# Patient Record
Sex: MALE | Race: WHITE | NOT HISPANIC OR LATINO | Employment: OTHER | URBAN - NONMETROPOLITAN AREA
[De-identification: names, ages, dates, MRNs, and addresses within clinical notes are randomized per-mention and may not be internally consistent; named-entity substitution may affect disease eponyms.]

---

## 2017-09-04 ENCOUNTER — HOSPITAL ENCOUNTER (EMERGENCY)
Facility: HOSPITAL | Age: 57
Discharge: HOME | End: 2017-09-04
Attending: INTERNAL MEDICINE | Admitting: INTERNAL MEDICINE
Payer: MEDICARE

## 2017-09-04 VITALS
TEMPERATURE: 98.1 F | DIASTOLIC BLOOD PRESSURE: 94 MMHG | RESPIRATION RATE: 18 BRPM | SYSTOLIC BLOOD PRESSURE: 151 MMHG | OXYGEN SATURATION: 100 %

## 2017-09-04 DIAGNOSIS — K59.01 SLOW TRANSIT CONSTIPATION: ICD-10-CM

## 2017-09-04 DIAGNOSIS — K21.9 GASTROESOPHAGEAL REFLUX DISEASE, ESOPHAGITIS PRESENCE NOT SPECIFIED: ICD-10-CM

## 2017-09-04 LAB
ALBUMIN SERPL-MCNC: 3.4 G/DL (ref 3.4–5)
ALP SERPL-CCNC: 136 U/L (ref 40–150)
ALT SERPL W P-5'-P-CCNC: 15 U/L (ref 0–70)
AMYLASE SERPL-CCNC: 28 U/L (ref 30–110)
ANION GAP SERPL CALCULATED.3IONS-SCNC: 10 MMOL/L (ref 3–14)
AST SERPL W P-5'-P-CCNC: 15 U/L (ref 0–45)
BASOPHILS # BLD AUTO: 0 10E9/L (ref 0–0.2)
BASOPHILS NFR BLD AUTO: 0.4 %
BILIRUB SERPL-MCNC: 0.4 MG/DL (ref 0.2–1.3)
BUN SERPL-MCNC: 12 MG/DL (ref 7–30)
CALCIUM SERPL-MCNC: 8.6 MG/DL (ref 8.5–10.1)
CHLORIDE SERPL-SCNC: 102 MMOL/L (ref 94–109)
CO2 SERPL-SCNC: 24 MMOL/L (ref 20–32)
CREAT SERPL-MCNC: 0.88 MG/DL (ref 0.66–1.25)
DIFFERENTIAL METHOD BLD: ABNORMAL
EOSINOPHIL # BLD AUTO: 0.1 10E9/L (ref 0–0.7)
EOSINOPHIL NFR BLD AUTO: 1.3 %
ERYTHROCYTE [DISTWIDTH] IN BLOOD BY AUTOMATED COUNT: 12.1 % (ref 10–15)
GFR SERPL CREATININE-BSD FRML MDRD: 90 ML/MIN/1.7M2
GLUCOSE SERPL-MCNC: 104 MG/DL (ref 70–99)
HCT VFR BLD AUTO: 39.1 % (ref 40–53)
HGB BLD-MCNC: 13.8 G/DL (ref 13.3–17.7)
IMM GRANULOCYTES # BLD: 0 10E9/L (ref 0–0.4)
IMM GRANULOCYTES NFR BLD: 0.3 %
LIPASE SERPL-CCNC: 96 U/L (ref 73–393)
LYMPHOCYTES # BLD AUTO: 1.9 10E9/L (ref 0.8–5.3)
LYMPHOCYTES NFR BLD AUTO: 27.3 %
MCH RBC QN AUTO: 32.5 PG (ref 26.5–33)
MCHC RBC AUTO-ENTMCNC: 35.3 G/DL (ref 31.5–36.5)
MCV RBC AUTO: 92 FL (ref 78–100)
MONOCYTES # BLD AUTO: 0.7 10E9/L (ref 0–1.3)
MONOCYTES NFR BLD AUTO: 9.4 %
NEUTROPHILS # BLD AUTO: 4.2 10E9/L (ref 1.6–8.3)
NEUTROPHILS NFR BLD AUTO: 61.3 %
NRBC # BLD AUTO: 0 10*3/UL
NRBC BLD AUTO-RTO: 0 /100
PLATELET # BLD AUTO: 122 10E9/L (ref 150–450)
POTASSIUM SERPL-SCNC: 3.4 MMOL/L (ref 3.4–5.3)
PROT SERPL-MCNC: 7.3 G/DL (ref 6.8–8.8)
RBC # BLD AUTO: 4.24 10E12/L (ref 4.4–5.9)
SODIUM SERPL-SCNC: 136 MMOL/L (ref 133–144)
TROPONIN I SERPL-MCNC: <0.015 UG/L (ref 0–0.04)
WBC # BLD AUTO: 6.9 10E9/L (ref 4–11)

## 2017-09-04 PROCEDURE — 25000125 ZZHC RX 250: Performed by: INTERNAL MEDICINE

## 2017-09-04 PROCEDURE — 96374 THER/PROPH/DIAG INJ IV PUSH: CPT

## 2017-09-04 PROCEDURE — 80053 COMPREHEN METABOLIC PANEL: CPT | Performed by: INTERNAL MEDICINE

## 2017-09-04 PROCEDURE — 96375 TX/PRO/DX INJ NEW DRUG ADDON: CPT

## 2017-09-04 PROCEDURE — 93010 ELECTROCARDIOGRAM REPORT: CPT | Performed by: INTERNAL MEDICINE

## 2017-09-04 PROCEDURE — 84484 ASSAY OF TROPONIN QUANT: CPT | Performed by: INTERNAL MEDICINE

## 2017-09-04 PROCEDURE — 85025 COMPLETE CBC W/AUTO DIFF WBC: CPT | Performed by: INTERNAL MEDICINE

## 2017-09-04 PROCEDURE — 99285 EMERGENCY DEPT VISIT HI MDM: CPT | Performed by: INTERNAL MEDICINE

## 2017-09-04 PROCEDURE — A9270 NON-COVERED ITEM OR SERVICE: HCPCS | Mod: GY | Performed by: INTERNAL MEDICINE

## 2017-09-04 PROCEDURE — 25000128 H RX IP 250 OP 636: Performed by: INTERNAL MEDICINE

## 2017-09-04 PROCEDURE — 25000132 ZZH RX MED GY IP 250 OP 250 PS 637: Mod: GY | Performed by: INTERNAL MEDICINE

## 2017-09-04 PROCEDURE — 74020 ZZHC X-RAY ABDOMEN COMPLETE: CPT | Mod: TC

## 2017-09-04 PROCEDURE — 83690 ASSAY OF LIPASE: CPT | Performed by: INTERNAL MEDICINE

## 2017-09-04 PROCEDURE — 96361 HYDRATE IV INFUSION ADD-ON: CPT

## 2017-09-04 PROCEDURE — S0028 INJECTION, FAMOTIDINE, 20 MG: HCPCS | Performed by: INTERNAL MEDICINE

## 2017-09-04 PROCEDURE — 93005 ELECTROCARDIOGRAM TRACING: CPT

## 2017-09-04 PROCEDURE — 82150 ASSAY OF AMYLASE: CPT | Performed by: INTERNAL MEDICINE

## 2017-09-04 PROCEDURE — 71020 ZZHC CHEST TWO VIEWS, FRONT/LAT: CPT | Mod: TC

## 2017-09-04 PROCEDURE — 36415 COLL VENOUS BLD VENIPUNCTURE: CPT | Performed by: INTERNAL MEDICINE

## 2017-09-04 PROCEDURE — 99285 EMERGENCY DEPT VISIT HI MDM: CPT | Mod: 25

## 2017-09-04 RX ORDER — FAMOTIDINE 20 MG/1
20 TABLET, FILM COATED ORAL 2 TIMES DAILY
Qty: 20 TABLET | Refills: 0 | Status: SHIPPED | OUTPATIENT
Start: 2017-09-04 | End: 2017-09-14

## 2017-09-04 RX ORDER — BISACODYL 10 MG
10 SUPPOSITORY, RECTAL RECTAL ONCE
Status: COMPLETED | OUTPATIENT
Start: 2017-09-04 | End: 2017-09-04

## 2017-09-04 RX ORDER — LORAZEPAM 2 MG/ML
1 INJECTION INTRAMUSCULAR ONCE
Status: COMPLETED | OUTPATIENT
Start: 2017-09-04 | End: 2017-09-04

## 2017-09-04 RX ORDER — LABETALOL HYDROCHLORIDE 5 MG/ML
20 INJECTION, SOLUTION INTRAVENOUS ONCE
Status: COMPLETED | OUTPATIENT
Start: 2017-09-04 | End: 2017-09-04

## 2017-09-04 RX ADMIN — SODIUM CHLORIDE 1000 ML: 9 INJECTION, SOLUTION INTRAVENOUS at 04:42

## 2017-09-04 RX ADMIN — LIDOCAINE HYDROCHLORIDE 30 ML: 20 SOLUTION ORAL; TOPICAL at 07:41

## 2017-09-04 RX ADMIN — LABETALOL HYDROCHLORIDE 20 MG: 5 INJECTION, SOLUTION INTRAVENOUS at 04:39

## 2017-09-04 RX ADMIN — LORAZEPAM 1 MG: 2 INJECTION INTRAMUSCULAR; INTRAVENOUS at 06:37

## 2017-09-04 RX ADMIN — BISACODYL 10 MG: 10 SUPPOSITORY RECTAL at 06:38

## 2017-09-04 RX ADMIN — MAGNESIUM HYDROXIDE 30 ML: 400 SUSPENSION ORAL at 04:28

## 2017-09-04 RX ADMIN — FAMOTIDINE 20 MG: 10 INJECTION, SOLUTION INTRAVENOUS at 04:41

## 2017-09-04 ASSESSMENT — ENCOUNTER SYMPTOMS
COUGH: 0
BACK PAIN: 0
DIAPHORESIS: 0
HEADACHES: 0
NUMBNESS: 0
FEVER: 0
WEAKNESS: 0
CONFUSION: 0
NAUSEA: 1
VOMITING: 0
SLEEP DISTURBANCE: 0
FREQUENCY: 0
BLOOD IN STOOL: 0
ABDOMINAL DISTENTION: 0
DIZZINESS: 0
CHEST TIGHTNESS: 0
FLANK PAIN: 0
CHILLS: 0
PALPITATIONS: 0
CONSTIPATION: 1
VOICE CHANGE: 0
COLOR CHANGE: 0
WHEEZING: 0
LIGHT-HEADEDNESS: 0
DYSURIA: 0
ABDOMINAL PAIN: 1
SHORTNESS OF BREATH: 0
MYALGIAS: 0
ANAL BLEEDING: 0
NECK PAIN: 0

## 2017-09-04 NOTE — ED PROVIDER NOTES
History     Chief Complaint   Patient presents with     Gastrophageal Reflux     For two days, states acid comes up into neck and makes him nauseated     The history is provided by the patient.     Rodney Goodwin is a 57 year old male who came for feeling acid reflex for 2 days, symptoms started after abusing meth 2 days ago, he is on methadone program. Also complaining of lower abd cramps and constipation.    I have reviewed the Medications, Allergies, Past Medical and Surgical History, and Social History in the Epic system.      Review of Systems   Constitutional: Negative for chills, diaphoresis and fever.   HENT: Negative for voice change.    Eyes: Negative for visual disturbance.   Respiratory: Negative for cough, chest tightness, shortness of breath and wheezing.    Cardiovascular: Negative for chest pain, palpitations and leg swelling.   Gastrointestinal: Positive for abdominal pain, constipation and nausea. Negative for abdominal distention, anal bleeding, blood in stool and vomiting.   Genitourinary: Negative for decreased urine volume, dysuria, flank pain and frequency.   Musculoskeletal: Negative for back pain, gait problem, myalgias and neck pain.   Skin: Negative for color change, pallor and rash.   Neurological: Negative for dizziness, syncope, weakness, light-headedness, numbness and headaches.   Psychiatric/Behavioral: Negative for confusion, sleep disturbance and suicidal ideas.       Physical Exam   BP: (!) 186/104  Heart Rate: 79  Temp: 97.6  F (36.4  C)  Resp: 19  SpO2: 100 %  Physical Exam   Constitutional: He is oriented to person, place, and time. He appears well-developed and well-nourished.   HENT:   Head: Normocephalic and atraumatic.   Mouth/Throat: No oropharyngeal exudate.   Eyes: Conjunctivae are normal. Pupils are equal, round, and reactive to light.   Neck: Normal range of motion. Neck supple. No JVD present. No tracheal deviation present. No thyromegaly present.   Cardiovascular:  Normal rate, regular rhythm, normal heart sounds and intact distal pulses.  Exam reveals no gallop and no friction rub.    No murmur heard.  Pulmonary/Chest: Effort normal and breath sounds normal. No stridor. No respiratory distress. He has no wheezes. He has no rales. He exhibits no tenderness.   Abdominal: Soft. Bowel sounds are normal. He exhibits no distension and no mass. There is no tenderness. There is no rebound and no guarding.   Musculoskeletal: Normal range of motion. He exhibits no edema or tenderness.   Lymphadenopathy:     He has no cervical adenopathy.   Neurological: He is alert and oriented to person, place, and time.   Skin: Skin is warm and dry. No rash noted. No erythema. No pallor.   Psychiatric: His behavior is normal.   Nursing note and vitals reviewed.      ED Course     ED Course     Procedures               Labs Ordered and Resulted from Time of ED Arrival Up to the Time of Departure from the ED   AMYLASE - Abnormal; Notable for the following:        Result Value    Amylase 28 (*)     All other components within normal limits   CBC WITH PLATELETS DIFFERENTIAL - Abnormal; Notable for the following:     RBC Count 4.24 (*)     Hematocrit 39.1 (*)     Platelet Count 122 (*)     All other components within normal limits   COMPREHENSIVE METABOLIC PANEL - Abnormal; Notable for the following:     Glucose 104 (*)     All other components within normal limits   LIPASE   TROPONIN I       Assessments & Plan (with Medical Decision Making)   Feeling acid reflax after abusing meth 2 days ago, constipation + lower abd carmps  EKG: NSR,   CXR, no acute finding   abd xray: constipation  No significant abnormality in labs  Pt had large bowel movement , felt much better  I advised fiber food at home,  Antiacid therapy  Fu with PCP  I have reviewed the nursing notes.    I have reviewed the findings, diagnosis, plan and need for follow up with the patient.      New Prescriptions    FAMOTIDINE (PEPCID) 20 MG  TABLET    Take 1 tablet (20 mg) by mouth 2 times daily for 10 days    MAGNESIUM HYDROXIDE (MILK OF MAGNESIA) 400 MG/5ML SUSPENSION    Take 30 mLs by mouth daily as needed for constipation or heartburn       Final diagnoses:   Slow transit constipation   Gastroesophageal reflux disease, esophagitis presence not specified       9/4/2017   [facility]     Joel Sharp MD  09/04/17 9667

## 2017-09-04 NOTE — ED AVS SNAPSHOT
[facility]    [address] [address]    Phone:  [phone]                                       Rodney Goodwin   MRN: [id number]    Department:  [facility]   Date of Visit:  9/4/2017           Patient Information     Date Of Birth          1960        Your diagnoses for this visit were:     Slow transit constipation     Gastroesophageal reflux disease, esophagitis presence not specified        You were seen by Joel Sharp MD.      Follow-up Information     Call Tristan Estevez MD.    Specialty:  Family Practice    Contact information:    [facility]  [address] [address]  [phone]          Discharge Instructions         Treating Constipation    Constipation is a common and often uncomfortable problem. Constipation means you have bowel movements fewer than 3 times per week, or strain to pass hard, dry stool. It can last a short time. Or it can be a problem that never seems to go away. The good news is that it can often be treated and controlled.  Eat more fiber  One of the best ways to help treat constipation is to increase your fiber intake. You can do this either through diet or by using fiber supplements. Fiber (in whole grains, fruits, and vegetables) adds bulk and absorbs water to soften the stool. This helps the stool pass through the colon more easily. When you increase your fiber intake, do it slowly to avoid side effects such as bloating. Also increase the amount of water that you drink. Eating more of the following foods can add fiber to your diet.    High-fiber cereals    Whole grains, bran, and brown rice    Vegetables such as carrots, broccoli, and greens    Fresh fruits (especially apples, pears, and dried fruits like raisins and apricots)    Nuts and legumes (especially beans such as lentils, kidney beans, and lima beans)  Get physically active  Exercise helps improve the working of your colon which helps ease  constipation. Try to get some physical activity every day. If you haven t been active for a while, talk to your healthcare provider before starting again.  Laxatives  Your healthcare provider may suggest an over-the-counter product to help ease your constipation. He or she may suggest the use of bulk-forming agents or laxatives. The use of laxatives, if used as directed, is common and safe. Follow directions carefully when using them. See your healthcare provider for new-onset constipation, or long-term constipation, to rule out other causes such as medicines or thyroid disease.  Date Last Reviewed: 7/1/2016 2000-2017 [employer]. [address] [place] [address], [place] [address]. All rights reserved. This information is not intended as a substitute for professional medical care. Always follow your healthcare professional's instructions.          Medicines for Acid Reflux  Your healthcare provider has told you that you have acid reflux. This condition causes stomach acid to wash up into your throat. For most people, acid reflux is troubling but not dangerous. But left untreated, acid reflux sometimes damages the esophagus. Medicines can help control acid reflux and limit your risk of future problems.  Medicines for acid reflux  Your healthcare provider may prescribe medicine to help treat your acid reflux. Medicine will be based on your symptoms and any test results. Your provider will explain how to take your medicine. You will also be told about possible side effects.  Reducing stomach acid  Your provider may suggest antacids that you can buy over the counter. Antacids can give fast relief. Or you may be told to take a type of medicine called H2 blockers. These are available over the counter and by prescription (for higher doses).  Blocking stomach acid  In more severe cases, your healthcare provider may suggest stronger medicines such as proton pump inhibitors (PPIs). These keep the stomach from making  acid. They are often prescribed for long-term use.  Other medicines  In some cases medicines to reduce or block stomach acid may not work. Then you may be switched to another type of medicine that helps your stomach empty better.     Date Last Reviewed: 10/1/2016    8461-9339 [employer]. [address] [place] [address]. All rights reserved. This information is not intended as a substitute for professional medical care. Always follow your healthcare professional's instructions.             Review of your medicines      START taking        Dose / Directions Last dose taken    famotidine 20 MG tablet   Commonly known as:  PEPCID   Dose:  20 mg   Quantity:  20 tablet        Take 1 tablet (20 mg) by mouth 2 times daily for 10 days   Refills:  0        magnesium hydroxide 400 MG/5ML suspension   Commonly known as:  MILK OF MAGNESIA   Dose:  30 mL   Quantity:  360 mL        Take 30 mLs by mouth daily as needed for constipation or heartburn   Refills:  1          Our records show that you are taking the medicines listed below. If these are incorrect, please call your family doctor or clinic.        Dose / Directions Last dose taken    ATENOLOL PO   Dose:  100 mg        Take 100 mg by mouth daily   Refills:  0        ipratropium - albuterol 0.5 mg/2.5 mg/3 mL 0.5-2.5 (3) MG/3ML neb solution   Commonly known as:  DUONEB   Dose:  1 vial   Quantity:  360 mL        Take 1 vial (3 mLs) by nebulization every 6 hours as needed for shortness of breath / dyspnea or wheezing   Refills:  0        METHADONE HCL PO   Dose:  175 mg        Take 175 mg by mouth daily   Refills:  0        nystatin 421484 UNIT/ML suspension   Commonly known as:  MYCOSTATIN   Dose:  130743 Units        Take 500,000 Units by mouth 4 times daily Swish and swallow 5 mL 4 times daily.   Refills:  0        order for DME   Quantity:  1 Device        Equipment being ordered: Oxygen 4 liter/min   Refills:  0        SIMVASTATIN PO   Dose:  20  "mg        Take 20 mg by mouth daily   Refills:  0        tiotropium 18 MCG capsule   Commonly known as:  SPIRIVA   Dose:  18 mcg   Quantity:  30 capsule        Inhale 1 capsule (18 mcg) into the lungs daily   Refills:  3                Prescriptions were sent or printed at these locations (2 Prescriptions)                   [facility] - [place], [place] - [address] AT [facility] [address]   [address], [place] [address]    Telephone:  [phone]   Fax:  [phone]   Hours:                  Printed at Department/Unit printer (2 of 2)         magnesium hydroxide (MILK OF MAGNESIA) 400 MG/5ML suspension               famotidine (PEPCID) 20 MG tablet                Procedures and tests performed during your visit     Amylase    CBC with platelets differential    Comprehensive metabolic panel    EKG 12-lead, tracing only    Lipase    Troponin I    XR Abdomen 2 Views    XR Chest 2 Views      Orders Needing Specimen Collection     None      Pending Results     Date and Time Order Name Status Description    9/4/2017 0425 XR Chest 2 Views In process     9/4/2017 0420 XR Abdomen 2 Views In process             Pending Culture Results     No orders found from 9/2/2017 to 9/5/2017.            Thank you for choosing [facility]       Thank you for choosing [facility] for your care. Our goal is always to provide you with excellent care. Hearing back from our patients is one way we can continue to improve our services. Please take a few minutes to complete the written survey that you may receive in the mail after you visit with us. Thank you!        [employer] Information     [employer] lets you send messages to your doctor, view your test results, renew your prescriptions, schedule appointments and more. To sign up, go to [email or web address] . Click on \"Log in\" on the left side of the screen, which will take you to the Welcome page. Then click on \"Sign up Now\" on the right side of the page.     You will be " asked to enter the access code listed below, as well as some personal information. Please follow the directions to create your username and password.     Your access code is: [email or web address]  Expires: 12/3/2017  7:45 AM     Your access code will  in 90 days. If you need help or a new code, please call your [facility] clinic or [phone].        Care EveryWhere ID     This is your Care EveryWhere ID. This could be used by other organizations to access your [facility] medical records  [id number]        Equal Access to Services     [facility]: Dean Moreira, kleber izquierdo, maci auallucas zhao, shelley short . So [facility] [phone].    ATENCIÓN: Si habla español, tiene a humphreys disposición servicios gratuitos de asistencia lingüística. Sheldon al [phone].    We comply with applicable federal civil rights laws and [place] laws. We do not discriminate on the basis of race, color, national origin, age, disability sex, sexual orientation or gender identity.            After Visit Summary       This is your record. Keep this with you and show to your community pharmacist(s) and doctor(s) at your next visit.

## 2017-09-04 NOTE — ED NOTES
Face to face report given with opportunity to observe patient.    Report given to ALFREDO Solomon   9/4/2017  7:16 AM

## 2017-09-04 NOTE — ED NOTES
"Reviewed discharge medications and instructions with pt. Questioning \"that he isnt going to make it [place] today for his daily Methadone dose,\" instructed him that he will need to contact clinic and inform them. Educated that we do not adm methadone for purposes as indicated. Copy of discharge instructions provided and prescriptions for Maalox and Pepcid  "

## 2017-09-04 NOTE — ED NOTES
Resting in bed, states that he feels no better and he is concerned about the taste in his mouth. The patient did get up to void, no bowel movement, however. Continues to moan in the bed. BP remains 160's/ 70's. Dr. Marks notified that results are back.

## 2017-09-04 NOTE — ED NOTES
Patient wanting someone to call his methadone clinic. He would like to have his methadone given here since he will not be making it to his appointment in [place] this AM. Provider updated. Provider in for re evaluation at this time

## 2017-09-04 NOTE — ED NOTES
"Presents with epigastric pain. The patient states that he has a history of hypertension and that he also uses methadone for heroin dependence. The patient reports that he's unable to vomit though he has \"acid come up\" and it \"burns\". The patient also states he has not had a bowel movement since Saturday morning and he is unable to urinate much. The patient reports that he \"smoked a lot of meth\" on Saturday. The patient reports that he has not felt right since that time and he has not felt well since. He initially complained of epigastric pain however he then changed his complaint to mid to lower abdominal pain. Assessment as per chart. Dr. Marks notified  "

## 2017-09-04 NOTE — ED AVS SNAPSHOT
[facility]    [address] [address]    Phone:  [phone]                                       Rodney Goodwin   MRN: [id number]    Department:  [facility]   Date of Visit:  9/4/2017           After Visit Summary Signature Page     I have received my discharge instructions, and my questions have been answered. I have discussed any challenges I see with this plan with the nurse or doctor.    ..........................................................................................................................................  Patient/Patient Representative Signature      ..........................................................................................................................................  Patient Representative Print Name and Relationship to Patient    ..................................................               ................................................  Date                                            Time    ..........................................................................................................................................  Reviewed by Signature/Title    ...................................................              ..............................................  Date                                                            Time

## 2017-09-04 NOTE — DISCHARGE INSTRUCTIONS
Treating Constipation    Constipation is a common and often uncomfortable problem. Constipation means you have bowel movements fewer than 3 times per week, or strain to pass hard, dry stool. It can last a short time. Or it can be a problem that never seems to go away. The good news is that it can often be treated and controlled.  Eat more fiber  One of the best ways to help treat constipation is to increase your fiber intake. You can do this either through diet or by using fiber supplements. Fiber (in whole grains, fruits, and vegetables) adds bulk and absorbs water to soften the stool. This helps the stool pass through the colon more easily. When you increase your fiber intake, do it slowly to avoid side effects such as bloating. Also increase the amount of water that you drink. Eating more of the following foods can add fiber to your diet.    High-fiber cereals    Whole grains, bran, and brown rice    Vegetables such as carrots, broccoli, and greens    Fresh fruits (especially apples, pears, and dried fruits like raisins and apricots)    Nuts and legumes (especially beans such as lentils, kidney beans, and lima beans)  Get physically active  Exercise helps improve the working of your colon which helps ease constipation. Try to get some physical activity every day. If you haven t been active for a while, talk to your healthcare provider before starting again.  Laxatives  Your healthcare provider may suggest an over-the-counter product to help ease your constipation. He or she may suggest the use of bulk-forming agents or laxatives. The use of laxatives, if used as directed, is common and safe. Follow directions carefully when using them. See your healthcare provider for new-onset constipation, or long-term constipation, to rule out other causes such as medicines or thyroid disease.  Date Last Reviewed: 7/1/2016 2000-2017 [employer]. [address] [place] [address], [place] [address]. All rights reserved.  This information is not intended as a substitute for professional medical care. Always follow your healthcare professional's instructions.          Medicines for Acid Reflux  Your healthcare provider has told you that you have acid reflux. This condition causes stomach acid to wash up into your throat. For most people, acid reflux is troubling but not dangerous. But left untreated, acid reflux sometimes damages the esophagus. Medicines can help control acid reflux and limit your risk of future problems.  Medicines for acid reflux  Your healthcare provider may prescribe medicine to help treat your acid reflux. Medicine will be based on your symptoms and any test results. Your provider will explain how to take your medicine. You will also be told about possible side effects.  Reducing stomach acid  Your provider may suggest antacids that you can buy over the counter. Antacids can give fast relief. Or you may be told to take a type of medicine called H2 blockers. These are available over the counter and by prescription (for higher doses).  Blocking stomach acid  In more severe cases, your healthcare provider may suggest stronger medicines such as proton pump inhibitors (PPIs). These keep the stomach from making acid. They are often prescribed for long-term use.  Other medicines  In some cases medicines to reduce or block stomach acid may not work. Then you may be switched to another type of medicine that helps your stomach empty better.     Date Last Reviewed: 10/1/2016    7382-6485 [employer] [address] [place] [address], [place] [address]. All rights reserved. This information is not intended as a substitute for professional medical care. Always follow your healthcare professional's instructions.

## 2017-09-13 ENCOUNTER — TRANSFERRED RECORDS (OUTPATIENT)
Dept: HEALTH INFORMATION MANAGEMENT | Facility: HOSPITAL | Age: 57
End: 2017-09-13

## 2017-10-17 ENCOUNTER — TRANSFERRED RECORDS (OUTPATIENT)
Dept: HEALTH INFORMATION MANAGEMENT | Facility: HOSPITAL | Age: 57
End: 2017-10-17

## 2017-10-25 ENCOUNTER — TRANSFERRED RECORDS (OUTPATIENT)
Dept: HEALTH INFORMATION MANAGEMENT | Facility: HOSPITAL | Age: 57
End: 2017-10-25

## 2017-11-03 ENCOUNTER — HOSPITAL ENCOUNTER (EMERGENCY)
Facility: HOSPITAL | Age: 57
Discharge: HOME | End: 2017-11-04
Attending: INTERNAL MEDICINE | Admitting: INTERNAL MEDICINE
Payer: MEDICARE

## 2017-11-03 ENCOUNTER — APPOINTMENT (OUTPATIENT)
Dept: CT IMAGING | Facility: HOSPITAL | Age: 57
End: 2017-11-03
Attending: INTERNAL MEDICINE
Payer: MEDICARE

## 2017-11-03 DIAGNOSIS — K63.89 EPIPLOIC APPENDAGITIS: ICD-10-CM

## 2017-11-03 LAB
ALBUMIN SERPL-MCNC: 3.5 G/DL (ref 3.4–5)
ALP SERPL-CCNC: 134 U/L (ref 40–150)
ALT SERPL W P-5'-P-CCNC: 21 U/L (ref 0–70)
AMYLASE SERPL-CCNC: 26 U/L (ref 30–110)
ANION GAP SERPL CALCULATED.3IONS-SCNC: 7 MMOL/L (ref 3–14)
AST SERPL W P-5'-P-CCNC: 17 U/L (ref 0–45)
BASOPHILS # BLD AUTO: 0 10E9/L (ref 0–0.2)
BASOPHILS NFR BLD AUTO: 0.5 %
BILIRUB SERPL-MCNC: 0.3 MG/DL (ref 0.2–1.3)
BUN SERPL-MCNC: 22 MG/DL (ref 7–30)
CALCIUM SERPL-MCNC: 8.5 MG/DL (ref 8.5–10.1)
CHLORIDE SERPL-SCNC: 104 MMOL/L (ref 94–109)
CO2 SERPL-SCNC: 26 MMOL/L (ref 20–32)
CREAT SERPL-MCNC: 1.14 MG/DL (ref 0.66–1.25)
CRP SERPL-MCNC: 6.5 MG/L (ref 0–8)
DIFFERENTIAL METHOD BLD: ABNORMAL
EOSINOPHIL # BLD AUTO: 0.1 10E9/L (ref 0–0.7)
EOSINOPHIL NFR BLD AUTO: 1.5 %
ERYTHROCYTE [DISTWIDTH] IN BLOOD BY AUTOMATED COUNT: 12.4 % (ref 10–15)
GFR SERPL CREATININE-BSD FRML MDRD: 67 ML/MIN/1.7M2
GLUCOSE SERPL-MCNC: 111 MG/DL (ref 70–99)
HCT VFR BLD AUTO: 35.8 % (ref 40–53)
HGB BLD-MCNC: 12.5 G/DL (ref 13.3–17.7)
IMM GRANULOCYTES # BLD: 0 10E9/L (ref 0–0.4)
IMM GRANULOCYTES NFR BLD: 0.2 %
LIPASE SERPL-CCNC: 81 U/L (ref 73–393)
LYMPHOCYTES # BLD AUTO: 2.5 10E9/L (ref 0.8–5.3)
LYMPHOCYTES NFR BLD AUTO: 30.1 %
MCH RBC QN AUTO: 32.1 PG (ref 26.5–33)
MCHC RBC AUTO-ENTMCNC: 34.9 G/DL (ref 31.5–36.5)
MCV RBC AUTO: 92 FL (ref 78–100)
MONOCYTES # BLD AUTO: 0.7 10E9/L (ref 0–1.3)
MONOCYTES NFR BLD AUTO: 8.2 %
NEUTROPHILS # BLD AUTO: 4.8 10E9/L (ref 1.6–8.3)
NEUTROPHILS NFR BLD AUTO: 59.5 %
NRBC # BLD AUTO: 0 10*3/UL
NRBC BLD AUTO-RTO: 0 /100
PLATELET # BLD AUTO: 145 10E9/L (ref 150–450)
POTASSIUM SERPL-SCNC: 3.7 MMOL/L (ref 3.4–5.3)
PROT SERPL-MCNC: 7 G/DL (ref 6.8–8.8)
RBC # BLD AUTO: 3.89 10E12/L (ref 4.4–5.9)
SODIUM SERPL-SCNC: 137 MMOL/L (ref 133–144)
WBC # BLD AUTO: 8.1 10E9/L (ref 4–11)

## 2017-11-03 PROCEDURE — 99285 EMERGENCY DEPT VISIT HI MDM: CPT | Mod: 25

## 2017-11-03 PROCEDURE — 83690 ASSAY OF LIPASE: CPT | Performed by: PHYSICIAN ASSISTANT

## 2017-11-03 PROCEDURE — 25000128 H RX IP 250 OP 636: Performed by: INTERNAL MEDICINE

## 2017-11-03 PROCEDURE — 86140 C-REACTIVE PROTEIN: CPT | Performed by: PHYSICIAN ASSISTANT

## 2017-11-03 PROCEDURE — 96374 THER/PROPH/DIAG INJ IV PUSH: CPT | Mod: 59

## 2017-11-03 PROCEDURE — 80053 COMPREHEN METABOLIC PANEL: CPT | Performed by: PHYSICIAN ASSISTANT

## 2017-11-03 PROCEDURE — 74177 CT ABD & PELVIS W/CONTRAST: CPT | Mod: TC

## 2017-11-03 PROCEDURE — 82150 ASSAY OF AMYLASE: CPT | Performed by: PHYSICIAN ASSISTANT

## 2017-11-03 PROCEDURE — 99285 EMERGENCY DEPT VISIT HI MDM: CPT | Performed by: INTERNAL MEDICINE

## 2017-11-03 PROCEDURE — 85025 COMPLETE CBC W/AUTO DIFF WBC: CPT | Performed by: PHYSICIAN ASSISTANT

## 2017-11-03 PROCEDURE — 36415 COLL VENOUS BLD VENIPUNCTURE: CPT | Performed by: PHYSICIAN ASSISTANT

## 2017-11-03 RX ORDER — IOPAMIDOL 612 MG/ML
100 INJECTION, SOLUTION INTRAVASCULAR ONCE
Status: COMPLETED | OUTPATIENT
Start: 2017-11-03 | End: 2017-11-03

## 2017-11-03 RX ORDER — KETOROLAC TROMETHAMINE 30 MG/ML
30 INJECTION, SOLUTION INTRAMUSCULAR; INTRAVENOUS ONCE
Status: COMPLETED | OUTPATIENT
Start: 2017-11-03 | End: 2017-11-03

## 2017-11-03 RX ORDER — NAPROXEN 500 MG/1
500 TABLET ORAL 2 TIMES DAILY WITH MEALS
Qty: 6 TABLET | Refills: 0 | Status: SHIPPED | OUTPATIENT
Start: 2017-11-03 | End: 2017-11-06

## 2017-11-03 RX ADMIN — IOPAMIDOL 100 ML: 612 INJECTION, SOLUTION INTRAVENOUS at 22:43

## 2017-11-03 RX ADMIN — KETOROLAC TROMETHAMINE 30 MG: 30 INJECTION, SOLUTION INTRAMUSCULAR at 21:54

## 2017-11-03 NOTE — ED AVS SNAPSHOT
[facility]    [address] [address]    Phone:  [phone]                                       Rodney Goodwin   MRN: [id number]    Department[facility]  [facility]   Date of Visit:  11/3/2017           After Visit Summary Signature Page     I have received my discharge instructions, and my questions have been answered. I have discussed any challenges I see with this plan with the nurse or doctor.    ..........................................................................................................................................  Patient/Patient Representative Signature      ..........................................................................................................................................  Patient Representative Print Name and Relationship to Patient    ..................................................               ................................................  Date                                            Time    ..........................................................................................................................................  Reviewed by Signature/Title    ...................................................              ..............................................  Date                                                            Time

## 2017-11-03 NOTE — ED AVS SNAPSHOT
[facility]    [address] [address]    Phone:  [phone]                                       Rodney Goodwin   MRN: [id number]    Department:  [facility]   Date of Visit:  11/3/2017           Patient Information     Date Of Birth          1960        Your diagnoses for this visit were:     Epiploic appendagitis        You were seen by Joel Sharp MD.      Follow-up Information     Follow up with Tristan Estevez MD.    Specialty:  Family Practice    Contact information:    [facility]  [address] [address]  [phone]          Discharge Instructions         How the Colon Works  The colon (large intestine) is a muscular tube that forms the last part of the digestive tract. It absorbs water and stores food waste. The colon is about 4 to 6 feet long. The rectum is the last 6 inches of the colon.    How food moves through the colon  Semiliquid food waste from the small intestine enters the colon at the beginning of the colon (cecum). As this waste, called stool, travels through the colon, it loses water and solidifies. Strong muscles keep the stool moving through the colon. The stool is moved toward the last section of the colon (sigmoid colon). From there, it passes into the rectum, where it is stored until it leaves the body through the anus during a bowel movement.    Date Last Reviewed: 8/1/2016 2000-2017 The [employer]. [address] [place] [address]. All rights reserved. This information is not intended as a substitute for professional medical care. Always follow your healthcare professional's instructions.      What to expect when you have contrast    During your exam, we will inject  contrast  into your vein or artery. (Contrast is a clear liquid with iodine in it. It shows up on X-rays.)    You may feel warm or hot. You may have a metal taste in your mouth and a slight upset stomach. You may also  feel pressure near the kidneys and bladder. These effects will last about 1 to 3 minutes.    Please tell us if you have:    Sneezing     Itching    Hives     Swelling in the face    A hoarse voice    Breathing problems    Other new symptoms    Serious problems are rare.  They may include:    Irregular heartbeat     Seizures    Kidney failure              Tissue damage    Shock      Death    If you have any problems during the exam, we  will treat them right away.    When you get home    Call your hospital if you have any new symptoms in the next 2 days, like hives or swelling. (Phone numbers are at the bottom of this page.) Or call your family doctor.     If you have wheezing or trouble breathing, call 911.    Self-care  -Drink at least 4 extra glasses of water today.   This reduces the stress on your kidneys.  -Keep taking your regular medicines.    The contrast will pass out of your body in your  Urine(pee). This will happen in the next 24 hours. You  will not feel this. Your urine will not  change color.    If you have kidney problems or take metformin    Drink 4 to 8 large glasses of water for the next  2 days, if you are not on a fluid restriction.    ?If you take metformin (Glucophage or Glucovance) for diabetes, keep taking it.      ?Your kidney function tests are abnormal.  If you take Metformin, do not take it for 48 hours. Please go to your clinic for a blood test within 3 days after your exam before the restarting this medicine.     (Note to provider:please give patient prescription for lab tests.)    ?Special instructions:     I have read and understand the above information.    Patient Sign Here:______________________________________Date:________Time:______    Staff Sign Here:________________________________________Date:_______Time:______      Radiology Departments:     ?[facility]: [phone] ?[facility]: [phone]     ?[place]: [phone] ?[facility]:[phone]      ?[place]:  [phone]  ?[place]: [phone]  ?[place]:[phone]    ?[facility]:[phone]  ?[facility]:[phone]       Review of your medicines      START taking        Dose / Directions Last dose taken    naproxen 500 MG tablet   Commonly known as:  NAPROSYN   Dose:  500 mg   Quantity:  6 tablet        Take 1 tablet (500 mg) by mouth 2 times daily (with meals) for 3 days   Refills:  0          Our records show that you are taking the medicines listed below. If these are incorrect, please call your family doctor or clinic.        Dose / Directions Last dose taken    ATENOLOL PO   Dose:  100 mg        Take 100 mg by mouth daily   Refills:  0        ipratropium - albuterol 0.5 mg/2.5 mg/3 mL 0.5-2.5 (3) MG/3ML neb solution   Commonly known as:  DUONEB   Dose:  1 vial   Quantity:  360 mL        Take 1 vial (3 mLs) by nebulization every 6 hours as needed for shortness of breath / dyspnea or wheezing   Refills:  0        METHADONE HCL PO   Dose:  105 mg        Take 105 mg by mouth daily   Refills:  0        nystatin 283483 UNIT/ML suspension   Commonly known as:  MYCOSTATIN   Dose:  112654 Units        Take 500,000 Units by mouth 4 times daily Swish and swallow 5 mL 4 times daily.   Refills:  0        SIMVASTATIN PO   Dose:  20 mg        Take 20 mg by mouth daily   Refills:  0                Prescriptions were sent or printed at these locations (1 Prescription)                   [facility] [address] [place] [address] [place] - [address] AT [facility] [address] [place] [address]    Telephone:  [phone]   Fax:  [phone]   Hours:                  Printed at Department/Unit printer (1 of 1)         naproxen (NAPROSYN) 500 MG tablet                Procedures and tests performed during your visit     Abd/pelvis CT - IV contrast only TRAUMA / AAA    Amylase    CBC with platelets differential    CRP inflammation    Comprehensive metabolic panel    Lipase      Orders Needing Specimen  "Collection     Ordered          17  UA with Microscopic reflex to Culture - STAT, [facility]: STAT, Needs to be Collected     Scheduled Task Status   17 Collect UA with Microscopic reflex to Culture Open   Order Class:  PCU Collect                  Pending Results     Date and Time Order Name Status Description    11/3/2017 2150 Abd/pelvis CT - IV contrast only TRAUMA / AAA In process             Pending Culture Results     No orders found from 2017 to 2017.            Thank you for choosing [facility]       Thank you for choosing [facility] for your care. Our goal is always to provide you with excellent care. Hearing back from our patients is one way we can continue to improve our services. Please take a few minutes to complete the written survey that you may receive in the mail after you visit with us. Thank you!        [facility] Information     [facility] lets you send messages to your doctor, view your test results, renew your prescriptions, schedule appointments and more. To sign up, go to [email or web address] . Click on \"Log in\" on the left side of the screen, which will take you to the Welcome page. Then click on \"Sign up Now\" on the right side of the page.     You will be asked to enter the access code listed below, as well as some personal information. Please follow the directions to create your username and password.     Your access code is: [email or web address]  Expires: 12/3/2017  7:45 AM     Your access code will  in 90 days. If you need help or a new code, please call your [facility] clinic or [phone].        Care EveryWhere ID     This is your Care EveryWhere ID. This could be used by other organizations to access your [facility] medical records  [id number]        Equal Access to Services     NATHAN NIELSON : kleber Sarkar qaybta kaalmada adeegyada, waxay idiin hayaan adeeg kharash la'aan ah. So [facility] [phone].    ATENCIÓN: Si viviennela espcricket vo " a humphreys disposición servicios gratuitos de asistencia lingüística. Lljamey al [phone].    We comply with applicable federal civil rights laws and [place] laws. We do not discriminate on the basis of race, color, national origin, age, disability, sex, sexual orientation, or gender identity.            After Visit Summary       This is your record. Keep this with you and show to your community pharmacist(s) and doctor(s) at your next visit.

## 2017-11-04 VITALS
SYSTOLIC BLOOD PRESSURE: 147 MMHG | TEMPERATURE: 99.2 F | RESPIRATION RATE: 20 BRPM | OXYGEN SATURATION: 98 % | DIASTOLIC BLOOD PRESSURE: 84 MMHG | HEART RATE: 62 BPM

## 2017-11-04 NOTE — ED NOTES
Reports 4 days of sharp, burning RLQ.  Normal bowel movements, normal urination.  Last ate at 1900 today.  Pain worsening since this am.

## 2017-11-04 NOTE — DISCHARGE INSTRUCTIONS
How the Colon Works  The colon (large intestine) is a muscular tube that forms the last part of the digestive tract. It absorbs water and stores food waste. The colon is about 4 to 6 feet long. The rectum is the last 6 inches of the colon.    How food moves through the colon  Semiliquid food waste from the small intestine enters the colon at the beginning of the colon (cecum). As this waste, called stool, travels through the colon, it loses water and solidifies. Strong muscles keep the stool moving through the colon. The stool is moved toward the last section of the colon (sigmoid colon). From there, it passes into the rectum, where it is stored until it leaves the body through the anus during a bowel movement.    Date Last Reviewed: 8/1/2016 2000-2017 The [employer]. [address] [place], [place] [address]. All rights reserved. This information is not intended as a substitute for professional medical care. Always follow your healthcare professional's instructions.      What to expect when you have contrast    During your exam, we will inject  contrast  into your vein or artery. (Contrast is a clear liquid with iodine in it. It shows up on X-rays.)    You may feel warm or hot. You may have a metal taste in your mouth and a slight upset stomach. You may also feel pressure near the kidneys and bladder. These effects will last about 1 to 3 minutes.    Please tell us if you have:    Sneezing     Itching    Hives     Swelling in the face    A hoarse voice    Breathing problems    Other new symptoms    Serious problems are rare.  They may include:    Irregular heartbeat     Seizures    Kidney failure              Tissue damage    Shock      Death    If you have any problems during the exam, we  will treat them right away.    When you get home    Call your hospital if you have any new symptoms in the next 2 days, like hives or swelling. (Phone numbers are at the bottom of this page.) Or call your family  doctor.     If you have wheezing or trouble breathing, call 911.    Self-care  -Drink at least 4 extra glasses of water today.   This reduces the stress on your kidneys.  -Keep taking your regular medicines.    The contrast will pass out of your body in your  Urine(pee). This will happen in the next 24 hours. You  will not feel this. Your urine will not  change color.    If you have kidney problems or take metformin    Drink 4 to 8 large glasses of water for the next  2 days, if you are not on a fluid restriction.    ?If you take metformin (Glucophage or Glucovance) for diabetes, keep taking it.      ?Your kidney function tests are abnormal.  If you take Metformin, do not take it for 48 hours. Please go to your clinic for a blood test within 3 days after your exam before the restarting this medicine.     (Note to provider:please give patient prescription for lab tests.)    ?Special instructions:     I have read and understand the above information.    Patient Sign Here:______________________________________Date:________Time:______    Staff Sign Here:________________________________________Date:_______Time:______      Radiology Departments:     ?[facility]: [phone] ?[place]: [phone]     ?[place]: [phone] ?[facility]:[phone]      ?[place]: [phone]  ?[place]: [phone]  ?[place]:[phone]    ?[facility]:[phone]  ?[facility]:[phone]

## 2017-11-06 NOTE — PROGRESS NOTES
Abdomen/pelvis CT report routed to Dr Estevez. IMPRESSION: Question epiploic appendicitis in the right lower quadrant. The appendix proper is unremarkable. Pt advised to follow up with Dr Estevez.

## 2017-11-08 ASSESSMENT — ENCOUNTER SYMPTOMS
CONFUSION: 0
WHEEZING: 0
MYALGIAS: 0
BLOOD IN STOOL: 0
SLEEP DISTURBANCE: 0
LIGHT-HEADEDNESS: 0
DIAPHORESIS: 0
ABDOMINAL PAIN: 1
CHEST TIGHTNESS: 0
HEADACHES: 0
FREQUENCY: 0
ANAL BLEEDING: 0
NECK PAIN: 0
NUMBNESS: 0
ABDOMINAL DISTENTION: 0
BACK PAIN: 0
VOICE CHANGE: 0
COLOR CHANGE: 0
VOMITING: 0
WEAKNESS: 0
FEVER: 0
DIZZINESS: 0
DYSURIA: 0
PALPITATIONS: 0
CHILLS: 0
SHORTNESS OF BREATH: 0
FLANK PAIN: 0
COUGH: 0
NAUSEA: 0

## 2017-11-08 NOTE — ED PROVIDER NOTES
History     Chief Complaint   Patient presents with     Abdominal Pain     RLQ pain for 4 days. pt goes to methadone clinic daily for 3 years.     Patient is a 57 year old male presenting with abdominal pain. The history is provided by the patient.   Abdominal Pain   Pain location:  RLQ  Pain quality: sharp    Pain radiates to:  Does not radiate  Pain severity:  Moderate  Onset quality:  Gradual  Timing:  Constant  Chronicity:  New  Associated symptoms: no chest pain, no chills, no cough, no dysuria, no fever, no nausea, no shortness of breath and no vomiting          Problem List:    Patient Active Problem List    Diagnosis Date Noted     Elevated blood sugar 10/29/2016     Priority: Medium     Narcotic addiction (H) 10/29/2016     Priority: Medium     Acute exacerbation of chronic obstructive pulmonary disease (H) 10/28/2016     Priority: Medium     Heroin abuse 09/15/2015     Priority: Medium     COPD exacerbation (H) 09/14/2015     Priority: Medium     Costochondritis 02/26/2015     Priority: Medium     Venous insufficiency of both lower extremities 02/26/2015     Priority: Medium     Do you wish to do the replacement in the background? yes         Bipolar 1 disorder (H) 11/11/2014     Priority: Medium     Cellulitis and abscess of forearm 04/04/2014     Priority: Medium        Past Medical History:    Past Medical History:   Diagnosis Date     Anxiety      Bipolar affective disorder (H)      Chronic constipation      Depressive disorder      Drug abuse      Hypertension        Past Surgical History:    Past Surgical History:   Procedure Laterality Date     ARTHROSCOPY KNEE BILATERAL       COLONOSCOPY      Multiple, repeat due 2015     COLONOSCOPY N/A 7/31/2015    Procedure: COLONOSCOPY;  Surgeon: Justin Andujar MD;  Location: [facility]     DENTAL SURGERY       HERNIA REPAIR      Inguinal, left       Family History:    Family History   Problem Relation Age of Onset     CEREBROVASCULAR DISEASE Father      DIABETES  Mother      Pancreatic Cancer Brother        Social History:  Marital Status:   [4]  Social History   Substance Use Topics     Smoking status: Current Every Day Smoker     Packs/day: 1.00     Years: 30.00     Types: Cigarettes     Smokeless tobacco: Never Used      Comment: Tried to Quit (NO)     Alcohol use No        Medications:      METHADONE HCL PO   ATENOLOL PO   SIMVASTATIN PO   ipratropium - albuterol 0.5 mg/2.5 mg/3 mL (DUONEB) 0.5-2.5 (3) MG/3ML nebulization   nystatin (MYCOSTATIN) 544649 UNIT/ML suspension         Review of Systems   Constitutional: Negative for chills, diaphoresis and fever.   HENT: Negative for voice change.    Eyes: Negative for visual disturbance.   Respiratory: Negative for cough, chest tightness, shortness of breath and wheezing.    Cardiovascular: Negative for chest pain, palpitations and leg swelling.   Gastrointestinal: Positive for abdominal pain. Negative for abdominal distention, anal bleeding, blood in stool, nausea and vomiting.   Genitourinary: Negative for decreased urine volume, dysuria, flank pain and frequency.   Musculoskeletal: Negative for back pain, gait problem, myalgias and neck pain.   Skin: Negative for color change, pallor and rash.   Neurological: Negative for dizziness, syncope, weakness, light-headedness, numbness and headaches.   Psychiatric/Behavioral: Negative for confusion, sleep disturbance and suicidal ideas.       Physical Exam   BP: 144/91  Pulse: 79  Heart Rate: 72  Temp: 96.5  F (35.8  C)  Resp: 18  SpO2: 98 %      Physical Exam   Constitutional: He is oriented to person, place, and time. He appears well-developed and well-nourished.   HENT:   Head: Normocephalic and atraumatic.   Mouth/Throat: No oropharyngeal exudate.   Eyes: Conjunctivae are normal. Pupils are equal, round, and reactive to light.   Neck: Normal range of motion. Neck supple. No JVD present. No tracheal deviation present. No thyromegaly present.   Cardiovascular: Normal  rate, regular rhythm, normal heart sounds and intact distal pulses.  Exam reveals no gallop and no friction rub.    No murmur heard.  Pulmonary/Chest: Effort normal and breath sounds normal. No stridor. No respiratory distress. He has no wheezes. He has no rales. He exhibits no tenderness.   Abdominal: Soft. Bowel sounds are normal. He exhibits no distension and no mass. There is tenderness in the right lower quadrant. There is no rebound and no guarding.   Musculoskeletal: Normal range of motion. He exhibits no edema or tenderness.   Lymphadenopathy:     He has no cervical adenopathy.   Neurological: He is alert and oriented to person, place, and time.   Skin: Skin is warm and dry. No rash noted. No erythema. No pallor.   Psychiatric: His behavior is normal.   Nursing note and vitals reviewed.      ED Course     ED Course     Procedures                   Labs Ordered and Resulted from Time of ED Arrival Up to the Time of Departure from the ED   AMYLASE - Abnormal; Notable for the following:        Result Value    Amylase 26 (*)     All other components within normal limits   CBC WITH PLATELETS DIFFERENTIAL - Abnormal; Notable for the following:     RBC Count 3.89 (*)     Hemoglobin 12.5 (*)     Hematocrit 35.8 (*)     Platelet Count 145 (*)     All other components within normal limits   COMPREHENSIVE METABOLIC PANEL - Abnormal; Notable for the following:     Glucose 111 (*)     All other components within normal limits   CRP INFLAMMATION   LIPASE       Assessments & Plan (with Medical Decision Making)   RLQ pain tenderness  CT ab: epiploic appendagitis  Symptoms improved after receiving Toradol  I advised him to return to Er if pain got worse, had fever vomiting or any new symptoms  He understood and agreed  I have reviewed the nursing notes.    I have reviewed the findings, diagnosis, plan and need for follow up with the patient.      Discharge Medication List as of 11/3/2017 11:49 PM      START taking these  medications    Details   naproxen (NAPROSYN) 500 MG tablet Take 1 tablet (500 mg) by mouth 2 times daily (with meals) for 3 days, Disp-6 tablet, R-0, Local Print             Final diagnoses:   Epiploic appendagitis       11/3/2017   [facility]     Joel Sharp MD  11/08/17 5910

## 2017-11-17 ENCOUNTER — TRANSFERRED RECORDS (OUTPATIENT)
Dept: HEALTH INFORMATION MANAGEMENT | Facility: HOSPITAL | Age: 57
End: 2017-11-17

## 2017-11-21 ENCOUNTER — HOSPITAL ENCOUNTER (OUTPATIENT)
Facility: HOSPITAL | Age: 57
Discharge: HOME | End: 2017-11-21
Attending: ORTHOPAEDIC SURGERY | Admitting: ORTHOPAEDIC SURGERY
Payer: MEDICARE

## 2017-11-21 ENCOUNTER — ANESTHESIA EVENT (OUTPATIENT)
Dept: SURGERY | Facility: HOSPITAL | Age: 57
End: 2017-11-21
Payer: MEDICARE

## 2017-11-21 ENCOUNTER — ANESTHESIA (OUTPATIENT)
Dept: SURGERY | Facility: HOSPITAL | Age: 57
End: 2017-11-21
Payer: MEDICARE

## 2017-11-21 VITALS
TEMPERATURE: 97 F | BODY MASS INDEX: 26.51 KG/M2 | HEART RATE: 60 BPM | SYSTOLIC BLOOD PRESSURE: 125 MMHG | HEIGHT: 69 IN | RESPIRATION RATE: 18 BRPM | WEIGHT: 179 LBS | OXYGEN SATURATION: 98 % | DIASTOLIC BLOOD PRESSURE: 78 MMHG

## 2017-11-21 DIAGNOSIS — G56.02 CARPAL TUNNEL SYNDROME OF LEFT WRIST: Primary | ICD-10-CM

## 2017-11-21 PROCEDURE — 37000009 ZZH ANESTHESIA TECHNICAL FEE, EACH ADDTL 15 MIN: Performed by: ORTHOPAEDIC SURGERY

## 2017-11-21 PROCEDURE — 36000050 ZZH SURGERY LEVEL 2 1ST 30 MIN: Performed by: ORTHOPAEDIC SURGERY

## 2017-11-21 PROCEDURE — 25000125 ZZHC RX 250: Performed by: ORTHOPAEDIC SURGERY

## 2017-11-21 PROCEDURE — 64721 CARPAL TUNNEL SURGERY: CPT | Performed by: ANESTHESIOLOGY

## 2017-11-21 PROCEDURE — 40000306 ZZH STATISTIC PRE PROC ASSESS II: Performed by: ORTHOPAEDIC SURGERY

## 2017-11-21 PROCEDURE — 37000008 ZZH ANESTHESIA TECHNICAL FEE, 1ST 30 MIN: Performed by: ORTHOPAEDIC SURGERY

## 2017-11-21 PROCEDURE — 36000052 ZZH SURGERY LEVEL 2 EA 15 ADDTL MIN: Performed by: ORTHOPAEDIC SURGERY

## 2017-11-21 PROCEDURE — S0077 INJECTION, CLINDAMYCIN PHOSP: HCPCS | Performed by: ORTHOPAEDIC SURGERY

## 2017-11-21 PROCEDURE — 25000125 ZZHC RX 250: Performed by: NURSE ANESTHETIST, CERTIFIED REGISTERED

## 2017-11-21 PROCEDURE — 71000027 ZZH RECOVERY PHASE 2 EACH 15 MINS: Performed by: ORTHOPAEDIC SURGERY

## 2017-11-21 PROCEDURE — 25000128 H RX IP 250 OP 636: Performed by: NURSE ANESTHETIST, CERTIFIED REGISTERED

## 2017-11-21 PROCEDURE — 71000014 ZZH RECOVERY PHASE 1 LEVEL 2 FIRST HR: Performed by: ORTHOPAEDIC SURGERY

## 2017-11-21 PROCEDURE — 25000566 ZZH SEVOFLURANE, EA 15 MIN: Performed by: ANESTHESIOLOGY

## 2017-11-21 PROCEDURE — 25000125 ZZHC RX 250: Performed by: ANESTHESIOLOGY

## 2017-11-21 PROCEDURE — 27210794 ZZH OR GENERAL SUPPLY STERILE: Performed by: ORTHOPAEDIC SURGERY

## 2017-11-21 PROCEDURE — 25000128 H RX IP 250 OP 636: Performed by: ANESTHESIOLOGY

## 2017-11-21 PROCEDURE — 64721 CARPAL TUNNEL SURGERY: CPT | Performed by: NURSE ANESTHETIST, CERTIFIED REGISTERED

## 2017-11-21 RX ORDER — FENTANYL CITRATE 50 UG/ML
25-50 INJECTION, SOLUTION INTRAMUSCULAR; INTRAVENOUS
Status: DISCONTINUED | OUTPATIENT
Start: 2017-11-21 | End: 2017-11-21 | Stop reason: HOSPADM

## 2017-11-21 RX ORDER — HYDRALAZINE HYDROCHLORIDE 20 MG/ML
2.5-5 INJECTION INTRAMUSCULAR; INTRAVENOUS EVERY 10 MIN PRN
Status: DISCONTINUED | OUTPATIENT
Start: 2017-11-21 | End: 2017-11-21 | Stop reason: HOSPADM

## 2017-11-21 RX ORDER — NALOXONE HYDROCHLORIDE 0.4 MG/ML
.1-.4 INJECTION, SOLUTION INTRAMUSCULAR; INTRAVENOUS; SUBCUTANEOUS
Status: DISCONTINUED | OUTPATIENT
Start: 2017-11-21 | End: 2017-11-21 | Stop reason: HOSPADM

## 2017-11-21 RX ORDER — KETOROLAC TROMETHAMINE 30 MG/ML
30 INJECTION, SOLUTION INTRAMUSCULAR; INTRAVENOUS EVERY 6 HOURS PRN
Status: DISCONTINUED | OUTPATIENT
Start: 2017-11-21 | End: 2017-11-21 | Stop reason: HOSPADM

## 2017-11-21 RX ORDER — ALBUTEROL SULFATE 0.83 MG/ML
2.5 SOLUTION RESPIRATORY (INHALATION) EVERY 4 HOURS PRN
Status: DISCONTINUED | OUTPATIENT
Start: 2017-11-21 | End: 2017-11-21 | Stop reason: HOSPADM

## 2017-11-21 RX ORDER — PROMETHAZINE HYDROCHLORIDE 25 MG/ML
12.5 INJECTION, SOLUTION INTRAMUSCULAR; INTRAVENOUS
Status: DISCONTINUED | OUTPATIENT
Start: 2017-11-21 | End: 2017-11-21 | Stop reason: HOSPADM

## 2017-11-21 RX ORDER — PROPOFOL 10 MG/ML
INJECTION, EMULSION INTRAVENOUS PRN
Status: DISCONTINUED | OUTPATIENT
Start: 2017-11-21 | End: 2017-11-21

## 2017-11-21 RX ORDER — SODIUM CHLORIDE, SODIUM LACTATE, POTASSIUM CHLORIDE, CALCIUM CHLORIDE 600; 310; 30; 20 MG/100ML; MG/100ML; MG/100ML; MG/100ML
INJECTION, SOLUTION INTRAVENOUS CONTINUOUS
Status: DISCONTINUED | OUTPATIENT
Start: 2017-11-21 | End: 2017-11-21 | Stop reason: HOSPADM

## 2017-11-21 RX ORDER — MEPERIDINE HYDROCHLORIDE 25 MG/ML
12.5 INJECTION INTRAMUSCULAR; INTRAVENOUS; SUBCUTANEOUS
Status: DISCONTINUED | OUTPATIENT
Start: 2017-11-21 | End: 2017-11-21 | Stop reason: HOSPADM

## 2017-11-21 RX ORDER — ONDANSETRON 2 MG/ML
INJECTION INTRAMUSCULAR; INTRAVENOUS PRN
Status: DISCONTINUED | OUTPATIENT
Start: 2017-11-21 | End: 2017-11-21

## 2017-11-21 RX ORDER — KETAMINE HYDROCHLORIDE 10 MG/ML
INJECTION, SOLUTION INTRAMUSCULAR; INTRAVENOUS PRN
Status: DISCONTINUED | OUTPATIENT
Start: 2017-11-21 | End: 2017-11-21

## 2017-11-21 RX ORDER — SCOLOPAMINE TRANSDERMAL SYSTEM 1 MG/1
1 PATCH, EXTENDED RELEASE TRANSDERMAL ONCE
Status: COMPLETED | OUTPATIENT
Start: 2017-11-21 | End: 2017-11-21

## 2017-11-21 RX ORDER — LIDOCAINE HYDROCHLORIDE 20 MG/ML
INJECTION, SOLUTION INFILTRATION; PERINEURAL PRN
Status: DISCONTINUED | OUTPATIENT
Start: 2017-11-21 | End: 2017-11-21

## 2017-11-21 RX ORDER — HYDROCODONE BITARTRATE AND ACETAMINOPHEN 5; 325 MG/1; MG/1
1 TABLET ORAL
Status: DISCONTINUED | OUTPATIENT
Start: 2017-11-21 | End: 2017-11-21 | Stop reason: HOSPADM

## 2017-11-21 RX ORDER — CLINDAMYCIN PHOSPHATE 900 MG/50ML
900 INJECTION, SOLUTION INTRAVENOUS
Status: COMPLETED | OUTPATIENT
Start: 2017-11-21 | End: 2017-11-21

## 2017-11-21 RX ORDER — DEXAMETHASONE SODIUM PHOSPHATE 4 MG/ML
4 INJECTION, SOLUTION INTRA-ARTICULAR; INTRALESIONAL; INTRAMUSCULAR; INTRAVENOUS; SOFT TISSUE EVERY 10 MIN PRN
Status: DISCONTINUED | OUTPATIENT
Start: 2017-11-21 | End: 2017-11-21 | Stop reason: HOSPADM

## 2017-11-21 RX ORDER — ONDANSETRON 2 MG/ML
4 INJECTION INTRAMUSCULAR; INTRAVENOUS EVERY 30 MIN PRN
Status: DISCONTINUED | OUTPATIENT
Start: 2017-11-21 | End: 2017-11-21 | Stop reason: HOSPADM

## 2017-11-21 RX ORDER — ONDANSETRON 4 MG/1
4 TABLET, ORALLY DISINTEGRATING ORAL EVERY 30 MIN PRN
Status: DISCONTINUED | OUTPATIENT
Start: 2017-11-21 | End: 2017-11-21 | Stop reason: HOSPADM

## 2017-11-21 RX ORDER — HYDROCODONE BITARTRATE AND ACETAMINOPHEN 5; 325 MG/1; MG/1
1-2 TABLET ORAL EVERY 4 HOURS PRN
Qty: 15 TABLET | Refills: 0 | Status: SHIPPED | OUTPATIENT
Start: 2017-11-21 | End: 2018-03-12

## 2017-11-21 RX ORDER — HYDROMORPHONE HYDROCHLORIDE 1 MG/ML
.3-.5 INJECTION, SOLUTION INTRAMUSCULAR; INTRAVENOUS; SUBCUTANEOUS EVERY 10 MIN PRN
Status: DISCONTINUED | OUTPATIENT
Start: 2017-11-21 | End: 2017-11-21 | Stop reason: HOSPADM

## 2017-11-21 RX ADMIN — LIDOCAINE HYDROCHLORIDE 40 MG: 20 INJECTION, SOLUTION INFILTRATION; PERINEURAL at 11:13

## 2017-11-21 RX ADMIN — ONDANSETRON 4 MG: 2 INJECTION INTRAMUSCULAR; INTRAVENOUS at 11:44

## 2017-11-21 RX ADMIN — MIDAZOLAM HYDROCHLORIDE 2 MG: 1 INJECTION, SOLUTION INTRAMUSCULAR; INTRAVENOUS at 11:04

## 2017-11-21 RX ADMIN — SCOPALAMINE 1 PATCH: 1 PATCH, EXTENDED RELEASE TRANSDERMAL at 10:27

## 2017-11-21 RX ADMIN — CLINDAMYCIN PHOSPHATE 900 MG: 18 INJECTION, SOLUTION INTRAVENOUS at 11:04

## 2017-11-21 RX ADMIN — KETAMINE HYDROCHLORIDE 20 MG: 10 INJECTION, SOLUTION INTRAMUSCULAR; INTRAVENOUS at 11:13

## 2017-11-21 RX ADMIN — SODIUM CHLORIDE, POTASSIUM CHLORIDE, SODIUM LACTATE AND CALCIUM CHLORIDE: 600; 310; 30; 20 INJECTION, SOLUTION INTRAVENOUS at 10:27

## 2017-11-21 RX ADMIN — PROPOFOL 200 MG: 10 INJECTION, EMULSION INTRAVENOUS at 11:13

## 2017-11-21 ASSESSMENT — LIFESTYLE VARIABLES: TOBACCO_USE: 1

## 2017-11-21 ASSESSMENT — ENCOUNTER SYMPTOMS: DYSRHYTHMIAS: 1

## 2017-11-21 ASSESSMENT — COPD QUESTIONNAIRES: COPD: 1

## 2017-11-21 NOTE — BRIEF OP NOTE
[facility]  Orthopedics Brief Operative Note    Pre-operative diagnosis: LEFT ELBOW CUBITAL TUNNEL AND LEFT CARPAL TUNNEL SYNDROME   Post-operative diagnosis Same as Above   Procedure: Procedure(s):  LEFT ELBOW CUBITAL TUNNEL RELEASE AND LEFT WRIST CARPAL TUNNEL RELEASE - Wound Class: I-Clean   - Wound Class: I-Clean   Surgeon: Jhonny Zhao MD, MD   Assistants(s): Rainer Hinds   Anesthesia: Combined MAC with Local    Estimated blood loss: Less than 10 ml    Total IV fluids: (See anesthesia record)   Blood transfusion: No transfusion was given during surgery   Total urine output: (See anesthesia record)   Drains: None   Specimens: None   Implants: None   Findings: Thickened TCL; Thickened cubital tunnel fascia; satisfactory carpal and cubital tunnel release   Complications: None   Condition: Stable   Comments: See dictated operative report for full details      Dictation Number: [id number]

## 2017-11-21 NOTE — OR NURSING
Report from Eric HILL. PT sleeping oral airway in. P46  Pt has been been in the 40s per Eric. Left hand warm pink and moves fingers.

## 2017-11-21 NOTE — DISCHARGE INSTRUCTIONS
Post-Anesthesia Patient Instructions    IMMEDIATELY FOLLOWING SURGERY:  Do not drive or operate machinery for the first twenty four hours after surgery.  Do not make any important decisions for twenty four hours after surgery or while taking narcotic pain medications or sedatives.  If you develop intractable nausea and vomiting or a severe headache please notify your doctor immediately.    FOLLOW-UP:  Please make an appointment with your surgeon as instructed. You do not need to follow up with anesthesia unless specifically instructed to do so.    WOUND CARE INSTRUCTIONS (if applicable):  Keep a dry clean dressing on the anesthesia/puncture wound site if there is drainage.  Once the wound has quit draining you may leave it open to air.  Generally you should leave the bandage intact for twenty four hours unless there is drainage.  If the epidural site drains for more than 36-48 hours please call the anesthesia department.    QUESTIONS?:  Please feel free to call your physician or the hospital  if you have any questions, and they will be happy to assist you.         Scopolamine Patch

## 2017-11-21 NOTE — ANESTHESIA PREPROCEDURE EVALUATION
Anesthesia Evaluation     . Pt has had prior anesthetic.     No history of anesthetic complications          ROS/MED HX    ENT/Pulmonary:     (+)tobacco use, Current use <0.5 PPD packs/day  COPD, , . .    Neurologic:  - neg neurologic ROS     Cardiovascular:     (+) Dyslipidemia, hypertension-range: not on beta blocker, Peripheral Vascular Disease (Lower extremity Venous insufficiency )---. : . . . :. dysrhythmias Long QT, . Previous cardiac testing date:results:date: results:ECG reviewed date:10/17/2017 results:SB@50, Long QT date: results:          METS/Exercise Tolerance:     Hematologic:     (+) Anemia, Other Hematologic Disorder-Pancytopenia      Musculoskeletal:   (+) , , other musculoskeletal- LEFT ELBOW CUBITAL TUNNEL AND LEFT CARPAL TUNNEL SYNDROME      GI/Hepatic:  - neg GI/hepatic ROS       Renal/Genitourinary:     (+) Nephrolithiasis ,       Endo:  - neg endo ROS       Psychiatric:     (+) psychiatric history depression, bipolar and anxiety      Infectious Disease:  - neg infectious disease ROS       Malignancy:      - no malignancy   Other: Comment: Polysubstance Abuse; on methadone                    Physical Exam      Airway   Mallampati: III  TM distance: >3 FB  Neck ROM: full    Dental   (+) upper dentures, lower dentures, missing and partials    Cardiovascular   Rhythm and rate: regular and normal      Pulmonary    breath sounds clear to auscultation(+) decreased breath sounds                       Anesthesia Plan      History & Physical Review  History and physical reviewed and following examination; no interval change.    ASA Status:  3 .    NPO Status:  > 8 hours    Plan for General and LMA with Intravenous and Propofol induction. Maintenance will be Balanced.    PONV prophylaxis:  Ondansetron (or other 5HT-3), Dexamethasone or Solumedrol and Scopolamine patch       Postoperative Care  Postoperative pain management:  IV analgesics and Oral pain medications.      Consents  Anesthetic plan,  risks, benefits and alternatives discussed with:  Patient..                          .

## 2017-11-21 NOTE — IP AVS SNAPSHOT
[facility] Preop/Phase II    [address] [address]    Phone:  [phone]                                       After Visit Summary   11/21/2017    Rodney Goodwin    MRN: [id number]           After Visit Summary Signature Page     I have received my discharge instructions, and my questions have been answered. I have discussed any challenges I see with this plan with the nurse or doctor.    ..........................................................................................................................................  Patient/Patient Representative Signature      ..........................................................................................................................................  Patient Representative Print Name and Relationship to Patient    ..................................................               ................................................  Date                                            Time    ..........................................................................................................................................  Reviewed by Signature/Title    ...................................................              ..............................................  Date                                                            Time

## 2017-11-21 NOTE — ANESTHESIA CARE TRANSFER NOTE
Patient: Rodney Goodwin    Procedure(s):  LEFT ELBOW CUBITAL and CARPAL TUNNEL RELEASE - Wound Class: I-Clean   - Wound Class: I-Clean    Diagnosis: LEFT ELBOW CUBITAL TUNNEL AND LEFT CARPAL TUNNEL SYNDROME  Diagnosis Additional Information: No value filed.    Anesthesia Type:   General, LMA     Note:  Airway :Oral Airway and Face Mask  Patient transferred to:PACU  Comments: VSS  Report to RNHandoff Report: Identifed the Patient, Identified the Reponsible Provider, Reviewed the pertinent medical history, Discussed the surgical course, Reviewed Intra-OP anesthesia mangement and issues during anesthesia, Set expectations for post-procedure period and Allowed opportunity for questions and acknowledgement of understanding      Vitals: (Last set prior to Anesthesia Care Transfer)    CRNA VITALS  11/21/2017 1134 - 11/21/2017 1212      11/21/2017             NIBP: 108/55    Pulse: (!)  45    NIBP Mean: 79    Ht Rate: (!)  44    SpO2: 100 %    Resp Rate (observed): (!)  2    Resp Rate (set): 8                Electronically Signed By: MARCO ANTONIO Rocha CRNA  November 21, 2017  12:12 PM

## 2017-11-21 NOTE — IP AVS SNAPSHOT
MRN:[id number]                      After Visit Summary   11/21/2017    Rodney Goodwin    MRN: [id number]           Thank you!     Thank you for choosing [facility] for your care. Our goal is always to provide you with excellent care. Hearing back from our patients is one way we can continue to improve our services. Please take a few minutes to complete the written survey that you may receive in the mail after you visit with us. Thank you!        Patient Information     Date Of Birth          1960        About your hospital stay     You were admitted on:  November 21, 2017 You last received care in the:  [facility]    You were discharged on:  November 21, 2017       Who to Call     For medical emergencies, please call 911.  For non-urgent questions about your medical care, please call your primary care provider or clinic, [phone]  For questions related to your surgery, please call your surgery clinic        Attending Provider     Provider Specialty    Jhonny Zhao MD Orthopedics       Primary Care Provider Office Phone # Fax #    Tristan Estevez [phone]      After Care Instructions     Diet Instructions       Resume pre-procedure diet            Discharge Instructions       Follow up appointment as instructed by Surgeon and or RN            Discharge Instructions       Patient to follow up with appointment in 1 weeks            Do not - immerse incision in water until sutures removed       Do not immerse incision in water until sutures removed            Dressing       Keep dressing clean and dry. Remove surgical dressing POD #4            Ice to affected area       Ice to operative site PRN            No lifting        No lifting over 2 lbs and no strenuous physical activity for 2 weeks            Shower       No shower for 24 hours post procedure. May shower Postoperative Day (POD)  4                  Further instructions from your care team      "      Post-Anesthesia Patient Instructions    IMMEDIATELY FOLLOWING SURGERY:  Do not drive or operate machinery for the first twenty four hours after surgery.  Do not make any important decisions for twenty four hours after surgery or while taking narcotic pain medications or sedatives.  If you develop intractable nausea and vomiting or a severe headache please notify your doctor immediately.    FOLLOW-UP:  Please make an appointment with your surgeon as instructed. You do not need to follow up with anesthesia unless specifically instructed to do so.    WOUND CARE INSTRUCTIONS (if applicable):  Keep a dry clean dressing on the anesthesia/puncture wound site if there is drainage.  Once the wound has quit draining you may leave it open to air.  Generally you should leave the bandage intact for twenty four hours unless there is drainage.  If the epidural site drains for more than 36-48 hours please call the anesthesia department.    QUESTIONS?:  Please feel free to call your physician or the hospital  if you have any questions, and they will be happy to assist you.         Scopolamine Patch      Pending Results     No orders found from 11/19/2017 to 11/22/2017.            Admission Information     Date & Time Provider Department Dept. Phone    11/21/2017 Jhonny Zhao MD [facility] Preop/Phase [phone]      Your Vitals Were     Blood Pressure Pulse Temperature Respirations Height Weight    131/71 60 96.8  F (36  C) (Temporal) 14 1.753 m (5' 9\") 81.2 kg (179 lb)    Pulse Oximetry BMI (Body Mass Index)                98% 26.43 kg/m2          [employer] Information     [employer] lets you send messages to your doctor, view your test results, renew your prescriptions, schedule appointments and more. To sign up, go to [email or web address] . Click on \"Log in\" on the left side of the screen, which will take you to the Welcome page. Then click on \"Sign up Now\" on the right side of the page.     You will be asked to enter " the access code listed below, as well as some personal information. Please follow the directions to create your username and password.     Your access code is: [email or web address]  Expires: 12/3/2017  6:45 AM     Your access code will  in 90 days. If you need help or a new code, please call your [facility] clinic or [phone].        Care EveryWhere ID     This is your Care EveryWhere ID. This could be used by other organizations to access your [facility] medical records  [id number]        Equal Access to Services     [facility]: Hadii radha Moreira, walj izquierdo, qayblaureano kaalmacate zhao, shelley short . So [facility] [phone].    ATENCIÓN: Si habla español, tiene a humphreys disposición servicios gratuitos de asistencia lingüística. Llame al [phone].    We comply with applicable federal civil rights laws and [place] laws. We do not discriminate on the basis of race, color, national origin, age, disability, sex, sexual orientation, or gender identity.               Review of your medicines      START taking        Dose / Directions    HYDROcodone-acetaminophen 5-325 MG per tablet   Commonly known as:  NORCO   Used for:  Carpal tunnel syndrome of left wrist        Dose:  1-2 tablet   Take 1-2 tablets by mouth every 4 hours as needed for other (Moderate to Severe Pain)   Quantity:  15 tablet   Refills:  0         CONTINUE these medicines which have NOT CHANGED        Dose / Directions    ipratropium - albuterol 0.5 mg/2.5 mg/3 mL 0.5-2.5 (3) MG/3ML neb solution   Commonly known as:  DUONEB   Used for:  COPD exacerbation (H)        Dose:  1 vial   Take 1 vial (3 mLs) by nebulization every 6 hours as needed for shortness of breath / dyspnea or wheezing   Quantity:  360 mL   Refills:  0       METHADONE HCL PO        Dose:  105 mg   Take 105 mg by mouth daily   Refills:  0       METOPROLOL SUCCINATE ER PO        Dose:  100 mg   Take 100 mg by mouth daily   Refills:  0        nystatin [id number] UNIT/ML suspension   Commonly known as:  MYCOSTATIN        Dose:  435862 Units   Take 500,000 Units by mouth 4 times daily Swish and swallow 5 mL 4 times daily.   Refills:  0       SIMVASTATIN PO        Dose:  20 mg   Take 20 mg by mouth daily   Refills:  0            Where to get your medicines      Some of these will need a paper prescription and others can be bought over the counter. Ask your nurse if you have questions.     Bring a paper prescription for each of these medications     HYDROcodone-acetaminophen 5-325 MG per tablet                Protect others around you: Learn how to safely use, store and throw away your medicines at www.disposemymeds.org.             Medication List: This is a list of all your medications and when to take them. Check marks below indicate your daily home schedule. Keep this list as a reference.      Medications           Morning Afternoon Evening Bedtime As Needed    HYDROcodone-acetaminophen 5-325 MG per tablet   Commonly known as:  NORCO   Take 1-2 tablets by mouth every 4 hours as needed for other (Moderate to Severe Pain)                                ipratropium - albuterol 0.5 mg/2.5 mg/3 mL 0.5-2.5 (3) MG/3ML neb solution   Commonly known as:  DUONEB   Take 1 vial (3 mLs) by nebulization every 6 hours as needed for shortness of breath / dyspnea or wheezing                                METHADONE HCL PO   Take 105 mg by mouth daily                                METOPROLOL SUCCINATE ER PO   Take 100 mg by mouth daily                                nystatin [id number] UNIT/ML suspension   Commonly known as:  MYCOSTATIN   Take 500,000 Units by mouth 4 times daily Swish and swallow 5 mL 4 times daily.                                SIMVASTATIN PO   Take 20 mg by mouth daily

## 2017-11-21 NOTE — OR NURSING
Patient and responsible adult given discharge instructions with no questions regarding instructions. Ger score 20 Pain level 0/10.  Discharged from unit via ambulatory. Patient discharged to home.

## 2017-11-21 NOTE — OP NOTE
PREOPERATIVE DIAGNOSES:     1.  Left carpal tunnel syndrome.   2.  Left decubital tunnel syndrome.      POSTOPERATIVE DIAGNOSES:     1.  Left carpal tunnel syndrome.   2.  Left decubital tunnel syndrome.      PROCEDURES:   1.  Left open carpal tunnel release.   2.  Left cubital tunnel release.      SURGEON:  Jhonny Zhao MD      ASSISTANT:  Rainer Hinds PA-C      ANESTHESIA:  General by LMA.      FINDINGS:   1.  Thickened transverse carpal ligament.   2.  Thickened fascia overlying the cubital tunnel.   3.  Satisfactory open carpal tunnel release and cubital tunnel release.   4.  Incontinuity, median and ulnar nerves.   5.  Adequate hemostasis.      TOURNIQUET TIME:  11 minutes.      SPECIMENS:  None.      DRAINS:  None.      COMPLICATIONS:  None.      IMPLANTS:  None.      INDICATIONS:  Rodney Goodwin is a 57-year-old male who is a chronic pain patient who has increasing numbness and pain in his left hand.  He had an EMG showing left carpal and cubital tunnel syndrome.  Discussed the need for operative release of his carpal and cubital tunnel for him and discussed the surgery, recovery, risks and benefits with him.  Informed consent was obtained.      DESCRIPTION OF PROCEDURE:  The patient was seen in the preoperative area, surgical site was marked.  Questions were answered.  He was taken to the operating room, placed under general anesthesia.  His left upper extremity was prepped and draped in a sterile fashion with ChloraPrep.  A timeout was called, identified correct patient, correct surgical site.  The limb was then exsanguinated and the tourniquet inflated to 250 mmHg. I started with the carpal tunnel.  A 1.5 cm incision was made overlying the carpal tunnel.  Full-thickness skin flaps were developed.  The palmar fascia was incised longitudinally.  This allowed visualization of the transverse carpal ligament that was incised at the mid portion and then carried that complete dissection distally.  I then carried  the dissection proximally so the superficial palmar fascia was also divided.  I then obtained hemostasis at the skin edges then turned my attention to the cubital tunnel release.  A curvilinear incision was made just anterior to the medial epicondyle and then dissected posterior to the medial epicondyle to identify the cubital tunnel fascia.  The fascia was incised longitudinally and then blunt dissection was carried out around the nerve.  The nerve was not successfully mobilized and the epineurium was left intact.  I carried the dissection from mid portion proximally to the triceps and distally to the flexor carpi ulnaris.  A complete release was obtained.  The elbow was then taken through flexion, extension and the ulnar nerve did not sublux.  Tourniquet was deflated.  Hemostasis was achieved.  The subcutaneous tissues were anesthetized with Marcaine and lidocaine at both surgical sites.  Wounds were closed with a 2-0 Vicryl, 4-0 nylon.  Sterile dressing was applied.  He was awakened, transferred to PACU in stable condition.      POSTOPERATIVE PLAN:  The patient will be in a sling for comfort; leave dressings intact for 4 days.  He then can remove them and shower.  Follow up in a week for wound check, suture removal.  I gave him a couple Norco pills.  He can resume his normal methadone pills starting tomorrow.         PARISH BRAVO MD             D: 2017 11:46   T: 2017 11:59   MT: JAUN      Name:     MADISON LERMA   MRN:      [id number]        Account:        [id number]   :      1960           Procedure Date: 2017      Document: [id number]

## 2017-11-21 NOTE — INTERVAL H&P NOTE
History and physical reviewed. Patient examined. No interval change in condition. Consent has been obtained.  Surgical site was marked. Patient's questions were answered.

## 2017-11-22 NOTE — ANESTHESIA POSTPROCEDURE EVALUATION
Patient: Rodney Goodwin    Procedure(s):  LEFT ELBOW CUBITAL and CARPAL TUNNEL RELEASE - Wound Class: I-Clean   - Wound Class: I-Clean    Diagnosis:LEFT ELBOW CUBITAL TUNNEL AND LEFT CARPAL TUNNEL SYNDROME  Diagnosis Additional Information: No value filed.    Anesthesia Type:  General, LMA    Note:  Anesthesia Post Evaluation    Patient location during evaluation: Phase 2, PACU and Bedside  Patient participation: Able to fully participate in evaluation  Level of consciousness: awake and alert  Pain management: adequate  Airway patency: patent  Cardiovascular status: acceptable  Respiratory status: acceptable  Hydration status: stable  PONV: none     Anesthetic complications: None          Last vitals:  Vitals:    11/21/17 1341 11/21/17 1342 11/21/17 1345   BP:   125/78   Pulse:      Resp:   18   Temp:   97  F (36.1  C)   SpO2: 98% 98% 98%         Electronically Signed By: Antonino Agosto MD  November 22, 2017  6:59 AM

## 2018-02-21 ENCOUNTER — HOSPITAL ENCOUNTER (OUTPATIENT)
Dept: NUCLEAR MEDICINE | Facility: HOSPITAL | Age: 58
Setting detail: NUCLEAR MEDICINE
Discharge: HOME | End: 2018-02-21
Attending: FAMILY MEDICINE | Admitting: FAMILY MEDICINE
Payer: MEDICARE

## 2018-02-21 ENCOUNTER — HOSPITAL ENCOUNTER (OUTPATIENT)
Dept: NUCLEAR MEDICINE | Facility: HOSPITAL | Age: 58
Setting detail: NUCLEAR MEDICINE
End: 2018-02-21
Attending: FAMILY MEDICINE
Payer: MEDICARE

## 2018-02-21 ENCOUNTER — HOSPITAL ENCOUNTER (OUTPATIENT)
Dept: CARDIOLOGY | Facility: HOSPITAL | Age: 58
Setting detail: NUCLEAR MEDICINE
End: 2018-02-21
Attending: FAMILY MEDICINE
Payer: MEDICARE

## 2018-02-21 ENCOUNTER — ANESTHESIA EVENT (OUTPATIENT)
Dept: NUCLEAR MEDICINE | Facility: HOSPITAL | Age: 58
End: 2018-02-21

## 2018-02-21 DIAGNOSIS — R94.31 ST SEGMENT DEPRESSION: ICD-10-CM

## 2018-02-21 PROCEDURE — 78452 HT MUSCLE IMAGE SPECT MULT: CPT | Mod: TC

## 2018-02-21 PROCEDURE — A9500 TC99M SESTAMIBI: HCPCS | Performed by: FAMILY MEDICINE

## 2018-02-21 PROCEDURE — 93016 CV STRESS TEST SUPVJ ONLY: CPT | Performed by: INTERNAL MEDICINE

## 2018-02-21 PROCEDURE — 25000128 H RX IP 250 OP 636: Performed by: INTERNAL MEDICINE

## 2018-02-21 PROCEDURE — 93017 CV STRESS TEST TRACING ONLY: CPT

## 2018-02-21 PROCEDURE — 34300033 ZZH RX 343: Performed by: RADIOLOGY

## 2018-02-21 PROCEDURE — 93018 CV STRESS TEST I&R ONLY: CPT | Performed by: INTERNAL MEDICINE

## 2018-02-21 PROCEDURE — 34300033 ZZH RX 343: Performed by: FAMILY MEDICINE

## 2018-02-21 PROCEDURE — A9500 TC99M SESTAMIBI: HCPCS | Performed by: RADIOLOGY

## 2018-02-21 RX ORDER — SODIUM CHLORIDE 9 MG/ML
INJECTION, SOLUTION INTRAVENOUS ONCE
Status: COMPLETED | OUTPATIENT
Start: 2018-02-21 | End: 2018-02-21

## 2018-02-21 RX ADMIN — Medication 30 MILLICURIE: at 10:21

## 2018-02-21 RX ADMIN — SODIUM CHLORIDE: 9 INJECTION, SOLUTION INTRAVENOUS at 10:07

## 2018-02-21 RX ADMIN — Medication 10 MILLICURIE: at 07:45

## 2018-02-21 NOTE — ANESTHESIA PREPROCEDURE EVALUATION
Anesthesia Plan  Procedure only, no anesthetic delivered    History & Physical Review      ASA Status:  3 .             Postoperative Care      Consents                          .

## 2018-03-12 ENCOUNTER — APPOINTMENT (OUTPATIENT)
Dept: GENERAL RADIOLOGY | Facility: HOSPITAL | Age: 58
End: 2018-03-12
Attending: FAMILY MEDICINE
Payer: MEDICARE

## 2018-03-12 ENCOUNTER — ANESTHESIA EVENT (OUTPATIENT)
Dept: EMERGENCY MEDICINE | Facility: HOSPITAL | Age: 58
End: 2018-03-12
Payer: MEDICARE

## 2018-03-12 ENCOUNTER — ANESTHESIA (OUTPATIENT)
Dept: EMERGENCY MEDICINE | Facility: HOSPITAL | Age: 58
End: 2018-03-12
Payer: MEDICARE

## 2018-03-12 ENCOUNTER — APPOINTMENT (OUTPATIENT)
Dept: CT IMAGING | Facility: HOSPITAL | Age: 58
End: 2018-03-12
Attending: FAMILY MEDICINE
Payer: MEDICARE

## 2018-03-12 ENCOUNTER — HOSPITAL ENCOUNTER (EMERGENCY)
Facility: HOSPITAL | Age: 58
Discharge: HOME | End: 2018-03-12
Attending: FAMILY MEDICINE | Admitting: FAMILY MEDICINE
Payer: MEDICARE

## 2018-03-12 VITALS
WEIGHT: 190 LBS | SYSTOLIC BLOOD PRESSURE: 128 MMHG | HEART RATE: 74 BPM | TEMPERATURE: 97.4 F | OXYGEN SATURATION: 94 % | BODY MASS INDEX: 28.06 KG/M2 | DIASTOLIC BLOOD PRESSURE: 83 MMHG | RESPIRATION RATE: 20 BRPM

## 2018-03-12 DIAGNOSIS — R10.84 ABDOMINAL PAIN, GENERALIZED: ICD-10-CM

## 2018-03-12 LAB
ALBUMIN SERPL-MCNC: 4.1 G/DL (ref 3.4–5)
ALBUMIN UR-MCNC: 10 MG/DL
ALP SERPL-CCNC: 140 U/L (ref 40–150)
ALT SERPL W P-5'-P-CCNC: 22 U/L (ref 0–70)
ANION GAP SERPL CALCULATED.3IONS-SCNC: 7 MMOL/L (ref 3–14)
APPEARANCE UR: CLEAR
AST SERPL W P-5'-P-CCNC: 23 U/L (ref 0–45)
BACTERIA #/AREA URNS HPF: ABNORMAL /HPF
BASOPHILS # BLD AUTO: 0 10E9/L (ref 0–0.2)
BASOPHILS NFR BLD AUTO: 0.5 %
BILIRUB SERPL-MCNC: 0.5 MG/DL (ref 0.2–1.3)
BILIRUB UR QL STRIP: NEGATIVE
BUN SERPL-MCNC: 15 MG/DL (ref 7–30)
CALCIUM SERPL-MCNC: 9.1 MG/DL (ref 8.5–10.1)
CHLORIDE SERPL-SCNC: 100 MMOL/L (ref 94–109)
CO2 SERPL-SCNC: 27 MMOL/L (ref 20–32)
COLOR UR AUTO: ABNORMAL
CREAT SERPL-MCNC: 0.98 MG/DL (ref 0.66–1.25)
DIFFERENTIAL METHOD BLD: ABNORMAL
EOSINOPHIL # BLD AUTO: 0 10E9/L (ref 0–0.7)
EOSINOPHIL NFR BLD AUTO: 0.1 %
ERYTHROCYTE [DISTWIDTH] IN BLOOD BY AUTOMATED COUNT: 12.7 % (ref 10–15)
GFR SERPL CREATININE-BSD FRML MDRD: 79 ML/MIN/1.7M2
GLUCOSE SERPL-MCNC: 105 MG/DL (ref 70–99)
GLUCOSE UR STRIP-MCNC: NEGATIVE MG/DL
HCT VFR BLD AUTO: 44 % (ref 40–53)
HGB BLD-MCNC: 15.3 G/DL (ref 13.3–17.7)
HGB UR QL STRIP: ABNORMAL
IMM GRANULOCYTES # BLD: 0.1 10E9/L (ref 0–0.4)
IMM GRANULOCYTES NFR BLD: 0.6 %
KETONES UR STRIP-MCNC: NEGATIVE MG/DL
LACTATE SERPL-SCNC: 1.2 MMOL/L (ref 0.4–2)
LEUKOCYTE ESTERASE UR QL STRIP: NEGATIVE
LYMPHOCYTES # BLD AUTO: 0.7 10E9/L (ref 0.8–5.3)
LYMPHOCYTES NFR BLD AUTO: 9 %
MCH RBC QN AUTO: 32.4 PG (ref 26.5–33)
MCHC RBC AUTO-ENTMCNC: 34.8 G/DL (ref 31.5–36.5)
MCV RBC AUTO: 93 FL (ref 78–100)
MONOCYTES # BLD AUTO: 0.8 10E9/L (ref 0–1.3)
MONOCYTES NFR BLD AUTO: 10.3 %
MUCOUS THREADS #/AREA URNS LPF: PRESENT /LPF
NEUTROPHILS # BLD AUTO: 6.4 10E9/L (ref 1.6–8.3)
NEUTROPHILS NFR BLD AUTO: 79.5 %
NITRATE UR QL: NEGATIVE
NRBC # BLD AUTO: 0 10*3/UL
NRBC BLD AUTO-RTO: 0 /100
PH UR STRIP: 6 PH (ref 4.7–8)
PLATELET # BLD AUTO: 120 10E9/L (ref 150–450)
POTASSIUM SERPL-SCNC: 3.8 MMOL/L (ref 3.4–5.3)
PROT SERPL-MCNC: 8.4 G/DL (ref 6.8–8.8)
RBC # BLD AUTO: 4.72 10E12/L (ref 4.4–5.9)
RBC #/AREA URNS AUTO: 4 /HPF (ref 0–2)
SODIUM SERPL-SCNC: 134 MMOL/L (ref 133–144)
SOURCE: ABNORMAL
SP GR UR STRIP: 1.01 (ref 1–1.03)
TROPONIN I SERPL-MCNC: <0.015 UG/L (ref 0–0.04)
UROBILINOGEN UR STRIP-MCNC: NORMAL MG/DL (ref 0–2)
WBC # BLD AUTO: 8 10E9/L (ref 4–11)
WBC #/AREA URNS AUTO: 1 /HPF (ref 0–5)

## 2018-03-12 PROCEDURE — 99285 EMERGENCY DEPT VISIT HI MDM: CPT | Mod: Z6 | Performed by: FAMILY MEDICINE

## 2018-03-12 PROCEDURE — 71045 X-RAY EXAM CHEST 1 VIEW: CPT | Mod: TC

## 2018-03-12 PROCEDURE — 25000128 H RX IP 250 OP 636: Performed by: FAMILY MEDICINE

## 2018-03-12 PROCEDURE — 37000011 ZZH ANESTHESIA WARD SERVICE: Performed by: NURSE ANESTHETIST, CERTIFIED REGISTERED

## 2018-03-12 PROCEDURE — 99285 EMERGENCY DEPT VISIT HI MDM: CPT | Mod: 25

## 2018-03-12 PROCEDURE — 36415 COLL VENOUS BLD VENIPUNCTURE: CPT | Performed by: FAMILY MEDICINE

## 2018-03-12 PROCEDURE — 93010 ELECTROCARDIOGRAM REPORT: CPT | Performed by: INTERNAL MEDICINE

## 2018-03-12 PROCEDURE — 93005 ELECTROCARDIOGRAM TRACING: CPT

## 2018-03-12 PROCEDURE — 84484 ASSAY OF TROPONIN QUANT: CPT | Performed by: FAMILY MEDICINE

## 2018-03-12 PROCEDURE — 83605 ASSAY OF LACTIC ACID: CPT | Performed by: FAMILY MEDICINE

## 2018-03-12 PROCEDURE — 85025 COMPLETE CBC W/AUTO DIFF WBC: CPT | Performed by: FAMILY MEDICINE

## 2018-03-12 PROCEDURE — 81001 URINALYSIS AUTO W/SCOPE: CPT | Performed by: FAMILY MEDICINE

## 2018-03-12 PROCEDURE — 80053 COMPREHEN METABOLIC PANEL: CPT | Performed by: FAMILY MEDICINE

## 2018-03-12 PROCEDURE — 94640 AIRWAY INHALATION TREATMENT: CPT

## 2018-03-12 PROCEDURE — 96375 TX/PRO/DX INJ NEW DRUG ADDON: CPT

## 2018-03-12 PROCEDURE — 25000125 ZZHC RX 250: Performed by: FAMILY MEDICINE

## 2018-03-12 PROCEDURE — 96374 THER/PROPH/DIAG INJ IV PUSH: CPT | Mod: XU

## 2018-03-12 PROCEDURE — 25000128 H RX IP 250 OP 636: Performed by: RADIOLOGY

## 2018-03-12 PROCEDURE — 74177 CT ABD & PELVIS W/CONTRAST: CPT | Mod: TC

## 2018-03-12 RX ORDER — ASPIRIN 325 MG
324 TABLET ORAL DAILY
Qty: 10 TABLET | Refills: 0 | Status: SHIPPED | OUTPATIENT
Start: 2018-03-12 | End: 2020-02-28

## 2018-03-12 RX ORDER — ONDANSETRON 4 MG/1
4 TABLET, ORALLY DISINTEGRATING ORAL EVERY 8 HOURS PRN
Qty: 10 TABLET | Refills: 0 | Status: SHIPPED | OUTPATIENT
Start: 2018-03-12 | End: 2018-03-15

## 2018-03-12 RX ORDER — ONDANSETRON 2 MG/ML
8 INJECTION INTRAMUSCULAR; INTRAVENOUS ONCE
Status: COMPLETED | OUTPATIENT
Start: 2018-03-12 | End: 2018-03-12

## 2018-03-12 RX ORDER — IOPAMIDOL 612 MG/ML
100 INJECTION, SOLUTION INTRAVASCULAR ONCE
Status: COMPLETED | OUTPATIENT
Start: 2018-03-12 | End: 2018-03-12

## 2018-03-12 RX ORDER — KETOROLAC TROMETHAMINE 15 MG/ML
15 INJECTION, SOLUTION INTRAMUSCULAR; INTRAVENOUS ONCE
Status: COMPLETED | OUTPATIENT
Start: 2018-03-12 | End: 2018-03-12

## 2018-03-12 RX ORDER — IPRATROPIUM BROMIDE AND ALBUTEROL SULFATE 2.5; .5 MG/3ML; MG/3ML
3 SOLUTION RESPIRATORY (INHALATION) ONCE
Status: COMPLETED | OUTPATIENT
Start: 2018-03-12 | End: 2018-03-12

## 2018-03-12 RX ADMIN — DIATRIZOATE MEGLUMINE AND DIATRIZOATE SODIUM 30 ML: 660; 100 SOLUTION ORAL; RECTAL at 15:22

## 2018-03-12 RX ADMIN — ONDANSETRON 8 MG: 2 INJECTION INTRAMUSCULAR; INTRAVENOUS at 14:19

## 2018-03-12 RX ADMIN — IOPAMIDOL 100 ML: 612 INJECTION, SOLUTION INTRAVENOUS at 15:21

## 2018-03-12 RX ADMIN — KETOROLAC TROMETHAMINE 15 MG: 15 INJECTION, SOLUTION INTRAMUSCULAR; INTRAVENOUS at 14:19

## 2018-03-12 RX ADMIN — IPRATROPIUM BROMIDE AND ALBUTEROL SULFATE 3 ML: .5; 3 SOLUTION RESPIRATORY (INHALATION) at 12:17

## 2018-03-12 NOTE — ED PROVIDER NOTES
eMERGENCY dEPARTMENT eNCOUnter        CHIEF COMPLAINT    Chief Complaint   Patient presents with     Shortness of Breath     Abdominal Pain       HPI    Rodney Goodwin is a 57 year old male who presents with shortness of breath and abdominal pain for the last couple of days.  Adeel has a history of congestive heart failure, COPD, history of polysubstance abuse is currently on methadone, bipolar disorder.  He denies any problems with ascites or liver failure.  He says for the last couple of days he has not been able to catch his breath and he has lower abdominal pain and swelling.  He states he has gained 20 pounds in the last week.  He attends the methadone clinic in [place].  He called the ambulance today from his residence 1 block away from the hospital.      REVIEW OF SYSTEMS    Cardiac: No palpitations, No syncope  Respiratory:  No SOB  Neurologic: No syncope or LOC  GI: he states he has been vomiting but it is noted that he spits up after he coughs No Diarrhea  General: No Fever, No Chills  All other systems reviewed and are negative.    PAST MEDICAL & SURGICAL HISTORY    Past Medical History:   Diagnosis Date     Anxiety      Bipolar affective disorder (H)      Chronic constipation      Depressive disorder      Drug abuse      Hypertension      Past Surgical History:   Procedure Laterality Date     ARTHROSCOPY KNEE BILATERAL       COLONOSCOPY      Multiple, repeat due 2015     COLONOSCOPY N/A 7/31/2015    Procedure: COLONOSCOPY;  Surgeon: Justin Andujar MD;  Location: [facility]     DECOMPRESSION CUBITAL TUNNEL Left 11/21/2017    Procedure: DECOMPRESSION CUBITAL TUNNEL;;  Surgeon: Jhonny Zhao MD;  Location: [facility] OR     DENTAL SURGERY       HERNIA REPAIR      Inguinal, left     RELEASE CARPAL TUNNEL Left 11/21/2017    Procedure: RELEASE CARPAL TUNNEL;  LEFT ELBOW CUBITAL and CARPAL TUNNEL RELEASE;  Surgeon: Jhonny Zhao MD;  Location: [facility] OR       CURRENT MEDICATIONS    Current Outpatient Rx   Medication Sig  Dispense Refill     ondansetron (ZOFRAN ODT) 4 MG ODT tab Take 1 tablet (4 mg) by mouth every 8 hours as needed for nausea 10 tablet 0     aspirin 325 MG tablet Take 1 tablet (324 mg) by mouth daily 10 tablet 0     METOPROLOL SUCCINATE ER PO Take 150 mg by mouth daily        METHADONE HCL PO Take 105 mg by mouth daily        SIMVASTATIN PO Take 20 mg by mouth daily       ipratropium - albuterol 0.5 mg/2.5 mg/3 mL (DUONEB) 0.5-2.5 (3) MG/3ML nebulization Take 1 vial (3 mLs) by nebulization every 6 hours as needed for shortness of breath / dyspnea or wheezing 360 mL 0       ALLERGIES    Allergies   Allergen Reactions     Penicillins        SOCIAL & FAMILY HISTORY    Social History     Social History     Marital status:      Spouse name: N/A     Number of children: 3     Years of education: 12     Occupational History      Disability     Social History Main Topics     Smoking status: Current Every Day Smoker     Packs/day: 1.00     Years: 30.00     Types: Cigarettes     Smokeless tobacco: Never Used      Comment: Tried to Quit (NO)     Alcohol use No     Drug use: Yes     Special: IV, Methamphetamines      Comment: last use of heroin 3 years, last use meth 4 months ago     Sexual activity: Not Currently     Other Topics Concern     Not on file     Social History Narrative     Family History   Problem Relation Age of Onset     DIABETES Mother      CEREBROVASCULAR DISEASE Father      Pancreatic Cancer Brother        PHYSICAL EXAM    VITAL SIGNS: /83  Pulse 74  Temp 97.4  F (36.3  C) (Oral)  Resp 20  Wt 86.2 kg (190 lb)  SpO2 94%  BMI 28.06 kg/m2   Constitutional:  Well developed, well nourished, no acute distress, he has few teeth.  Is rolling around on the bed.  He does stop rolling when I ask him questions.  HENT:  Atraumatic, moist mucus membranes  Neck: supple, no JVD   Respiratory:  Lungs clear, no retractions   Cardiovascular: Regular rate, no murmurs  GI:  Soft, nontender, normal bowel  sounds  Musculoskeletal:  No edema, no acute deformities  Integument:  Skin is warm and dry, no petechiae   Neurologic:  Alert & oriented, no slurred speech  Psych: Pleasant affect, no hallucinations    EKG    Inverted T waves, unchanged from previous    RADIOLOGY/PROCEDURES    Results for orders placed or performed during the hospital encounter of 03/12/18   XR Chest Port 1 View    Narrative    PROCEDURE:  XR CHEST PORT 1 VW    HISTORY:  sob; .     COMPARISON:  9/4/2017    FINDINGS:   The cardiac silhouette is normal in size. The pulmonary vasculature is  normal.  The lungs are clear. No pleural effusion or pneumothorax.      Impression    IMPRESSION:  No acute cardiopulmonary disease.      CARINA CURRY MD   CT Abdomen Pelvis w Contrast    Narrative    Exam:CT ABDOMEN PELVIS W CONTRAST    History: 57 years Male with generalized abdominal pain, lower quadrant  and gain of 20 pounds last 4 days.     Comparisons: 11/3/2017    Technique: Axial CT imaging of the abdomen and pelvis was performed  with IV and oral contrast. Coronal and sagittal reconstructions were  obtained      FINDINGS: The appendix is normal. There is interval resolution of fat  stranding in the right lower quadrant as seen on the prior study.  Lung bases:The lung bases are clear    Abdomen:  Liver:Unremarkable  Gallbladder and biliary tree:No calcified gallstones are present.  There is no evidence of biliary dilatation.  Pancreas:Unremarkable  Spleen:Unremarkable  Adrenals:Normal    Kidneys and ureters: There is a 3 mm calculus at the lower pole of the  left kidney. There is no proptosis. The kidneys are otherwise  unremarkable.    Lymph nodes:There is no significant lymphadenopathy    Bowel:No abnormally distended or thickened loops of bowel are present.  There is no evidence of bowel obstruction.    Appendix:Unremarkable    Vessels: There is atherosclerotic disease of the aorta. There is a 2.6  cm infrarenal abdominal aortic aneurysm without  change from the prior  study.    Osseous structures:Unremarkable    Pelvis:There is no evidence of mass or lymphadenopathy. No abnormal  fluid collections are present.            Impression    IMPRESSION: No acute findings. There is no evidence of appendicitis or  other inflammatory process.    Mild abdominal aortic aneurysm measuring 2.6 cm in diameter without  change from prior study.     Interval resolution of fat stranding/epiploic appendicitis of the  right lower quadrant    GREGORIO PATE MD         ED COURSE & MEDICAL DECISION MAKING    Pertinent Labs & Imaging studies reviewed and interpreted. (See chart for details)    See chart for details of medications given during the ED stay.    Vitals:    03/12/18 1139 03/12/18 1217 03/12/18 1450 03/12/18 1651   BP: 157/93   128/83   Pulse: 79   74   Resp: 20   20   Temp: 99.6  F (37.6  C)   97.4  F (36.3  C)   TempSrc: Oral   Oral   SpO2: 95% 94% 96% 94%   Weight: 86.2 kg (190 lb)              FINAL IMPRESSION    1. Abdominal pain, generalized        PLAN  Adeel has a normal exam normal CT and normal labs.  He has not had any vomiting here.  Will discharge him to home.  He asked for some aspirin and a prescription is sent as well as some Zofran.  He should follow-up with his regular methadone appointment tomorrow.      (Please note that this note was completed with a voice recognition program.  Every attempt was made to edit the dictations, but inevitably there remain words that are mis-transcribed.)       Sunshine Elizabeth MD  03/13/18 1934

## 2018-03-12 NOTE — ED AVS SNAPSHOT
[facility]    [address] [address]    Phone:  [phone]                                       Rodney Goodwin   MRN: [id number]    Department:  [facility]   Date of Visit:  3/12/2018           After Visit Summary Signature Page     I have received my discharge instructions, and my questions have been answered. I have discussed any challenges I see with this plan with the nurse or doctor.    ..........................................................................................................................................  Patient/Patient Representative Signature      ..........................................................................................................................................  Patient Representative Print Name and Relationship to Patient    ..................................................               ................................................  Date                                            Time    ..........................................................................................................................................  Reviewed by Signature/Title    ...................................................              ..............................................  Date                                                            Time

## 2018-03-12 NOTE — ED AVS SNAPSHOT
[facility]    [address]    [place] [address]    Phone:  [phone]                                       Rodney Goowdin   MRN: [id number]    Department:  [facility]   Date of Visit:  3/12/2018           Patient Information     Date Of Birth          1960        Your diagnoses for this visit were:     Abdominal pain, generalized        You were seen by Sunshine Elizabeth MD.        Discharge Instructions       Rodney,    Your labs and CT are normal today.  I recommend tylenol tonight, continue your regular medications.  Obtain your methadone tomorrow in [place] as scheduled.       Review of your medicines      START taking        Dose / Directions Last dose taken    aspirin 325 MG tablet   Dose:  324 mg   Quantity:  10 tablet        Take 1 tablet (324 mg) by mouth daily   Refills:  0        ondansetron 4 MG ODT tab   Commonly known as:  ZOFRAN ODT   Dose:  4 mg   Quantity:  10 tablet        Take 1 tablet (4 mg) by mouth every 8 hours as needed for nausea   Refills:  0          Our records show that you are taking the medicines listed below. If these are incorrect, please call your family doctor or clinic.        Dose / Directions Last dose taken    ipratropium - albuterol 0.5 mg/2.5 mg/3 mL 0.5-2.5 (3) MG/3ML neb solution   Commonly known as:  DUONEB   Dose:  1 vial   Quantity:  360 mL        Take 1 vial (3 mLs) by nebulization every 6 hours as needed for shortness of breath / dyspnea or wheezing   Refills:  0        METHADONE HCL PO   Dose:  105 mg        Take 105 mg by mouth daily   Refills:  0        METOPROLOL SUCCINATE ER PO   Dose:  150 mg        Take 150 mg by mouth daily   Refills:  0        SIMVASTATIN PO   Dose:  20 mg        Take 20 mg by mouth daily   Refills:  0                Prescriptions were sent or printed at these locations (2 Prescriptions)                   [facility] [id number]  [place], [place] - [address] AT [facility] [address]  "[place] [address]    Telephone:  [phone]   Fax:  [phone]   Hours:                  E-Prescribed (2 of 2)         ondansetron (ZOFRAN ODT) 4 MG ODT tab               aspirin 325 MG tablet                Procedures and tests performed during your visit     CBC with platelets differential    CT Abdomen Pelvis w Contrast    Comprehensive metabolic panel    EKG 12-lead, tracing only    Lactic acid    Troponin I    UA with Microscopic    XR Chest Port 1 View      Orders Needing Specimen Collection     None      Pending Results     No orders found from 3/10/2018 to 3/13/2018.            Pending Culture Results     No orders found from 3/10/2018 to 3/13/2018.            Thank you for choosing [facility]       Thank you for choosing [facility] for your care. Our goal is always to provide you with excellent care. Hearing back from our patients is one way we can continue to improve our services. Please take a few minutes to complete the written survey that you may receive in the mail after you visit with us. Thank you!        [facility] Information     [facility] lets you send messages to your doctor, view your test results, renew your prescriptions, schedule appointments and more. To sign up, go to [email or web address] . Click on \"Log in\" on the left side of the screen, which will take you to the Welcome page. Then click on \"Sign up Now\" on the right side of the page.     You will be asked to enter the access code listed below, as well as some personal information. Please follow the directions to create your username and password.     Your access code is: [email or web address]  Expires: 6/10/2018  4:41 PM     Your access code will  in 90 days. If you need help or a new code, please call your [facility] clinic or [phone].        Care EveryWhere ID     This is your Care EveryWhere ID. This could be used by other organizations to access your [facility] medical records  [id number]        Equal Access to Services     " NATHAN NIELSON : Hadii radha Moreira, waaxda luqadaha, qaybta kaalmada cece, shelley barreto. So [facility] [phone].    ATENCIÓN: Si habla español, tiene a humphreys disposición servicios gratuitos de asistencia lingüística. Llame al [phone].    We comply with applicable federal civil rights laws and [place] laws. We do not discriminate on the basis of race, color, national origin, age, disability, sex, sexual orientation, or gender identity.            After Visit Summary       This is your record. Keep this with you and show to your community pharmacist(s) and doctor(s) at your next visit.

## 2018-03-12 NOTE — ANESTHESIA POSTPROCEDURE EVALUATION
Patient: Rodney Goodwin    * No procedures listed *    Diagnosis:* No pre-op diagnosis entered *  Diagnosis Additional Information: No value filed.    Anesthesia Type:  No value filed.    Note:  Anesthesia Post Evaluation    Last vitals:  Vitals:    03/12/18 1139 03/12/18 1217   BP: 157/93    Pulse: 79    Resp: 20    Temp: 99.6  F (37.6  C)    SpO2: 95% 94%         Electronically Signed By: MARCO ANTONIO Sweeney CRNA  March 12, 2018  12:53 PM

## 2018-03-12 NOTE — DISCHARGE INSTRUCTIONS
Rodney,    Your labs and CT are normal today.  I recommend tylenol tonight, continue your regular medications.  Obtain your methadone tomorrow in [place] as scheduled.

## 2018-03-12 NOTE — ED NOTES
"Patient arrives via [facility] with complaint of abdominal pain and shortness of breath. Patient reports the shortness of breath started yesterday and the abdominal pain began this morning. Pain is located on RLQ and LLQ. Reports nausea without emesis. Currently rating the pain 9/10. Reports he has had fevers, however has not checked his temp. Patient reporting a 20 pound weight gain over the last several weeks. Denies urinary changes. Patient stating \"I feel constipated, but has a bowel movement last night.\" Patient is under a pain contract with the Methadone clinic. Call light within reach.    "

## 2018-12-24 ENCOUNTER — HOSPITAL ENCOUNTER (EMERGENCY)
Facility: HOSPITAL | Age: 58
Discharge: LEFT BEFORE DISCHARGE | End: 2018-12-24
Attending: EMERGENCY MEDICINE | Admitting: EMERGENCY MEDICINE
Payer: MEDICARE

## 2018-12-24 VITALS
TEMPERATURE: 97.5 F | OXYGEN SATURATION: 96 % | DIASTOLIC BLOOD PRESSURE: 92 MMHG | SYSTOLIC BLOOD PRESSURE: 149 MMHG | RESPIRATION RATE: 20 BRPM | HEART RATE: 74 BPM

## 2018-12-24 DIAGNOSIS — R63.1 EXCESSIVE THIRST: Primary | ICD-10-CM

## 2018-12-24 DIAGNOSIS — F15.10 METHAMPHETAMINE ABUSE (H): ICD-10-CM

## 2018-12-24 DIAGNOSIS — R35.0 URINE FREQUENCY: ICD-10-CM

## 2018-12-24 DIAGNOSIS — R63.1 POLYDIPSIA: ICD-10-CM

## 2018-12-24 LAB
ALBUMIN UR-MCNC: NEGATIVE MG/DL
AMPHETAMINES UR QL SCN: POSITIVE
APPEARANCE UR: CLEAR
BARBITURATES UR QL: NEGATIVE
BENZODIAZ UR QL: NEGATIVE
BILIRUB UR QL STRIP: NEGATIVE
CANNABINOIDS UR QL SCN: NEGATIVE
COCAINE UR QL: NEGATIVE
COLOR UR AUTO: NORMAL
GLUCOSE UR STRIP-MCNC: NEGATIVE MG/DL
HGB UR QL STRIP: NEGATIVE
KETONES UR STRIP-MCNC: NEGATIVE MG/DL
LEUKOCYTE ESTERASE UR QL STRIP: NEGATIVE
METHADONE UR QL SCN: POSITIVE
NITRATE UR QL: NEGATIVE
OPIATES UR QL SCN: NEGATIVE
PCP UR QL SCN: NEGATIVE
PH UR STRIP: 5.5 PH (ref 4.7–8)
SOURCE: NORMAL
SP GR UR STRIP: 1 (ref 1–1.03)
UROBILINOGEN UR STRIP-MCNC: NORMAL MG/DL (ref 0–2)

## 2018-12-24 PROCEDURE — 99282 EMERGENCY DEPT VISIT SF MDM: CPT | Mod: Z6 | Performed by: EMERGENCY MEDICINE

## 2018-12-24 PROCEDURE — 81003 URINALYSIS AUTO W/O SCOPE: CPT | Performed by: EMERGENCY MEDICINE

## 2018-12-24 PROCEDURE — 99283 EMERGENCY DEPT VISIT LOW MDM: CPT

## 2018-12-24 PROCEDURE — 80307 DRUG TEST PRSMV CHEM ANLYZR: CPT | Performed by: EMERGENCY MEDICINE

## 2018-12-24 RX ORDER — KETOROLAC TROMETHAMINE 30 MG/ML
30 INJECTION, SOLUTION INTRAMUSCULAR; INTRAVENOUS ONCE
Status: DISCONTINUED | OUTPATIENT
Start: 2018-12-24 | End: 2018-12-24 | Stop reason: HOSPADM

## 2018-12-24 RX ORDER — SODIUM CHLORIDE 9 MG/ML
1000 INJECTION, SOLUTION INTRAVENOUS CONTINUOUS
Status: DISCONTINUED | OUTPATIENT
Start: 2018-12-24 | End: 2018-12-24 | Stop reason: HOSPADM

## 2018-12-24 ASSESSMENT — ENCOUNTER SYMPTOMS
ABDOMINAL PAIN: 0
FREQUENCY: 1
SHORTNESS OF BREATH: 0
FEVER: 0

## 2018-12-24 NOTE — ED PROVIDER NOTES
History     Chief Complaint   Patient presents with     Abdominal Pain     Nausea & Vomiting     HPI  Rodney Goodwin is a 58 year old male who presented to the emergency department with complaints of excessive thirst, drinking a lot of water and frequent urination.  Patient is not diabetic.  He goes to [place] every week to the methadone clinic for the last 3 years.  He denies any fever, chills, painful urination, shortness of breath or chest pain.  No nausea or vomiting, no diarrhea.    Problem List:    Patient Active Problem List    Diagnosis Date Noted     Elevated blood sugar 10/29/2016     Priority: Medium     Narcotic addiction (H) 10/29/2016     Priority: Medium     Acute exacerbation of chronic obstructive pulmonary disease (H) 10/28/2016     Priority: Medium     Heroin abuse (H) 09/15/2015     Priority: Medium     COPD exacerbation (H) 09/14/2015     Priority: Medium     Costochondritis 02/26/2015     Priority: Medium     Venous insufficiency of both lower extremities 02/26/2015     Priority: Medium     Do you wish to do the replacement in the background? yes         Bipolar 1 disorder (H) 11/11/2014     Priority: Medium     Cellulitis and abscess of forearm 04/04/2014     Priority: Medium        Past Medical History:    Past Medical History:   Diagnosis Date     Anxiety      Bipolar affective disorder (H)      Chronic constipation      Depressive disorder      Drug abuse      Hypertension        Past Surgical History:    Past Surgical History:   Procedure Laterality Date     ARTHROSCOPY KNEE BILATERAL       COLONOSCOPY      Multiple, repeat due 2015     COLONOSCOPY N/A 7/31/2015    Procedure: COLONOSCOPY;  Surgeon: Justin Andujar MD;  [facility]     DECOMPRESSION CUBITAL TUNNEL Left 11/21/2017    Procedure: DECOMPRESSION CUBITAL TUNNEL;;  Surgeon: Jhonny Zhao MD;  Location: [facility]     DENTAL SURGERY       HERNIA REPAIR      Inguinal, left     RELEASE CARPAL TUNNEL Left 11/21/2017    Procedure:  RELEASE CARPAL TUNNEL;  LEFT ELBOW CUBITAL and CARPAL TUNNEL RELEASE;  Surgeon: Jhonny Zhao MD;  Location: [facility]       Family History:    Family History   Problem Relation Age of Onset     Diabetes Mother      Cerebrovascular Disease Father      Pancreatic Cancer Brother        Social History:  Marital Status:   [4]  Social History     Tobacco Use     Smoking status: Current Every Day Smoker     Packs/day: 1.00     Years: 30.00     Pack years: 30.00     Types: Cigarettes     Smokeless tobacco: Never Used     Tobacco comment: Tried to Quit (NO)   Substance Use Topics     Alcohol use: No     Alcohol/week: 0.0 oz     Drug use: Yes     Types: IV, Methamphetamines     Comment: last use of heroin 3 years, last use meth 4 months ago        Medications:      METHADONE HCL PO   METOPROLOL SUCCINATE ER PO   SIMVASTATIN PO   aspirin 325 MG tablet   ipratropium - albuterol 0.5 mg/2.5 mg/3 mL (DUONEB) 0.5-2.5 (3) MG/3ML nebulization         Review of Systems   Constitutional: Negative for fever.   Respiratory: Negative for shortness of breath.    Cardiovascular: Negative for chest pain.   Gastrointestinal: Negative for abdominal pain.   Genitourinary: Positive for frequency.   All other systems reviewed and are negative.      Physical Exam   BP: 149/92  Pulse: 74  Heart Rate: 72  Temp: 97.5  F (36.4  C)  Resp: 20  SpO2: 96 %      Physical Exam   Constitutional: He is oriented to person, place, and time. He appears well-developed and well-nourished. No distress.   HENT:   Head: Normocephalic and atraumatic.   Mouth/Throat: Oropharynx is clear and moist.   Eyes: EOM are normal. Pupils are equal, round, and reactive to light.   Cardiovascular: Normal rate, regular rhythm, normal heart sounds and intact distal pulses.   Pulmonary/Chest: Effort normal and breath sounds normal. No stridor. No respiratory distress.   Abdominal: Bowel sounds are normal. He exhibits no distension. There is no tenderness.   Musculoskeletal: He  exhibits no edema, tenderness or deformity.   Neurological: He is alert and oriented to person, place, and time. No cranial nerve deficit.   Skin: He is not diaphoretic.   Nursing note and vitals reviewed.      ED Course   Patient evaluated and laboratory tests ordered.  Started on IV fluid hydration.    Procedures      Results for orders placed or performed during the hospital encounter of 12/24/18 (from the past 24 hour(s))   UA reflex to Microscopic and Culture   Result Value Ref Range    Color Urine Light Yellow     Appearance Urine Clear     Glucose Urine Negative NEG^Negative mg/dL    Bilirubin Urine Negative NEG^Negative    Ketones Urine Negative NEG^Negative mg/dL    Specific Gravity Urine 1.005 1.003 - 1.035    Blood Urine Negative NEG^Negative    pH Urine 5.5 4.7 - 8.0 pH    Protein Albumin Urine Negative NEG^Negative mg/dL    Urobilinogen mg/dL Normal 0.0 - 2.0 mg/dL    Nitrite Urine Negative NEG^Negative    Leukocyte Esterase Urine Negative NEG^Negative    Source Midstream Urine    Drug Screen Urine (Range)   Result Value Ref Range    Amphetamine Qual Urine Positive (A) NEG^Negative    Barbiturates Qual Urine Negative NEG^Negative    Benzodiazepine Qual Urine Negative NEG^Negative    Cannabinoids Qual Urine Negative NEG^Negative    Cocaine Qual Urine Negative NEG^Negative    Opiates Qualitative Urine Negative NEG^Negative    Methadone Qual Urine Positive (A) NEG^Negative    PCP Qual Urine Negative NEG^Negative       Medications - No data to display    Assessments & Plan (with Medical Decision Making)   Excessive thirst with associated urinary frequency and polydipsia: Patient presented to the ED with several weeks history of excessive thirst, frequent drinking of water and frequent urination.  He has never been diagnosed with diabetes.  He was evaluated and laboratory tests ordered.  An attempt was made to control his blood and after 2 attempts failed patient decided to sign and leave the facility  AGAINST MEDICAL ADVICE.  Urine sample given was positive for amphetamine and methadone.  He is currently on a methadone program for the last 3 years and gets his prescriptions from [facility].  However he did not report any prescriptions for amphetamine or methamphetamine from a new provider.  Patient left facility AMA without completion of investigations.    Methamphetamine abuse:    I have reviewed the nursing notes.    I have reviewed the findings, diagnosis, plan and need for follow up with the patient.       Medication List      There are no discharge medications for this visit.         Final diagnoses:   Excessive thirst   Urine frequency   Polydipsia   Methamphetamine abuse (H)       12/24/2018   [facility]     Thomas Blair MD  12/24/18 0714

## 2018-12-24 NOTE — ED NOTES
After unsuccessful attempts by 2 RNs to obtain IV access pt stated a desire to leave.  Pt was advised of potential negative health outcomes of leaving AMA but insists on leaving.  Dr Blair advised.  Pt signed AMA form and departed.

## 2019-01-10 ENCOUNTER — HOSPITAL ENCOUNTER (EMERGENCY)
Facility: HOSPITAL | Age: 59
Discharge: HOME | End: 2019-01-10
Attending: FAMILY MEDICINE | Admitting: FAMILY MEDICINE
Payer: MEDICARE

## 2019-01-10 VITALS
TEMPERATURE: 98.8 F | WEIGHT: 185 LBS | RESPIRATION RATE: 18 BRPM | HEART RATE: 64 BPM | SYSTOLIC BLOOD PRESSURE: 114 MMHG | OXYGEN SATURATION: 98 % | BODY MASS INDEX: 27.4 KG/M2 | HEIGHT: 69 IN | DIASTOLIC BLOOD PRESSURE: 100 MMHG

## 2019-01-10 DIAGNOSIS — F11.93 OPIATE WITHDRAWAL (H): ICD-10-CM

## 2019-01-10 LAB
ALBUMIN UR-MCNC: NEGATIVE MG/DL
AMPHETAMINES UR QL SCN: POSITIVE
ANION GAP SERPL CALCULATED.3IONS-SCNC: 5 MMOL/L (ref 3–14)
APPEARANCE UR: CLEAR
BARBITURATES UR QL: NEGATIVE
BASOPHILS # BLD AUTO: 0 10E9/L (ref 0–0.2)
BASOPHILS NFR BLD AUTO: 0.4 %
BENZODIAZ UR QL: NEGATIVE
BILIRUB UR QL STRIP: NEGATIVE
BUN SERPL-MCNC: 16 MG/DL (ref 7–30)
CALCIUM SERPL-MCNC: 8.7 MG/DL (ref 8.5–10.1)
CANNABINOIDS UR QL SCN: NEGATIVE
CHLORIDE SERPL-SCNC: 110 MMOL/L (ref 94–109)
CO2 SERPL-SCNC: 25 MMOL/L (ref 20–32)
COCAINE UR QL: NEGATIVE
COLOR UR AUTO: NORMAL
CREAT SERPL-MCNC: 0.86 MG/DL (ref 0.66–1.25)
DIFFERENTIAL METHOD BLD: ABNORMAL
EOSINOPHIL # BLD AUTO: 0.1 10E9/L (ref 0–0.7)
EOSINOPHIL NFR BLD AUTO: 1.7 %
ERYTHROCYTE [DISTWIDTH] IN BLOOD BY AUTOMATED COUNT: 12.2 % (ref 10–15)
GFR SERPL CREATININE-BSD FRML MDRD: >90 ML/MIN/{1.73_M2}
GLUCOSE SERPL-MCNC: 102 MG/DL (ref 70–99)
GLUCOSE UR STRIP-MCNC: NEGATIVE MG/DL
HCT VFR BLD AUTO: 41.8 % (ref 40–53)
HGB BLD-MCNC: 13.7 G/DL (ref 13.3–17.7)
HGB UR QL STRIP: NEGATIVE
IMM GRANULOCYTES # BLD: 0 10E9/L (ref 0–0.4)
IMM GRANULOCYTES NFR BLD: 0.2 %
KETONES UR STRIP-MCNC: NEGATIVE MG/DL
LEUKOCYTE ESTERASE UR QL STRIP: NEGATIVE
LYMPHOCYTES # BLD AUTO: 2.5 10E9/L (ref 0.8–5.3)
LYMPHOCYTES NFR BLD AUTO: 30.6 %
MCH RBC QN AUTO: 32 PG (ref 26.5–33)
MCHC RBC AUTO-ENTMCNC: 32.8 G/DL (ref 31.5–36.5)
MCV RBC AUTO: 98 FL (ref 78–100)
METHADONE UR QL SCN: POSITIVE
MONOCYTES # BLD AUTO: 0.6 10E9/L (ref 0–1.3)
MONOCYTES NFR BLD AUTO: 6.9 %
NEUTROPHILS # BLD AUTO: 5 10E9/L (ref 1.6–8.3)
NEUTROPHILS NFR BLD AUTO: 60.2 %
NITRATE UR QL: NEGATIVE
NRBC # BLD AUTO: 0 10*3/UL
NRBC BLD AUTO-RTO: 0 /100
OPIATES UR QL SCN: NEGATIVE
PCP UR QL SCN: NEGATIVE
PH UR STRIP: 6.5 PH (ref 4.7–8)
PLATELET # BLD AUTO: 143 10E9/L (ref 150–450)
POTASSIUM SERPL-SCNC: 4.9 MMOL/L (ref 3.4–5.3)
RBC # BLD AUTO: 4.28 10E12/L (ref 4.4–5.9)
SODIUM SERPL-SCNC: 140 MMOL/L (ref 133–144)
SOURCE: NORMAL
SP GR UR STRIP: 1.01 (ref 1–1.03)
UROBILINOGEN UR STRIP-MCNC: NORMAL MG/DL (ref 0–2)
WBC # BLD AUTO: 8.3 10E9/L (ref 4–11)

## 2019-01-10 PROCEDURE — 36415 COLL VENOUS BLD VENIPUNCTURE: CPT | Performed by: FAMILY MEDICINE

## 2019-01-10 PROCEDURE — 96361 HYDRATE IV INFUSION ADD-ON: CPT

## 2019-01-10 PROCEDURE — 80307 DRUG TEST PRSMV CHEM ANLYZR: CPT | Performed by: FAMILY MEDICINE

## 2019-01-10 PROCEDURE — 96375 TX/PRO/DX INJ NEW DRUG ADDON: CPT

## 2019-01-10 PROCEDURE — 96374 THER/PROPH/DIAG INJ IV PUSH: CPT

## 2019-01-10 PROCEDURE — 25000128 H RX IP 250 OP 636: Performed by: FAMILY MEDICINE

## 2019-01-10 PROCEDURE — 99284 EMERGENCY DEPT VISIT MOD MDM: CPT | Mod: Z6 | Performed by: FAMILY MEDICINE

## 2019-01-10 PROCEDURE — 99284 EMERGENCY DEPT VISIT MOD MDM: CPT | Mod: 25

## 2019-01-10 PROCEDURE — A9270 NON-COVERED ITEM OR SERVICE: HCPCS | Mod: GY | Performed by: FAMILY MEDICINE

## 2019-01-10 PROCEDURE — 85025 COMPLETE CBC W/AUTO DIFF WBC: CPT | Performed by: FAMILY MEDICINE

## 2019-01-10 PROCEDURE — 81003 URINALYSIS AUTO W/O SCOPE: CPT | Performed by: FAMILY MEDICINE

## 2019-01-10 PROCEDURE — 80048 BASIC METABOLIC PNL TOTAL CA: CPT | Performed by: FAMILY MEDICINE

## 2019-01-10 PROCEDURE — 25000132 ZZH RX MED GY IP 250 OP 250 PS 637: Mod: GY | Performed by: FAMILY MEDICINE

## 2019-01-10 RX ORDER — HYDROXYZINE PAMOATE 25 MG/1
25 CAPSULE ORAL 3 TIMES DAILY PRN
Status: DISCONTINUED | OUTPATIENT
Start: 2019-01-10 | End: 2019-01-10 | Stop reason: HOSPADM

## 2019-01-10 RX ORDER — PROMETHAZINE HYDROCHLORIDE 25 MG/ML
25 INJECTION, SOLUTION INTRAMUSCULAR; INTRAVENOUS ONCE
Status: COMPLETED | OUTPATIENT
Start: 2019-01-10 | End: 2019-01-10

## 2019-01-10 RX ORDER — CLONIDINE HYDROCHLORIDE 0.1 MG/1
0.1 TABLET ORAL ONCE
Status: COMPLETED | OUTPATIENT
Start: 2019-01-10 | End: 2019-01-10

## 2019-01-10 RX ORDER — ONDANSETRON 2 MG/ML
4 INJECTION INTRAMUSCULAR; INTRAVENOUS ONCE
Status: COMPLETED | OUTPATIENT
Start: 2019-01-10 | End: 2019-01-10

## 2019-01-10 RX ADMIN — HYDROXYZINE PAMOATE 25 MG: 25 CAPSULE ORAL at 08:53

## 2019-01-10 RX ADMIN — CLONIDINE HYDROCHLORIDE 0.1 MG: 0.1 TABLET ORAL at 08:53

## 2019-01-10 RX ADMIN — SODIUM CHLORIDE 1000 ML: 9 INJECTION, SOLUTION INTRAVENOUS at 08:17

## 2019-01-10 RX ADMIN — ONDANSETRON 4 MG: 2 INJECTION INTRAMUSCULAR; INTRAVENOUS at 08:17

## 2019-01-10 RX ADMIN — PROMETHAZINE HYDROCHLORIDE 25 MG: 25 INJECTION INTRAMUSCULAR; INTRAVENOUS at 08:53

## 2019-01-10 ASSESSMENT — ENCOUNTER SYMPTOMS
ABDOMINAL PAIN: 1
NERVOUS/ANXIOUS: 1
ACTIVITY CHANGE: 1
SPEECH DIFFICULTY: 0
MYALGIAS: 1
VOMITING: 0
DIARRHEA: 1
FATIGUE: 1
HEADACHES: 0
AGITATION: 1
SHORTNESS OF BREATH: 0
NAUSEA: 1
DIAPHORESIS: 0
CONSTIPATION: 0

## 2019-01-10 ASSESSMENT — MIFFLIN-ST. JEOR: SCORE: 1659.53

## 2019-01-10 NOTE — ED NOTES
"Pt agitated and restless in the bed. Pt demanding pain medications. Pt states \"I need a pain medication otherwise I'm leaving.\" updated Dr. Ryne Syed.  is going to talk with pt.   "

## 2019-01-10 NOTE — ED NOTES
"Patient reporting that he is going to go back to [place] to get his Methadone.  Pt stated \" I want you to go and tell that  That was in here tell her I want some pain medication\" Informed pt that I will tell her but not guarantee anything .   "

## 2019-01-10 NOTE — ED NOTES
"Pt awake, requesting we to sent information to the Methadone clinic, informed pt they need to send a release of information form to us.  Pt upset and stated \"You guys aren't doing crap for me here\"  "

## 2019-01-10 NOTE — ED PROVIDER NOTES
"  History     Chief Complaint   Patient presents with     Withdrawal     quit his methadone 7 days ago, vomiting     HPI  Rodney Goodwin is a 58 year old male who presented to the emergency room via ambulance having quit his methadone 7 days ago.  He is having nausea and vomiting, he has abdominal pain, is extremely restless and states he \"cannot take it anymore\".  He stopped his methadone because he is tired of going over to [place] to get it, states he feels that he should be able to do without it and does not feel he is going to use illicit drugs in its place.    Problem List:    Patient Active Problem List    Diagnosis Date Noted     Elevated blood sugar 10/29/2016     Priority: Medium     Narcotic addiction (H) 10/29/2016     Priority: Medium     Acute exacerbation of chronic obstructive pulmonary disease (H) 10/28/2016     Priority: Medium     Heroin abuse (H) 09/15/2015     Priority: Medium     COPD exacerbation (H) 09/14/2015     Priority: Medium     Costochondritis 02/26/2015     Priority: Medium     Venous insufficiency of both lower extremities 02/26/2015     Priority: Medium     Do you wish to do the replacement in the background? yes         Bipolar 1 disorder (H) 11/11/2014     Priority: Medium     Cellulitis and abscess of forearm 04/04/2014     Priority: Medium        Past Medical History:    Past Medical History:   Diagnosis Date     Anxiety      Bipolar affective disorder (H)      Chronic constipation      Depressive disorder      Drug abuse      Hypertension        Past Surgical History:    Past Surgical History:   Procedure Laterality Date     ARTHROSCOPY KNEE BILATERAL       COLONOSCOPY      Multiple, repeat due 2015     COLONOSCOPY N/A 7/31/2015    Procedure: COLONOSCOPY;  Surgeon: Justin Andujar MD;  [facility]     DECOMPRESSION CUBITAL TUNNEL Left 11/21/2017    Procedure: DECOMPRESSION CUBITAL TUNNEL;;  Surgeon: Jhonny Zhao MD;  Location: [facility]     DENTAL SURGERY       HERNIA REPAIR   " "   Inguinal, left     RELEASE CARPAL TUNNEL Left 11/21/2017    Procedure: RELEASE CARPAL TUNNEL;  LEFT ELBOW CUBITAL and CARPAL TUNNEL RELEASE;  Surgeon: Jhonny Zhao MD;  Location: [facility]       Family History:    Family History   Problem Relation Age of Onset     Diabetes Mother      Cerebrovascular Disease Father      Pancreatic Cancer Brother        Social History:  Marital Status:   [4]  Social History     Tobacco Use     Smoking status: Current Every Day Smoker     Packs/day: 1.00     Years: 30.00     Pack years: 30.00     Types: Cigarettes     Smokeless tobacco: Never Used     Tobacco comment: Tried to Quit (NO)   Substance Use Topics     Alcohol use: No     Alcohol/week: 0.0 oz     Drug use: Yes     Types: IV, Methamphetamines     Comment: last use of heroin 3 years, last use meth 4 months ago        Medications:      aspirin 325 MG tablet   ipratropium - albuterol 0.5 mg/2.5 mg/3 mL (DUONEB) 0.5-2.5 (3) MG/3ML nebulization   METHADONE HCL PO   METOPROLOL SUCCINATE ER PO   SIMVASTATIN PO         Review of Systems   Constitutional: Positive for activity change and fatigue. Negative for diaphoresis.   HENT: Negative.    Respiratory: Negative for shortness of breath.    Cardiovascular: Negative for chest pain.   Gastrointestinal: Positive for abdominal pain, diarrhea and nausea. Negative for constipation and vomiting.   Genitourinary: Negative.    Musculoskeletal: Positive for myalgias.   Skin: Positive for pallor.   Neurological: Negative for speech difficulty and headaches.   Psychiatric/Behavioral: Positive for agitation. The patient is nervous/anxious.        Physical Exam   BP: 179/93  Pulse: 64  Heart Rate: 63  Temp: 98.8  F (37.1  C)  Resp: (!) 11  Height: 175.3 cm (5' 9\")  Weight: 83.9 kg (185 lb)(stated)  SpO2: 100 %      Physical Exam   Constitutional: He is oriented to person, place, and time. He appears well-developed and well-nourished.   HENT:   Head: Normocephalic and atraumatic.   Eyes: " Pupils are equal, round, and reactive to light.   Neck: Normal range of motion. Neck supple.   Cardiovascular: Normal rate, regular rhythm, normal heart sounds and intact distal pulses.   No murmur heard.  Pulmonary/Chest: Effort normal and breath sounds normal. No respiratory distress.   Abdominal: Soft. Bowel sounds are normal. He exhibits no distension. There is tenderness. There is guarding.   Musculoskeletal: Normal range of motion. He exhibits no edema.   Neurological: He is alert and oriented to person, place, and time.   Skin: Skin is warm and dry. Capillary refill takes less than 2 seconds.   Psychiatric: His mood appears anxious. His affect is labile. His speech is rapid and/or pressured. He is agitated. He expresses impulsivity and inappropriate judgment. He exhibits a depressed mood.   Nursing note and vitals reviewed.      ED Course        Procedures    Results for orders placed or performed during the hospital encounter of 01/10/19 (from the past 24 hour(s))   UA reflex to Microscopic and Culture   Result Value Ref Range    Color Urine Straw     Appearance Urine Clear     Glucose Urine Negative NEG^Negative mg/dL    Bilirubin Urine Negative NEG^Negative    Ketones Urine Negative NEG^Negative mg/dL    Specific Gravity Urine 1.011 1.003 - 1.035    Blood Urine Negative NEG^Negative    pH Urine 6.5 4.7 - 8.0 pH    Protein Albumin Urine Negative NEG^Negative mg/dL    Urobilinogen mg/dL Normal 0.0 - 2.0 mg/dL    Nitrite Urine Negative NEG^Negative    Leukocyte Esterase Urine Negative NEG^Negative    Source Midstream Urine    Drug Screen Urine (Range)   Result Value Ref Range    Amphetamine Qual Urine Positive (A) NEG^Negative    Barbiturates Qual Urine Negative NEG^Negative    Benzodiazepine Qual Urine Negative NEG^Negative    Cannabinoids Qual Urine Negative NEG^Negative    Cocaine Qual Urine Negative NEG^Negative    Opiates Qualitative Urine Negative NEG^Negative    Methadone Qual Urine Positive (A)  NEG^Negative    PCP Qual Urine Negative NEG^Negative   CBC with platelets differential   Result Value Ref Range    WBC 8.3 4.0 - 11.0 10e9/L    RBC Count 4.28 (L) 4.4 - 5.9 10e12/L    Hemoglobin 13.7 13.3 - 17.7 g/dL    Hematocrit 41.8 40.0 - 53.0 %    MCV 98 78 - 100 fl    MCH 32.0 26.5 - 33.0 pg    MCHC 32.8 31.5 - 36.5 g/dL    RDW 12.2 10.0 - 15.0 %    Platelet Count 143 (L) 150 - 450 10e9/L    Diff Method Automated Method     % Neutrophils 60.2 %    % Lymphocytes 30.6 %    % Monocytes 6.9 %    % Eosinophils 1.7 %    % Basophils 0.4 %    % Immature Granulocytes 0.2 %    Nucleated RBCs 0 0 /100    Absolute Neutrophil 5.0 1.6 - 8.3 10e9/L    Absolute Lymphocytes 2.5 0.8 - 5.3 10e9/L    Absolute Monocytes 0.6 0.0 - 1.3 10e9/L    Absolute Eosinophils 0.1 0.0 - 0.7 10e9/L    Absolute Basophils 0.0 0.0 - 0.2 10e9/L    Abs Immature Granulocytes 0.0 0 - 0.4 10e9/L    Absolute Nucleated RBC 0.0    Basic metabolic panel   Result Value Ref Range    Sodium 140 133 - 144 mmol/L    Potassium 4.9 3.4 - 5.3 mmol/L    Chloride 110 (H) 94 - 109 mmol/L    Carbon Dioxide 25 20 - 32 mmol/L    Anion Gap 5 3 - 14 mmol/L    Glucose 102 (H) 70 - 99 mg/dL    Urea Nitrogen 16 7 - 30 mg/dL    Creatinine 0.86 0.66 - 1.25 mg/dL    GFR Estimate >90 >60 mL/min/[1.73_m2]    GFR Estimate If Black >90 >60 mL/min/[1.73_m2]    Calcium 8.7 8.5 - 10.1 mg/dL       Medications   hydrOXYzine (VISTARIL) capsule 25 mg (25 mg Oral Given 1/10/19 0853)   ondansetron (ZOFRAN) injection 4 mg (4 mg Intravenous Given 1/10/19 0817)   0.9% sodium chloride BOLUS (0 mLs Intravenous Stopped 1/10/19 0915)   promethazine (PHENERGAN) IV injection 25 mg (25 mg Intravenous Given 1/10/19 0853)   cloNIDine (CATAPRES) tablet 0.1 mg (0.1 mg Oral Given 1/10/19 0853)       Assessments & Plan (with Medical Decision Making)   Patient was given Phenergan for nausea, Vistaril for relaxation, and clonidine for withdrawal.  He then slept for about 3 hours.  On awaking he was again  asking for opiates for his abdominal pain, that was refused.  He spoke with the  and methadone clinic and has a tight decided that he is going to go back to the methadone clinic and go back on the methadone.  Of note his urine was positive for amphetamines.  He is not on any prescribed amphetamines.  He will follow-up with his primary care as needed.  He was offered detox to finish the process since he is already 7 days into it, he declined.  I have reviewed the nursing notes.    I have reviewed the findings, diagnosis, plan and need for follow up with the patient.     Medication List      There are no discharge medications for this visit.         Final diagnoses:   Opiate withdrawal (H)       1/10/2019   [facility]     Naty Bhagat MD  01/10/19 7503

## 2019-01-10 NOTE — ED NOTES
Patient to ED via ambulance.  Patient reported he recently stopped taking his Methadone about 7 days ago. Patient reported he was just sick of traveling to [place] everyday to get it.  Patient reported he has been taking methadone for about 4 years. Prior to that patient was taking Oxy for 8 years and then when the doctors took him off of that he started to use Heroin for a couple years.  Pt reports he has not used any drugs or alcohol for 4 years. Patient reports he has been vomiting the past few days and hasn't been able to eat well either

## 2019-01-10 NOTE — ED AVS SNAPSHOT
[facility]  [address] [address]  Phone:  [phone]                                    Rodney Goodwin   MRN: [id number]    Department:  [facility]   Date of Visit:  1/10/2019           After Visit Summary Signature Page    I have received my discharge instructions, and my questions have been answered. I have discussed any challenges I see with this plan with the nurse or doctor.    ..........................................................................................................................................  Patient/Patient Representative Signature      ..........................................................................................................................................  Patient Representative Print Name and Relationship to Patient    ..................................................               ................................................  Date                                   Time    ..........................................................................................................................................  Reviewed by Signature/Title    ...................................................              ..............................................  Date                                               Time          22EPIC Rev 08/18

## 2019-01-10 NOTE — ED NOTES
Patient moving around in bed and not keeping cardiac monitors on and pulling BP cuff off.  Encouraged pt to keep BP cuff off.

## 2019-01-10 NOTE — PROGRESS NOTES
Referral to see 58 year old man who is being seen for withdrawal symptoms from methadone.  Pt quit taking his methadone 7 days ago and reports belly pain and vomiting.  I spoke in speaker phone with patient present to Aníbal, an LPN at [facility] methadone clinic in [place].  Aníbal stated that he gave patient the same advice as I did.  He can either go to [facility] or to [facility] in [place].  Discussed with Dr Kimbrough.  Pt is looking for a ride to [place] at this time to seek services again at [facility].    Discussed with provider.  Pt is in agreement with plan.    LAURA El   Care Transitions

## 2019-01-10 NOTE — ED TRIAGE NOTES
Pt presents from his home with hx of quitting his methadone 120 mg daily 7 days ago. States has pain all over and has vomited yellow vile every 20 min. For the last 2 days.

## 2019-01-10 NOTE — ED NOTES
"Pt asking for something to drink, informed pt at this time we recommend nothing to eat or drink until we get the nausea under control. Pt making statements that \"If I feel like this again I will just kill myself\".  \"Just give me some med's to knock me out and never wake again.\" pt making comments \"I cant handle feeling this way I just want kill myself\"   Will update provider on comments made by patient.   "

## 2019-01-10 NOTE — ED NOTES
Patient given verbal and written discharge instructions. Patient verbalized understanding of discharge instructions. Pt agitated. Making calls for a ride to [place] to  methadone. Pt declined detox per S.S. Awake and alert. MD aware

## 2019-01-11 ENCOUNTER — TELEPHONE (OUTPATIENT)
Dept: EMERGENCY MEDICINE | Facility: HOSPITAL | Age: 59
End: 2019-01-11

## 2019-03-05 ENCOUNTER — TELEPHONE (OUTPATIENT)
Dept: CARE COORDINATION | Facility: OTHER | Age: 59
End: 2019-03-05

## 2019-03-05 NOTE — TELEPHONE ENCOUNTER
Received incoming phone call from pt this date.  Inquiring if our clinic does Methadone for opioid use disorder.  Informed him that this clinic does not prescribe Methadone for opioid use disorder at this time.  Currently going to [place] every day for dosing.  He had no other questions/concerns this date.    Nadya Medellin RN-BSN  Chronic Pain Care Coordinator  [phone] opt. #3

## 2019-09-28 ENCOUNTER — HOSPITAL ENCOUNTER (EMERGENCY)
Facility: HOSPITAL | Age: 59
Discharge: HOME | End: 2019-09-28
Attending: FAMILY MEDICINE | Admitting: FAMILY MEDICINE
Payer: MEDICARE

## 2019-09-28 ENCOUNTER — APPOINTMENT (OUTPATIENT)
Dept: CT IMAGING | Facility: HOSPITAL | Age: 59
End: 2019-09-28
Attending: PHYSICIAN ASSISTANT
Payer: MEDICARE

## 2019-09-28 VITALS
OXYGEN SATURATION: 94 % | RESPIRATION RATE: 16 BRPM | SYSTOLIC BLOOD PRESSURE: 160 MMHG | TEMPERATURE: 98.4 F | BODY MASS INDEX: 28.06 KG/M2 | WEIGHT: 190 LBS | DIASTOLIC BLOOD PRESSURE: 87 MMHG | HEART RATE: 89 BPM

## 2019-09-28 DIAGNOSIS — R10.13 ABDOMINAL PAIN, EPIGASTRIC: ICD-10-CM

## 2019-09-28 LAB
ALBUMIN SERPL-MCNC: 3.8 G/DL (ref 3.4–5)
ALBUMIN UR-MCNC: NEGATIVE MG/DL
ALP SERPL-CCNC: 127 U/L (ref 40–150)
ALT SERPL W P-5'-P-CCNC: 24 U/L (ref 0–70)
ANION GAP SERPL CALCULATED.3IONS-SCNC: 7 MMOL/L (ref 3–14)
APPEARANCE UR: CLEAR
AST SERPL W P-5'-P-CCNC: 22 U/L (ref 0–45)
BACTERIA #/AREA URNS HPF: ABNORMAL /HPF
BASOPHILS # BLD AUTO: 0 10E9/L (ref 0–0.2)
BASOPHILS NFR BLD AUTO: 0.2 %
BILIRUB SERPL-MCNC: 0.4 MG/DL (ref 0.2–1.3)
BILIRUB UR QL STRIP: NEGATIVE
BUN SERPL-MCNC: 11 MG/DL (ref 7–30)
CALCIUM SERPL-MCNC: 9.2 MG/DL (ref 8.5–10.1)
CHLORIDE SERPL-SCNC: 103 MMOL/L (ref 94–109)
CO2 SERPL-SCNC: 26 MMOL/L (ref 20–32)
COLOR UR AUTO: YELLOW
CREAT SERPL-MCNC: 0.94 MG/DL (ref 0.66–1.25)
DIFFERENTIAL METHOD BLD: ABNORMAL
EOSINOPHIL # BLD AUTO: 0.2 10E9/L (ref 0–0.7)
EOSINOPHIL NFR BLD AUTO: 2.5 %
ERYTHROCYTE [DISTWIDTH] IN BLOOD BY AUTOMATED COUNT: 12.1 % (ref 10–15)
GFR SERPL CREATININE-BSD FRML MDRD: 89 ML/MIN/{1.73_M2}
GLUCOSE SERPL-MCNC: 101 MG/DL (ref 70–99)
GLUCOSE UR STRIP-MCNC: NEGATIVE MG/DL
HCT VFR BLD AUTO: 40.2 % (ref 40–53)
HGB BLD-MCNC: 14 G/DL (ref 13.3–17.7)
HGB UR QL STRIP: ABNORMAL
IMM GRANULOCYTES # BLD: 0 10E9/L (ref 0–0.4)
IMM GRANULOCYTES NFR BLD: 0.5 %
KETONES UR STRIP-MCNC: NEGATIVE MG/DL
LACTATE BLD-SCNC: 0.7 MMOL/L (ref 0.7–2)
LEUKOCYTE ESTERASE UR QL STRIP: NEGATIVE
LIPASE SERPL-CCNC: 51 U/L (ref 73–393)
LYMPHOCYTES # BLD AUTO: 1.5 10E9/L (ref 0.8–5.3)
LYMPHOCYTES NFR BLD AUTO: 24.5 %
MCH RBC QN AUTO: 32 PG (ref 26.5–33)
MCHC RBC AUTO-ENTMCNC: 34.8 G/DL (ref 31.5–36.5)
MCV RBC AUTO: 92 FL (ref 78–100)
MONOCYTES # BLD AUTO: 0.9 10E9/L (ref 0–1.3)
MONOCYTES NFR BLD AUTO: 14.4 %
MUCOUS THREADS #/AREA URNS LPF: PRESENT /LPF
NEUTROPHILS # BLD AUTO: 3.5 10E9/L (ref 1.6–8.3)
NEUTROPHILS NFR BLD AUTO: 57.9 %
NITRATE UR QL: NEGATIVE
NRBC # BLD AUTO: 0 10*3/UL
NRBC BLD AUTO-RTO: 0 /100
NT-PROBNP SERPL-MCNC: 326 PG/ML (ref 0–900)
PH UR STRIP: 6 PH (ref 4.7–8)
PLATELET # BLD AUTO: 142 10E9/L (ref 150–450)
POTASSIUM SERPL-SCNC: 3.8 MMOL/L (ref 3.4–5.3)
PROT SERPL-MCNC: 8.2 G/DL (ref 6.8–8.8)
RBC # BLD AUTO: 4.38 10E12/L (ref 4.4–5.9)
RBC #/AREA URNS AUTO: 5 /HPF (ref 0–2)
SODIUM SERPL-SCNC: 136 MMOL/L (ref 133–144)
SOURCE: ABNORMAL
SP GR UR STRIP: 1.02 (ref 1–1.03)
TROPONIN I SERPL-MCNC: <0.015 UG/L (ref 0–0.04)
UROBILINOGEN UR STRIP-MCNC: NORMAL MG/DL (ref 0–2)
WBC # BLD AUTO: 6 10E9/L (ref 4–11)
WBC #/AREA URNS AUTO: <1 /HPF (ref 0–5)

## 2019-09-28 PROCEDURE — 85025 COMPLETE CBC W/AUTO DIFF WBC: CPT | Performed by: PHYSICIAN ASSISTANT

## 2019-09-28 PROCEDURE — 99284 EMERGENCY DEPT VISIT MOD MDM: CPT | Mod: Z6 | Performed by: PHYSICIAN ASSISTANT

## 2019-09-28 PROCEDURE — 74177 CT ABD & PELVIS W/CONTRAST: CPT | Mod: TC

## 2019-09-28 PROCEDURE — 96374 THER/PROPH/DIAG INJ IV PUSH: CPT | Mod: XU

## 2019-09-28 PROCEDURE — 80053 COMPREHEN METABOLIC PANEL: CPT | Performed by: PHYSICIAN ASSISTANT

## 2019-09-28 PROCEDURE — 83880 ASSAY OF NATRIURETIC PEPTIDE: CPT | Performed by: PHYSICIAN ASSISTANT

## 2019-09-28 PROCEDURE — 25000128 H RX IP 250 OP 636: Performed by: PHYSICIAN ASSISTANT

## 2019-09-28 PROCEDURE — 83605 ASSAY OF LACTIC ACID: CPT | Performed by: PHYSICIAN ASSISTANT

## 2019-09-28 PROCEDURE — 99285 EMERGENCY DEPT VISIT HI MDM: CPT | Mod: 25

## 2019-09-28 PROCEDURE — 36415 COLL VENOUS BLD VENIPUNCTURE: CPT | Performed by: PHYSICIAN ASSISTANT

## 2019-09-28 PROCEDURE — 81001 URINALYSIS AUTO W/SCOPE: CPT | Performed by: PHYSICIAN ASSISTANT

## 2019-09-28 PROCEDURE — 83690 ASSAY OF LIPASE: CPT | Performed by: PHYSICIAN ASSISTANT

## 2019-09-28 PROCEDURE — 25500064 ZZH RX 255 OP 636: Performed by: PHYSICIAN ASSISTANT

## 2019-09-28 PROCEDURE — 84484 ASSAY OF TROPONIN QUANT: CPT | Performed by: PHYSICIAN ASSISTANT

## 2019-09-28 PROCEDURE — 96361 HYDRATE IV INFUSION ADD-ON: CPT

## 2019-09-28 RX ORDER — ONDANSETRON 2 MG/ML
4 INJECTION INTRAMUSCULAR; INTRAVENOUS EVERY 30 MIN PRN
Status: DISCONTINUED | OUTPATIENT
Start: 2019-09-28 | End: 2019-09-29 | Stop reason: HOSPADM

## 2019-09-28 RX ORDER — ATENOLOL 50 MG/1
100 TABLET ORAL DAILY
COMMUNITY
End: 2020-01-23

## 2019-09-28 RX ORDER — SODIUM CHLORIDE 9 MG/ML
1000 INJECTION, SOLUTION INTRAVENOUS CONTINUOUS
Status: DISCONTINUED | OUTPATIENT
Start: 2019-09-28 | End: 2019-09-29 | Stop reason: HOSPADM

## 2019-09-28 RX ADMIN — IOHEXOL 100 ML: 300 INJECTION, SOLUTION INTRAVENOUS at 21:08

## 2019-09-28 RX ADMIN — ONDANSETRON 4 MG: 2 INJECTION INTRAMUSCULAR; INTRAVENOUS at 20:08

## 2019-09-28 RX ADMIN — SODIUM CHLORIDE 1000 ML: 9 INJECTION, SOLUTION INTRAVENOUS at 20:08

## 2019-09-28 ASSESSMENT — ENCOUNTER SYMPTOMS
FEVER: 0
ABDOMINAL PAIN: 1
DIARRHEA: 0
DIFFICULTY URINATING: 1
WHEEZING: 1
NAUSEA: 1
VOMITING: 1
BLOOD IN STOOL: 0
CONSTIPATION: 1

## 2019-09-28 NOTE — ED AVS SNAPSHOT
[facility]  [address] [address]  Phone:  [phone]                                    Rodney Goodwin   MRN: [id number]    Department:  [facility]   Date of Visit:  9/28/2019           After Visit Summary Signature Page    I have received my discharge instructions, and my questions have been answered. I have discussed any challenges I see with this plan with the nurse or doctor.    ..........................................................................................................................................  Patient/Patient Representative Signature      ..........................................................................................................................................  Patient Representative Print Name and Relationship to Patient    ..................................................               ................................................  Date                                   Time    ..........................................................................................................................................  Reviewed by Signature/Title    ...................................................              ..............................................  Date                                               Time          22EPIC Rev 08/18

## 2019-09-29 NOTE — ED NOTES
"Patient was able to void.  Urine sample to lab.  Patient whimpering stating \"please don't let me die\" reassurance given.  IV bolus infusing without any difficulties.  Will monitor.  "

## 2019-09-29 NOTE — ED NOTES
IV was removed.  Discharge instructions reviewed with patient and he verbalizes understanding.  VSS.  Home to rest.  Will follow up with Dr. Estevez if no improvement and/or return to the emergency room if symptoms worsen.

## 2019-09-29 NOTE — ED PROVIDER NOTES
History     Chief Complaint   Patient presents with     Abdominal Pain     HPI  Rodney Goodwin is a 59 year old male who presents to ED with complaints of severe upper abdominal discomfort, multiple episodes of bilious vomiting, limited bowel movements.  Patient has had significant chronic problems with constipation secondary to methadone use for chronic pain.  Has been taking multiple medications for constipation since clinic visit on Monday.  Had loose stool on Wednesday but has not had BM since.  Denies blood in stool.  Reports feeling dehydrated.  Denies fever, chest pain.  History of COPD and is wheezy but declines any treatment for this.      Allergies:  Allergies   Allergen Reactions     Penicillins        Problem List:    Patient Active Problem List    Diagnosis Date Noted     Elevated blood sugar 10/29/2016     Priority: Medium     Narcotic addiction (H) 10/29/2016     Priority: Medium     Acute exacerbation of chronic obstructive pulmonary disease (H) 10/28/2016     Priority: Medium     Heroin abuse (H) 09/15/2015     Priority: Medium     COPD exacerbation (H) 09/14/2015     Priority: Medium     Costochondritis 02/26/2015     Priority: Medium     Venous insufficiency of both lower extremities 02/26/2015     Priority: Medium     Do you wish to do the replacement in the background? yes         Bipolar 1 disorder (H) 11/11/2014     Priority: Medium     Cellulitis and abscess of forearm 04/04/2014     Priority: Medium        Past Medical History:    Past Medical History:   Diagnosis Date     Anxiety      Bipolar affective disorder (H)      Chronic constipation      Depressive disorder      Drug abuse (H)      Hypertension        Past Surgical History:    Past Surgical History:   Procedure Laterality Date     ARTHROSCOPY KNEE BILATERAL       COLONOSCOPY      Multiple, repeat due 2015     COLONOSCOPY N/A 7/31/2015    Procedure: COLONOSCOPY;  Surgeon: Justin Andujar MD;  Location: [facility]     DECOMPRESSION  CUBITAL TUNNEL Left 11/21/2017    Procedure: DECOMPRESSION CUBITAL TUNNEL;;  Surgeon: Jhonny Zhao MD;  Location: [facility]     DENTAL SURGERY       HERNIA REPAIR      Inguinal, left     RELEASE CARPAL TUNNEL Left 11/21/2017    Procedure: RELEASE CARPAL TUNNEL;  LEFT ELBOW CUBITAL and CARPAL TUNNEL RELEASE;  Surgeon: Jhonny Zhao MD;  Location: [facility]       Family History:    Family History   Problem Relation Age of Onset     Diabetes Mother      Cerebrovascular Disease Father      Pancreatic Cancer Brother        Social History:  Marital Status:   [4]  Social History     Tobacco Use     Smoking status: Current Every Day Smoker     Packs/day: 1.00     Years: 30.00     Pack years: 30.00     Types: Cigarettes     Smokeless tobacco: Never Used     Tobacco comment: Tried to Quit (NO)   Substance Use Topics     Alcohol use: No     Alcohol/week: 0.0 standard drinks     Drug use: Yes     Types: IV, Methamphetamines     Comment: last use of heroin 3 years, last use meth 4 months ago        Medications:    aspirin 325 MG tablet  atenolol (TENORMIN) 50 MG tablet  METHADONE HCL PO  SIMVASTATIN PO          Review of Systems   Constitutional: Negative for fever.   Respiratory: Positive for wheezing.    Cardiovascular: Negative for chest pain.   Gastrointestinal: Positive for abdominal pain, constipation, nausea and vomiting. Negative for blood in stool and diarrhea.   Genitourinary: Positive for difficulty urinating.       Physical Exam   BP: 108/81  Pulse: 97  Heart Rate: 72  Temp: 97.9  F (36.6  C)  Resp: 18  Weight: 86.2 kg (190 lb)  SpO2: 94 %      Physical Exam  Constitutional:       General: He is not in acute distress.     Appearance: He is not diaphoretic.   HENT:      Head: Atraumatic.   Eyes:      General: No scleral icterus.     Pupils: Pupils are equal, round, and reactive to light.   Cardiovascular:      Heart sounds: Normal heart sounds.   Pulmonary:      Effort: No respiratory distress.      Breath sounds:  Wheezing (bilateral) present.   Abdominal:      General: Abdomen is protuberant. Bowel sounds are decreased.      Palpations: Abdomen is soft.      Tenderness: There is tenderness (generalized abdominal tenderness with severe tenderness in epigastric region).   Musculoskeletal:         General: No tenderness.   Skin:     General: Skin is warm.      Findings: No rash.   Neurological:      Mental Status: He is oriented to person, place, and time.   Psychiatric:         Mood and Affect: Mood is anxious.         ED Course        Procedures  Symptoms concerning for bowel obstruction, cholecystitis, pancreatitis, gastritis.  CBC, lipase, LFTs unremarkable.  CT abdomen unremarkable.  Given upper GI symptoms and SOB, troponin and BNP obtained and were normal.  No concern for CAD or CHF at this time.  Patient declined neb treatment or Zofran in ED.  Patient noted some improvement in symptoms with NS bolus.  Declined Zofran for home use.  Recommended Tylenol for pain relief and follow-up with primary provider regarding abdominal pain. Discussed that this could very well be due to ongoing use of high doses of methadone. Return to ED if severely worsening pain, new onset fever, bloody stools, other concerning symptoms.             Results for orders placed or performed during the hospital encounter of 09/28/19 (from the past 24 hour(s))   UA reflex to Microscopic and Culture   Result Value Ref Range    Color Urine Yellow     Appearance Urine Clear     Glucose Urine Negative NEG^Negative mg/dL    Bilirubin Urine Negative NEG^Negative    Ketones Urine Negative NEG^Negative mg/dL    Specific Gravity Urine 1.018 1.003 - 1.035    Blood Urine Small (A) NEG^Negative    pH Urine 6.0 4.7 - 8.0 pH    Protein Albumin Urine Negative NEG^Negative mg/dL    Urobilinogen mg/dL Normal 0.0 - 2.0 mg/dL    Nitrite Urine Negative NEG^Negative    Leukocyte Esterase Urine Negative NEG^Negative    Source Midstream Urine     RBC Urine 5 (H) 0 - 2 /HPF     WBC Urine <1 0 - 5 /HPF    Bacteria Urine None (A) NEG^Negative /HPF    Mucous Urine Present (A) NEG^Negative /LPF   Troponin I   Result Value Ref Range    Troponin I ES <0.015 0.000 - 0.045 ug/L   Nt probnp inpatient (BNP)   Result Value Ref Range    N-Terminal Pro BNP Inpatient 326 0 - 900 pg/mL   Lactic acid whole blood   Result Value Ref Range    Lactic Acid 0.7 0.7 - 2.0 mmol/L   Lipase   Result Value Ref Range    Lipase 51 (L) 73 - 393 U/L   Comprehensive metabolic panel   Result Value Ref Range    Sodium 136 133 - 144 mmol/L    Potassium 3.8 3.4 - 5.3 mmol/L    Chloride 103 94 - 109 mmol/L    Carbon Dioxide 26 20 - 32 mmol/L    Anion Gap 7 3 - 14 mmol/L    Glucose 101 (H) 70 - 99 mg/dL    Urea Nitrogen 11 7 - 30 mg/dL    Creatinine 0.94 0.66 - 1.25 mg/dL    GFR Estimate 89 >60 mL/min/[1.73_m2]    GFR Estimate If Black >90 >60 mL/min/[1.73_m2]    Calcium 9.2 8.5 - 10.1 mg/dL    Bilirubin Total 0.4 0.2 - 1.3 mg/dL    Albumin 3.8 3.4 - 5.0 g/dL    Protein Total 8.2 6.8 - 8.8 g/dL    Alkaline Phosphatase 127 40 - 150 U/L    ALT 24 0 - 70 U/L    AST 22 0 - 45 U/L   CBC with platelets differential   Result Value Ref Range    WBC 6.0 4.0 - 11.0 10e9/L    RBC Count 4.38 (L) 4.4 - 5.9 10e12/L    Hemoglobin 14.0 13.3 - 17.7 g/dL    Hematocrit 40.2 40.0 - 53.0 %    MCV 92 78 - 100 fl    MCH 32.0 26.5 - 33.0 pg    MCHC 34.8 31.5 - 36.5 g/dL    RDW 12.1 10.0 - 15.0 %    Platelet Count 142 (L) 150 - 450 10e9/L    Diff Method Automated Method     % Neutrophils 57.9 %    % Lymphocytes 24.5 %    % Monocytes 14.4 %    % Eosinophils 2.5 %    % Basophils 0.2 %    % Immature Granulocytes 0.5 %    Nucleated RBCs 0 0 /100    Absolute Neutrophil 3.5 1.6 - 8.3 10e9/L    Absolute Lymphocytes 1.5 0.8 - 5.3 10e9/L    Absolute Monocytes 0.9 0.0 - 1.3 10e9/L    Absolute Eosinophils 0.2 0.0 - 0.7 10e9/L    Absolute Basophils 0.0 0.0 - 0.2 10e9/L    Abs Immature Granulocytes 0.0 0 - 0.4 10e9/L    Absolute Nucleated RBC 0.0    CT Abdomen  Pelvis w Contrast    Narrative    EXAMINATION: CT ABDOMEN PELVIS W CONTRAST, 9/28/2019 9:29 PM    HISTORY: Abd pain, acute, generalized; gen pain and epigastric pain    COMPARISON: 3/12/2018    TECHNIQUE:  Helical CT images from the lung bases through the  symphysis pubis were obtained with IV contrast. Contrast dose:  [id number] given    FINDINGS:    Pancreatico hepatobiliary: Normal appearance of the liver,  gallbladder, bile ducts and spleen. The pancreatic parenchyma is  atrophic but stable since the prior exam.    Genitourinary:  Stable tiny cortical cyst in the right kidney. The  adrenal glands, kidneys, ureters, and bladder otherwise appear normal.  Prostatic calcifications.    Gastrointestinal:  Normal appearance of the distal esophagus, stomach,  and bowel.    Lymph nodes:  No lymphadenopathy.    Vasculature:  Atheromatous calcifications. Infrarenal abdominal aortic  aneurysm measuring approximately 3.1 x 3.1 cm in AP and transverse  dimensions. This is increased slightly since 3/12/2018 when it  measured approximately 2.7 cm.    Lung bases: Normal lung bases.    Musculoskeletal: No worrisome bone lesions. Degenerative arthritis in  the lumbar spine with anterior endplate osteophyte formation facet  arthropathy.      Impression    IMPRESSION:   1. No acute findings  2. Infrarenal abdominal aortic aneurysm, measuring approximately 3 cm  and slightly increased since 3/12/2018.   3. Atrophic pancreatic parenchyma.    Agree with preliminary interpretation provided by virtual radiologic    GIL GRAY MD       Medications   0.9% sodium chloride BOLUS (0 mLs Intravenous Stopped 9/28/19 2252)   iohexol (OMNIPAQUE) 300 mg/mL injection 100 mL (100 mLs Intravenous Given 9/28/19 2108)   sodium chloride (PF) 0.9% PF flush 60 mL (50 mLs Intravenous Given 9/28/19 2108)       Assessments & Plan (with Medical Decision Making)     I have reviewed the nursing notes.    I have reviewed the findings, diagnosis, plan  and need for follow up with the patient.    Discharge Medication List as of 9/28/2019 10:52 PM          Final diagnoses:   Abdominal pain, epigastric     BONITA Montez on 9/29/2019 at 11:51 AM   9/28/2019   [facility]     Chava Farr PA  09/29/19 1151

## 2019-09-29 NOTE — ED NOTES
Patient presents to the emergency room with ongoing issues of constipation.  States he has been having constipation since August.  Was seen at the clinic on Monday and given more medications for this.  States since Thursday has been feeling worse.  Hasn't peed much since then and had some diarrhea on Thursday.  Patient states now he doesn't feel like he can swallow; c/o dry mouth and tongue is dry and states he is vomiting up yellow bile when he tries to drink anything.  Patient also notes that he normally goes to the methadone clinic daily and was unable to do so today because he was too sick.  Gown on and nursing assessment as charted.  Patient c/o pain all over on his stomach but mainly epigastric area.

## 2019-12-26 ENCOUNTER — TRANSFERRED RECORDS (OUTPATIENT)
Dept: HEALTH INFORMATION MANAGEMENT | Facility: CLINIC | Age: 59
End: 2019-12-26

## 2020-01-08 ENCOUNTER — HOSPITAL ENCOUNTER (EMERGENCY)
Facility: HOSPITAL | Age: 60
Discharge: HOME | End: 2020-01-08
Attending: FAMILY MEDICINE | Admitting: FAMILY MEDICINE
Payer: MEDICARE

## 2020-01-08 ENCOUNTER — TRANSFERRED RECORDS (OUTPATIENT)
Dept: HEALTH INFORMATION MANAGEMENT | Facility: CLINIC | Age: 60
End: 2020-01-08

## 2020-01-08 ENCOUNTER — MEDICAL CORRESPONDENCE (OUTPATIENT)
Dept: HEALTH INFORMATION MANAGEMENT | Facility: CLINIC | Age: 60
End: 2020-01-08

## 2020-01-08 VITALS
HEART RATE: 85 BPM | OXYGEN SATURATION: 97 % | DIASTOLIC BLOOD PRESSURE: 70 MMHG | TEMPERATURE: 97.6 F | RESPIRATION RATE: 13 BRPM | SYSTOLIC BLOOD PRESSURE: 119 MMHG

## 2020-01-08 DIAGNOSIS — F11.93 OPIATE WITHDRAWAL (H): ICD-10-CM
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
## 2020-01-08 PROCEDURE — 99283 EMERGENCY DEPT VISIT LOW MDM: CPT | Mod: Z6 | Performed by: FAMILY MEDICINE

## 2020-01-08 PROCEDURE — 25000128 H RX IP 250 OP 636: Performed by: INTERNAL MEDICINE

## 2020-01-08 PROCEDURE — 99283 EMERGENCY DEPT VISIT LOW MDM: CPT

## 2020-01-08 RX ORDER — ONDANSETRON 4 MG/1
4 TABLET, ORALLY DISINTEGRATING ORAL
Status: COMPLETED | OUTPATIENT
Start: 2020-01-08 | End: 2020-01-08

## 2020-01-08 RX ADMIN — ONDANSETRON 4 MG: 4 TABLET, ORALLY DISINTEGRATING ORAL at 07:54

## 2020-01-08 ASSESSMENT — ENCOUNTER SYMPTOMS
NECK PAIN: 1
NAUSEA: 1
HEADACHES: 1
NECK STIFFNESS: 0
CONFUSION: 0
DIFFICULTY URINATING: 0
VOMITING: 1
ARTHRALGIAS: 0
DYSURIA: 1
PALPITATIONS: 1
DIARRHEA: 0
COLOR CHANGE: 0
SHORTNESS OF BREATH: 0
MYALGIAS: 1
ABDOMINAL PAIN: 1
CONSTIPATION: 1
FEVER: 1
BACK PAIN: 1

## 2020-01-08 NOTE — ED NOTES
"Placed on call light to request kleenex. Rocking back and forth in bed and crying. States abdominal pain is still rated 10/10. Call light within reach. Informed patient ride would be here to bring him to [place] very soon and patient began to cry more and said, \"but they close at 11. I need something for this pain.\"   "

## 2020-01-08 NOTE — ED NOTES
[facility] ambulance service here, condition stable, transferring patient to [facility] in [place].

## 2020-01-08 NOTE — ED TRIAGE NOTES
"Arrived to ED room 2 via [facility] with c/o methadone withdrawal.  has been driving to [place] for Methadone and no longer wants to drive to [place] so quit taking it on this past Sunday.  is prescribed Methadone 110 mg daily. Reports wants to switch to suboxone. C/o abdominal pain rated 10/10. Reports has also not taken his simvastin or atenolol in the past couple of days. During suicide screen states \"I wish I was dead.\" When questioned further states he is not suicidal and does not have a suicide plan but wishes he would not wake up because he is tired of dealing with all of this. Reports frustration with current situation but states he has never attempted suicide and would not attempt suicide but does wish he would just stay asleep and not wake up. Patient very tearful. Answers questions openly and appropriately. Vitals stable as charted. Changed into gown. Monitors in place. Cardiac monitor showing SR in the 70's. Call light within reach.   "

## 2020-01-08 NOTE — ED PROVIDER NOTES
"  History     Chief Complaint   Patient presents with     Withdrawal     States tired of driving to [place] for methadone so quit taking it abruptly on Sunday. Reports was taking methadone 110 mg daily.      HPI  Rodney Goodwin is a 59 year old male who is on methadone since 2015 (Dr. Barcenas at [facility]) and has a history of bipolar, depression, drug abuse, hypertension, anxiety.  He is in methadone withdrawal and quit taking it this past Sunday.  He is prescribed 110 mg daily--takes it at 0530.  He would like to switch to Suboxone.  He states that he is having abdominal pain 10/10.     He goes M T W and Th and gets 3 days of meds to go home with. Can use zofran and clonidine.     He was told to come to [facility] which is opiate withdrawal where you are monitored. Then start on suboxone 3 days later.  He'd be started on subutex.     He is also not taking his other medication such as simvastatin and atenolol.    Depression: He states \"I wish I was dead.\"  He denies being suicidal or having a plan but wishes he would not wake up.    Stacy:  Nurse for Dr. Barcenas [phone]. Stacy's direct number is [phone]  Counselor report stated...  Patient and sister called and he sounded tearful. He was supposed to come today  Sister stayed overnight to bring him today. He stated he was throwing up. Sister and Rodney said he can't get to [place].     Samantha in [facility]    Social history  1/20: He used to be a . He is on disability for his bipolar. He lives alone. He drives. He has 3 children--but hasn't talked to them. His xwife is a nurse on 5th floor.       Allergies:  Allergies   Allergen Reactions     Codeine      Penicillins        Problem List:    Patient Active Problem List    Diagnosis Date Noted     Elevated blood sugar 10/29/2016     Priority: Medium     Narcotic addiction (H) 10/29/2016     Priority: Medium     Acute exacerbation of chronic obstructive pulmonary disease (H) 10/28/2016     Priority: " Medium     Heroin abuse (H) 09/15/2015     Priority: Medium     COPD exacerbation (H) 09/14/2015     Priority: Medium     Costochondritis 02/26/2015     Priority: Medium     Venous insufficiency of both lower extremities 02/26/2015     Priority: Medium     Do you wish to do the replacement in the background? yes         Bipolar 1 disorder (H) 11/11/2014     Priority: Medium     Cellulitis and abscess of forearm 04/04/2014     Priority: Medium        Past Medical History:    Past Medical History:   Diagnosis Date     Anxiety      Bipolar affective disorder (H)      Chronic constipation      Depressive disorder      Drug abuse (H)      Hypertension        Past Surgical History:    Past Surgical History:   Procedure Laterality Date     ARTHROSCOPY KNEE BILATERAL       COLONOSCOPY      Multiple, repeat due 2015     COLONOSCOPY N/A 7/31/2015    Procedure: COLONOSCOPY;  Surgeon: Justin Andujar MD;  Location: [facility]     DECOMPRESSION CUBITAL TUNNEL Left 11/21/2017    Procedure: DECOMPRESSION CUBITAL TUNNEL;;  Surgeon: Jhonny Zhao MD;  Location: [facility] OR     DENTAL SURGERY       HERNIA REPAIR      Inguinal, left     RELEASE CARPAL TUNNEL Left 11/21/2017    Procedure: RELEASE CARPAL TUNNEL;  LEFT ELBOW CUBITAL and CARPAL TUNNEL RELEASE;  Surgeon: Jhonny Zhao MD;  Location: [facility] OR       Family History:    Family History   Problem Relation Age of Onset     Diabetes Mother      Cerebrovascular Disease Father      Pancreatic Cancer Brother        Social History:  Marital Status:   [4]  Social History     Tobacco Use     Smoking status: Current Every Day Smoker     Packs/day: 1.00     Years: 30.00     Pack years: 30.00     Types: Cigarettes     Smokeless tobacco: Never Used     Tobacco comment: Tried to Quit (NO)   Substance Use Topics     Alcohol use: No     Alcohol/week: 0.0 standard drinks     Drug use: Yes     Types: IV, Methamphetamines     Comment: last use of heroin 3 years, last use meth 4 months ago     "    Medications:    atenolol (TENORMIN) 50 MG tablet  METHADONE HCL PO  SIMVASTATIN PO  aspirin 325 MG tablet          Review of Systems   Constitutional: Positive for fever.   HENT: Negative for congestion.    Eyes: Negative for visual disturbance (vision is blurry from crying).   Respiratory: Negative for shortness of breath.    Cardiovascular: Positive for chest pain and palpitations.   Gastrointestinal: Positive for abdominal pain, constipation, nausea and vomiting (x 1). Negative for diarrhea.   Genitourinary: Positive for dysuria. Negative for difficulty urinating (only a few drops come out).   Musculoskeletal: Positive for back pain, myalgias and neck pain. Negative for arthralgias and neck stiffness.   Skin: Negative for color change.   Neurological: Positive for headaches (he has a headache today. \"I ain't never had a headache.\" ).   Psychiatric/Behavioral: Negative for confusion.        + depression and anxiety       Physical Exam   BP: 115/84  Pulse: 75  Heart Rate: 72  Temp: 97.7  F (36.5  C)  Resp: 20  SpO2: 96 %      Physical Exam  Vitals signs and nursing note reviewed.   Constitutional:       General: He is in acute distress (crying and sobbing).      Appearance: He is well-developed.   HENT:      Head: Normocephalic and atraumatic.   Eyes:      Pupils: Pupils are equal, round, and reactive to light.   Neck:      Musculoskeletal: Normal range of motion and neck supple.   Cardiovascular:      Rate and Rhythm: Normal rate and regular rhythm.      Heart sounds: Normal heart sounds. No murmur. No friction rub. No gallop.    Pulmonary:      Effort: Pulmonary effort is normal. No respiratory distress.      Breath sounds: Normal breath sounds.   Abdominal:      Tenderness: There is no abdominal tenderness (diffuse tenderness). There is no guarding or rebound.   Musculoskeletal:      Right lower leg: No edema.      Left lower leg: No edema.   Lymphadenopathy:      Cervical: No cervical adenopathy.   Skin:    "  General: Skin is warm and dry.   Neurological:      Mental Status: He is alert and oriented to person, place, and time.         ED Course        Procedures          [unfilled]    Patient Vitals for the past 24 hrs:   BP Temp Temp src Pulse Heart Rate Resp SpO2   01/08/20 0848 -- -- -- -- -- 18 --   01/08/20 0845 150/90 -- -- 69 67 -- 96 %   01/08/20 0830 143/91 -- -- 71 73 -- 98 %   01/08/20 0827 -- -- -- -- -- 14 --   01/08/20 0820 -- -- -- -- -- 18 --   01/08/20 0815 141/87 -- -- 71 70 -- 95 %   01/08/20 0810 -- -- -- -- 72 12 96 %   01/08/20 0800 134/83 -- -- 68 72 13 96 %   01/08/20 0747 115/84 97.7  F (36.5  C) Oral 75 -- 20 96 %       LABORATORY (REVIEWED AND INTERPRETED):  CBC BMP Liver Panel   Recent Labs   Lab Test 09/28/19 2006   WBC 6.0   HGB 14.0   *    Recent Labs   Lab Test 09/28/19 2006   POTASSIUM 3.8   CHLORIDE 103   BUN 11    Recent Labs   Lab Test 09/28/19 2006   BILITOTAL 0.4   ALT 24   AST 22   LIPASE 51*        UA     DIP MICRO   Recent Labs   Lab Test 09/28/19 2006   COLOR Yellow   NITRITE Negative    Invalid input(s): RBCUA, WBCUA, BACTERIAUA, EPITHELIALUA       OTHER LABS   Recent Labs   Lab Test 09/28/19 2006 11/03/17  2205  11/13/14  0753  01/20/14  0605   INR  --   --   --   --   --  1.06   TSH  --   --   --  1.26   < >  --    LIPASE 51* 81   < >  --   --  146   AMYLASE  --  26*   < >  --   --   --     < > = values in this interval not displayed.            INTERVENTIONS:  Medications   ondansetron (ZOFRAN-ODT) ODT tab 4 mg (4 mg Oral Given 1/8/20 0754)       ECG (Reviewed and Interpreted by me):    IMAGING (Reviewed and Interpreted by me):    ED COURSE:  The patient has been seen and evaluated by me.  I have reviewed the medical records.    I talked to Stacy nurse for Dr. Barcenas.    9:04 AM discussed transfer with the [facility] to [facility].     IMPRESSIONS AND PLAN:  Opiate Withdrawal: has been on methadone 110mg daily since 2015. He goes to [employer]  Monday, Tuesday, Wednesday, Thursday. He gets doses for the weekends on Thursdays. He took last dose on Sunday at 0530 am. He's been without his opiate for 51 hours. He is tearful, crying, in pain, abdominal pain, palpitations. Pulse is normal and bp is good. He will be [facility].      Passive suicidal thoughts: never attempted suicide. No plan forsuicide. Just wishes he wouldn't wake up. Currently in opiate withdrawal       DIAGNOSIS:    ICD-10-CM    1. Opiate withdrawal (H) F11.23    2. Passive Suicidal Thoughts X83.8XXA        DISCHARGE MEDICATIONS:  New Prescriptions    No medications on file         LOS:  3      1/8/2020  [facility]    Tristan Estevez Cassandra E, MD  01/08/20 0908

## 2020-01-08 NOTE — PROGRESS NOTES
Care Transitions focused note:      Contacted [facility] and spoke with Trell.  He states they would be able to accommodate Rodney just ask that a nurse to nurse be completed ([phone]).  Dr. Mccormick and Ely FUENTES updated.

## 2020-01-10 ENCOUNTER — TRANSFERRED RECORDS (OUTPATIENT)
Dept: HEALTH INFORMATION MANAGEMENT | Facility: CLINIC | Age: 60
End: 2020-01-10

## 2020-01-15 ENCOUNTER — PATIENT OUTREACH (OUTPATIENT)
Dept: CARE COORDINATION | Facility: OTHER | Age: 60
End: 2020-01-15

## 2020-01-15 NOTE — TELEPHONE ENCOUNTER
Clinic Care Coordination Contact  Care Team Conversations    Received a phone call from Samantha at [facility] in [place].  Pt is currently admitted and was started on Subutex.  Was previously on Methadone.  CC actually spoke with this pt in March of 2019.  He would like to continue on Suboxone once discharged.  Appt scheduled for 1.23.20  Samantha is faxing records now.

## 2020-01-21 NOTE — PROGRESS NOTES
"Initial Buprenorphine Visit           HPI       Rodney Goodwin is here for f/u on buprenophine/naloxone (Suboxone) therapy for opioid use disorder.  Transfer of Suboxone Care    Started with prescription opioids that were prescribed to him for acute pain issues. Started using in 1989.  Lortabs, Oxycontin.  IV use of Oxycontin.  Up to 4-5 80mg daily.  Then turned to heroin - as much as he could.  Started the Methadone program in 2014.  Last opioid use was in 2013.  Has tried multiples substances - meth, mushrooms, benzos, stimulants, THC, fentanyl, soma.    Rodney is currently taking 10/2.5mg 8mg and 2 mg each of Buprenorphine/Naloxone 2 times daily.   Had not been on it prior. Had never tried it on the street.    Had run out of methadone - cold turkey - out x 3 days.  Had been at 200 mg at max, most recently 110 mg.  Went to ER.  [facility] 1/8/2020 for detox.  Started on Suboxone there.  Records obtained - scanned.  Discharged 1/16/2020.  Given 1 weeks' worth.    Prior [facility] for methadone.  Records not available yet.  No active counseling.    Status since last visit:    Since last visit patient has been: doing well.     Intensity:     There has been: no craving.      Suboxone Dose: adequate.     Progression of Symptoms:     Cues to use and relapse     Recovery program has been: sporadic.   Accompanying Signs & Symptoms:    Side Effects: none.    Sobriety:     Status: patient has relapsed.  Methamphetamines, Suboxone, \"susu made a concoction so I could go to the bathroom\" \"shit out black sawdust\"     Drug Screen: obtained.    Precipitating factors:    Triggers have been:   Alleviating factors:    Contact with sponsor has been: no sponsor. No  meeting    Family and support system has been:   Other Therapies Tried :     Patient has been going to recovery meetings:regularly.  NA/AA    Other medical concerns:  YES abdominal pain - last BM was on Monday morning.  Colonoscopy 12/17/2012; polypectomy.  " "Ramesh.  Then went to [facility].  8/2015.  Unsure of results or follow up.    CT 9/2019 - 3 cm infrarenal aneurysm.  Otherwise negative.      Mag citrate 9/2019.  Miralax added 1/13/20.    Any ED visits?  YES 1.8.20 for methadone withdrawal.  [facility] [place].        History   Smoking Status     Current Every Day Smoker     Packs/day: 1.00     Years: 30.00     Types: Cigarettes   Smokeless Tobacco     Never Used     Comment: Tried to Quit (NO)       Problem, Medication and Allergy Lists were reviewed and updated if needed.  reviewed and are current..    Pharmacy Database reviewed? Yes, 1.23.20, as expected.  Last fill of Suboxone 1.16.20.  8/2mg #14 and 2/0.5mg #14    Patient is a new patient to this clinic and so  I reviewed/updated the Past Medical History, the Family History and the Social History .         Review of Systems:   Review of Systems  CONSTITUTIONAL: NEGATIVE for fever, chills, change in weight  INTEGUMENTARY/SKIN: NEGATIVE for worrisome rashes, moles or lesions  EYES: NEGATIVE for vision changes or irritation  ENT/MOUTH: NEGATIVE for ear, mouth and throat problems  RESP: NEGATIVE for significant cough or SOB  CV: NEGATIVE for chest pain, palpitations or peripheral edema  GI: NEGATIVE for nausea, abdominal pain, heartburn, or change in bowel habits  : NEGATIVE for frequency, dysuria, or hematuria  MUSCULOSKELETAL: NEGATIVE for significant arthralgias or myalgia  NEURO: NEGATIVE for weakness, dizziness or paresthesias  ENDOCRINE: NEGATIVE for temperature intolerance, skin/hair changes  PSYCHIATRIC: NEGATIVE for changes in mood or affect          Physical Exam:     Vitals:    01/23/20 0959   BP: 112/72   BP Location: Right arm   Patient Position: Sitting   Cuff Size: Adult Regular   Pulse: 78   Temp: 98.9  F (37.2  C)   TempSrc: Tympanic   SpO2: 90%   Weight: 84.4 kg (186 lb)   Height: 1.753 m (5' 9\")     Body mass index is 27.47 kg/m .  Vitals were reviewed   " Physical Exam  GENERAL APPEARANCE: appears intoxicated  EYES: Eyes grossly normal to inspection  HENT:  nose no rhinorrhea  RESP: lungs clear to auscultation - no rales, rhonchi or wheezes and no resp distress  CV: regular rates and rhythm, normal S1 S2,no murmur   ABDOMEN:soft, mild generalized tenderness, hypoactive bowels, non-distended, no hepatosplenomegaly or masses  SKIN: no rashes, no jaundice, no obvious masses.   NEURO:  no tremor  MENTAL STATUS EXAM:  Appearance/Behavior:No apparent distress  Speech: Normal  Mood/Affect: depressed affect and anxious  Insight: Poor and Limited      Results:    Results from the last 24 hours  Results for orders placed or performed in visit on 01/23/20 (from the past 24 hour(s))   Urine Drugs of Abuse Screen (Tox13)   Result Value Ref Range    Cannabinoids (50-pgn-1-carboxy-9-THC) Not Detected NDET^Not Detected ng/mL    Phencyclidine (Phencyclidine) Not Detected NDET^Not Detected ng/mL    Cocaine (Benzoylecgonine) Not Detected NDET^Not Detected ng/mL    Methamphetamine (d-Methamphetamine) Detected, Abnormal Result (A) NDET^Not Detected ng/mL    Opiates (Morphine) Detected, Abnormal Result (A) NDET^Not Detected ng/mL    Amphetamine (d-Amphetamine) Detected, Abnormal Result (A) NDET^Not Detected ng/mL    Benzodiazepines (Nordiazepam) Not Detected NDET^Not Detected ng/mL    Tricyclic Antidepressants (Desipramine) Not Detected NDET^Not Detected ng/mL    Methadone (Methadone) Detected, Abnormal Result (A) NDET^Not Detected ng/mL    Barbiturates (Butalbital) Not Detected NDET^Not Detected ng/mL    Oxycodone (Oxycodone) Not Detected NDET^Not Detected ng/mL    Propoxyphene (Norpropoxyphene) Not Detected NDET^Not Detected ng/mL    Buprenorphine (Buprenorphine) Detected, Abnormal Result (A) NDET^Not Detected ng/mL   Basic metabolic panel   Result Value Ref Range    Sodium 135 133 - 144 mmol/L    Potassium 4.4 3.4 - 5.3 mmol/L    Chloride 104 94 - 109 mmol/L    Carbon Dioxide 24 20 -  32 mmol/L    Anion Gap 7 3 - 14 mmol/L    Glucose 121 (H) 70 - 99 mg/dL    Urea Nitrogen 25 7 - 30 mg/dL    Creatinine 1.11 0.66 - 1.25 mg/dL    GFR Estimate 73 >60 mL/min/[1.73_m2]    GFR Estimate If Black 84 >60 mL/min/[1.73_m2]    Calcium 9.1 8.5 - 10.1 mg/dL   CBC with platelets   Result Value Ref Range    WBC 11.2 (H) 4.0 - 11.0 10e9/L    RBC Count 4.59 4.4 - 5.9 10e12/L    Hemoglobin 14.5 13.3 - 17.7 g/dL    Hematocrit 42.7 40.0 - 53.0 %    MCV 93 78 - 100 fl    MCH 31.6 26.5 - 33.0 pg    MCHC 34.0 31.5 - 36.5 g/dL    RDW 12.5 10.0 - 15.0 %    Platelet Count 249 150 - 450 10e9/L   Hepatic panel   Result Value Ref Range    Bilirubin Direct 0.1 0.0 - 0.2 mg/dL    Bilirubin Total 0.4 0.2 - 1.3 mg/dL    Albumin 3.3 (L) 3.4 - 5.0 g/dL    Protein Total 8.4 6.8 - 8.8 g/dL    Alkaline Phosphatase 114 40 - 150 U/L    ALT 32 0 - 70 U/L    AST 17 0 - 45 U/L       Assessment and Plan     Was naloxone nasal spray prescribed? Yes.  1. Opioid use disorder (H)  Long standing, complicated history.  Off methadone - residual on drug screen?  Opiate as well?   Confirmatory drug testing to be done.  Continue NA/AA.  Will need to get set up with counseling, sponsor, support system.  - Basic metabolic panel  - CBC with platelets  - Hepatic panel  - Hepatitis B Surface Antibody  - Hepatitis B surface antigen  - Hepatitis C Screen Reflex to HCV RNA Quant and Genotype  - HIV Antigen Antibody Combo  - SUBOXONE 8-2 MG per film; TK 1 FILM UNDER THE TONGUE QAM AND QPM  Dispense: 14 each; Refill: 0  - SUBOXONE 2-0.5 MG per film; TK 1 FILM UNDER THE TONGUE QAM AND QPM  Dispense: 14 each; Refill: 0    2. Chronic, continuous use of opioids  - Urine Drugs of Abuse Screen (Tox13)    3. Opioid abuse, in remission (H)   - Urine Drugs of Abuse Screen (Tox13)  - Pain Drug Scr UR W Rptd Meds    4. Encounter for screening for other viral diseases   - Hepatitis B Surface Antibody  - Hepatitis B surface antigen    5. Chronic constipation  -  GASTROENTEROLOGY ADULT REF PROCEDURE ONLY [place] [phone]    6. History of colonic polyps  - GASTROENTEROLOGY ADULT REF PROCEDURE ONLY [place] [phone]    Dosage will not need adjustment today.  Continue at 10 mg  2 time(s) a day of  buprenorphine/naloxone (Suboxone).     Follow up Plan  Stage 1(1 week): Please make a clinic appointment for follow up with me (ARABELLA CAGLE) or another Suboxone provider in 1 week for suboxone follow up.    Greater than 25 minutes was spent with this patient, over half of which was on counseling and coordination of care which includes importance of recovery activities,which may include  attendance at NA/AA meetings, sober support network, and the importance of not isolating, being open, honest, and willing to change, possible triggers and how to avoid them, and managing cravings.    Medications Discontinued During This Encounter   Medication Reason     simvastatin (ZOCOR) 20 MG tablet      atenolol (TENORMIN) 50 MG tablet      METHADONE HCL PO      SUBOXONE 8-2 MG per film Reorder     SUBOXONE 2-0.5 MG per film Reorder       Options for treatment and follow-up care were reviewed with the patient. Rodney engaged in the decision making process and verbalized understanding of the options discussed and agreed with the final plan.    Arabella Cagle MD

## 2020-01-23 ENCOUNTER — PATIENT OUTREACH (OUTPATIENT)
Dept: CARE COORDINATION | Facility: OTHER | Age: 60
End: 2020-01-23

## 2020-01-23 ENCOUNTER — OFFICE VISIT (OUTPATIENT)
Dept: FAMILY MEDICINE | Facility: OTHER | Age: 60
End: 2020-01-23
Attending: FAMILY MEDICINE
Payer: MEDICARE

## 2020-01-23 VITALS
HEIGHT: 69 IN | HEART RATE: 78 BPM | DIASTOLIC BLOOD PRESSURE: 72 MMHG | SYSTOLIC BLOOD PRESSURE: 112 MMHG | BODY MASS INDEX: 27.55 KG/M2 | OXYGEN SATURATION: 90 % | TEMPERATURE: 98.9 F | WEIGHT: 186 LBS

## 2020-01-23 DIAGNOSIS — Z86.0100 HISTORY OF COLONIC POLYPS: ICD-10-CM

## 2020-01-23 DIAGNOSIS — F11.90 OPIOID USE DISORDER: Primary | ICD-10-CM

## 2020-01-23 DIAGNOSIS — Z11.59 ENCOUNTER FOR SCREENING FOR OTHER VIRAL DISEASES: ICD-10-CM

## 2020-01-23 DIAGNOSIS — F11.11 OPIOID ABUSE, IN REMISSION (H): ICD-10-CM

## 2020-01-23 DIAGNOSIS — K59.09 CHRONIC CONSTIPATION: ICD-10-CM

## 2020-01-23 DIAGNOSIS — F11.90 CHRONIC, CONTINUOUS USE OF OPIOIDS: ICD-10-CM

## 2020-01-23 LAB
ALBUMIN SERPL-MCNC: 3.3 G/DL (ref 3.4–5)
ALP SERPL-CCNC: 114 U/L (ref 40–150)
ALT SERPL W P-5'-P-CCNC: 32 U/L (ref 0–70)
AMPHETAMINES UR QL: ABNORMAL NG/ML
ANION GAP SERPL CALCULATED.3IONS-SCNC: 7 MMOL/L (ref 3–14)
AST SERPL W P-5'-P-CCNC: 17 U/L (ref 0–45)
BARBITURATES UR QL SCN: NOT DETECTED NG/ML
BENZODIAZ UR QL SCN: NOT DETECTED NG/ML
BILIRUB DIRECT SERPL-MCNC: 0.1 MG/DL (ref 0–0.2)
BILIRUB SERPL-MCNC: 0.4 MG/DL (ref 0.2–1.3)
BUN SERPL-MCNC: 25 MG/DL (ref 7–30)
BUPRENORPHINE UR QL: ABNORMAL NG/ML
CALCIUM SERPL-MCNC: 9.1 MG/DL (ref 8.5–10.1)
CANNABINOIDS UR QL: NOT DETECTED NG/ML
CHLORIDE SERPL-SCNC: 104 MMOL/L (ref 94–109)
CO2 SERPL-SCNC: 24 MMOL/L (ref 20–32)
COCAINE UR QL SCN: NOT DETECTED NG/ML
CREAT SERPL-MCNC: 1.11 MG/DL (ref 0.66–1.25)
D-METHAMPHET UR QL: ABNORMAL NG/ML
ERYTHROCYTE [DISTWIDTH] IN BLOOD BY AUTOMATED COUNT: 12.5 % (ref 10–15)
GFR SERPL CREATININE-BSD FRML MDRD: 73 ML/MIN/{1.73_M2}
GLUCOSE SERPL-MCNC: 121 MG/DL (ref 70–99)
HCT VFR BLD AUTO: 42.7 % (ref 40–53)
HGB BLD-MCNC: 14.5 G/DL (ref 13.3–17.7)
MCH RBC QN AUTO: 31.6 PG (ref 26.5–33)
MCHC RBC AUTO-ENTMCNC: 34 G/DL (ref 31.5–36.5)
MCV RBC AUTO: 93 FL (ref 78–100)
METHADONE UR QL SCN: ABNORMAL NG/ML
OPIATES UR QL SCN: ABNORMAL NG/ML
OXYCODONE UR QL SCN: NOT DETECTED NG/ML
PCP UR QL SCN: NOT DETECTED NG/ML
PLATELET # BLD AUTO: 249 10E9/L (ref 150–450)
POTASSIUM SERPL-SCNC: 4.4 MMOL/L (ref 3.4–5.3)
PROPOXYPH UR QL: NOT DETECTED NG/ML
PROT SERPL-MCNC: 8.4 G/DL (ref 6.8–8.8)
RBC # BLD AUTO: 4.59 10E12/L (ref 4.4–5.9)
SODIUM SERPL-SCNC: 135 MMOL/L (ref 133–144)
TRICYCLICS UR QL SCN: NOT DETECTED NG/ML
WBC # BLD AUTO: 11.2 10E9/L (ref 4–11)

## 2020-01-23 PROCEDURE — 99214 OFFICE O/P EST MOD 30 MIN: CPT | Performed by: FAMILY MEDICINE

## 2020-01-23 PROCEDURE — G0499 HEPB SCREEN HIGH RISK INDIV: HCPCS | Mod: ZL | Performed by: FAMILY MEDICINE

## 2020-01-23 PROCEDURE — 36415 COLL VENOUS BLD VENIPUNCTURE: CPT | Mod: ZL | Performed by: FAMILY MEDICINE

## 2020-01-23 PROCEDURE — 87389 HIV-1 AG W/HIV-1&-2 AB AG IA: CPT | Mod: ZL | Performed by: FAMILY MEDICINE

## 2020-01-23 PROCEDURE — 80306 DRUG TEST PRSMV INSTRMNT: CPT | Mod: ZL | Performed by: FAMILY MEDICINE

## 2020-01-23 PROCEDURE — 80048 BASIC METABOLIC PNL TOTAL CA: CPT | Mod: ZL | Performed by: FAMILY MEDICINE

## 2020-01-23 PROCEDURE — 86706 HEP B SURFACE ANTIBODY: CPT | Mod: ZL | Performed by: FAMILY MEDICINE

## 2020-01-23 PROCEDURE — 80076 HEPATIC FUNCTION PANEL: CPT | Mod: ZL | Performed by: FAMILY MEDICINE

## 2020-01-23 PROCEDURE — G0463 HOSPITAL OUTPT CLINIC VISIT: HCPCS

## 2020-01-23 PROCEDURE — G0472 HEP C SCREEN HIGH RISK/OTHER: HCPCS | Mod: ZL | Performed by: FAMILY MEDICINE

## 2020-01-23 PROCEDURE — 87522 HEPATITIS C REVRS TRNSCRPJ: CPT | Mod: ZL | Performed by: FAMILY MEDICINE

## 2020-01-23 PROCEDURE — 85027 COMPLETE CBC AUTOMATED: CPT | Mod: ZL | Performed by: FAMILY MEDICINE

## 2020-01-23 PROCEDURE — 80307 DRUG TEST PRSMV CHEM ANLYZR: CPT | Mod: ZL | Performed by: FAMILY MEDICINE

## 2020-01-23 RX ORDER — BUPRENORPHINE HYDROCHLORIDE, NALOXONE HYDROCHLORIDE 8; 2 MG/1; MG/1
FILM, SOLUBLE BUCCAL; SUBLINGUAL
COMMUNITY
Start: 2020-01-16 | End: 2020-01-23

## 2020-01-23 RX ORDER — POLYETHYLENE GLYCOL 3350 17 G/17G
17 POWDER, FOR SOLUTION ORAL DAILY
COMMUNITY
Start: 2020-01-21 | End: 2023-09-05

## 2020-01-23 RX ORDER — HALOPERIDOL 1 MG/1
1 TABLET ORAL
OUTPATIENT
Start: 2020-01-23

## 2020-01-23 RX ORDER — ALBUTEROL SULFATE 90 UG/1
2 AEROSOL, METERED RESPIRATORY (INHALATION) EVERY 4 HOURS PRN
Status: ON HOLD | COMMUNITY
Start: 2020-01-15 | End: 2020-04-29

## 2020-01-23 RX ORDER — PROMETHAZINE HYDROCHLORIDE 25 MG/1
TABLET ORAL
COMMUNITY
Start: 2020-01-11 | End: 2020-02-28

## 2020-01-23 RX ORDER — CLONIDINE HYDROCHLORIDE 0.1 MG/1
0.1 TABLET ORAL
OUTPATIENT
Start: 2020-01-23

## 2020-01-23 RX ORDER — ATENOLOL 100 MG/1
100 TABLET ORAL DAILY
Status: ON HOLD | COMMUNITY
Start: 2019-11-07 | End: 2020-04-29

## 2020-01-23 RX ORDER — BUPRENORPHINE HYDROCHLORIDE, NALOXONE HYDROCHLORIDE 2; .5 MG/1; MG/1
FILM, SOLUBLE BUCCAL; SUBLINGUAL
Qty: 14 EACH | Refills: 0 | Status: SHIPPED | OUTPATIENT
Start: 2020-01-23 | End: 2020-01-30

## 2020-01-23 RX ORDER — BUPRENORPHINE HYDROCHLORIDE, NALOXONE HYDROCHLORIDE 2; .5 MG/1; MG/1
FILM, SOLUBLE BUCCAL; SUBLINGUAL
COMMUNITY
Start: 2020-01-16 | End: 2020-01-23

## 2020-01-23 RX ORDER — TRIAMCINOLONE ACETONIDE 1 MG/G
OINTMENT TOPICAL
COMMUNITY
Start: 2019-10-24 | End: 2020-02-28

## 2020-01-23 RX ORDER — IPRATROPIUM BROMIDE AND ALBUTEROL 20; 100 UG/1; UG/1
SPRAY, METERED RESPIRATORY (INHALATION)
COMMUNITY
Start: 2020-01-21 | End: 2020-02-28

## 2020-01-23 RX ORDER — BUPRENORPHINE HYDROCHLORIDE, NALOXONE HYDROCHLORIDE 8; 2 MG/1; MG/1
FILM, SOLUBLE BUCCAL; SUBLINGUAL
Qty: 14 EACH | Refills: 0 | Status: SHIPPED | OUTPATIENT
Start: 2020-01-23 | End: 2020-01-30

## 2020-01-23 RX ORDER — ASPIRIN 81 MG/1
81 TABLET, COATED ORAL DAILY
COMMUNITY
Start: 2020-01-21 | End: 2023-08-23

## 2020-01-23 RX ORDER — SIMVASTATIN 20 MG
TABLET ORAL
COMMUNITY
Start: 2019-11-07 | End: 2020-01-23

## 2020-01-23 ASSESSMENT — PAIN SCALES - GENERAL: PAINLEVEL: SEVERE PAIN (6)

## 2020-01-23 ASSESSMENT — ACTIVITIES OF DAILY LIVING (ADL): DEPENDENT_IADLS:: INDEPENDENT

## 2020-01-23 ASSESSMENT — MIFFLIN-ST. JEOR: SCORE: 1659.07

## 2020-01-23 NOTE — PATIENT INSTRUCTIONS
Suboxone refills done.  Follow up next week.  Await records from [facility].  Need to establish care with new provider if no longer following with Dr. Estevez.     Referral to GI for constipation/polyps/endoscopies.

## 2020-01-23 NOTE — TELEPHONE ENCOUNTER
[facility]      Last Written Prescription Date:  Not on list  Last Fill Quantity: -,   # refills: -    clonidine      Last Written Prescription Date:  Not on list  Last Fill Quantity: -,   # refills: -      Haldol      Last Written Prescription Date:  Not on list  Last Fill Quantity: -,   # refills: -    Last Office Visit: today  Future Office visit:    Next 5 appointments (look out 90 days)    Jan 23, 2020 10:00 AM CST  (Arrive by 9:45 AM)  SHORT with Arabella Roberts MD  [facility] ([facility] [place] ) [address]  [place] [address]  [phone]           Routing refill request to provider for review/approval because:  Drug not active on patient's medication list

## 2020-01-23 NOTE — LETTER
Informed  Consent  and  Agreement  for  Buprenorphine/Naloxone Treatment  Of Opioid Dependence   Rodney Goodwin  [id number]         January 23, 2020        Buprenorphine/naloxone can control symptoms of withdrawal and can decrease cravings for other opioids,  We recommend not trying to stop this medication on your own, most people who stop on their own have a relapse of their drug use.      This  agreement  provides important information  on the potential benefits and  risks of opioid medications and shows that both  you and your provider agree on a care plan so  that opioid medications are used in a way that is  safe and effective in treating your withdrawal symptoms and opioid dependence.  This agreement is reviewed and signed by all patients in our practice who  receive Buprenorphine/Naloxone (Suboxone/Subutex).           Expected  Benefits or  Goals of Buprenorphine Treatment     Decreased craving     Improved  ability to engage  in work, social, recreational  and/or physical activities     Improved  quality of life           Potential Risks or Side Effects of Buprenorphine Treatment    Overdose Taking more than the prescribed amount of medication or using with alcohol or other drugs can cause you to stop breathing, resulting in coma, brain damage, or even death.      Risk of overdose to other family members, children are at high risk of being harmed by buprenorphine      Physical side effects May include mood changes, drowsiness, nausea, constipation, urination difficulties, depressed breathing, itching, bone thinning and sexual difficulties, such as lowering male hormone in men and stopping menstrual periods in women.      Withdrawal Buprenorphine/naloxone can bring on severe withdrawal if taken with other opioids.     Physical dependence Suddenly stopping buprenorphine may lead to withdrawal symptoms including abdominal cramping, pain, diarrhea, sweating, anxiety, irritability and aching.     Hyperalgesia  The use of opioids can increase the experience of pain. If this happens, it may require change or discontinuation of medication.      Sleep apnea  Sleep apnea (periods of not breathing while asleep) may be caused or worsened by opioids.     Risk to unborn child Risks to unborn children may include: physical dependence at birth, possible alterations in pain perception, possible increased risk for addiction later in life, among others. Tell your provider if you are or intend to become pregnant.  Do NOT stop buprenorphine if you become pregnant without discussing with your provider.      Victimization There is a risk that you or someone in your household may be the victim of theft, deceit, assault or abuse by people trying to take your medications to misuse them.         Responsibilities  in Buprenorphine/naloxone Therapy  of Opioid Addiction   Your provider's responsibilities:   Listening carefully to your concerns, treating you with care and with due respect, and making clinical decisions based on what he/she believes is in your best interest.    Your responsibilities:   In order to maximize the potential benefits of buprenorphine and to minimize the potential risks, it is important that you accept the following responsibilities.    We are a primary care clinic, not an addiction clinic.    In signing this agreement, you agree to:  1. Use your buprenorphine medications as prescribed for the purpose of treating opioid addiction/opioid use disorder.  2. BE HONEST! We will work with you to help with your opioid dependence if you are upfront with us.  3. Be on time for appointments. Frequent late or missed appointments will result in termination from our program.  4. Be respectful and considerate to all staff and providers at our clinic.  Rude or aggressive behavior to staff including providers, nursing staff,  and any others will result in termination from the program.  5. Avoid excessive alcohol or other  dangerous recreational drug use, especially Benzodiazepines (like Xanax/alprozolam, Ativan/lorazepam, Valium/diazepam) while being prescribed buprenorphine. Evidence of this occurring may result in referral for consultation or higher level of care and possible termination from our program.  6. Not share, sell, trade or in any way provide your medications to others.  Evidence of this occurring may result in termination from our buprenorphine program.  7. Not receive controlled substances from other clinics without discussion with your buprenorphine physician.  Evidence of this occurring may result in termination from our buprenorphine program.  8. Starting or increasing dosage may result in difficulty concentrating; so we would recommend having a  to bring you to appointments where this may happen   9. If opioids are prescribed unexpectedly by another office (for example due to an accident or dental procedure), inform this office within 24 hours and bring documentation of the visit.  10. Have urine/blood drug tests on a random basis and as requested by your provider. Be ready for this at each appointment.  11. Bring your medication to the clinic when requested.  12. Store your medication in a secure location away from children.  13. Participate in other chemical dependency treatments or other forms of care agreed to with your provider.  You are expected to keep these appointments and follow your care plan. Buprenorphine alone is not enough to overcome addiction/dependence; you are required to have a care plan beyond just Buprenorphine.    14. Permit this practice to communicate with other care providers and/or your significant  others as needed to assure buprenorphine is being used appropriately and is beneficial to  your health and well-being.  15.  Plan ahead for refills. You are responsible for planning your appointments to get refills on time. If you cannot schedule with your primary provider, it is your  responsibility to schedule with another buprenorphine provider.  If you are restricted to just on provider then arrangements must be made in advance if you need refills when your provider is not available.  16. No phone refills - you must be seen in clinic by one of our physicians that prescribe buprenorphine.  17. Agree to purchase all buprenorphine/naloxone, and any other medications related to my treatment from one pharmacy.  18. No early refills. If you lose your meds, run out early, or have your meds stolen, we will not give early refills. We CAN, however, help with symptoms of withdrawal. Call us if you have any trouble, and we will try to help. We recommend a system to help lock up meds.  19.  I understand that my clinic can track controlled substance prescriptions from other providers through a central database (prescription monitoring program).  20  To tell my clinic right away if I become pregnant or have a new medical problem treated at another clinic.    Your medications may be continued if they assist you in maintaining sobriety, help you engage in valued activities, and/or enhance your quality of life and you follow the above responsibilities.  Your medications may be discontinued if your goals for treatment are not met, if you experience negative effects from using them, or if you do not follow this agreement.        I have reviewed this document and have been given the opportunity to have any questions  answered. I understand the possible benefits and risks of buprenorphine medications and I accept the  responsibilities described above.    __________________________________        ____________________________________  Patient     Date   Arabella Huynh MD                     Date

## 2020-01-23 NOTE — NURSING NOTE
"Chief Complaint   Patient presents with     Recheck Medication       Initial /72 (BP Location: Right arm, Patient Position: Sitting, Cuff Size: Adult Regular)   Pulse 78   Temp 98.9  F (37.2  C) (Tympanic)   Ht 1.753 m (5' 9\")   Wt 84.4 kg (186 lb)   SpO2 90%   BMI 27.47 kg/m   Estimated body mass index is 27.47 kg/m  as calculated from the following:    Height as of this encounter: 1.753 m (5' 9\").    Weight as of this encounter: 84.4 kg (186 lb).  Medication Reconciliation: complete  Mariela Pichardo LPN  "

## 2020-01-23 NOTE — PROGRESS NOTES
Clinic Care Coordination Contact  Care Team Conversations    CC attended office visit with pt and Dr. Roberts this date.  Please refer to Dr. Roberts's note for plan of care.  Suboxone program went over in detail with pt.  Went over informed consent and treatment agreement with him.  Signed PEARL for [facility] to obtain Methadone program records.  Reports he goes to NA/AA meetings daily.  Relapse prevention group as well.  Was very fixated on his abdominal pain.  Dr. Roberts placed referrals.  Informed pt that once he is feeling better, we can set him up with a new PCP.  He was agreeable with this.  Provided him with CC's business card and encouraged him to call with any questions/concerns/problems.  Verbalized understanding.    Nadya Medellin RN-BSN  Clinic Care Coordinator  [phone] opt. #[phone]

## 2020-01-24 LAB
HBV SURFACE AB SERPL IA-ACNC: 1.32 M[IU]/ML
HBV SURFACE AG SERPL QL IA: NONREACTIVE
HIV 1+2 AB+HIV1 P24 AG SERPL QL IA: NONREACTIVE

## 2020-01-26 LAB
HCV AB SERPL QL IA: REACTIVE
HCV RNA SERPL NAA+PROBE-ACNC: NORMAL [IU]/ML
HCV RNA SERPL NAA+PROBE-LOG IU: NORMAL LOG IU/ML

## 2020-01-27 NOTE — PROGRESS NOTES
Follow-up Buprenorphine Visit           HPI       Rodney Goodwin is here for f/u on buprenophine/naloxone (Suboxone) therapy for opioid use disorder.  Suboxone Follow up    Rodney is currently taking 10/2.5 mg of Buprenorphine/Naloxone 2 times daily.     Status since last visit:    Since last visit patient has been: doing well.     Intensity:     There has been: no craving.      Suboxone Dose: adequate.    Progression of Symptoms:     Cues to use and relapse None    Recovery program has been: active.   Accompanying Signs & Symptoms:    Side Effects: present.  Constipation - this has been on ongoing issue for years  Sobriety:     Status: no use since last visit.      Drug Screen: obtained.    Precipitating factors:    Triggers have been: non-existent.   Alleviating factors:    Contact with sponsor has been: regular.     Family and support system has been: neutral.   Other Therapies Tried :     Patient has been going to recovery meetings:sporadically.      Other medical concerns: YES - ongoing GI  - had colonoscopy with Dr. Andujar.  Has copies of records.  Started on protonix, carafate, linzess -but unable to  scripts yet.  Stopped zantac and prilosec.  Repeat egd 6-12 months, colonoscopy 12 months.  Biopsies pending.    Any ED visits? No       History   Smoking Status     Current Every Day Smoker     Packs/day: 1.00     Years: 30.00     Types: Cigarettes   Smokeless Tobacco     Never Used     Comment: Tried to Quit (NO)       Problem, Medication and Allergy Lists were reviewed and updated if needed.  reviewed and are current..    Pharmacy Database reviewed? Yes, 1.30.20, as expected.    Patient is an established patient of this clinic..         Review of Systems:   Review of Systems  CONSTITUTIONAL: NEGATIVE for fever, chills, change in weight  INTEGUMENTARY/SKIN: NEGATIVE for worrisome rashes, moles or lesions  EYES: NEGATIVE for vision changes or irritation  ENT/MOUTH: NEGATIVE for ear, mouth and throat  "problems  RESP: NEGATIVE for significant cough or SOB  CV: NEGATIVE for chest pain, palpitations or peripheral edema  GI: NEGATIVE for nausea, abdominal pain, heartburn, or change in bowel habits  : NEGATIVE for frequency, dysuria, or hematuria  MUSCULOSKELETAL: NEGATIVE for significant arthralgias or myalgia  NEURO: NEGATIVE for weakness, dizziness or paresthesias  ENDOCRINE: NEGATIVE for temperature intolerance, skin/hair changes  PSYCHIATRIC: NEGATIVE for changes in mood or affect          Physical Exam:     Vitals:    01/30/20 1010   BP: 110/62   BP Location: Right arm   Patient Position: Sitting   Cuff Size: Adult Regular   Pulse: 70   Temp: 97.9  F (36.6  C)   TempSrc: Tympanic   SpO2: 93%   Weight: 85.7 kg (189 lb)   Height: 1.753 m (5' 9\")     Body mass index is 27.91 kg/m .  Vitals were reviewed   Physical Exam  GENERAL APPEARANCE: disheveled  EYES: Eyes grossly normal to inspection  HENT:  nose no rhinorrhea  RESP: lungs clear to auscultation - no rales, rhonchi or wheezes and no resp distress  CV: regular rates and rhythm, normal S1 S2,no murmur   ABDOMEN:soft, non-tender, non-distended, no hepatosplenomegaly or masses  SKIN: no rashes, no jaundice, no obvious masses.   NEURO:  no tremor  MENTAL STATUS EXAM:  Appearance/Behavior:Poorly groomed  Speech: Rambling  Mood/Affect: depressed affect  Insight: Limited      Results:    Results from the last 24 hours  Results for orders placed or performed in visit on 01/30/20 (from the past 24 hour(s))   Urine Drugs of Abuse Screen (Tox13)   Result Value Ref Range    Cannabinoids (33-ucw-9-carboxy-9-THC) Not Detected NDET^Not Detected ng/mL    Phencyclidine (Phencyclidine) Not Detected NDET^Not Detected ng/mL    Cocaine (Benzoylecgonine) Not Detected NDET^Not Detected ng/mL    Methamphetamine (d-Methamphetamine) Not Detected NDET^Not Detected ng/mL    Opiates (Morphine) Not Detected NDET^Not Detected ng/mL    Amphetamine (d-Amphetamine) Not Detected NDET^Not " Detected ng/mL    Benzodiazepines (Nordiazepam) Not Detected NDET^Not Detected ng/mL    Tricyclic Antidepressants (Desipramine) Not Detected NDET^Not Detected ng/mL    Methadone (Methadone) Not Detected NDET^Not Detected ng/mL    Barbiturates (Butalbital) Not Detected NDET^Not Detected ng/mL    Oxycodone (Oxycodone) Not Detected NDET^Not Detected ng/mL    Propoxyphene (Norpropoxyphene) Not Detected NDET^Not Detected ng/mL    Buprenorphine (Buprenorphine) Detected, Abnormal Result (A) NDET^Not Detected ng/mL       Assessment and Plan     Was naloxone nasal spray prescribed? No Details: has some at home.  1. Opioid use disorder (H)  Doing well with suboxone.  Confirmatory drug testing from last visit good.  - Urine Drugs of Abuse Screen (Tox13)  - SUBOXONE 2-0.5 MG per film; TK 1 FILM UNDER THE TONGUE QAM AND QPM  Dispense: 28 each; Refill: 0  - SUBOXONE 8-2 MG per film; TK 1 FILM UNDER THE TONGUE QAM AND QPM  Dispense: 28 each; Refill: 0  - MENTAL HEALTH REFERRAL  - Adult; Outpatient Treatment; Individual/Couples/Family/Group Therapy/Health Psychology; Other: Not Listed - Enter Referral Details in Scheduling Comments Below    2. Opioid abuse, in remission (H)     - Urine Drugs of Abuse Screen (Tox13)    3. Bipolar 1 disorder (H)  Referral placed.  Declines medications, but is open to counseling  - MENTAL HEALTH REFERRAL  - Adult; Outpatient Treatment; Individual/Couples/Family/Group Therapy/Health Psychology; Other: Not Listed - Enter Referral Details in Scheduling Comments Below    Dosage will not need adjustment today.  Continue at 10 mg  2 time(s) a day of  buprenorphine/naloxone (Suboxone).     Follow up Plan  Induction Stage (Days): Please make a clinic appointment for follow up with me (JOSE CAGLE) or another Suboxone provider in 14 day(s) for suboxone follow up.    Greater than 15 minutes was spent with this patient, over half of which was on counseling and coordination of care which includes  importance of recovery activities,which may include  attendance at NA/AA meetings, sober support network, and the importance of not isolating, being open, honest, and willing to change, possible triggers and how to avoid them, and managing cravings.    Medications Discontinued During This Encounter   Medication Reason     omeprazole (PRILOSEC) 20 MG DR capsule Therapy completed     ranitidine (ZANTAC) 150 MG tablet Therapy completed     SUBOXONE 2-0.5 MG per film Reorder     SUBOXONE 8-2 MG per film Reorder     Continue Suboxone at current dose.  Follow up 2 weeks.  Follow up with GI as planned.  Start new medications when able to  scripts.  Referral for mental health - counseling.  Establish care with internist, Dr. Carl, for 3/2020.      Options for treatment and follow-up care were reviewed with the patient. Rodney engaged in the decision making process and verbalized understanding of the options discussed and agreed with the final plan.    Arabella Huynh MD

## 2020-01-29 ENCOUNTER — TRANSFERRED RECORDS (OUTPATIENT)
Dept: HEALTH INFORMATION MANAGEMENT | Facility: CLINIC | Age: 60
End: 2020-01-29

## 2020-01-30 ENCOUNTER — PATIENT OUTREACH (OUTPATIENT)
Dept: CARE COORDINATION | Facility: OTHER | Age: 60
End: 2020-01-30

## 2020-01-30 ENCOUNTER — OFFICE VISIT (OUTPATIENT)
Dept: BEHAVIORAL HEALTH | Facility: OTHER | Age: 60
End: 2020-01-30
Attending: SOCIAL WORKER
Payer: COMMERCIAL

## 2020-01-30 ENCOUNTER — APPOINTMENT (OUTPATIENT)
Dept: LAB | Facility: OTHER | Age: 60
End: 2020-01-30
Attending: FAMILY MEDICINE
Payer: MEDICARE

## 2020-01-30 ENCOUNTER — OFFICE VISIT (OUTPATIENT)
Dept: FAMILY MEDICINE | Facility: OTHER | Age: 60
End: 2020-01-30
Attending: FAMILY MEDICINE
Payer: MEDICARE

## 2020-01-30 VITALS
SYSTOLIC BLOOD PRESSURE: 110 MMHG | DIASTOLIC BLOOD PRESSURE: 62 MMHG | WEIGHT: 189 LBS | TEMPERATURE: 97.9 F | HEART RATE: 70 BPM | BODY MASS INDEX: 27.99 KG/M2 | HEIGHT: 69 IN | OXYGEN SATURATION: 93 %

## 2020-01-30 DIAGNOSIS — R69 DIAGNOSIS DEFERRED: Primary | ICD-10-CM

## 2020-01-30 DIAGNOSIS — F31.9 BIPOLAR 1 DISORDER (H): ICD-10-CM

## 2020-01-30 DIAGNOSIS — F11.90 OPIOID USE DISORDER: Primary | ICD-10-CM

## 2020-01-30 DIAGNOSIS — F11.11 OPIOID ABUSE, IN REMISSION (H): ICD-10-CM

## 2020-01-30 LAB
AMPHETAMINES UR QL: NOT DETECTED NG/ML
BARBITURATES UR QL SCN: NOT DETECTED NG/ML
BENZODIAZ UR QL SCN: NOT DETECTED NG/ML
BUPRENORPHINE UR QL: ABNORMAL NG/ML
CANNABINOIDS UR QL: NOT DETECTED NG/ML
COCAINE UR QL SCN: NOT DETECTED NG/ML
D-METHAMPHET UR QL: NOT DETECTED NG/ML
METHADONE UR QL SCN: NOT DETECTED NG/ML
OPIATES UR QL SCN: NOT DETECTED NG/ML
OXYCODONE UR QL SCN: NOT DETECTED NG/ML
PAIN DRUG SCR UR W RPTD MEDS: NORMAL
PCP UR QL SCN: NOT DETECTED NG/ML
PROPOXYPH UR QL: NOT DETECTED NG/ML
TRICYCLICS UR QL SCN: NOT DETECTED NG/ML

## 2020-01-30 PROCEDURE — 99213 OFFICE O/P EST LOW 20 MIN: CPT | Performed by: FAMILY MEDICINE

## 2020-01-30 PROCEDURE — 80306 DRUG TEST PRSMV INSTRMNT: CPT | Mod: ZL | Performed by: FAMILY MEDICINE

## 2020-01-30 PROCEDURE — G0463 HOSPITAL OUTPT CLINIC VISIT: HCPCS

## 2020-01-30 RX ORDER — BUPRENORPHINE HYDROCHLORIDE, NALOXONE HYDROCHLORIDE 2; .5 MG/1; MG/1
FILM, SOLUBLE BUCCAL; SUBLINGUAL
Qty: 28 EACH | Refills: 0 | Status: SHIPPED | OUTPATIENT
Start: 2020-01-30 | End: 2020-02-28

## 2020-01-30 RX ORDER — PANTOPRAZOLE SODIUM 40 MG/1
40 TABLET, DELAYED RELEASE ORAL DAILY
Status: ON HOLD | COMMUNITY
End: 2020-04-29

## 2020-01-30 RX ORDER — SUCRALFATE 1 G/1
1 TABLET ORAL 2 TIMES DAILY
COMMUNITY
Start: 2020-01-29 | End: 2023-10-25

## 2020-01-30 RX ORDER — NALOXONE HYDROCHLORIDE 4 MG/.1ML
SPRAY NASAL
COMMUNITY
Start: 2020-01-24 | End: 2020-02-28

## 2020-01-30 RX ORDER — BUPRENORPHINE HYDROCHLORIDE, NALOXONE HYDROCHLORIDE 8; 2 MG/1; MG/1
FILM, SOLUBLE BUCCAL; SUBLINGUAL
Qty: 28 EACH | Refills: 0 | Status: SHIPPED | OUTPATIENT
Start: 2020-01-30 | End: 2020-02-28

## 2020-01-30 RX ORDER — LINACLOTIDE 290 UG/1
290 CAPSULE, GELATIN COATED ORAL DAILY
Status: ON HOLD | COMMUNITY
Start: 2020-01-29 | End: 2020-04-29

## 2020-01-30 ASSESSMENT — MIFFLIN-ST. JEOR: SCORE: 1672.68

## 2020-01-30 ASSESSMENT — ACTIVITIES OF DAILY LIVING (ADL): DEPENDENT_IADLS:: INDEPENDENT

## 2020-01-30 ASSESSMENT — PAIN SCALES - GENERAL: PAINLEVEL: MODERATE PAIN (5)

## 2020-01-30 NOTE — PROGRESS NOTES
TOMMIE FELIX met with patient today in clinic to help assist with paperwork for insurance. TOMMIE FELIX and patient called [employer] and were told that patient needed to turn in his most recent bank statement. TOMMIE FELIX and patient called [employer] and had them fax statement to TOMMIE FELIX, information then faxed to [employer].     Patient mentioned he would like to see a therapist, but not a psychiatrist. TOMMIE FELIX gave patient this writer's contact information and encouraged him to reach out to this writer when he would like to come in next, patient agreeable to this plan.    Asked if patient receives [facility] services, he stated he had tried in the past but did not like having an [facility] worker. TOMMIE FELIX will ask patient at next visit/phone call if he is receiving any services in the home such as PCA, if not will help coordinate these services if patient is agreeable.    Patient denies any other needs or concerns at this time.      Barbara Rodriguez ALEXANDER  Social Work Care Coordinator  [facility]   Ext [phone] or [phone]

## 2020-01-30 NOTE — PROGRESS NOTES
Clinic Care Coordination Contact  Care Team Conversations    CC attended office visit with pt and Dr. Roberts this date.  Please refer to Dr. Roberts's note for plan of care.  All questions answered.  Encouraged him to call CC with any questions/concerns/problems.  Verbalized understanding.    Nadya Medellin RN-BSN  Clinic Care Coordinator  [phone] opt. #[phone]

## 2020-01-30 NOTE — PATIENT INSTRUCTIONS
Continue Suboxone at current dose.  Follow up 2 weeks.  Follow up with GI as planned.  Start new medications when able to  scripts.  Referral for mental health - counseling.  Establish care with internist, Dr. Carl, for 3/2020.

## 2020-01-30 NOTE — NURSING NOTE
"Chief Complaint   Patient presents with     Recheck Medication       Initial /62 (BP Location: Right arm, Patient Position: Sitting, Cuff Size: Adult Regular)   Pulse 70   Temp 97.9  F (36.6  C) (Tympanic)   Ht 1.753 m (5' 9\")   Wt 85.7 kg (189 lb)   SpO2 93%   BMI 27.91 kg/m   Estimated body mass index is 27.91 kg/m  as calculated from the following:    Height as of this encounter: 1.753 m (5' 9\").    Weight as of this encounter: 85.7 kg (189 lb).  Medication Reconciliation: complete  Mariela Pichardo LPN  "

## 2020-02-28 ENCOUNTER — HOSPITAL ENCOUNTER (EMERGENCY)
Facility: HOSPITAL | Age: 60
Discharge: HOME | End: 2020-02-28
Attending: EMERGENCY MEDICINE | Admitting: EMERGENCY MEDICINE
Payer: MEDICARE

## 2020-02-28 VITALS
OXYGEN SATURATION: 96 % | DIASTOLIC BLOOD PRESSURE: 109 MMHG | SYSTOLIC BLOOD PRESSURE: 182 MMHG | RESPIRATION RATE: 18 BRPM | TEMPERATURE: 97.9 F
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
## 2020-02-28 PROCEDURE — 25000131 ZZH RX MED GY IP 250 OP 636 PS 637: Mod: GY | Performed by: EMERGENCY MEDICINE

## 2020-02-28 PROCEDURE — 99283 EMERGENCY DEPT VISIT LOW MDM: CPT

## 2020-02-28 PROCEDURE — 99283 EMERGENCY DEPT VISIT LOW MDM: CPT | Mod: Z6 | Performed by: EMERGENCY MEDICINE

## 2020-02-28 RX ORDER — DOXYCYCLINE 100 MG/1
100 CAPSULE ORAL 2 TIMES DAILY
Qty: 20 CAPSULE | Refills: 0 | Status: SHIPPED | OUTPATIENT
Start: 2020-02-28 | End: 2020-04-09

## 2020-02-28 RX ORDER — BUPRENORPHINE AND NALOXONE 2; .5 MG/1; MG/1
2 FILM, SOLUBLE BUCCAL; SUBLINGUAL 2 TIMES DAILY
COMMUNITY
End: 2020-03-06

## 2020-02-28 RX ORDER — PREDNISONE 20 MG/1
60 TABLET ORAL ONCE
Status: COMPLETED | OUTPATIENT
Start: 2020-02-28 | End: 2020-02-28

## 2020-02-28 RX ORDER — PREDNISONE 20 MG/1
60 TABLET ORAL DAILY
Qty: 6 TABLET | Refills: 0 | Status: SHIPPED | OUTPATIENT
Start: 2020-02-29 | End: 2020-03-06

## 2020-02-28 RX ADMIN — PREDNISONE 60 MG: 20 TABLET ORAL at 04:55

## 2020-02-28 NOTE — ED NOTES
"Patient ambulatory to ED room 1. Patient states that he has \"bug bites\" all over his skin. Patient shows RN his torso and posterior side which has multiple sores in various sizes. Patient states that bugs \"are black, like pepper flakes and they burn\" when they bite. Patient claims that someone, from [place], with \"hazmat\" equipment checked out his apartment and \"found nothing\". Patient states that he has been using bleach to clean and using various OTC itch relief products. Patient states, \"I hope the doctor will give me some salve\". Patient states that symptoms started a few weeks ago and patient has not seen his PCP for this. Patient notes that he hasn't had any suboxone in a few weeks due to scheduling issues. Patient is tearful and anxious about what could be the cause of his sores.   "

## 2020-02-28 NOTE — ED AVS SNAPSHOT
[facility]  [address] [address]  Phone:  [phone]                                    Rodney Goodwin   MRN: [id number]    Department:  [facility]   Date of Visit:  2/28/2020           After Visit Summary Signature Page    I have received my discharge instructions, and my questions have been answered. I have discussed any challenges I see with this plan with the nurse or doctor.    ..........................................................................................................................................  Patient/Patient Representative Signature      ..........................................................................................................................................  Patient Representative Print Name and Relationship to Patient    ..................................................               ................................................  Date                                   Time    ..........................................................................................................................................  Reviewed by Signature/Title    ...................................................              ..............................................  Date                                               Time          22EPIC Rev 08/18

## 2020-02-28 NOTE — DISCHARGE INSTRUCTIONS
You have been given a prescription for a medication that can cause an allergic reactions. Return to the ER if you develop any itching, tongue swelling, wheezing or shortness of breath.    Follow up with your doctor today

## 2020-02-28 NOTE — ED NOTES
"Patient given written and verbal discharge instructions and patient verbalizes understanding. Patient advised that prescriptions were e-scribed to [facility] for  later this morning. Patient advised to keep appointment with Dr. Carl on Monday. Patient states he wants to know what is causing rash and \"what it is\". Advised patient to ask Dr. Carl for a dermatology referral.   "

## 2020-03-05 ENCOUNTER — PATIENT OUTREACH (OUTPATIENT)
Dept: CARE COORDINATION | Facility: OTHER | Age: 60
End: 2020-03-05

## 2020-03-05 NOTE — PROGRESS NOTES
"Received VM from pt requesting a return phone call.  States he \"was out of it this morning\".  Would like to get back on Suboxone program.  Appt made for tomorrow.  He was happy and agreeable with this.    Nadya Medellin RN-BSN  Clinic Care Coordinator  [phone] opt. #[phone]        "

## 2020-03-06 ENCOUNTER — PATIENT OUTREACH (OUTPATIENT)
Dept: CARE COORDINATION | Facility: OTHER | Age: 60
End: 2020-03-06

## 2020-03-06 ENCOUNTER — OFFICE VISIT (OUTPATIENT)
Dept: FAMILY MEDICINE | Facility: OTHER | Age: 60
End: 2020-03-06
Attending: FAMILY MEDICINE
Payer: MEDICARE

## 2020-03-06 ENCOUNTER — APPOINTMENT (OUTPATIENT)
Dept: LAB | Facility: OTHER | Age: 60
End: 2020-03-06
Attending: FAMILY MEDICINE
Payer: MEDICARE

## 2020-03-06 VITALS
TEMPERATURE: 97.9 F | WEIGHT: 180 LBS | OXYGEN SATURATION: 98 % | HEART RATE: 67 BPM | SYSTOLIC BLOOD PRESSURE: 122 MMHG | BODY MASS INDEX: 26.66 KG/M2 | HEIGHT: 69 IN | DIASTOLIC BLOOD PRESSURE: 72 MMHG

## 2020-03-06 DIAGNOSIS — F11.11 OPIOID ABUSE, IN REMISSION (H): Primary | ICD-10-CM

## 2020-03-06 DIAGNOSIS — F11.90 OPIOID USE DISORDER: ICD-10-CM

## 2020-03-06 LAB
AMPHETAMINES UR QL: ABNORMAL NG/ML
BARBITURATES UR QL SCN: NOT DETECTED NG/ML
BENZODIAZ UR QL SCN: NOT DETECTED NG/ML
BUPRENORPHINE UR QL: NOT DETECTED NG/ML
CANNABINOIDS UR QL: NOT DETECTED NG/ML
COCAINE UR QL SCN: NOT DETECTED NG/ML
D-METHAMPHET UR QL: ABNORMAL NG/ML
METHADONE UR QL SCN: NOT DETECTED NG/ML
OPIATES UR QL SCN: ABNORMAL NG/ML
OXYCODONE UR QL SCN: NOT DETECTED NG/ML
PCP UR QL SCN: NOT DETECTED NG/ML
PROPOXYPH UR QL: NOT DETECTED NG/ML
TRICYCLICS UR QL SCN: NOT DETECTED NG/ML

## 2020-03-06 PROCEDURE — G0463 HOSPITAL OUTPT CLINIC VISIT: HCPCS

## 2020-03-06 PROCEDURE — 80306 DRUG TEST PRSMV INSTRMNT: CPT | Mod: ZL | Performed by: FAMILY MEDICINE

## 2020-03-06 PROCEDURE — 99213 OFFICE O/P EST LOW 20 MIN: CPT | Performed by: FAMILY MEDICINE

## 2020-03-06 PROCEDURE — 80307 DRUG TEST PRSMV CHEM ANLYZR: CPT | Mod: ZL | Performed by: FAMILY MEDICINE

## 2020-03-06 RX ORDER — BUPRENORPHINE AND NALOXONE 8; 2 MG/1; MG/1
1 FILM, SOLUBLE BUCCAL; SUBLINGUAL 2 TIMES DAILY
Qty: 28 EACH | Refills: 0 | Status: SHIPPED | OUTPATIENT
Start: 2020-03-06 | End: 2020-04-26

## 2020-03-06 RX ORDER — BUPRENORPHINE AND NALOXONE 2; .5 MG/1; MG/1
1 FILM, SOLUBLE BUCCAL; SUBLINGUAL 2 TIMES DAILY
Qty: 28 EACH | Refills: 0 | Status: SHIPPED | OUTPATIENT
Start: 2020-03-06 | End: 2020-04-26

## 2020-03-06 ASSESSMENT — PAIN SCALES - GENERAL: PAINLEVEL: WORST PAIN (10)

## 2020-03-06 ASSESSMENT — ACTIVITIES OF DAILY LIVING (ADL): DEPENDENT_IADLS:: INDEPENDENT

## 2020-03-06 ASSESSMENT — MIFFLIN-ST. JEOR: SCORE: 1631.85

## 2020-03-06 NOTE — PROGRESS NOTES
Clinic Care Coordination Contact  Care Team Conversations    Received call from patient's sister, Opal.  She was trying to get a hold of patient's CC, Nadya, but did not want to leave a message as she was concerned about his mental health.  She states patient has been staying with her for the last week and has not been eating or sleeping for that time.  She states he thinks bugs are crawling out of him and stated it may be his last day on this planet.  She denies patient having any thoughts of hurting himself, suicidal thoughts, or homicidal thoughts.  She just states he is not doing well.   Advised patient may have to be evaluated for hospitalization.  She states he is going to come see  today at 1 pm.  Advised that CC will go speak with Nadya and  regarding patient and they will advise at his appointment.      Meche Lindsay RN-BSN  Care Coordination, Behavioral Health

## 2020-03-06 NOTE — PROGRESS NOTES
"Follow-up Buprenorphine Visit           HPI       Rodney Goodwin is here for f/u on buprenophine/naloxone (Suboxone) therapy for opioid use disorder.  Recheck Medication      Rodney is currently taking 10/2.5 mg of Buprenorphine/Naloxone 2 times daily.     Status since last visit:    Since last visit patient has been: stable - has been out of his Suboxone.    Intensity:      There has been: no craving.      Suboxone Dose: adequate when taking as prescribed; taking up until 2/20/2020; stopped the suboxone - thought it was contributing to abdominal pain; it wasn't; abdominal pain continued  Progression of Symptoms:     Cues to use and relapse Admits to 2 norco 7.5 mg - couple last week; denies methamphetamines    Recovery program has been: active.   Accompanying Signs & Symptoms:    Side Effects: none.    Sobriety:     Status: patient has relapsed.  2 norco    Drug Screen: obtained.    Precipitating factors:    Triggers have been: none.   Alleviating factors:    Contact with sponsor has been: regular.     Family and support system has been: helpful.   Other Therapies Tried :     Patient has been going to recovery meetings:not at all.  Not at this.      Other medical concerns:  YES abdominal pain - having suicidal thoughts - no plan yet.  Thinks about dying all the time.  If there was an easy way out, he'd take it right now.   Verbally contracted for safety with RN CC - \"I won't kill myself\".   Any ED visits?  YES recently for \"bug bite\"      History   Smoking Status     Current Some Day Smoker     Packs/day: 1.00     Years: 30.00     Types: Cigarettes   Smokeless Tobacco     Never Used     Comment: Tried to Quit (NO)       Problem, Medication and Allergy Lists were reviewed and updated if needed.  reviewed and are current..    Pharmacy Database reviewed? Yes, 3.6.20, as expected.  Has been out of Suboxone for a few weeks.  Last fill 1.30.20 - 2 week supply.    Patient is an established patient of this clinic..         " "Review of Systems:   Review of Systems  CONSTITUTIONAL: NEGATIVE for fever, chills, change in weight  INTEGUMENTARY/SKIN: NEGATIVE for worrisome rashes, moles or lesions  EYES: NEGATIVE for vision changes or irritation  ENT/MOUTH: NEGATIVE for ear, mouth and throat problems  RESP: NEGATIVE for significant cough or SOB  CV: NEGATIVE for chest pain, palpitations or peripheral edema  GI: NEGATIVE for nausea, abdominal pain, heartburn, or change in bowel habits  : NEGATIVE for frequency, dysuria, or hematuria  MUSCULOSKELETAL: NEGATIVE for significant arthralgias or myalgia  NEURO: NEGATIVE for weakness, dizziness or paresthesias  ENDOCRINE: NEGATIVE for temperature intolerance, skin/hair changes  PSYCHIATRIC: NEGATIVE for changes in mood or affect          Physical Exam:     Vitals:    03/06/20 1311   BP: 122/72   BP Location: Right arm   Patient Position: Sitting   Cuff Size: Adult Regular   Pulse: 67   Temp: 97.9  F (36.6  C)   TempSrc: Tympanic   SpO2: 98%   Weight: 81.6 kg (180 lb)   Height: 1.753 m (5' 9\")     Body mass index is 26.58 kg/m .  Vitals were reviewed and were normal  Vitals were reviewed   Physical Exam  GENERAL APPEARANCE: appears intoxicated  EYES: Eyes grossly normal to inspection  HENT:  nose no rhinorrhea  RESP: lungs clear to auscultation - no rales, rhonchi or wheezes and no resp distress  CV: regular rates and rhythm, normal S1 S2,no murmur   ABDOMEN:soft, non-tender, non-distended, no hepatosplenomegaly or masses  SKIN: no rashes, no jaundice, no obvious masses.   NEURO:  no tremor  MENTAL STATUS EXAM:  Appearance/Behavior:Restless  Speech: Rambling  Mood/Affect: depressed affect and anxious  Insight: Limited      Results:    Results from the last 24 hours  Results for orders placed or performed in visit on 03/06/20 (from the past 24 hour(s))   Urine Drugs of Abuse Screen (Tox13)   Result Value Ref Range    Cannabinoids (25-qla-3-carboxy-9-THC) Not Detected NDET^Not Detected ng/mL    " Phencyclidine (Phencyclidine) Not Detected NDET^Not Detected ng/mL    Cocaine (Benzoylecgonine) Not Detected NDET^Not Detected ng/mL    Methamphetamine (d-Methamphetamine) Detected, Abnormal Result (A) NDET^Not Detected ng/mL    Opiates (Morphine) Detected, Abnormal Result (A) NDET^Not Detected ng/mL    Amphetamine (d-Amphetamine) Detected, Abnormal Result (A) NDET^Not Detected ng/mL    Benzodiazepines (Nordiazepam) Not Detected NDET^Not Detected ng/mL    Tricyclic Antidepressants (Desipramine) Not Detected NDET^Not Detected ng/mL    Methadone (Methadone) Not Detected NDET^Not Detected ng/mL    Barbiturates (Butalbital) Not Detected NDET^Not Detected ng/mL    Oxycodone (Oxycodone) Not Detected NDET^Not Detected ng/mL    Propoxyphene (Norpropoxyphene) Not Detected NDET^Not Detected ng/mL    Buprenorphine (Buprenorphine) Not Detected NDET^Not Detected ng/mL     Denies meth use.  Will send out for confirmatory.    Assessment and Plan     Was naloxone nasal spray prescribed? No Details: has some at home.  1. Opioid abuse, in remission (H)  Ongoing struggle.  Chronic abdominal pain main focus now, which is affecting mood.  Has primary, Dr. Estevez, and GI consulting.    Does feel suboxone is helpful for his opioid use disorder.  Trial off did not help abdominal pain.  Would like to restart.    Will require close follow up.  Will try to see if truly beneficial.  - Urine Drugs of Abuse Screen (Tox13)  - Pain Drug Scr UR W Rptd Meds    Dosage will not need adjustment today.  Continue at 10/2.5mg mg  2 time(s) a day of  buprenorphine/naloxone (Suboxone).     Follow up Plan  Stage 2 (2 weeks): Please make a clinic appointment for follow up with me (JOSE CAGLE) or another Suboxone provider in 2 weeks for suboxone follow up.    Greater than 15 minutes was spent with this patient, over half of which was on counseling and coordination of care which includes importance of recovery activities,which may include  attendance at  NA/AA meetings, sober support network, and the importance of not isolating, being open, honest, and willing to change, possible triggers and how to avoid them, and managing cravings.    Medications Discontinued During This Encounter   Medication Reason     predniSONE (DELTASONE) 20 MG tablet        Options for treatment and follow-up care were reviewed with the patient. Rodney engaged in the decision making process and verbalized understanding of the options discussed and agreed with the final plan.    Arabella Huynh MD

## 2020-03-06 NOTE — NURSING NOTE
"Chief Complaint   Patient presents with     Recheck Medication       Initial /72 (BP Location: Right arm, Patient Position: Sitting, Cuff Size: Adult Regular)   Pulse 67   Temp 97.9  F (36.6  C) (Tympanic)   Ht 1.753 m (5' 9\")   Wt 81.6 kg (180 lb)   SpO2 98%   BMI 26.58 kg/m   Estimated body mass index is 26.58 kg/m  as calculated from the following:    Height as of this encounter: 1.753 m (5' 9\").    Weight as of this encounter: 81.6 kg (180 lb).  Medication Reconciliation: complete  Mariela Pichardo LPN  "

## 2020-03-06 NOTE — PROGRESS NOTES
"Clinic Care Coordination Contact  Care Team Conversations    CC attended office visit with pt and Dr. Roberts this date.  Please refer to Dr. Roberts's note for plan of care.  Did make an appt with Dr. Carl to establish care in April - get a second opinion on his abdominal pain.   He did not show up last time because he thought Dr. Carl was a \"nut doctor\".  Informed him that Dr. Carl is an internal medicine provider.  Verbalized understanding.  All questions answered.  Encouraged him to call/text CC with any questions/concerns/problems.  Verbalized understanding.    Also informed him that if he has increasing suicidal thoughts or a plan to harm himself, he needs to seek medical attention ASAP.  He stated understanding.    Nadya Medellin RN-BSN  Clinic Care Coordinator  [phone] opt. #[phone]            "

## 2020-03-13 LAB — PAIN DRUG SCR UR W RPTD MEDS: NORMAL

## 2020-03-15 ASSESSMENT — ENCOUNTER SYMPTOMS
MUSCULOSKELETAL NEGATIVE: 1
NERVOUS/ANXIOUS: 0
WEAKNESS: 0
EYES NEGATIVE: 1
ALLERGIC/IMMUNOLOGIC NEGATIVE: 1
CARDIOVASCULAR NEGATIVE: 1
HEMATOLOGIC/LYMPHATIC NEGATIVE: 1
CONSTITUTIONAL NEGATIVE: 1
SLEEP DISTURBANCE: 0
GASTROINTESTINAL NEGATIVE: 1
SHORTNESS OF BREATH: 0
ENDOCRINE NEGATIVE: 1
NEUROLOGICAL NEGATIVE: 1
PHOTOPHOBIA: 0
TREMORS: 0
RESPIRATORY NEGATIVE: 1

## 2020-03-15 NOTE — ED PROVIDER NOTES
History     Chief Complaint   Patient presents with     Rash     HPI  Rodney Goodwin is a 59 year old male who presents today with complaints of feeling bugs all over his body. Has been seen by pcp for same and treated with OTC anti-inflammatory medications. Patient has also been using benadryl. Has appt with pcp and will be referred to dermatology. Patient denies any SI or HI. Denies any hallucinations. Able to converse without difficulty.     Allergies:  Allergies   Allergen Reactions     Benzodiazepines      Contraindicated - on Suboxone     Codeine      Penicillins        Problem List:    Patient Active Problem List    Diagnosis Date Noted     Elevated blood sugar 10/29/2016     Priority: Medium     Narcotic addiction (H) 10/29/2016     Priority: Medium     Acute exacerbation of chronic obstructive pulmonary disease (H) 10/28/2016     Priority: Medium     Heroin abuse (H) 09/15/2015     Priority: Medium     COPD exacerbation (H) 09/14/2015     Priority: Medium     Costochondritis 02/26/2015     Priority: Medium     Venous insufficiency of both lower extremities 02/26/2015     Priority: Medium     Do you wish to do the replacement in the background? yes         Bipolar 1 disorder (H) 11/11/2014     Priority: Medium     Cellulitis and abscess of forearm 04/04/2014     Priority: Medium     IV drug user 08/09/2010     Priority: Medium     Overview:   IMO Update 10/11       Chlordiazepoxide dependence (H) 08/09/2010     Priority: Medium     Overview:   IMO Update 10/11       Heroin addiction (H) 08/09/2010     Priority: Medium     Overview:   See social notes  IMO Update 10/11       Osteoarthrosis, pelvic region and thigh 11/13/2008     Priority: Medium     Overview:   IMO Update 10/11       Lumbosacral spondylosis without myelopathy 11/13/2008     Priority: Medium     Backache 06/09/2008     Priority: Medium     Overview:   IMO Update 10/11       Degeneration of lumbar or lumbosacral intervertebral disc  07/25/2006     Priority: Medium     Overview:   IMO Update 10/11          Past Medical History:    Past Medical History:   Diagnosis Date     Anxiety      Bipolar affective disorder (H)      Chronic constipation      Depressive disorder      Drug abuse (H)      Hypertension      Opioid use disorder, severe, dependence (H) 2020       Past Surgical History:    Past Surgical History:   Procedure Laterality Date     ARTHROSCOPY KNEE BILATERAL       COLONOSCOPY  01/29/2020    Multiple, repeat due 2015     COLONOSCOPY N/A 7/31/2015    Procedure: COLONOSCOPY;  Surgeon: Justin Andujar MD;  Location: [facility]     DECOMPRESSION CUBITAL TUNNEL Left 11/21/2017    Procedure: DECOMPRESSION CUBITAL TUNNEL;;  Surgeon: Jhonny Zhao MD;  Location: [facility]     DENTAL SURGERY       HERNIA REPAIR      Inguinal, left     RELEASE CARPAL TUNNEL Left 11/21/2017    Procedure: RELEASE CARPAL TUNNEL;  LEFT ELBOW CUBITAL and CARPAL TUNNEL RELEASE;  Surgeon: Jhonny Zhao MD;  Location: [facility]       Family History:    Family History   Problem Relation Age of Onset     Diabetes Mother      Cerebrovascular Disease Father      Pancreatic Cancer Brother        Social History:  Marital Status:   [4]  Social History     Tobacco Use     Smoking status: Current Some Day Smoker     Packs/day: 1.00     Years: 30.00     Pack years: 30.00     Types: Cigarettes     Smokeless tobacco: Never Used     Tobacco comment: Tried to Quit (NO)   Substance Use Topics     Alcohol use: No     Alcohol/week: 0.0 standard drinks     Drug use: Not Currently     Types: IV, Methamphetamines        Medications:    ASPIRIN LOW DOSE 81 MG EC tablet  atenolol (TENORMIN) 100 MG tablet  LINZESS 290 MCG capsule  pantoprazole (PROTONIX) 40 MG EC tablet  polyethylene glycol (MIRALAX/GLYCOLAX) powder  SIMVASTATIN PO  sucralfate (CARAFATE) 1 GM tablet  VENTOLIN  (90 Base) MCG/ACT inhaler  buprenorphine HCl-naloxone HCl (SUBOXONE) 2-0.5 MG per film  buprenorphine HCl-naloxone  HCl (SUBOXONE) 8-2 MG per film          Review of Systems   Constitutional: Negative.    HENT: Negative.    Eyes: Negative.  Negative for photophobia.   Respiratory: Negative.  Negative for shortness of breath.    Cardiovascular: Negative.    Gastrointestinal: Negative.    Endocrine: Negative.    Genitourinary: Negative.    Musculoskeletal: Negative.    Skin: Positive for rash.   Allergic/Immunologic: Negative.    Neurological: Negative.  Negative for tremors and weakness.   Hematological: Negative.    Psychiatric/Behavioral: Negative for self-injury, sleep disturbance and suicidal ideas. The patient is not nervous/anxious.        Physical Exam   BP: (!) 182/109  Temp: 97.9  F (36.6  C)  Resp: 18  SpO2: 96 %      Physical Exam  Constitutional:       General: He is not in acute distress.     Appearance: He is normal weight. He is not toxic-appearing.   HENT:      Head: Normocephalic and atraumatic.   Neck:      Musculoskeletal: Normal range of motion and neck supple. No neck rigidity.   Pulmonary:      Effort: Pulmonary effort is normal.   Lymphadenopathy:      Cervical: No cervical adenopathy.   Skin:     General: Skin is warm.      Findings: Rash (Healing sores noted over entire torso and arms in various stages of healing. No erythema, discharge or warmth noted. ) present.   Neurological:      General: No focal deficit present.      Mental Status: He is alert and oriented to person, place, and time.   Psychiatric:         Behavior: Behavior normal.         Thought Content: Thought content normal.         Judgment: Judgment normal.      Comments: Anxious mood. No SI or HI         ED Course        Medications   predniSONE (DELTASONE) tablet 60 mg (60 mg Oral Given 2/28/20 7522)       Assessments & Plan (with Medical Decision Making)     59 year old who multiple sores over body. No evidence of cellulitis or super-infection. Patient complaining of itching but otherwise no other complaints. Has no SI or HI but is quite  anxious. Cannot r/o itching from illicit drug use but patient denies any recreational drug use. Will treat symptomatically with prednisone and provide doxycycline for prophylaxis against cellulitis. Patient to follow up with pcp as scheduled for re-evaluation and derm referral. Patient understands to come back if he develops any new or worsening symptoms.     Due to the nature of this electronic medical record, laboratory results, imaging results, diagnosis, other information and medications reported above may not represent information available to me at the the time of my care and disposition. Medications reported above may have not been ordered by me.     Portions of the record may have been created with voice recognition software. Occasional wrong-word or 'sound-a- like' substitution may have occurred due to the inherent limitations of voice recognition software. Though the chart has been reviewed, there may be inadvertent transcription errors. Read the chart carefully and recognize, using context, where substitutions have occurred.       Discharge Medication List as of 2/28/2020  4:51 AM      START taking these medications    Details   doxycycline monohydrate (MONODOX) 100 MG capsule Take 1 capsule (100 mg) by mouth 2 times daily for 10 days, Disp-20 capsule, R-0, E-Prescribe      predniSONE (DELTASONE) 20 MG tablet Take 3 tablets (60 mg) by mouth daily for 2 days, Disp-6 tablet, R-0, E-Prescribe             Final diagnoses:   Bug bite, initial encounter       2/28/2020   [facility]     Luz Ibanez MD  03/15/20 [id number]

## 2020-04-09 ENCOUNTER — HOSPITAL ENCOUNTER (EMERGENCY)
Facility: HOSPITAL | Age: 60
Discharge: HOME | End: 2020-04-09
Attending: EMERGENCY MEDICINE | Admitting: EMERGENCY MEDICINE
Payer: MEDICARE

## 2020-04-09 ENCOUNTER — APPOINTMENT (OUTPATIENT)
Dept: CT IMAGING | Facility: HOSPITAL | Age: 60
End: 2020-04-09
Attending: EMERGENCY MEDICINE
Payer: MEDICARE

## 2020-04-09 VITALS
SYSTOLIC BLOOD PRESSURE: 112 MMHG | HEART RATE: 85 BPM | BODY MASS INDEX: 26.58 KG/M2 | HEIGHT: 69 IN | TEMPERATURE: 96.6 F | DIASTOLIC BLOOD PRESSURE: 78 MMHG | OXYGEN SATURATION: 93 % | RESPIRATION RATE: 16 BRPM

## 2020-04-09 DIAGNOSIS — R10.9 FLANK PAIN: ICD-10-CM

## 2020-04-09 DIAGNOSIS — R10.84 ABDOMINAL PAIN, GENERALIZED: ICD-10-CM

## 2020-04-09 LAB
ALBUMIN SERPL-MCNC: 3.6 G/DL (ref 3.4–5)
ALP SERPL-CCNC: 116 U/L (ref 40–150)
ALT SERPL W P-5'-P-CCNC: 19 U/L (ref 0–70)
ANION GAP SERPL CALCULATED.3IONS-SCNC: 4 MMOL/L (ref 3–14)
AST SERPL W P-5'-P-CCNC: 20 U/L (ref 0–45)
BASOPHILS # BLD AUTO: 0.1 10E9/L (ref 0–0.2)
BASOPHILS NFR BLD AUTO: 0.6 %
BILIRUB DIRECT SERPL-MCNC: <0.1 MG/DL (ref 0–0.2)
BILIRUB SERPL-MCNC: 0.3 MG/DL (ref 0.2–1.3)
BUN SERPL-MCNC: 18 MG/DL (ref 7–30)
CALCIUM SERPL-MCNC: 8.5 MG/DL (ref 8.5–10.1)
CHLORIDE SERPL-SCNC: 106 MMOL/L (ref 94–109)
CO2 SERPL-SCNC: 27 MMOL/L (ref 20–32)
CREAT SERPL-MCNC: 0.85 MG/DL (ref 0.66–1.25)
DIFFERENTIAL METHOD BLD: ABNORMAL
EOSINOPHIL # BLD AUTO: 0.3 10E9/L (ref 0–0.7)
EOSINOPHIL NFR BLD AUTO: 3.2 %
ERYTHROCYTE [DISTWIDTH] IN BLOOD BY AUTOMATED COUNT: 12.1 % (ref 10–15)
GFR SERPL CREATININE-BSD FRML MDRD: >90 ML/MIN/{1.73_M2}
GLUCOSE SERPL-MCNC: 110 MG/DL (ref 70–99)
HCT VFR BLD AUTO: 37.8 % (ref 40–53)
HGB BLD-MCNC: 12.8 G/DL (ref 13.3–17.7)
IMM GRANULOCYTES # BLD: 0 10E9/L (ref 0–0.4)
IMM GRANULOCYTES NFR BLD: 0.4 %
LIPASE SERPL-CCNC: 74 U/L (ref 73–393)
LYMPHOCYTES # BLD AUTO: 2.1 10E9/L (ref 0.8–5.3)
LYMPHOCYTES NFR BLD AUTO: 26.6 %
MCH RBC QN AUTO: 31.4 PG (ref 26.5–33)
MCHC RBC AUTO-ENTMCNC: 33.9 G/DL (ref 31.5–36.5)
MCV RBC AUTO: 93 FL (ref 78–100)
MONOCYTES # BLD AUTO: 0.8 10E9/L (ref 0–1.3)
MONOCYTES NFR BLD AUTO: 10.5 %
NEUTROPHILS # BLD AUTO: 4.7 10E9/L (ref 1.6–8.3)
NEUTROPHILS NFR BLD AUTO: 58.7 %
NRBC # BLD AUTO: 0 10*3/UL
NRBC BLD AUTO-RTO: 0 /100
PLATELET # BLD AUTO: 141 10E9/L (ref 150–450)
POTASSIUM SERPL-SCNC: 3.7 MMOL/L (ref 3.4–5.3)
PROT SERPL-MCNC: 6.9 G/DL (ref 6.8–8.8)
RBC # BLD AUTO: 4.08 10E12/L (ref 4.4–5.9)
SODIUM SERPL-SCNC: 137 MMOL/L (ref 133–144)
WBC # BLD AUTO: 7.9 10E9/L (ref 4–11)

## 2020-04-09 PROCEDURE — 99285 EMERGENCY DEPT VISIT HI MDM: CPT | Mod: 25

## 2020-04-09 PROCEDURE — 96375 TX/PRO/DX INJ NEW DRUG ADDON: CPT

## 2020-04-09 PROCEDURE — 36415 COLL VENOUS BLD VENIPUNCTURE: CPT | Performed by: EMERGENCY MEDICINE

## 2020-04-09 PROCEDURE — 96372 THER/PROPH/DIAG INJ SC/IM: CPT | Mod: XS

## 2020-04-09 PROCEDURE — 74176 CT ABD & PELVIS W/O CONTRAST: CPT | Mod: TC

## 2020-04-09 PROCEDURE — 80076 HEPATIC FUNCTION PANEL: CPT | Performed by: EMERGENCY MEDICINE

## 2020-04-09 PROCEDURE — 85025 COMPLETE CBC W/AUTO DIFF WBC: CPT | Performed by: EMERGENCY MEDICINE

## 2020-04-09 PROCEDURE — 96374 THER/PROPH/DIAG INJ IV PUSH: CPT

## 2020-04-09 PROCEDURE — 80048 BASIC METABOLIC PNL TOTAL CA: CPT | Performed by: EMERGENCY MEDICINE

## 2020-04-09 PROCEDURE — 83690 ASSAY OF LIPASE: CPT | Performed by: EMERGENCY MEDICINE

## 2020-04-09 PROCEDURE — 25000132 ZZH RX MED GY IP 250 OP 250 PS 637: Mod: GY | Performed by: EMERGENCY MEDICINE

## 2020-04-09 PROCEDURE — 99285 EMERGENCY DEPT VISIT HI MDM: CPT | Mod: Z6 | Performed by: EMERGENCY MEDICINE

## 2020-04-09 PROCEDURE — 25000128 H RX IP 250 OP 636: Performed by: EMERGENCY MEDICINE

## 2020-04-09 RX ORDER — MAGNESIUM CARB/ALUMINUM HYDROX 105-160MG
296 TABLET,CHEWABLE ORAL ONCE
Status: DISCONTINUED | OUTPATIENT
Start: 2020-04-09 | End: 2020-04-10 | Stop reason: HOSPADM

## 2020-04-09 RX ORDER — KETOROLAC TROMETHAMINE 15 MG/ML
15 INJECTION, SOLUTION INTRAMUSCULAR; INTRAVENOUS ONCE
Status: COMPLETED | OUTPATIENT
Start: 2020-04-09 | End: 2020-04-09

## 2020-04-09 RX ORDER — OLANZAPINE 10 MG/2ML
10 INJECTION, POWDER, FOR SOLUTION INTRAMUSCULAR ONCE
Status: COMPLETED | OUTPATIENT
Start: 2020-04-09 | End: 2020-04-09

## 2020-04-09 RX ORDER — MAGNESIUM CARB/ALUMINUM HYDROX 105-160MG
296 TABLET,CHEWABLE ORAL ONCE
Status: COMPLETED | OUTPATIENT
Start: 2020-04-09 | End: 2020-04-09

## 2020-04-09 RX ADMIN — HYDROMORPHONE HYDROCHLORIDE 1 MG: 1 INJECTION, SOLUTION INTRAMUSCULAR; INTRAVENOUS; SUBCUTANEOUS at 21:40

## 2020-04-09 RX ADMIN — OLANZAPINE 10 MG: 10 INJECTION, POWDER, FOR SOLUTION INTRAMUSCULAR at 22:09

## 2020-04-09 RX ADMIN — MAGNESIUM CITRATE 296 ML: 1.75 LIQUID ORAL at 23:00

## 2020-04-09 RX ADMIN — KETOROLAC TROMETHAMINE 15 MG: 15 INJECTION, SOLUTION INTRAMUSCULAR; INTRAVENOUS at 21:39

## 2020-04-09 NOTE — ED AVS SNAPSHOT
[facility]  [address] [address]  Phone:  [phone]                                    Rodney Goodwin   MRN: [id number]    Department:  [facility]   Date of Visit:  4/9/2020           After Visit Summary Signature Page    I have received my discharge instructions, and my questions have been answered. I have discussed any challenges I see with this plan with the nurse or doctor.    ..........................................................................................................................................  Patient/Patient Representative Signature      ..........................................................................................................................................  Patient Representative Print Name and Relationship to Patient    ..................................................               ................................................  Date                                   Time    ..........................................................................................................................................  Reviewed by Signature/Title    ...................................................              ..............................................  Date                                               Time          22EPIC Rev 08/18

## 2020-04-10 NOTE — ED PROVIDER NOTES
History     Chief Complaint   Patient presents with     Abdominal Pain     right side     Flank Pain     bilateral     HPI  Rodney Goodwin is a 60 year old male who presents with right flank pain, onset 3 days ago, history of kidney stones, feels like prior kidney stones.  Notes radiation from the right flank to the right groin.  No testicular discomfort.  No nausea.  No vomiting.  No other associated symptoms.  Duration ongoing.  Character persistent.  No urinary discomfort.  No frequency.  No urgency.    Allergies:  Allergies   Allergen Reactions     Benzodiazepines      Contraindicated - on Suboxone     Codeine      Penicillins        Problem List:    Patient Active Problem List    Diagnosis Date Noted     Elevated blood sugar 10/29/2016     Priority: Medium     Narcotic addiction (H) 10/29/2016     Priority: Medium     Acute exacerbation of chronic obstructive pulmonary disease (H) 10/28/2016     Priority: Medium     Heroin abuse (H) 09/15/2015     Priority: Medium     COPD exacerbation (H) 09/14/2015     Priority: Medium     Costochondritis 02/26/2015     Priority: Medium     Venous insufficiency of both lower extremities 02/26/2015     Priority: Medium     Do you wish to do the replacement in the background? yes         Bipolar 1 disorder (H) 11/11/2014     Priority: Medium     Cellulitis and abscess of forearm 04/04/2014     Priority: Medium     IV drug user 08/09/2010     Priority: Medium     Overview:   IMO Update 10/11       Chlordiazepoxide dependence (H) 08/09/2010     Priority: Medium     Overview:   IMO Update 10/11       Heroin addiction (H) 08/09/2010     Priority: Medium     Overview:   See social notes  IMO Update 10/11       Osteoarthrosis, pelvic region and thigh 11/13/2008     Priority: Medium     Overview:   IMO Update 10/11       Lumbosacral spondylosis without myelopathy 11/13/2008     Priority: Medium     Backache 06/09/2008     Priority: Medium     Overview:   IMO Update 10/11        Degeneration of lumbar or lumbosacral intervertebral disc 07/25/2006     Priority: Medium     Overview:   IMO Update 10/11          Past Medical History:    Past Medical History:   Diagnosis Date     Anxiety      Bipolar affective disorder (H)      Chronic constipation      Depressive disorder      Drug abuse (H)      Hypertension      Opioid use disorder, severe, dependence (H) 2020       Past Surgical History:    Past Surgical History:   Procedure Laterality Date     ARTHROSCOPY KNEE BILATERAL       COLONOSCOPY  01/29/2020    Multiple, repeat due 2015     COLONOSCOPY N/A 7/31/2015    Procedure: COLONOSCOPY;  Surgeon: Justin Andujar MD;  Location: [facility]     DECOMPRESSION CUBITAL TUNNEL Left 11/21/2017    Procedure: DECOMPRESSION CUBITAL TUNNEL;;  Surgeon: Jhonny Zhao MD;  Location: [facility]     DENTAL SURGERY       HERNIA REPAIR      Inguinal, left     RELEASE CARPAL TUNNEL Left 11/21/2017    Procedure: RELEASE CARPAL TUNNEL;  LEFT ELBOW CUBITAL and CARPAL TUNNEL RELEASE;  Surgeon: Jhonny Zhao MD;  Location: [facility]       Family History:    Family History   Problem Relation Age of Onset     Diabetes Mother      Cerebrovascular Disease Father      Pancreatic Cancer Brother        Social History:  Marital Status:   [4]  Social History     Tobacco Use     Smoking status: Current Some Day Smoker     Packs/day: 1.00     Years: 30.00     Pack years: 30.00     Types: Cigarettes     Smokeless tobacco: Never Used     Tobacco comment: Tried to Quit (NO)   Substance Use Topics     Alcohol use: No     Alcohol/week: 0.0 standard drinks     Drug use: Not Currently     Types: IV, Methamphetamines        Medications:    ASPIRIN LOW DOSE 81 MG EC tablet  atenolol (TENORMIN) 100 MG tablet  buprenorphine HCl-naloxone HCl (SUBOXONE) 2-0.5 MG per film  magnesium citrate solution  SIMVASTATIN PO  VENTOLIN  (90 Base) MCG/ACT inhaler  buprenorphine HCl-naloxone HCl (SUBOXONE) 8-2 MG per film  LINZESS 290 MCG  "capsule  pantoprazole (PROTONIX) 40 MG EC tablet  polyethylene glycol (MIRALAX/GLYCOLAX) powder  sucralfate (CARAFATE) 1 GM tablet      Review of Systems  Respiratory denies.  Cardiovascular denies chest pain.  GI no abdominal pain.   flank pain.  Skin denies.  Heme no blood thinner usage.  Remainder of complete 10 point review of systems negative.    Physical Exam   BP: 156/94  Pulse: 85  Heart Rate: 97  Temp: 97.9  F (36.6  C)  Resp: 20  Height: 175.3 cm (5' 9\")  SpO2: 93 %      Physical Exam  Constitutional:       Appearance: He is well-developed.      Comments: Patient highly uncomfortable on arrival.   HENT:      Head: Normocephalic and atraumatic.      Mouth/Throat:      Mouth: Mucous membranes are moist.      Pharynx: Oropharynx is clear.   Eyes:      Extraocular Movements: Extraocular movements intact.      Pupils: Pupils are equal, round, and reactive to light.   Cardiovascular:      Rate and Rhythm: Normal rate.      Heart sounds: No murmur.   Pulmonary:      Effort: Pulmonary effort is normal. No respiratory distress.      Breath sounds: No wheezing.   Abdominal:      General: Bowel sounds are normal. There is no distension.      Comments: No right upper quadrant pain.  Patient with right lower quadrant pain and radiation to the right flank   Genitourinary:     Comments: Right CVAT is present.  No testicular discomfort.  Skin:     General: Skin is warm and dry.   Neurological:      General: No focal deficit present.      Mental Status: He is alert.   Psychiatric:      Comments: Patient somewhat anxious on arrival.     Reexamination with benign abdomen.  No peritoneal signs.  No rebound.         Results for orders placed or performed during the hospital encounter of 04/09/20 (from the past 24 hour(s))   Basic metabolic panel   Result Value Ref Range    Sodium 137 133 - 144 mmol/L    Potassium 3.7 3.4 - 5.3 mmol/L    Chloride 106 94 - 109 mmol/L    Carbon Dioxide 27 20 - 32 mmol/L    Anion Gap 4 3 - 14 " mmol/L    Glucose 110 (H) 70 - 99 mg/dL    Urea Nitrogen 18 7 - 30 mg/dL    Creatinine 0.85 0.66 - 1.25 mg/dL    GFR Estimate >90 >60 mL/min/[1.73_m2]    GFR Estimate If Black >90 >60 mL/min/[1.73_m2]    Calcium 8.5 8.5 - 10.1 mg/dL   CBC with platelets differential   Result Value Ref Range    WBC 7.9 4.0 - 11.0 10e9/L    RBC Count 4.08 (L) 4.4 - 5.9 10e12/L    Hemoglobin 12.8 (L) 13.3 - 17.7 g/dL    Hematocrit 37.8 (L) 40.0 - 53.0 %    MCV 93 78 - 100 fl    MCH 31.4 26.5 - 33.0 pg    MCHC 33.9 31.5 - 36.5 g/dL    RDW 12.1 10.0 - 15.0 %    Platelet Count 141 (L) 150 - 450 10e9/L    Diff Method Automated Method     % Neutrophils 58.7 %    % Lymphocytes 26.6 %    % Monocytes 10.5 %    % Eosinophils 3.2 %    % Basophils 0.6 %    % Immature Granulocytes 0.4 %    Nucleated RBCs 0 0 /100    Absolute Neutrophil 4.7 1.6 - 8.3 10e9/L    Absolute Lymphocytes 2.1 0.8 - 5.3 10e9/L    Absolute Monocytes 0.8 0.0 - 1.3 10e9/L    Absolute Eosinophils 0.3 0.0 - 0.7 10e9/L    Absolute Basophils 0.1 0.0 - 0.2 10e9/L    Abs Immature Granulocytes 0.0 0 - 0.4 10e9/L    Absolute Nucleated RBC 0.0    Hepatic panel   Result Value Ref Range    Bilirubin Direct <0.1 0.0 - 0.2 mg/dL    Bilirubin Total 0.3 0.2 - 1.3 mg/dL    Albumin 3.6 3.4 - 5.0 g/dL    Protein Total 6.9 6.8 - 8.8 g/dL    Alkaline Phosphatase 116 40 - 150 U/L    ALT 19 0 - 70 U/L    AST 20 0 - 45 U/L   Lipase   Result Value Ref Range    Lipase 74 73 - 393 U/L       CT of abdomen and pelvis read by the radiologist and reported via printout from [facility] shows CT without nephrolithiasis or hydronephrosis.  Normal appendix.  No bowel obstruction.    Medications   magnesium citrate solution 296 mL (296 mLs Oral Not Given 4/9/20 2305)   HYDROmorphone (DILAUDID) injection 1 mg (1 mg Intravenous Given 4/9/20 2140)   ketorolac (TORADOL) injection 15 mg (15 mg Intravenous Given 4/9/20 2139)   OLANZapine (zyPREXA) injection 10 mg (10 mg Intramuscular Given 4/9/20 2209)   magnesium citrate  solution 296 mL (296 mLs Oral Given 4/9/20 2300)       Assessments & Plan (with Medical Decision Making)   Patient with right lower quadrant and right flank pain suggestive of kidney stone.  CT returns without evidence thereof.  No evidence for appendicitis.  No bowel obstruction.  Chemistries nonrevealing, LFTs normal, lipase normal, no leukocytosis.  Reexamination finds the patient doing well without any complaints-abdomen benign.  He feels it may be constipation.  CT does suggest constipation in the ascending colon which perhaps represents his presentation and pain in the absence of other findings at this time.  He is requesting discharge.  Urinalysis not provided and patient requesting discharge.  He denies any dysuria frequency or urgency.  Urinalysis not garnered in lieu of of patient's request for discharge.  Plan for magnesium citrate and MiraLAX with the understanding that he should represent if any refractory symptoms or not improving as possibility of occult pathology not appreciated on initial CT does exist.  Patient acknowledges understanding thereof and states he will return if any concerns.    Discharge Medication List as of 4/9/2020 11:15 PM      START taking these medications    Details   magnesium citrate solution Take 296 mLs by mouth daily for 3 doses Until resolution, Disp-888 mL,R-0, Local Print             Final diagnoses:   Flank pain   Abdominal pain, generalized       4/9/2020   [facility]     Abdifatah Alejandra MD  04/10/20 0010

## 2020-04-26 ENCOUNTER — HOSPITAL ENCOUNTER (INPATIENT)
Facility: HOSPITAL | Age: 60
LOS: 2 days | Discharge: HOME | DRG: 885 | End: 2020-04-29
Attending: FAMILY MEDICINE | Admitting: PSYCHIATRY & NEUROLOGY
Payer: MEDICARE

## 2020-04-26 DIAGNOSIS — F31.9 BIPOLAR 1 DISORDER (H): Primary | ICD-10-CM

## 2020-04-26 DIAGNOSIS — R44.3 HALLUCINATIONS: ICD-10-CM

## 2020-04-26 DIAGNOSIS — R45.89 SUICIDAL BEHAVIOR: ICD-10-CM

## 2020-04-26 DIAGNOSIS — J44.9 CHRONIC OBSTRUCTIVE PULMONARY DISEASE, UNSPECIFIED COPD TYPE (H): ICD-10-CM

## 2020-04-26 DIAGNOSIS — R21 RASH: ICD-10-CM

## 2020-04-26 DIAGNOSIS — E78.2 MIXED HYPERLIPIDEMIA: ICD-10-CM

## 2020-04-26 DIAGNOSIS — K21.9 GASTROESOPHAGEAL REFLUX DISEASE WITHOUT ESOPHAGITIS: ICD-10-CM

## 2020-04-26 DIAGNOSIS — K58.9 IRRITABLE BOWEL SYNDROME, UNSPECIFIED TYPE: ICD-10-CM

## 2020-04-26 DIAGNOSIS — I10 ESSENTIAL HYPERTENSION: ICD-10-CM

## 2020-04-26 DIAGNOSIS — F31.9 BIPOLAR AFFECTIVE DISORDER, REMISSION STATUS UNSPECIFIED (H): ICD-10-CM

## 2020-04-26 LAB
ALBUMIN SERPL-MCNC: 3.4 G/DL (ref 3.4–5)
ALBUMIN UR-MCNC: NEGATIVE MG/DL
ALP SERPL-CCNC: 135 U/L (ref 40–150)
ALT SERPL W P-5'-P-CCNC: 20 U/L (ref 0–70)
AMPHETAMINES UR QL: ABNORMAL NG/ML
ANION GAP SERPL CALCULATED.3IONS-SCNC: 5 MMOL/L (ref 3–14)
APAP SERPL-MCNC: <2 MG/L (ref 10–20)
APPEARANCE UR: CLEAR
AST SERPL W P-5'-P-CCNC: 14 U/L (ref 0–45)
BACTERIA #/AREA URNS HPF: ABNORMAL /HPF
BARBITURATES UR QL SCN: NOT DETECTED NG/ML
BASOPHILS # BLD AUTO: 0 10E9/L (ref 0–0.2)
BASOPHILS NFR BLD AUTO: 0.5 %
BENZODIAZ UR QL SCN: NOT DETECTED NG/ML
BILIRUB SERPL-MCNC: 0.2 MG/DL (ref 0.2–1.3)
BILIRUB UR QL STRIP: NEGATIVE
BUN SERPL-MCNC: 20 MG/DL (ref 7–30)
BUPRENORPHINE UR QL: ABNORMAL NG/ML
CALCIUM SERPL-MCNC: 9.3 MG/DL (ref 8.5–10.1)
CANNABINOIDS UR QL: NOT DETECTED NG/ML
CHLORIDE SERPL-SCNC: 112 MMOL/L (ref 94–109)
CO2 SERPL-SCNC: 24 MMOL/L (ref 20–32)
COCAINE UR QL SCN: NOT DETECTED NG/ML
COLOR UR AUTO: ABNORMAL
CREAT SERPL-MCNC: 0.84 MG/DL (ref 0.66–1.25)
D-METHAMPHET UR QL: ABNORMAL NG/ML
DIFFERENTIAL METHOD BLD: ABNORMAL
EOSINOPHIL # BLD AUTO: 0.3 10E9/L (ref 0–0.7)
EOSINOPHIL NFR BLD AUTO: 3.7 %
ERYTHROCYTE [DISTWIDTH] IN BLOOD BY AUTOMATED COUNT: 12.7 % (ref 10–15)
ETHANOL SERPL-MCNC: <0.01 G/DL
GFR SERPL CREATININE-BSD FRML MDRD: >90 ML/MIN/{1.73_M2}
GLUCOSE SERPL-MCNC: 127 MG/DL (ref 70–99)
GLUCOSE UR STRIP-MCNC: NEGATIVE MG/DL
HCT VFR BLD AUTO: 40.6 % (ref 40–53)
HGB BLD-MCNC: 13.9 G/DL (ref 13.3–17.7)
HGB UR QL STRIP: ABNORMAL
HYALINE CASTS #/AREA URNS LPF: 1 /LPF
IMM GRANULOCYTES # BLD: 0 10E9/L (ref 0–0.4)
IMM GRANULOCYTES NFR BLD: 0.3 %
KETONES UR STRIP-MCNC: NEGATIVE MG/DL
LEUKOCYTE ESTERASE UR QL STRIP: NEGATIVE
LYMPHOCYTES # BLD AUTO: 2.4 10E9/L (ref 0.8–5.3)
LYMPHOCYTES NFR BLD AUTO: 32.5 %
MCH RBC QN AUTO: 32.2 PG (ref 26.5–33)
MCHC RBC AUTO-ENTMCNC: 34.2 G/DL (ref 31.5–36.5)
MCV RBC AUTO: 94 FL (ref 78–100)
METHADONE UR QL SCN: NOT DETECTED NG/ML
MONOCYTES # BLD AUTO: 0.7 10E9/L (ref 0–1.3)
MONOCYTES NFR BLD AUTO: 9.6 %
MUCOUS THREADS #/AREA URNS LPF: PRESENT /LPF
NEUTROPHILS # BLD AUTO: 3.9 10E9/L (ref 1.6–8.3)
NEUTROPHILS NFR BLD AUTO: 53.4 %
NITRATE UR QL: NEGATIVE
NRBC # BLD AUTO: 0 10*3/UL
NRBC BLD AUTO-RTO: 0 /100
OPIATES UR QL SCN: NOT DETECTED NG/ML
OXYCODONE UR QL SCN: NOT DETECTED NG/ML
PCP UR QL SCN: NOT DETECTED NG/ML
PH UR STRIP: 5.5 PH (ref 4.7–8)
PLATELET # BLD AUTO: 169 10E9/L (ref 150–450)
POTASSIUM SERPL-SCNC: 3.7 MMOL/L (ref 3.4–5.3)
PROPOXYPH UR QL: NOT DETECTED NG/ML
PROT SERPL-MCNC: 7.2 G/DL (ref 6.8–8.8)
RBC # BLD AUTO: 4.32 10E12/L (ref 4.4–5.9)
RBC #/AREA URNS AUTO: 1 /HPF (ref 0–2)
SALICYLATES SERPL-MCNC: <2 MG/DL
SODIUM SERPL-SCNC: 141 MMOL/L (ref 133–144)
SOURCE: ABNORMAL
SP GR UR STRIP: 1.02 (ref 1–1.03)
TRICYCLICS UR QL SCN: NOT DETECTED NG/ML
TSH SERPL DL<=0.005 MIU/L-ACNC: 0.26 MU/L (ref 0.4–4)
UROBILINOGEN UR STRIP-MCNC: NORMAL MG/DL (ref 0–2)
WBC # BLD AUTO: 7.3 10E9/L (ref 4–11)
WBC #/AREA URNS AUTO: <1 /HPF (ref 0–5)

## 2020-04-26 PROCEDURE — 81001 URINALYSIS AUTO W/SCOPE: CPT | Performed by: FAMILY MEDICINE

## 2020-04-26 PROCEDURE — 80329 ANALGESICS NON-OPIOID 1 OR 2: CPT | Performed by: FAMILY MEDICINE

## 2020-04-26 PROCEDURE — 84443 ASSAY THYROID STIM HORMONE: CPT | Performed by: FAMILY MEDICINE

## 2020-04-26 PROCEDURE — 80053 COMPREHEN METABOLIC PANEL: CPT | Performed by: FAMILY MEDICINE

## 2020-04-26 PROCEDURE — 99285 EMERGENCY DEPT VISIT HI MDM: CPT | Mod: Z6 | Performed by: FAMILY MEDICINE

## 2020-04-26 PROCEDURE — 85025 COMPLETE CBC W/AUTO DIFF WBC: CPT | Performed by: FAMILY MEDICINE

## 2020-04-26 PROCEDURE — 36415 COLL VENOUS BLD VENIPUNCTURE: CPT | Performed by: FAMILY MEDICINE

## 2020-04-26 PROCEDURE — 86780 TREPONEMA PALLIDUM: CPT | Performed by: FAMILY MEDICINE

## 2020-04-26 PROCEDURE — 80306 DRUG TEST PRSMV INSTRMNT: CPT | Performed by: FAMILY MEDICINE

## 2020-04-26 PROCEDURE — 80320 DRUG SCREEN QUANTALCOHOLS: CPT | Performed by: FAMILY MEDICINE

## 2020-04-26 PROCEDURE — 99285 EMERGENCY DEPT VISIT HI MDM: CPT

## 2020-04-27 PROBLEM — K21.9 GASTROESOPHAGEAL REFLUX DISEASE WITHOUT ESOPHAGITIS: Status: ACTIVE | Noted: 2020-04-27

## 2020-04-27 PROBLEM — N48.9 PENILE LESION: Status: ACTIVE | Noted: 2020-04-27

## 2020-04-27 PROBLEM — R45.89 SUICIDAL BEHAVIOR: Status: ACTIVE | Noted: 2020-04-27

## 2020-04-27 PROCEDURE — 25000132 ZZH RX MED GY IP 250 OP 250 PS 637: Mod: GY | Performed by: NURSE PRACTITIONER

## 2020-04-27 PROCEDURE — 99223 1ST HOSP IP/OBS HIGH 75: CPT | Mod: AI | Performed by: NURSE PRACTITIONER

## 2020-04-27 PROCEDURE — 99222 1ST HOSP IP/OBS MODERATE 55: CPT | Mod: 95 | Performed by: NURSE PRACTITIONER

## 2020-04-27 PROCEDURE — 12400000 ZZH R&B MH

## 2020-04-27 RX ORDER — DIAZEPAM 5 MG
5 TABLET ORAL 2 TIMES DAILY
Status: COMPLETED | OUTPATIENT
Start: 2020-04-27 | End: 2020-04-28

## 2020-04-27 RX ORDER — SIMVASTATIN 20 MG
20 TABLET ORAL AT BEDTIME
Status: DISCONTINUED | OUTPATIENT
Start: 2020-04-27 | End: 2020-04-29 | Stop reason: HOSPADM

## 2020-04-27 RX ORDER — IPRATROPIUM BROMIDE AND ALBUTEROL 20; 100 UG/1; UG/1
1 SPRAY, METERED RESPIRATORY (INHALATION) 4 TIMES DAILY
Status: ON HOLD | COMMUNITY
End: 2020-04-29

## 2020-04-27 RX ORDER — ALBUTEROL SULFATE 90 UG/1
2 AEROSOL, METERED RESPIRATORY (INHALATION) EVERY 4 HOURS PRN
Status: DISCONTINUED | OUTPATIENT
Start: 2020-04-27 | End: 2020-04-27 | Stop reason: ALTCHOICE

## 2020-04-27 RX ORDER — ALBUTEROL SULFATE 0.83 MG/ML
2.5 SOLUTION RESPIRATORY (INHALATION) EVERY 6 HOURS PRN
Status: DISCONTINUED | OUTPATIENT
Start: 2020-04-27 | End: 2020-04-29 | Stop reason: HOSPADM

## 2020-04-27 RX ORDER — TRIAMCINOLONE ACETONIDE 1 MG/G
OINTMENT TOPICAL 2 TIMES DAILY PRN
Status: DISCONTINUED | OUTPATIENT
Start: 2020-04-27 | End: 2020-04-29 | Stop reason: HOSPADM

## 2020-04-27 RX ORDER — TRIAMCINOLONE ACETONIDE 1 MG/G
OINTMENT TOPICAL 2 TIMES DAILY
Status: ON HOLD | COMMUNITY
End: 2020-04-29

## 2020-04-27 RX ORDER — ASPIRIN 81 MG/1
81 TABLET ORAL DAILY
Status: DISCONTINUED | OUTPATIENT
Start: 2020-04-27 | End: 2020-04-29 | Stop reason: HOSPADM

## 2020-04-27 RX ORDER — BUPRENORPHINE AND NALOXONE 2; .5 MG/1; MG/1
1 FILM, SOLUBLE BUCCAL; SUBLINGUAL 2 TIMES DAILY
Status: ON HOLD | COMMUNITY
End: 2020-04-27

## 2020-04-27 RX ORDER — BISACODYL 10 MG
10 SUPPOSITORY, RECTAL RECTAL DAILY PRN
Status: DISCONTINUED | OUTPATIENT
Start: 2020-04-27 | End: 2020-04-29 | Stop reason: HOSPADM

## 2020-04-27 RX ORDER — HYDROXYZINE HYDROCHLORIDE 25 MG/1
25-50 TABLET, FILM COATED ORAL 3 TIMES DAILY PRN
Status: DISCONTINUED | OUTPATIENT
Start: 2020-04-27 | End: 2020-04-29 | Stop reason: HOSPADM

## 2020-04-27 RX ORDER — OLANZAPINE 5 MG/1
5 TABLET, ORALLY DISINTEGRATING ORAL 2 TIMES DAILY
Status: DISCONTINUED | OUTPATIENT
Start: 2020-04-27 | End: 2020-04-28

## 2020-04-27 RX ORDER — SUCRALFATE 1 G/1
1 TABLET ORAL 2 TIMES DAILY
Status: DISCONTINUED | OUTPATIENT
Start: 2020-04-27 | End: 2020-04-29 | Stop reason: HOSPADM

## 2020-04-27 RX ORDER — NICOTINE 21 MG/24HR
1 PATCH, TRANSDERMAL 24 HOURS TRANSDERMAL DAILY
Status: DISCONTINUED | OUTPATIENT
Start: 2020-04-27 | End: 2020-04-29 | Stop reason: HOSPADM

## 2020-04-27 RX ORDER — ATENOLOL 25 MG/1
100 TABLET ORAL DAILY
Status: DISCONTINUED | OUTPATIENT
Start: 2020-04-27 | End: 2020-04-29 | Stop reason: HOSPADM

## 2020-04-27 RX ORDER — TRAZODONE HYDROCHLORIDE 50 MG/1
50 TABLET, FILM COATED ORAL
Status: DISCONTINUED | OUTPATIENT
Start: 2020-04-27 | End: 2020-04-29 | Stop reason: HOSPADM

## 2020-04-27 RX ORDER — PANTOPRAZOLE SODIUM 40 MG/1
40 TABLET, DELAYED RELEASE ORAL
Status: DISCONTINUED | OUTPATIENT
Start: 2020-04-27 | End: 2020-04-29 | Stop reason: HOSPADM

## 2020-04-27 RX ORDER — SIMVASTATIN 20 MG
20 TABLET ORAL DAILY
Status: ON HOLD | COMMUNITY
Start: 2020-03-02 | End: 2020-04-29

## 2020-04-27 RX ORDER — OLANZAPINE 5 MG/1
5-10 TABLET, ORALLY DISINTEGRATING ORAL 2 TIMES DAILY PRN
Status: DISCONTINUED | OUTPATIENT
Start: 2020-04-27 | End: 2020-04-29 | Stop reason: HOSPADM

## 2020-04-27 RX ORDER — POLYETHYLENE GLYCOL 3350 17 G/17G
17 POWDER, FOR SOLUTION ORAL DAILY
Status: DISCONTINUED | OUTPATIENT
Start: 2020-04-27 | End: 2020-04-29 | Stop reason: HOSPADM

## 2020-04-27 RX ORDER — OLANZAPINE 10 MG/2ML
5-10 INJECTION, POWDER, FOR SOLUTION INTRAMUSCULAR 2 TIMES DAILY PRN
Status: DISCONTINUED | OUTPATIENT
Start: 2020-04-27 | End: 2020-04-29 | Stop reason: HOSPADM

## 2020-04-27 RX ORDER — ALUMINA, MAGNESIA, AND SIMETHICONE 2400; 2400; 240 MG/30ML; MG/30ML; MG/30ML
30 SUSPENSION ORAL EVERY 4 HOURS PRN
Status: DISCONTINUED | OUTPATIENT
Start: 2020-04-27 | End: 2020-04-29 | Stop reason: HOSPADM

## 2020-04-27 RX ORDER — BUPRENORPHINE AND NALOXONE 8; 2 MG/1; MG/1
1 FILM, SOLUBLE BUCCAL; SUBLINGUAL 2 TIMES DAILY
Status: ON HOLD | COMMUNITY
End: 2020-04-27

## 2020-04-27 RX ORDER — ACETAMINOPHEN 325 MG/1
650 TABLET ORAL EVERY 4 HOURS PRN
Status: DISCONTINUED | OUTPATIENT
Start: 2020-04-27 | End: 2020-04-29 | Stop reason: HOSPADM

## 2020-04-27 RX ADMIN — ASPIRIN 81 MG: 81 TABLET, COATED ORAL at 17:56

## 2020-04-27 RX ADMIN — PANTOPRAZOLE SODIUM 40 MG: 40 TABLET, DELAYED RELEASE ORAL at 17:56

## 2020-04-27 RX ADMIN — ATENOLOL 100 MG: 25 TABLET ORAL at 17:56

## 2020-04-27 RX ADMIN — NICOTINE 1 PATCH: 21 PATCH, EXTENDED RELEASE TRANSDERMAL at 08:53

## 2020-04-27 RX ADMIN — SUCRALFATE 1 G: 1 TABLET ORAL at 20:12

## 2020-04-27 RX ADMIN — OLANZAPINE 5 MG: 5 TABLET, ORALLY DISINTEGRATING ORAL at 16:11

## 2020-04-27 RX ADMIN — TRAZODONE HYDROCHLORIDE 50 MG: 50 TABLET ORAL at 01:18

## 2020-04-27 RX ADMIN — DIAZEPAM 5 MG: 5 TABLET ORAL at 20:12

## 2020-04-27 RX ADMIN — OLANZAPINE 5 MG: 5 TABLET, ORALLY DISINTEGRATING ORAL at 20:12

## 2020-04-27 RX ADMIN — SIMVASTATIN 20 MG: 20 TABLET, FILM COATED ORAL at 20:12

## 2020-04-27 RX ADMIN — DIAZEPAM 5 MG: 5 TABLET ORAL at 16:11

## 2020-04-27 ASSESSMENT — ACTIVITIES OF DAILY LIVING (ADL)
DRESS: SCRUBS (BEHAVIORAL HEALTH);INDEPENDENT
BATHING: 0-->INDEPENDENT
RETIRED_COMMUNICATION: 0-->UNDERSTANDS/COMMUNICATES WITHOUT DIFFICULTY
TRANSFERRING: 0-->INDEPENDENT
LAUNDRY: UNABLE TO COMPLETE
SWALLOWING: 0-->SWALLOWS FOODS/LIQUIDS WITHOUT DIFFICULTY
ORAL_HYGIENE: INDEPENDENT
COGNITION: 0 - NO COGNITION ISSUES REPORTED
ORAL_HYGIENE: INDEPENDENT
DRESS: 0-->INDEPENDENT
TOILETING: 0-->INDEPENDENT
HYGIENE/GROOMING: INDEPENDENT
LAUNDRY: UNABLE TO COMPLETE
HYGIENE/GROOMING: INDEPENDENT
FALL_HISTORY_WITHIN_LAST_SIX_MONTHS: NO
DRESS: SCRUBS (BEHAVIORAL HEALTH);INDEPENDENT
AMBULATION: 0-->INDEPENDENT
RETIRED_EATING: 0-->INDEPENDENT

## 2020-04-27 ASSESSMENT — MIFFLIN-ST. JEOR: SCORE: 1638.19

## 2020-04-27 NOTE — PLAN OF CARE
"  Problem: Adult Behavioral Health Plan of Care  Goal: Patient-Specific Goal (Individualization)  Description: Pt will sleep at least 6-8 hours per NOC shift.  Pt will eat at least 75% of meals.  Pt will participate in at least 50% of groups.   Pt will follow treatment team recommendations and be medication compliant.    4/27/2020 0909 by Lynn Boyd, RN  Note: Pt was cooperative with nursing assessment and medications.  He got up to eat breakfast in day room, then went back to lay down.  When asked how he was doing he replied \"I just wish I was dead.  I don't want to be alive anymore.  I've done this a dozen times.  I just don't want to wake up.\"  Pt is hopeless this morning.  He denies hallucinations, pain.  When asked about depression and anxiety he said \"I am so tired I just want to sleep.\"      Pt ate lunch in lounge.  He then went back to bed and stayed there for remainder of day shift.        Problem: Suicide Risk  Goal: Absence of Self-Harm  Description: Pt will report no suicidal ideation.  Pt will report manageable levels of anxiety and depression.  Pt will remain free of self harm.  4/27/2020 0909 by Lynn Boyd, RN  Note: Pt reports not wanting to live.  He is hopeless this morning.  He did not harm self this day shift.      Problem: Thought Process Alteration  Goal: Optimal Thought Clarity  Outcome: No Change     "

## 2020-04-27 NOTE — ED NOTES
Face to face report given with opportunity to observe patient.    Report given to ALFREDO Hahn RN   4/26/2020  9:59 PM
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
## 2020-04-27 NOTE — H&P
"Rodney Goodwin is a 60 year old male who is being evaluated via a billable video visit.      The patient has been notified of following:     \"This video visit will be conducted via a call between you and your physician/provider. We have found that certain health care needs can be provided without the need for an in-person physical exam.  This service lets us provide the care you need with a video conversation.  If a prescription is necessary we can send it directly to your pharmacy.  If lab work is needed we can place an order for that and you can then stop by our lab to have the test done at a later time.    If during the course of the call the physician/provider feels a video visit is not appropriate, you will not be charged for this service.\"     Patient has given verbal consent for Video visit? Yes      Video Start Time: 1045    Rodney Goodwin complains of    Chief Complaint   Patient presents with     Suicidal       I have reviewed and updated the patient's Past Medical History, Social History, Family History and Medication List.    ALLERGIES  Codeine and Penicillins    Additional provider notes:   [facility]    History and Physical  Medical Services       Date of Admission:  4/26/2020  Date of Service (when I saw the patient): 04/27/20    Assessment & Plan     Principal Problem:    Suicidal behavior    Active Medical Problems:    COPD  (H)- pt denies chest pain, sob, difficulty breathing. Ordered home dose Combivent and Albuterol.       Hypertension-hx of high blood pressure. Pt reports he takes atenolol for daily control. Bp is 176/60. Pt denies cp, sob.       Polysubstance abuse (H)- utox positive for methamphetamines and amphetamines.    GERD- ordered home does of Protonix. Pt denies reflux, heartburn, difficulty swallowing., or epigastric pain.      Penile lesion- according to ER note pt has a small round lesion on glans of penis with no drainage or tenderness. Treponema Abs is pending. "           Code Status: Full Code    Kira Posey, CNP    Primary Care Physician   Tristan Estevez    Chief Complaint   Suicidal     History is obtained from the patient and medical chart     History of Present Illness   (per ED) Rodney Goodwin is a 60 year old male who presents with suicidal ideation.  Patient reports since January this year he noticed white specs with black eyes coming from his skin on his arms, legs, abdominal wall, he also noticed them underneath his dentures.  He reports he can't live anymore with his problem and wanted to shot himself with the gun. One of his daughter called patient and called PD and Patient was brought to ED.    Past Medical History    I have reviewed this patient's medical history and updated it with pertinent information if needed.   Past Medical History:   Diagnosis Date     Acute exacerbation of chronic obstructive pulmonary disease (H) 10/28/2016     Anxiety      Backache 6/9/2008    Overview:  IMO Update 10/11     Bipolar 1 disorder (H) 11/11/2014     Bipolar affective disorder (H)      Cellulitis and abscess of forearm 4/4/2014     Chlordiazepoxide dependence (H) 8/9/2010    Overview:  IMO Update 10/11     Chronic constipation      COPD exacerbation (H) 9/14/2015     Costochondritis 2/26/2015     Degeneration of lumbar or lumbosacral intervertebral disc 7/25/2006    Overview:  IMO Update 10/11     Depressive disorder      Drug abuse (H)      Elevated blood sugar 10/29/2016     Heroin abuse (H) 9/15/2015     Heroin addiction (H) 8/9/2010    Overview:  See social notes IMO Update 10/11     Hypertension      IV drug user 8/9/2010    Overview:  IMO Update 10/11     Lumbosacral spondylosis without myelopathy 11/13/2008     Narcotic addiction (H) 10/29/2016     Opioid use disorder, severe, dependence (H) 2020    [facility] records; prior Methadone; [facility]; AA/NA     Osteoarthrosis, pelvic region and thigh 11/13/2008    Overview:  IMO Update 10/11     Suicidal behavior 4/27/2020      Venous insufficiency of both lower extremities 2/26/2015     Do you wish to do the replacement in the background? yes         Past Surgical History   I have reviewed this patient's surgical history and updated it with pertinent information if needed.  Past Surgical History:   Procedure Laterality Date     ARTHROSCOPY KNEE BILATERAL       COLONOSCOPY  01/29/2020    Multiple, repeat due 2015     COLONOSCOPY N/A 7/31/2015    Procedure: COLONOSCOPY;  Surgeon: Justin Andujar MD;  Location: [facility]     DECOMPRESSION CUBITAL TUNNEL Left 11/21/2017    Procedure: DECOMPRESSION CUBITAL TUNNEL;;  Surgeon: Jhonny Zhao MD;  Location: [facility]     DENTAL SURGERY       HERNIA REPAIR      Inguinal, left     RELEASE CARPAL TUNNEL Left 11/21/2017    Procedure: RELEASE CARPAL TUNNEL;  LEFT ELBOW CUBITAL and CARPAL TUNNEL RELEASE;  Surgeon: Jhonny Zhao MD;  Location: [facility]       Prior to Admission Medications   Prior to Admission Medications   Prescriptions Last Dose Informant Patient Reported? Taking?   ASPIRIN LOW DOSE 81 MG EC tablet More than a month at Unknown time  Yes No   Sig: Take 81 mg by mouth daily    Ipratropium-Albuterol (COMBIVENT RESPIMAT)  MCG/ACT inhaler Unknown at Unknown time Pharmacy Yes No   Sig: Inhale 1 puff into the lungs 4 times daily Space evenly during waking hours.   LINZESS 290 MCG capsule 4/26/2020 at Unknown time  Yes Yes   Sig: Take 290 mcg by mouth daily    VENTOLIN  (90 Base) MCG/ACT inhaler Past Week at Unknown time Pharmacy Yes Yes   Sig: Inhale 2 puffs into the lungs every 4 hours as needed for shortness of breath / dyspnea    atenolol (TENORMIN) 100 MG tablet 4/26/2020 at Unknown time  Yes Yes   Sig: Take 100 mg by mouth daily    naloxone (NARCAN) 4 MG/0.1ML nasal spray Unknown at Unknown time Pharmacy Yes No   Sig: [place] 4 mg into one nostril alternating nostrils once as needed for opioid reversal Put cone in nose and push 1/2 of contents into each nostril. Repeat every 3 minutes  if no response until assistance arrives   pantoprazole (PROTONIX) 40 MG EC tablet 4/26/2020 at Unknown time  Yes Yes   Sig: Take 40 mg by mouth daily   polyethylene glycol (MIRALAX/GLYCOLAX) powder Past Month at Unknown time  Yes Yes   Sig: Take 17 g by mouth daily    simvastatin (ZOCOR) 20 MG tablet Past Week at Unknown time  Yes Yes   Sig: Take 20 mg by mouth daily   sucralfate (CARAFATE) 1 GM tablet More than a month at Unknown time Pharmacy Yes No   Sig: Take 1 tablet by mouth 2 times daily    triamcinolone (KENALOG) 0.1 % external ointment Unknown at Unknown time Pharmacy Yes No   Sig: Apply topically 2 times daily Apply externally to the affected area twice daily      Facility-Administered Medications: None     Allergies   Allergies   Allergen Reactions     Codeine      Penicillins        Social History   I have reviewed this patient's social history and updated it with pertinent information if needed. Rodney Goodwin  reports that he has been smoking cigarettes. He has a 30.00 pack-year smoking history. He has never used smokeless tobacco. He reports previous drug use. Drugs: IV and Methamphetamines. He reports that he does not drink alcohol.    Family History   I have reviewed this patient's family history and updated it with pertinent information if needed.   Family History   Problem Relation Age of Onset     Diabetes Mother      Cerebrovascular Disease Father      Pancreatic Cancer Brother        Review of Systems   CONSTITUTIONAL:  negative  EYES:  negative  HEENT:  negative  RESPIRATORY:  negative  CARDIOVASCULAR:  negative  GASTROINTESTINAL:  negative  GENITOURINARY:  negative  negative except for penile lesions  INTEGUMENT/BREAST:  negative  HEMATOLOGIC/LYMPHATIC:  negative  ALLERGIC/IMMUNOLOGIC:  negative  ENDOCRINE:  negative  MUSCULOSKELETAL:  negative  NEUROLOGICAL:  negative    Physical Exam   Temp: 98.6  F (37  C) Temp src: Tympanic BP: (!) 184/90 Pulse: 79 Heart Rate: 97 Resp: 18 SpO2: 96 % O2  "Device: None (Room air)    Vital Signs with Ranges  Temp:  [98.1  F (36.7  C)-98.7  F (37.1  C)] 98.6  F (37  C)  Pulse:  [77-88] 79  Heart Rate:  [97] 97  Resp:  [14-18] 18  BP: (151-192)/(60-93) 184/90  SpO2:  [95 %-98 %] 96 %  182 lbs 8 oz    Constitutional: NAD, well-appearing, appears well-nourished, well-developed   HEENT: face symmetric, pupils round and equal, conjunctiva clear, no lid drooping, no crusting,  able to hear, nose and ears normal, no gross evidence of cervical lymphadenopathy   Respiratory: good effort, symmetric rise and fall of chest, speaks in full sentences, no audible wheeze, no cough   Cardiovascular: no edema, warm extremities per pt   GI: normal inspection, no tenderness per pt palpation   Lymph/Hematologic: no gross evidence of cervical lymphadenopathy  Genitourinary: deferred penile lesion per pt report, pt denies drainage, tenderness  Skin: small round penile lesion per ed, no drainage, no tenderness per pt report otherwise skin warm, dry per pt,  no obvious rashes, no cyanosis   Musculoskeletal: gait not assessed, FROM  Neurologic: awake, alert   Psychiatric: irritated, somewhat uncooperative (pt repeatedly stated \"I don't care\" when asked questions\"     Data   Data reviewed today:   Recent Labs   Lab 04/26/20 1959   WBC 7.3   HGB 13.9   MCV 94         POTASSIUM 3.7   CHLORIDE 112*   CO2 24   BUN 20   CR 0.84   ANIONGAP 5   RACHEL 9.3   *   ALBUMIN 3.4   PROTTOTAL 7.2   BILITOTAL 0.2   ALKPHOS 135   ALT 20   AST 14       No results found for this or any previous visit (from the past 24 hour(s)).      Video-Visit Details    Type of service:  Video Visit    Video End Time (time video stopped): 1100    Originating Location (pt. Location): Other [address] [place] [address]    Distant Location (provider location):  [facility]     Mode of Communication:  Video Conference via Kaila Posey CNP              "

## 2020-04-27 NOTE — H&P
"Psychiatric Eval/H&P  Patient Name: Rodney Goodwin   YOB: 1960  Age: 60 year old  [id number]    Primary Physician: Tristan Estevez   Completed By: MARCO ANTONIO Renteria CNP     CC: \"I'm tired of living like this\"    (per ED) Rodney Goodwin is a 60 year old male who presents with suicidal ideation.  Patient reports since January this year he noticed white specs with black eyes coming from his skin on his arms, legs, abdominal wall, he also noticed them underneath his dentures.  He reports he can't live anymore with his problem and wanted to shot himself with the gun.  One of his daughter called patient and called PD and Patient was brought to ED. Patient reports he has history of chronic epigastric pain and he was diagnosed with H. pylori in the past.  Today he denies any chest pain shortness of breath, no fever chills, no cough.  He reports intermittently he spits up this white specks.  No concerns about his bowel movement or urination. He reports feeling depressed.  He also reports he has history of heroin use in the past.  Today he denies using illicit drugs or drinking alcohol.    HPI  Patient is lying in his bed, agrees to interview via telemedicine.  He tells me \"I'm tired of living like this and my sister had enough and called the police\".  When asked how he has been living, he clarifies that he deals with abdominal pain, his life is boring, and nothing helps.  Patient reports depressed mood, sleeps about 4 hours a night and is \"hyperactive\" all his life.  He tells me that when he used to take Rittilan he did not have any problems, and has not been prescribed that in a long time.  He also states Heroin helps, \"but that's illegal\"  Patient reports switching to suboxone recently from Methadone and it didn't work.  He reports he \"ran out\" of all his medications in March and decided not to take any of them any more because nothing helped - although when asked how he was doing 2 months ago, he " "stated \"I guess I was doing better\".  Patient has extensive substance abuse history listed below, last used meth 3 days ago.  Patient denies auditory hallucination today, states \"I haven't seen any bugs since I got here\".  Review of records show patient has multiple ED visits for both abdominal pain and seeing \"bugs\" on his skin, most recently in January and February.        Video-Visit Details    Type of service:  Video Visit    Video Start Time (time video started):0227  Video End Time (time video stopped): 0308    Originating Location (pt. Location): [facility]    Distant Location (provider location):  [facility]    Mode of Communication:  Video Conference via [employer]    Physician has received verbal consent for a Video Visit from the patient? Yes      MARCO ANTONIO Renteria CNP          Past Psychiatric History:   Patient denies history of suicide attempts.  He reports history of inpatient hospitalizations \"over 20 years ago\".  He reports not taking any medications for mental health since 2000, and he has had many trials of medications, states \"nothing helps\".  Past medications include, likely more: Seroquel, Depakote, remeron, Ambien, trazodone, Paxil, wellbutrin, benzodiazepines (which is listed as an allergy when he was prescribed suboxone).     Social History:   Patient currently lives alone in [place] in an apartment, states he usually gets along fairly well on his own, and belongs to a [employer].  He has ex-wife and 3 kids with no contact with them.  Patient receives disability for bipolar disorder.  He denies having any legal issues pending.  He states he can't work because his body hurts.        Chemical Use History:   Long history of drug abuse/addictions: Heroin, opioids, meth, benzos, stimulants, thc, mushrooms, fentynal, soma.  Patient reports not using any illicit drugs \"for years\" except this past Friday he did meth and likely others \"because I couldn't stand being " "like this\".  Records indicate CD treatment at least 12 times.  Patient was prescribed Methadone since 2014, and in January was getting sub[facility]one from [facility], he stopped taking it in March as he reports \"it didn't do anything\".          Family Psychiatric History:   Unknown       MSE/PSYCH  PSYCHIATRIC EXAM  /60   Pulse 88   Temp 98.4  F (36.9  C) (Tympanic)   Resp 16   Ht 1.753 m (5' 9\")   Wt 82.8 kg (182 lb 8 oz)   SpO2 95%   BMI 26.95 kg/m       -Appearance/Behavior:   Distressed and Poorly groomed  {attitude:anxious and guarded  -Motor: fidgety.  -Gait: Normal.    -Abnormal involuntary movements: None noted  -Mood: depressed and irritable.  -Affect: Subdued.  -Speech: Normal                  -Thought process/associations: Goal directed.  -Thought content: No evidence of delusion, tangential.  -Perceptual disturbances: reported seeing \"bugs\" prior to admission, denies since admission.              -Suicidal/Homicidal Ideation: Passive  -Judgment: Poor.  -Insight: Poor.  *Orientation: time, place and person.  *Memory: intact  *Attention: impaired  *Language: fluent, no aphasias, able to repeat phrases and name objects. Vocab intact.  *Fund of information: appropriate for education  *Cognitive functioning estimate: 0 - independent.          Medical Review of Systems:   Medical History and ROS  Prior to Admission medications    Medication Sig Start Date End Date Taking? Authorizing Provider   atenolol (TENORMIN) 100 MG tablet Take 100 mg by mouth daily  11/7/19  Yes Reported, Patient   LINZESS 290 MCG capsule Take 290 mcg by mouth daily  1/29/20  Yes Reported, Patient   pantoprazole (PROTONIX) 40 MG EC tablet Take 40 mg by mouth daily   Yes Reported, Patient   polyethylene glycol (MIRALAX/GLYCOLAX) powder Take 17 g by mouth daily  1/21/20  Yes Reported, Patient   simvastatin (ZOCOR) 20 MG tablet Take 20 mg by mouth daily 3/2/20  Yes Reported, Patient   VENTOLIN  (90 Base) MCG/ACT inhaler " Inhale 2 puffs into the lungs every 4 hours as needed for shortness of breath / dyspnea  1/15/20  Yes Reported, Patient   ASPIRIN LOW DOSE 81 MG EC tablet Take 81 mg by mouth daily  1/21/20   Reported, Patient   buprenorphine HCl-naloxone HCl (SUBOXONE) 2-0.5 MG per film Place 1 Film under the tongue 2 times daily     Reported, Patient   buprenorphine HCl-naloxone HCl (SUBOXONE) 8-2 MG per film Place 1 Film under the tongue 2 times daily     Reported, Patient   Ipratropium-Albuterol (COMBIVENT RESPIMAT)  MCG/ACT inhaler Inhale 1 puff into the lungs 4 times daily Space evenly during waking hours.    Reported, Patient   naloxone (NARCAN) 4 MG/0.1ML nasal spray Spray 4 mg into one nostril alternating nostrils once as needed for opioid reversal Put cone in nose and push 1/2 of contents into each nostril. Repeat every 3 minutes if no response until assistance arrives    Reported, Patient   sucralfate (CARAFATE) 1 GM tablet Take 1 tablet by mouth 2 times daily  1/29/20   Reported, Patient   triamcinolone (KENALOG) 0.1 % external ointment Apply topically 2 times daily Apply externally to the affected area twice daily    Reported, Patient     Allergies   Allergen Reactions     Benzodiazepines      Contraindicated - on Suboxone     Codeine      Penicillins      Past Medical History:   Diagnosis Date     Acute exacerbation of chronic obstructive pulmonary disease (H) 10/28/2016     Anxiety      Backache 6/9/2008    Overview:  IMO Update 10/11     Bipolar 1 disorder (H) 11/11/2014     Bipolar affective disorder (H)      Cellulitis and abscess of forearm 4/4/2014     Chlordiazepoxide dependence (H) 8/9/2010    Overview:  IMO Update 10/11     Chronic constipation      COPD exacerbation (H) 9/14/2015     Costochondritis 2/26/2015     Degeneration of lumbar or lumbosacral intervertebral disc 7/25/2006    Overview:  IMO Update 10/11     Depressive disorder      Drug abuse (H)      Elevated blood sugar 10/29/2016     Heroin  abuse (H) 9/15/2015     Heroin addiction (H) 8/9/2010    Overview:  See social notes IMO Update 10/11     Hypertension      IV drug user 8/9/2010    Overview:  IMO Update 10/11     Lumbosacral spondylosis without myelopathy 11/13/2008     Narcotic addiction (H) 10/29/2016     Opioid use disorder, severe, dependence (H) 2020    [facility] records; prior Methadone; [facility]; AA/NA     Osteoarthrosis, pelvic region and thigh 11/13/2008    Overview:  IMO Update 10/11     Suicidal behavior 4/27/2020     Venous insufficiency of both lower extremities 2/26/2015     Do you wish to do the replacement in the background? yes       Past Surgical History:   Procedure Laterality Date     ARTHROSCOPY KNEE BILATERAL       COLONOSCOPY  01/29/2020    Multiple, repeat due 2015     COLONOSCOPY N/A 7/31/2015    Procedure: COLONOSCOPY;  Surgeon: Justin Andujar MD;  Location: [facility]     DECOMPRESSION CUBITAL TUNNEL Left 11/21/2017    Procedure: DECOMPRESSION CUBITAL TUNNEL;;  Surgeon: Jhonny Zhao MD;  Location: [facility]     DENTAL SURGERY       HERNIA REPAIR      Inguinal, left     RELEASE CARPAL TUNNEL Left 11/21/2017    Procedure: RELEASE CARPAL TUNNEL;  LEFT ELBOW CUBITAL and CARPAL TUNNEL RELEASE;  Surgeon: Jhonny Zhao MD;  Location: [facility]         Physical Exam   BP: 178/93  Pulse: 85  Temp: 98.5  F (36.9  C)  Resp: 18  SpO2: 97 %        Physical Exam performed by Sahil Mendosa MD  04/26/20 2031  No changes or discrepancies noted    Vitals signs and nursing note reviewed.   Constitutional:       General: He is not in acute distress.     Appearance: Normal appearance. He is not ill-appearing, toxic-appearing or diaphoretic.   HENT:      Head: Normocephalic.      Right Ear: Tympanic membrane, ear canal and external ear normal.      Left Ear: Tympanic membrane, ear canal and external ear normal.      Nose: Nose normal.      Mouth/Throat:      Mouth: Mucous membranes are moist.      Pharynx: Oropharynx is clear. No oropharyngeal exudate.    Eyes:      Extraocular Movements: Extraocular movements intact.      Conjunctiva/sclera: Conjunctivae normal.      Pupils: Pupils are equal, round, and reactive to light.   Neck:      Musculoskeletal: Normal range of motion and neck supple.   Cardiovascular:      Rate and Rhythm: Normal rate and regular rhythm.      Pulses: Normal pulses.   Pulmonary:      Effort: Pulmonary effort is normal. No respiratory distress.      Breath sounds: Normal breath sounds.   Abdominal:      General: Abdomen is flat. Bowel sounds are normal.      Palpations: There is no mass.      Tenderness: There is no abdominal tenderness.   Genitourinary:     Comments: Small rounded lesion on glans of penis, no drainage, no tenderness.  Musculoskeletal: Normal range of motion.         General: No swelling or tenderness.   Skin:     General: Skin is warm and dry.      Capillary Refill: Capillary refill takes less than 2 seconds.      Comments: Dry scaly psoriatic skin lesions on both elbows and knees.    Dry rounded skin lesions on his lower extremities, abdominal wall, buttocks, lower back without drainage or skin edema or signs of localized cellulitis.   Neurological:      General: No focal deficit present.      Mental Status: He is alert and oriented to person, place, and time.      Cranial Nerves: No cranial nerve deficit.      Sensory: No sensory deficit.      Motor: Motor function is intact.      Coordination: Coordination is intact.      Gait: Gait is intact.   Psychiatric:         Mood and Affect: Mood normal.      Review of Systems:  Constitution: No weight loss, fever, night sweats  Skin: No rashes, pruritus or open wounds  Neuro: No headaches or seizure activity.  Psych:  See HPI  Eyes: No vision changes.  ENT: No problems chewing or swallowing.   Musculoskeletal: No muscle pain, joint pain or swelling   Respiratory: No cough or dyspnea  Cardiovascular:  No chest pain,  palpitations or fainting  Gastrointestinal:  No abdominal pain,  nausea, vomiting or change in bowel habits       Labs:   Results for orders placed or performed during the hospital encounter of 04/26/20   UA with Microscopic     Status: Abnormal   Result Value Ref Range    Color Urine Light Yellow     Appearance Urine Clear     Glucose Urine Negative NEG^Negative mg/dL    Bilirubin Urine Negative NEG^Negative    Ketones Urine Negative NEG^Negative mg/dL    Specific Gravity Urine 1.024 1.003 - 1.035    Blood Urine Trace (A) NEG^Negative    pH Urine 5.5 4.7 - 8.0 pH    Protein Albumin Urine Negative NEG^Negative mg/dL    Urobilinogen mg/dL Normal 0.0 - 2.0 mg/dL    Nitrite Urine Negative NEG^Negative    Leukocyte Esterase Urine Negative NEG^Negative    Source Midstream Urine     WBC Urine <1 0 - 5 /HPF    RBC Urine 1 0 - 2 /HPF    Bacteria Urine None (A) NEG^Negative /HPF    Mucous Urine Present (A) NEG^Negative /LPF    Hyaline Casts 1 (A) OTO2^O - 2 /LPF   Urine Drugs of Abuse Screen Panel 13     Status: Abnormal   Result Value Ref Range    Cannabinoids (85-gdw-6-carboxy-9-THC) Not Detected NDET^Not Detected ng/mL    Phencyclidine (Phencyclidine) Not Detected NDET^Not Detected ng/mL    Cocaine (Benzoylecgonine) Not Detected NDET^Not Detected ng/mL    Methamphetamine (d-Methamphetamine) Detected, Abnormal Result (A) NDET^Not Detected ng/mL    Opiates (Morphine) Not Detected NDET^Not Detected ng/mL    Amphetamine (d-Amphetamine) Detected, Abnormal Result (A) NDET^Not Detected ng/mL    Benzodiazepines (Nordiazepam) Not Detected NDET^Not Detected ng/mL    Tricyclic Antidepressants (Desipramine) Not Detected NDET^Not Detected ng/mL    Methadone (Methadone) Not Detected NDET^Not Detected ng/mL    Barbiturates (Butalbital) Not Detected NDET^Not Detected ng/mL    Oxycodone (Oxycodone) Not Detected NDET^Not Detected ng/mL    Propoxyphene (Norpropoxyphene) Not Detected NDET^Not Detected ng/mL    Buprenorphine (Buprenorphine) Detected, Abnormal Result (A) NDET^Not Detected ng/mL    Acetaminophen level     Status: Abnormal   Result Value Ref Range    Acetaminophen Level <2 (L) 10 - 20 mg/L   Alcohol ethyl     Status: None   Result Value Ref Range    Ethanol g/dL <0.01 0.01 g/dL   CBC with platelets differential     Status: Abnormal   Result Value Ref Range    WBC 7.3 4.0 - 11.0 10e9/L    RBC Count 4.32 (L) 4.4 - 5.9 10e12/L    Hemoglobin 13.9 13.3 - 17.7 g/dL    Hematocrit 40.6 40.0 - 53.0 %    MCV 94 78 - 100 fl    MCH 32.2 26.5 - 33.0 pg    MCHC 34.2 31.5 - 36.5 g/dL    RDW 12.7 10.0 - 15.0 %    Platelet Count 169 150 - 450 10e9/L    Diff Method Automated Method     % Neutrophils 53.4 %    % Lymphocytes 32.5 %    % Monocytes 9.6 %    % Eosinophils 3.7 %    % Basophils 0.5 %    % Immature Granulocytes 0.3 %    Nucleated RBCs 0 0 /100    Absolute Neutrophil 3.9 1.6 - 8.3 10e9/L    Absolute Lymphocytes 2.4 0.8 - 5.3 10e9/L    Absolute Monocytes 0.7 0.0 - 1.3 10e9/L    Absolute Eosinophils 0.3 0.0 - 0.7 10e9/L    Absolute Basophils 0.0 0.0 - 0.2 10e9/L    Abs Immature Granulocytes 0.0 0 - 0.4 10e9/L    Absolute Nucleated RBC 0.0    Comprehensive metabolic panel     Status: Abnormal   Result Value Ref Range    Sodium 141 133 - 144 mmol/L    Potassium 3.7 3.4 - 5.3 mmol/L    Chloride 112 (H) 94 - 109 mmol/L    Carbon Dioxide 24 20 - 32 mmol/L    Anion Gap 5 3 - 14 mmol/L    Glucose 127 (H) 70 - 99 mg/dL    Urea Nitrogen 20 7 - 30 mg/dL    Creatinine 0.84 0.66 - 1.25 mg/dL    GFR Estimate >90 >60 mL/min/[1.73_m2]    GFR Estimate If Black >90 >60 mL/min/[1.73_m2]    Calcium 9.3 8.5 - 10.1 mg/dL    Bilirubin Total 0.2 0.2 - 1.3 mg/dL    Albumin 3.4 3.4 - 5.0 g/dL    Protein Total 7.2 6.8 - 8.8 g/dL    Alkaline Phosphatase 135 40 - 150 U/L    ALT 20 0 - 70 U/L    AST 14 0 - 45 U/L   Salicylate level     Status: None   Result Value Ref Range    Salicylate Level <2 mg/dL   TSH     Status: Abnormal   Result Value Ref Range    TSH 0.26 (L) 0.40 - 4.00 mU/L        Assessment/Impression:   Patient brought  "to ED for suicidal statements, passive in nature secondary to his abdominal pain.  He reports seeing spots or \"bugs\" prior to admission, denies seeing them since admission.  He reports feelings of depression, previous diagnosis of bipolar disorder and self reports ADHD, stating being hyperactive all his life.  Patient has long history of chemical abuse with many different substances and prescribed Methadone since 2014, recently switched to suboxone for which he quit taking everything in March due to \"nothing working\" - although when asked how he was doing 2 months ago, he stated \"I guess I was doing better\".  He last used meth 3 days ago.  Review of records show patient has multiple ED visits for both abdominal pain and seeing \"bugs\" on his skin, most recently in January and February.   History of inpatient hospitalizations \"over 20 years ago\", although he denies history of suicide attempts.  He reports not taking any medications for mental health since 2000, and he has had many trials of medications. Records indicate CD treatment at least 12 times.      Patient agrees to try Zyprexa for mood/sleep for now along with a few doses of Valium which may help with anxiety and stomach pain.  Will consult with FNP regarding restarting any medications for his abdominal pain, or something different - see Kalpesh FNP H&P.     Educated regarding medication indications, risks, benefits, side effects, contraindications and possible interactions. Verbally expressed understanding.     DX:  Bipolar Disorder, most recent depressed  Anxiety  ADHD, by history  Substance Abuse Disorder, history of Heroin, opioids, meth, benzos, stimulants, thc, mushrooms, fentynal, soma.     Plan:  Admit to Unit: [address]  Attending: Nguyen Huntley    Monitor for target symptoms:   Provide a safe environment and therapeutic milieu.     Start: Zyprexa and Valium for today and morning for comfort  Continue to explore antidepressant or mood stabilizer once " abdominal discomfort is manageable    Anticipated length of stay: 3-5 days for mood and medical stabilization, safe discharge planning       Nguyen Huntley, APRN, CNP

## 2020-04-27 NOTE — ED TRIAGE NOTES
Suicidal thoughts for some time. Worse today and states sister believed him and she called police.

## 2020-04-27 NOTE — ED PROVIDER NOTES
History     Chief Complaint   Patient presents with     Suicidal     HPI  Rodney Goodwin is a 60 year old male who presents with suicidal ideation.  Patient reports since January this year he noticed white specs with black eyes coming from his skin on his arms, legs, abdominal wall, he also noticed them underneath his dentures.  He reports he can't live anymore with his problem and wanted to shot himself with the gun.  One of his daughter called patient and called PD and Patient was brought to ED.  Patient reports he has history of chronic epigastric pain and he was diagnosed with H. pylori in the past.  Today he denies any chest pain shortness of breath, no fever chills, no cough.  He reports intermittently he spits up this white specks.  No concerns about his bowel movement or urination.  He reports feeling depressed.  He also reports he has history of heroin use in the past.  Today he denies using illicit drugs or drinking alcohol.      Allergies   Allergen Reactions     Codeine      Penicillins        Problem List:    Patient Active Problem List    Diagnosis Date Noted     COPD (chronic obstructive pulmonary disease) (H) 04/28/2020     Priority: Medium     Suicidal behavior 04/27/2020     Priority: Medium     Gastroesophageal reflux disease without esophagitis 04/27/2020     Priority: Medium     Penile lesion 04/27/2020     Priority: Medium     Hypertension      Priority: Medium     Elevated blood sugar 10/29/2016     Priority: Medium     Narcotic addiction (H) 10/29/2016     Priority: Medium     Heroin abuse (H) 09/15/2015     Priority: Medium     Costochondritis 02/26/2015     Priority: Medium     Venous insufficiency of both lower extremities 02/26/2015     Priority: Medium     Do you wish to do the replacement in the background? yes         Tobacco dependence 01/06/2015     Priority: Medium     Cluster B personality disorder (H) 12/25/2014     Priority: Medium     Vs cluster B traits per OSH records  11/2014       Polysubstance abuse (H) 12/25/2014     Priority: Medium     Heroin, benzodiazepines, amphetamine, prescription opiates, THC abuse. Dealing drugs, IVDU as recently as 11/2014.       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     Priority: Medium     Cellulitis and abscess of forearm 04/04/2014     Priority: Medium     IV drug user 08/09/2010     Priority: Medium     Overview:   IMO Update 10/11       Chlordiazepoxide dependence (H) 08/09/2010     Priority: Medium     Overview:   IMO Update 10/11       Heroin addiction (H) 08/09/2010     Priority: Medium     Overview:   See social notes  IMO Update 10/11       Osteoarthrosis, pelvic region and thigh 11/13/2008     Priority: Medium     Overview:   IMO Update 10/11       Lumbosacral spondylosis without myelopathy 11/13/2008     Priority: Medium     Backache 06/09/2008     Priority: Medium     Overview:   IMO Update 10/11       Degeneration of lumbar or lumbosacral intervertebral disc 07/25/2006     Priority: Medium     Overview:   IMO Update 10/11          Past Medical History:    Past Medical History:   Diagnosis Date     Acute exacerbation of chronic obstructive pulmonary disease (H) 10/28/2016     Anxiety      Backache 6/9/2008     Bipolar 1 disorder (H) 11/11/2014     Bipolar affective disorder (H)      Cellulitis and abscess of forearm 4/4/2014     Chlordiazepoxide dependence (H) 8/9/2010     Chronic constipation      COPD exacerbation (H) 9/14/2015     Costochondritis 2/26/2015     Degeneration of lumbar or lumbosacral intervertebral disc 7/25/2006     Depressive disorder      Drug abuse (H)      Elevated blood sugar 10/29/2016     Heroin abuse (H) 9/15/2015     Heroin addiction (H) 8/9/2010     Hypertension      IV drug user 8/9/2010     Lumbosacral spondylosis without myelopathy 11/13/2008     Narcotic addiction (H) 10/29/2016     Opioid  use disorder, severe, dependence (H) 2020     Osteoarthrosis, pelvic region and thigh 11/13/2008     Penile lesion 4/27/2020     Suicidal behavior 4/27/2020     Venous insufficiency of both lower extremities 2/26/2015       Past Surgical History:    Past Surgical History:   Procedure Laterality Date     ARTHROSCOPY KNEE BILATERAL       COLONOSCOPY  01/29/2020    Multiple, repeat due 2015     COLONOSCOPY N/A 7/31/2015    Procedure: COLONOSCOPY;  Surgeon: Justin Andujar MD;  Location: [facility]     DECOMPRESSION CUBITAL TUNNEL Left 11/21/2017    Procedure: DECOMPRESSION CUBITAL TUNNEL;;  Surgeon: Jhonny Zhao MD;  Location: [facility]     DENTAL SURGERY       HERNIA REPAIR      Inguinal, left     RELEASE CARPAL TUNNEL Left 11/21/2017    Procedure: RELEASE CARPAL TUNNEL;  LEFT ELBOW CUBITAL and CARPAL TUNNEL RELEASE;  Surgeon: Jhonny Zhao MD;  Location: [facility]       Family History:    Family History   Problem Relation Age of Onset     Diabetes Mother      Cerebrovascular Disease Father      Pancreatic Cancer Brother        Social History:  Marital Status:   [4]  Social History     Tobacco Use     Smoking status: Current Some Day Smoker     Packs/day: 1.00     Years: 30.00     Pack years: 30.00     Types: Cigarettes     Smokeless tobacco: Never Used     Tobacco comment: Tried to Quit (NO)   Substance Use Topics     Alcohol use: No     Alcohol/week: 0.0 standard drinks     Drug use: Not Currently     Types: IV, Methamphetamines        Medications:    atenolol (TENORMIN) 100 MG tablet  Ipratropium-Albuterol (COMBIVENT RESPIMAT)  MCG/ACT inhaler  LINZESS 290 MCG capsule  lithium ER (LITHOBID) 300 MG CR tablet  OLANZapine (ZYPREXA) 5 MG tablet  pantoprazole (PROTONIX) 40 MG EC tablet  polyethylene glycol (MIRALAX/GLYCOLAX) powder  simvastatin (ZOCOR) 20 MG tablet  triamcinolone (KENALOG) 0.1 % external ointment  VENTOLIN  (90 Base) MCG/ACT inhaler  ASPIRIN LOW DOSE 81 MG EC tablet  naloxone (NARCAN) 4  "MG/0.1ML nasal spray  sucralfate (CARAFATE) 1 GM tablet          Review of Systems  All systems reviewed and pertinent positives indicated in history of present illness otherwise negative.    Physical Exam   BP: 178/93  Pulse: 85  Heart Rate: 97  Temp: 98.5  F (36.9  C)  Resp: 18  Height: 175.3 cm (5' 9\")  Weight: 82.8 kg (182 lb 8 oz)  SpO2: 97 %      Physical Exam  Vitals signs and nursing note reviewed.   Constitutional:       General: He is not in acute distress.     Appearance: Normal appearance. He is not ill-appearing, toxic-appearing or diaphoretic.   HENT:      Head: Normocephalic.      Right Ear: Tympanic membrane, ear canal and external ear normal.      Left Ear: Tympanic membrane, ear canal and external ear normal.      Nose: Nose normal.      Mouth/Throat:      Mouth: Mucous membranes are moist.      Pharynx: Oropharynx is clear. No oropharyngeal exudate.   Eyes:      Extraocular Movements: Extraocular movements intact.      Conjunctiva/sclera: Conjunctivae normal.      Pupils: Pupils are equal, round, and reactive to light.   Neck:      Musculoskeletal: Normal range of motion and neck supple.   Cardiovascular:      Rate and Rhythm: Normal rate and regular rhythm.      Pulses: Normal pulses.   Pulmonary:      Effort: Pulmonary effort is normal. No respiratory distress.      Breath sounds: Normal breath sounds.   Abdominal:      General: Abdomen is flat. Bowel sounds are normal.      Palpations: There is no mass.      Tenderness: There is no abdominal tenderness.   Genitourinary:     Comments: Small rounded lesion on glans of penis, no drainage, no tenderness.  Musculoskeletal: Normal range of motion.         General: No swelling or tenderness.   Skin:     General: Skin is warm and dry.      Capillary Refill: Capillary refill takes less than 2 seconds.      Comments: Dry scaly psoriatic skin lesions on both elbows and knees.    Dry rounded skin lesions on his lower extremities, abdominal wall, buttocks, " "lower back without drainage or skin edema or signs of localized cellulitis.   Neurological:      General: No focal deficit present.      Mental Status: He is alert and oriented to person, place, and time.      Cranial Nerves: No cranial nerve deficit.      Sensory: No sensory deficit.      Motor: Motor function is intact.      Coordination: Coordination is intact.      Gait: Gait is intact.   Psychiatric:         Mood and Affect: Mood normal.         ED Course   59 yo male who presents with suicidal ideation. Patient is very concern about his skin since he noticed white spec coming out of his skin and he \"can't live with his problem anymore\". He has hx of psoriasis.    Diagnostic test were ordered and Patient care was transferred to  at shift change at 8.10pm.  ED Course as of Jun 09 0325   Sun Apr 26, 2020 2100 Case discussed with Tamar Leigh. Plan: Admit        Procedures                   No results found for this or any previous visit (from the past 24 hour(s)).    Medications   diazepam (VALIUM) tablet 5 mg (5 mg Oral Given 4/28/20 2053)       Assessments & Plan (with Medical Decision Making)     I have reviewed the nursing notes.    I have reviewed the findings, diagnosis, plan and need for follow up with the patient.      Discharge Medication List as of 4/29/2020  1:27 PM      START taking these medications    Details   lithium ER (LITHOBID) 300 MG CR tablet Take 1 tablet (300 mg) by mouth At Bedtime, Disp-30 tablet,R-0, E-Prescribe      OLANZapine (ZYPREXA) 5 MG tablet Take 1 tablet (5 mg) by mouth 2 times daily as needed (anxiety/sleep/hallucination), Disp-60 tablet,R-0, E-Prescribe             Final diagnoses:   Bipolar affective disorder, remission status unspecified (H)   Hallucinations       4/26/2020   [facility]     Sahil Mendosa MD  04/26/20 2031       Sahil Mendosa MD  06/09/20 0328    "

## 2020-04-27 NOTE — PLAN OF CARE
"  Problem: Adult Behavioral Health Plan of Care  Goal: Patient-Specific Goal (Individualization)  Description: Pt will sleep at least 6-8 hours per NOC shift.  Pt will eat at least 75% of meals.  Pt will participate in at least 50% of groups.   Pt will follow treatment team recommendations and be medication compliant.    Outcome: No Change     Problem: Suicide Risk  Goal: Absence of Self-Harm  Description: Pt will report no suicidal ideation.  Pt will report manageable levels of anxiety and depression.  Pt will remain free of self harm.  Outcome: No Change   ADMISSION NOTE    Reason for admission Suicide Ideation and Hallucinations  Safety concerns Skin lesions, high BP, polysubstance abuse, and epigastric pain.  Risk for or history of violence: None noted or reported.  Full skin assessment: Completed. Pt kissing tip of left  middle digit. Multiple dark color, small round, not open lesions on lower extremities, buttocks, abdomen, and lower back. Dry skin patches on bilateral elbows and knees. Pt also has small round lesion on tip of penis. No drainage noted in any of the areas listed.    Patient arrived on unit from [facility] accompanied by 2 security officers on 4/27/2020  00:52 AM.   Status on arrival: on paper scrubs, ambulatory, compliant and tearful.  BP (!) 192/84   Pulse 77   Temp 98.7  F (37.1  C) (Tympanic)   Resp 18   Ht 1.753 m (5' 9\")   Wt 82.8 kg (182 lb 8 oz)   SpO2 96%   BMI 26.95 kg/m    Patient given partial tour of unit and Welcome to  unit papers given to patient, wanding completed, belongings inventoried, and admission assessment initiated.   Patient's legal status on arrival is  Hold. Patient Bill of Rights discussed with and copy given to patient.   Patient denies HI and thoughts of self harm. Pt contracts for safety while on unit.      Pt arrived to unit, allowed skin assessment and vitals. Pt answered minimal admisision questions while holding right side of " "abdomen, crying that he has been having stomach pains and bugs crawling all over skin, biting him, causing pain to the point of wanting to kill self. Pt contracted for safety and politely declined to answer more questions or sign any paper work at this time. Pt states crying \"I have been in the ER for hours, this bugs don't let me alone, nobody believes me, they come out and back in, I just want to sleep.\" Pt asked for sleep aid. Trazodone 50 mg given at 0118.   0145- Snack provided as requested.   0215- [facility] pharmacy called for medication list.  0220- Medication list obtained from [employer] via fax. Placed in chart.  0245- Pt appears to be sleeping, with eyes closed and having regular respirations.       "

## 2020-04-27 NOTE — PROGRESS NOTES
04/27/20 0119   Patient Belongings   Did you bring any home meds/supplements to the hospital?  No   Patient Belongings remains with patient  (glasses on face cap jacket shoes tshirt hankie boxers socks cig and lighter)   Patient Belongings Remaining with Patient clothing   [facility] keys[facility], wallet kwik rewards visa card e.b.t.card s.s.card zero cash  All other belongings put in assigned cubby in belongings room.       I have reviewed my belongings list on admission and verify that it is correct.     Patient signature_______________________________    Second staff witness (if patient unable to sign) ______________________________       I have received all my belongings at discharge.    Patient signature________________________________    Luz Casanova, 4/27/2020  1:22 AM

## 2020-04-27 NOTE — PLAN OF CARE
"  Problem: Adult Behavioral Health Plan of Care  Goal: Patient-Specific Goal (Individualization)  Description: Pt will sleep at least 6-8 hours per NOC shift.  Pt will eat at least 75% of meals.  Pt will participate in at least 50% of groups.   Pt will follow treatment team recommendations and be medication compliant.    4/27/2020 1558 by Arabella Rivera, RN  Outcome: No Change  Note: 1545: Received end of shift report from ALFREDO Bailey. Pt lying in bed resting upon arrival.     1836: First dose Valium et olanzapine given prior to dinner, refusal of dinner states, \" I already had enough to eat today, I'm just going to rest, I have that H.Pylori et makes my stomach hurt.\" Pt compliant with medication administrations, states adequate anxiety relief with scheduled valium et olanzapine, et somewhat effective pain relief. C/o \"7\" /10 RLQ secondary to H.Pylori.     Pt denies active SI/HI, hallucinations, moderate anxiety et chronic worsening depression. Is in agreement to notify staff if worsening feelings of depression or thoughts of harming staff. Pt is isolative et withdrawn.     2245: Pt isolates self, naps, rests in bed, majority of shift, did eat 100% of HS snack, compliant with HS medication administrations, voices thankfulness towards staff et feeling safe. States, \"I'm feeling much better with those two pills.\"     Face to face end of shift report to be communicated to on-coming staff.     Arabella Rivera RN  4/27/2020  10:46 PM               Problem: Adult Behavioral Health Plan of Care  Goal: Adheres to Safety Considerations for Self and Others  Outcome: No Change  Note: Appropriate behavior with staff et peers.      Problem: Thought Process Alteration  Goal: Optimal Thought Clarity  4/27/2020 1558 by Arabella Rivera, RN  Outcome: No Change     Problem: Suicide Risk  Goal: Absence of Self-Harm  Description: Pt will report no suicidal ideation.  Pt will report manageable levels of anxiety and " depression.  Pt will remain free of self harm.  4/27/2020 1558 by Arabella Rivera, RN  Outcome: Improving  Note: Pt remains free from injury et self harm.

## 2020-04-27 NOTE — ED PROVIDER NOTES
"Patient admitted to behavioral health after diagnostic evaluation center \"DEC\" evaluation. Patient complaining of abdominal pain which he states he has had before and was evaluated two weeks ago.  Abdomen exam is unremarkable. Patient states he is hungry and wants to eat at this time.  Meal given.  Awaiting admission to behavioral health.     Luz Ibanez MD  04/26/20 4145    "

## 2020-04-27 NOTE — ED NOTES
"Patient arrives with [place] [employer] for suicidal ideation. Sister called PD. Sister stating the patient is constantly calling her and threatening to kill himself. States \"I just can't do this anymore\" and that he would shoot himself with a gun. Patient is tearful. Cooperative at this time. Security at bedside for 1:1.  "

## 2020-04-27 NOTE — PLAN OF CARE
Prior to Admission Medication Reconciliation:     Medications added:   [] None  [x] As listed below:    Mag citrate- has refills left    Medications deleted:   [] None  [x] As listed below:    Methadone 60 mg from 2015    apap 650 q6h prn from 2015    pepto bismal 2 tsp TID x 10 days dispensed 1/31/20 0 refills- therapy should be complete     Doxycycline 100 mg 10 ds dispensed 2/28/20- therapy should be completed    Hydroxyzine 100 mg q4h prn anxiety- from 2015    Lithium carbonate 300 mg TID from 2015    Methylprednisolone 500 mg 10 ds dispensed 1/31/20- therapy should be completed or other    Tetracycline 500 mg 1/31/20 10 ds- therapy should be completed or other    Mag citrate sol'n- did have refills but not on [facility] medlist    Changes made to existing medications:   [x] None  [] As listed below:        Last times/dates taken verified with patient:  [] Yes- completed myself  [x] Nurse completed no changes made  [] Unable to review with patient:  [] Nurse completed/changes made:         Allergy modifications:    []Did not review  []Patient verified NKA  []Patient verified current existing allergies: no changes made  []New allergies: listed below        Medication reconciliation sources:   []Patient  []Patient family member/emergency contact: **  [x][facility] Report Review:  Home Medications     - Last Reconciled 04/14/20 by Sandrine Quick CMA    albuterol sulfate 90 mcg/actuation aerosol inhaler2 puffs inhalation Q4H PRN   aspirin 81 mg tablet,delayed release 81 mg PO DAILY   atenolol 100 mg tablet 100 mg PO DAILY   bismuth subsalicylate 262 mg tablet (Pepto-Bismol) 2 tabs PO TID 10 days   buprenorphine 2 mg-naloxone 0.5 mg sublingual film  (Suboxone) 2 film Buccal BID   buprenorphine 8 mg-naloxone 2 mg sublingual film  (Suboxone) 1 film Buccal BID   ipratropium 0.5 mg-albuterol 3 mg (2.5 mg base)/3 mL nebulization soln  3 mL inhalation QID PRN   linaclotide 290 mcg capsule (Linzess) 290 mcg PO DAILY 90  days   metronidazole 500 mg tablet 500 mg PO TID 10 days   pantoprazole 40 mg tablet,delayed release 40 mg PO DAILY 90 days   polyethylene glycol 3350 17 gram/dose oral powder TAKE 17 GRAMS BY MOUTH DAILY   simvastatin 20 mg tablet TAKE 1 TABLET BY MOUTH DAILY   sorbitol 70 % solution 30 mL PO BID PRN   sucralfate 1 gram tablet 1 g PO BID 30 days     []Epic Chart Review  []Care Everywhere review  []Pharmacy med list: **  []Pharmacy phone call  [x]Outside meds dispense report: see below  []Nursing home MAR:  []Other: **    Pharmacy desired at discharge: [facility]    Is patient on coumadin?  [x]No    Requests for consultation by provider or pharmacist:   [] Patient understands why all of their meds were prescribed and how to take them. No questions.   [x] Fill dates coincide with compliancy for all maintenance meds.   [] Fill dates do not coincide with compliancy with maintenance meds. See notes in PTA medlist about how patient is taking.   [] Patient has questions about the following:    Comments: no concerns, [facility] records and rx records are pretty similar. Differences in how combivent is input vs given at pharmacy and there is a sorbitol sol'n that hasn't been filled that is on their medlist and mag citrate was filled but not his medlist. Let me know if there is anything that needs further evaluation, everything seems to coincide.       Cinthia Hanley on 4/27/2020 at 1:12 PM       Discrepancies: [x] No []Not Applicable []Yes: listed below    Time spent on medication reconciliation:   []5-20 mins  [x]20-40 mins  []> 40 mins    Issues completing PTA medication reconciliation:  [] On hold for a long time  [] Waited for a call back  [] Fax didn't come through  [] Fax took a long time  [] Other:    Notifying appropriate party of changes/additions/discrepancies:  []No changes made, notification not necessary.   [] Notified attending provider via text page  [] Notified attending provider in person  [] Notified  pharmacy  [] Notified nurse  [] Attending provider not available, left detailed notes  [x] Changes/additions made don't need provider notification because provider has not seen patient or input any orders  [] Changes/additions made don't need provider notification because changes made are to medications not ordered  Medications Prior to Admission   Medication Sig Dispense Refill Last Dose     atenolol (TENORMIN) 100 MG tablet Take 100 mg by mouth daily    4/26/2020 at Unknown time     LINZESS 290 MCG capsule Take 290 mcg by mouth daily    4/26/2020 at Unknown time     pantoprazole (PROTONIX) 40 MG EC tablet Take 40 mg by mouth daily   4/26/2020 at Unknown time     polyethylene glycol (MIRALAX/GLYCOLAX) powder Take 17 g by mouth daily    Past Month at Unknown time     VENTOLIN  (90 Base) MCG/ACT inhaler Inhale 1 puff into the lungs 4 times daily as needed for shortness of breath / dyspnea    Past Week at Unknown time     ASPIRIN LOW DOSE 81 MG EC tablet Take 81 mg by mouth daily    More than a month at Unknown time     buprenorphine HCl-naloxone HCl (SUBOXONE) 2-0.5 MG per film Place 1 Film under the tongue 2 times daily    Unknown at Unknown time     buprenorphine HCl-naloxone HCl (SUBOXONE) 8-2 MG per film Place 1 Film under the tongue 2 times daily    Unknown at Unknown time     Ipratropium-Albuterol (COMBIVENT RESPIMAT)  MCG/ACT inhaler Inhale 1 puff into the lungs 4 times daily Space evenly during waking hours.   Unknown at Unknown time     naloxone (NARCAN) 4 MG/0.1ML nasal spray Spray 4 mg into one nostril alternating nostrils once as needed for opioid reversal Put cone in nose and push 1/2 of contents into each nostril. Repeat every 3 minutes if no response until assistance arrives   Unknown at Unknown time     simvastatin (ZOCOR) 20 MG tablet Take 20 mg by mouth daily        sucralfate (CARAFATE) 1 GM tablet Take 1 tablet by mouth 2 times daily    More than a month at Unknown time      triamcinolone (KENALOG) 0.1 % external ointment Apply topically 2 times daily Apply externally to the affected area twice daily   Unknown at Unknown time         Medication Dispense History (from 4/28/2019 to 3/19/2020)   Expand All  Collapse All   Albuterol Sulfate      Dispensed  Days Supply  Quantity  Provider  Pharmacy    VENTOLIN HFA AER  01/15/2020  16  18 each   [facility] #...    VENTOLIN HFA INH W/DOS CTR 200PUFFS  10/02/2019  20  18 g  NESS GONZALEZ  [facility] #...          Aspirin      Dispensed  Days Supply  Quantity  Provider  Pharmacy    ASPIRIN 81MG EC LOW DOSE TABLETS  01/21/2020  90  90  SHARAN ROSAS  [facility] #...          Atenolol      Dispensed  Days Supply  Quantity  Provider  Pharmacy    ATENOLOL     TAB 100MG  02/11/2020  90  90 each   [facility] #...    ATENOLOL     TAB 100MG  11/07/2019  90  90 each   [facility] #...    ATENOLOL     TAB 100MG  08/05/2019  90  90 each   [facility] #...    ATENOLOL     TAB 100MG  05/05/2019  90  90 each   [facility] #...          Bisacodyl      Dispensed  Days Supply  Quantity  Provider  Pharmacy    BISACODYL 5MG TABLETS  10/24/2019  30  120  SHARAN ROSAS  [facility] #...    BISACODYL 5MG EC TABLETS  09/20/2019  7  28  SHARAN ROSAS  [facility] #...    GENTLE LAXATIVE TABLETS  09/11/2019  7  28  SHARAN ROSAS  [facility] #...          Bismuth Subsalicylate      Dispensed  Days Supply  Quantity  Provider  Pharmacy    PEPTO BISMOL 262MG TABLETS  01/31/2020  12  72  GAL,MAX  [facility] #...          Buprenorphine HCl-Naloxone HCl      Dispensed  Days Supply  Quantity  Provider  Pharmacy    SUBOXONE     MIS 2-0.5MG  03/06/2020  14  28 each   [facility] #...    SUBOXONE     MIS 8-2MG  03/06/2020  14  28 each   [facility] #...    SUBOXONE 2MG/0.5MG SUBL/BUCC FILM  03/06/2020  5  10  JOSE CAGLE  [facility] #...     SUBOXONE     MIS 2-0.5MG  01/30/2020  14  28 each   [facility] #...    SUBOXONE     MIS 8-2MG  01/30/2020  14  28 each   [facility] #...    SUBOXONE     MIS 2-0.5MG  01/24/2020  7  14 each   [facility] #...    SUBOXONE     MIS 8-2MG  01/23/2020  7  14 each   [facility] #...    SUBOXONE 2MG/0.5MG SUBL/BUCC FILM  01/16/2020  7  14  [facility]    SUBOXONE 8MG/2MG SUBL/BUCC FILM  01/16/2020  7  14  [facility]          Doxycycline Monohydrate      Dispensed  Days Supply  Quantity  Provider  Pharmacy    DOXYCYC MONO CAP 100MG  02/28/2020  10  20 each   [facility] #...          Ipratropium-Albuterol      Dispensed  Days Supply  Quantity  Provider  Pharmacy    COMBIVENT RESPIMAT ORAL 120SPRAY 4G  01/21/2020  30  4 g  [facility]    COMBIVENT RESPIMAT ORAL 120SPRAY 4G  10/02/2019  30  4 g  [facility]          Naloxone HCl      Dispensed  Days Supply  Quantity  Provider  Pharmacy    NARCAN       SPR  01/24/2020  1  2 each   [facility] #...          Omeprazole      Dispensed  Days Supply  Quantity  Provider  Pharmacy    OMEPRAZOLE   CAP 20MG  01/11/2020  30  60 each   [facility] #...          PEG 3350-KCl-Na Bicarb-NaCl      Dispensed  Days Supply  Quantity  Provider  Pharmacy    PEG-3350/KCL /SOD LEMON-LIME 4000ML  01/28/2020  2  4,000 mL  GAL,MAX  [facility]          Pantoprazole Sodium      Dispensed  Days Supply  Quantity  Provider  Pharmacy    PANTOPRAZOLE TAB 40MG  02/05/2020  90  90 each   [facility] #...          Polyethylene Glycol 3350      Dispensed  Days Supply  Quantity  Provider  Pharmacy    POLYETH GLYCOL 3350 NF POWDER 510GM  03/04/2020  30  510 g  [facility]  [facility]    POLYETH GLYCOL 3350 NF POWDER 510GM  01/30/2020  30  510 g  STERLING GREGORY  [facility]  #...    POLYETH GLYCOL 3350 NF POWDER 510GM  01/21/2020  30  510 g  STERLING GREGORY  [facility] #...    POLYETH GLYCOL 3350 NF POWDER 510GM  10/24/2019  30  510 g  SHARAN ROSAS  [facility] #...          Promethazine HCl      Dispensed  Days Supply  Quantity  Provider  Pharmacy    PROMETHAZINE TAB 25MG  01/11/2020  3  14 each   [facility] #...          Simvastatin      Dispensed  Days Supply  Quantity  Provider  Pharmacy    SIMVASTATIN  TAB 20MG  03/02/2020  90  90 each   [facility] #...    SIMVASTATIN  TAB 20MG  03/01/2020  30  30 each   [facility] #...    SIMVASTATIN  TAB 20MG  11/07/2019  90  90 each   [facility] #...    SIMVASTATIN  TAB 20MG  08/05/2019  90  90 each   [facility] #...    SIMVASTATIN  TAB 20MG  05/05/2019  90  90 each   [facility] #...          Sucralfate      Dispensed  Days Supply  Quantity  Provider  Pharmacy    SUCRALFATE   TAB 1GM  03/01/2020  30  60 each   [facility] #...    SUCRALFATE   TAB 1GM  01/29/2020  30  60 each   [facility] #...          Tamsulosin HCl      Dispensed  Days Supply  Quantity  Provider  Pharmacy    TAMSULOSIN   CAP 0.4MG  09/11/2019  30  30 each   [facility] #...          Tetracycline HCl      Dispensed  Days Supply  Quantity  Provider  Pharmacy    TETRACYCLINE CAP 500MG  01/31/2020  10  30 each   [facility] #...          Triamcinolone Acetonide      Dispensed  Days Supply  Quantity  Provider  Pharmacy    TRIAMCINOLONE 0.1% OINTMENT 80GM  02/22/2020  25  80 g  [facility] ROS[facility]    TRIAMCINOLONE 0.1% OINTMENT 80GM  01/31/2020  25  80 g  [facility] ROS[facility]    TRIAMCINOLONE 0.1% OINTMENT 80GM  10/24/2019  25  80 g  [facility] ROSAS  [facility]          Other      Dispensed  Days Supply  Quantity  Provider  Pharmacy    MAGNESIUM CITRATE SOLN LEMON 296ML  09/20/2019  1  296 mL  [facility]    MAGNESIUM CITRATE JORGE ALBERTO LOZA 296ML  09/20/2019  1  296 mL  [facility]  [facility]          [facility]      Dispensed  Days Supply  Quantity  Provider  Pharmacy    LINZESS      CAP 290MCG  01/29/2020  90  90 each   [facility] #...          metroNIDAZOLE      Dispensed  Days Supply  Quantity  Provider  Pharmacy    METRONIDAZOL TAB 500MG  01/31/2020  10  30 each   [facility] #...          predniSONE      Dispensed  Days Supply  Quantity  Provider  Pharmacy    PREDNISONE   TAB 20MG  02/28/2020  2  6 each   [facility] #...    PREDNISONE   TAB 10MG  01/15/2020  6  18 each   [facility] #...

## 2020-04-28 PROBLEM — J44.9 COPD (CHRONIC OBSTRUCTIVE PULMONARY DISEASE) (H): Status: ACTIVE | Noted: 2020-04-28

## 2020-04-28 LAB — T PALLIDUM AB SER QL: NONREACTIVE

## 2020-04-28 PROCEDURE — 12400000 ZZH R&B MH

## 2020-04-28 PROCEDURE — 99232 SBSQ HOSP IP/OBS MODERATE 35: CPT | Mod: 95 | Performed by: NURSE PRACTITIONER

## 2020-04-28 PROCEDURE — 25000132 ZZH RX MED GY IP 250 OP 250 PS 637: Mod: GY | Performed by: NURSE PRACTITIONER

## 2020-04-28 RX ORDER — DIAZEPAM 2 MG
2 TABLET ORAL 2 TIMES DAILY
Status: DISCONTINUED | OUTPATIENT
Start: 2020-04-29 | End: 2020-04-29 | Stop reason: HOSPADM

## 2020-04-28 RX ORDER — LITHIUM CARBONATE 300 MG/1
300 TABLET, FILM COATED, EXTENDED RELEASE ORAL AT BEDTIME
Status: DISCONTINUED | OUTPATIENT
Start: 2020-04-28 | End: 2020-04-29 | Stop reason: HOSPADM

## 2020-04-28 RX ORDER — OLANZAPINE 5 MG/1
5 TABLET, ORALLY DISINTEGRATING ORAL 3 TIMES DAILY
Status: DISCONTINUED | OUTPATIENT
Start: 2020-04-28 | End: 2020-04-29 | Stop reason: HOSPADM

## 2020-04-28 RX ADMIN — SIMVASTATIN 20 MG: 20 TABLET, FILM COATED ORAL at 20:53

## 2020-04-28 RX ADMIN — OLANZAPINE 5 MG: 5 TABLET, ORALLY DISINTEGRATING ORAL at 15:22

## 2020-04-28 RX ADMIN — LITHIUM CARBONATE 300 MG: 300 TABLET, EXTENDED RELEASE ORAL at 20:54

## 2020-04-28 RX ADMIN — HYDROXYZINE HYDROCHLORIDE 50 MG: 25 TABLET ORAL at 17:56

## 2020-04-28 RX ADMIN — ASPIRIN 81 MG: 81 TABLET, COATED ORAL at 09:03

## 2020-04-28 RX ADMIN — ATENOLOL 100 MG: 25 TABLET ORAL at 09:03

## 2020-04-28 RX ADMIN — PANTOPRAZOLE SODIUM 40 MG: 40 TABLET, DELAYED RELEASE ORAL at 06:34

## 2020-04-28 RX ADMIN — DIAZEPAM 5 MG: 5 TABLET ORAL at 09:03

## 2020-04-28 RX ADMIN — DIAZEPAM 5 MG: 5 TABLET ORAL at 20:53

## 2020-04-28 RX ADMIN — UMECLIDINIUM BROMIDE AND VILANTEROL TRIFENATATE 1 PUFF: 62.5; 25 POWDER RESPIRATORY (INHALATION) at 09:04

## 2020-04-28 RX ADMIN — OLANZAPINE 5 MG: 5 TABLET, ORALLY DISINTEGRATING ORAL at 20:53

## 2020-04-28 RX ADMIN — OLANZAPINE 5 MG: 5 TABLET, ORALLY DISINTEGRATING ORAL at 09:03

## 2020-04-28 RX ADMIN — NICOTINE 1 PATCH: 21 PATCH, EXTENDED RELEASE TRANSDERMAL at 09:13

## 2020-04-28 RX ADMIN — SUCRALFATE 1 G: 1 TABLET ORAL at 20:54

## 2020-04-28 RX ADMIN — SUCRALFATE 1 G: 1 TABLET ORAL at 07:58

## 2020-04-28 ASSESSMENT — ACTIVITIES OF DAILY LIVING (ADL)
HYGIENE/GROOMING: INDEPENDENT
DRESS: SCRUBS (BEHAVIORAL HEALTH);INDEPENDENT
ORAL_HYGIENE: INDEPENDENT
LAUNDRY: UNABLE TO COMPLETE
ORAL_HYGIENE: INDEPENDENT
HYGIENE/GROOMING: INDEPENDENT
DRESS: SCRUBS (BEHAVIORAL HEALTH)
LAUNDRY: UNABLE TO COMPLETE

## 2020-04-28 NOTE — PLAN OF CARE
Problem: Adult Behavioral Health Plan of Care  Goal: Patient-Specific Goal (Individualization)  Description: Pt will sleep at least 6-8 hours per NOC shift.  Pt will eat at least 75% of meals.  Pt will participate in at least 50% of groups.   Pt will follow treatment team recommendations and be medication compliant.    4/28/2020 1510 by Katie To RN  Outcome: No Change  Note: Patient is withdrawn/isolative to his room this shift.  His mood/affect are labile.  He spoke with provider via telemed this morning.  Patient was irritable with provider while discussing Valium being discontinued tomorrow.  He is somewhat dismissive during interactions.  Cooperative with medications.  Spoke with pharmacist, Linzess is to be ordered.  He does not attend groups.  Patient showered this morning.     Problem: Suicide Risk  Goal: Absence of Self-Harm  Description: Pt will report no suicidal ideation.  Pt will report manageable levels of anxiety and depression.  Pt will remain free of self harm.  4/28/2020 1510 by Katie To RN  Outcome: Improving  Note: Patient denies SI today and had no noted self harm this shift.      Problem: Thought Process Alteration  Goal: Optimal Thought Clarity  4/28/2020 1510 by Katie To RN  Outcome: No Change    Face to face end of shift report communicated to evening shift RN.     Katie MIRANDA RN  4/28/2020

## 2020-04-28 NOTE — PLAN OF CARE
Face to face end of shift report will be communicated to oncoming RN.  Problem: Adult Behavioral Health Plan of Care  Goal: Patient-Specific Goal (Individualization)  Description: Pt will sleep at least 6-8 hours per NOC shift.  Pt will eat at least 75% of meals.  Pt will participate in at least 50% of groups.   Pt will follow treatment team recommendations and be medication compliant.    4/28/2020 0114 by Padma Regalado RN  Outcome: No Change     Problem: Suicide Risk  Goal: Absence of Self-Harm  Description: Pt will report no suicidal ideation.  Pt will report manageable levels of anxiety and depression.  Pt will remain free of self harm.  4/28/2020 0114 by Padma Regalado RN  Outcome: No Change     Problem: Thought Process Alteration  Goal: Optimal Thought Clarity  4/28/2020 0114 by Padma Regalado RN  Outcome: No Change   Face to face end of shift report obtained from ALFREDO Bell. Pt observed resting in bed.  Pt appears to be sleeping in bed with eyes closed, having regular respirations, and position changes. 15 minutes and PRN safety checks completed with no noted complains. No self harm noted or reported so far this shift. Will continue to monitor.   0600- Pt slept 6.5 hours.

## 2020-04-28 NOTE — PLAN OF CARE
"Face to face shift report received from Katie. Rounding completed, pt observed.    Problem: Adult Behavioral Health Plan of Care  Goal: Patient-Specific Goal (Individualization)  Description: Pt will sleep at least 6-8 hours per NOC shift.  Pt will eat at least 75% of meals.  Pt will participate in at least 50% of groups.   Pt will follow treatment team recommendations and be medication compliant.    4/28/2020 1629 by Jamil Ojeda RN  Note: At start of shift patient was laying in bed resting. State he is very bored. Patient stayed in his room all shift and stated that he didn't want to be around anyone. Patient is very withdrawn and is just waiting for discharge. He did not attend any groups. He was compliant with all medications and treatments.       Problem: Thought Process Alteration  Goal: Optimal Thought Clarity  4/28/2020 1510 by Katie To RN  Outcome: No Change     Problem: Suicide Risk  Goal: Absence of Self-Harm  Description: Pt will report no suicidal ideation.  Pt will report manageable levels of anxiety and depression.  Pt will remain free of self harm.  4/28/2020 1629 by Jamil Ojeda RN  Note: Patient is feeling down and depressed, is in constant pain, states he \"just wants to be gone\". States his 72 hour hold will be up shortly. He stated that he ran out of his medications so he had to come here to get back on them. Denies any hallucinations. Very withdrawn all day.      1800 patient signed volentary rights, copy given to patient, original placed under legal documents.  2100 patient signs the release of information.  Face to face report will be communicated to oncoming RN.    Jamil Ojeda RN  4/28/2020  22:30 PM    "

## 2020-04-28 NOTE — PROGRESS NOTES
"[facility]  Psychiatric Progress Note      Impression:     Patient brought to ED for suicidal statements, passive in nature secondary to his abdominal pain.    Patient is interviewed in his room today via video.  He initially tells me he slept good and feels better, less pain, and denies having any thoughts of suicide, states \"not today\".  Records indicate slept about 6.5 hours. Patient denies having any cravings for substances and declines wanting Naltrexone.  We discuss medications again and review if he has tried lithium, he reports he has a long time ago and agrees to try it again, \"anything that will help\" - although when discussing cutting back and discontinuing valium now that he is back on track with medical medications along with Zyprexa, he becomes very irritated, states \"my past drug use is always used against me, when something helps you take it away\".  Attempt to remind patient the Valium was only for a couple days as we discussed yesterday, as well as addicting which is not appropriate for him, he becomes angry and ends the interview.  See plan below.    Educated regarding medication indications, risks, benefits, side effects, contraindications and possible interactions. Verbally expressed understanding.        Diagnoses:     Bipolar I Disorder, current episode depressed  Anxiety  ADHD, by history  Substance Abuse Disorder, history of Heroin, opioids, meth, benzos, stimulants, thc, mushrooms, fentynal, soma      Attestation:  Patient has been seen and evaluated by me,  MARCO ANTONIO Renteria CNP     Rodney Goodwin is a 60 year old male who is being evaluated via a billable video visit.      The patient has been notified of following:     \"This video visit will be conducted via a call between you and your physician/provider. We have found that certain health care needs can be provided without the need for an in-person physical exam.  This service lets us provide the care you need with " "a video conversation.  If a prescription is necessary we can send it directly to your pharmacy.  If lab work is needed we can place an order for that and you can then stop by our lab to have the test done at a later time.    If during the course of the call the physician/provider feels a video visit is not appropriate, you will not be charged for this service.\"     Patient has given verbal consent for Video visit? Yes    Video Start Time: 0838      Video-Visit Details    Type of service:  Video Visit    Video End Time (time video stopped): 0850    Originating Location (pt. Location): [facility]    Distant Location (provider location):  [facility]    Mode of Communication:  Video Conference via [employer] History:   The patient's care was discussed with the treatment team and chart notes were reviewed.    BEHAVIORAL TEAM DISCUSSION    Progress: moderate  Continued Stay Criteria/Rationale: continued passive suicidal statements; medication adjustments  Medical/Physical: Pain  Precautions:   Falls precaution?: YES  Behavioral Orders   Procedures     Code 1 - Restrict to Unit     Routine Programming     As clinically indicated     Status 15     Every 15 minutes.     Plan: adjust zyprexa to tid, consider increase, taper Valium in place as not appropriate for long term, start lithium  Medical home meds are re-started through FNP  Consider referral to Methadone Clinic or CD treatment   Rationale for change in precautions or plan: stabilize mood      Participants:  Nguyen Huntley NP,  SW, Nursing        Medications:     Current Facility-Administered Medications Ordered in Epic   Medication Dose Route Frequency Last Rate Last Dose     acetaminophen (TYLENOL) tablet 650 mg  650 mg Oral Q4H PRN         albuterol (PROVENTIL) neb solution 2.5 mg  2.5 mg Nebulization Q6H PRN         alum & mag hydroxide-simethicone (MAALOX  ES) suspension 30 mL  30 mL Oral Q4H PRN         aspirin " EC tablet 81 mg  81 mg Oral Daily   81 mg at 04/28/20 0903     atenolol (TENORMIN) tablet 100 mg  100 mg Oral Daily   100 mg at 04/28/20 0903     bisacodyl (DULCOLAX) Suppository 10 mg  10 mg Rectal Daily PRN         [START ON 4/29/2020] diazepam (VALIUM) tablet 2 mg  2 mg Oral BID         diazepam (VALIUM) tablet 5 mg  5 mg Oral BID   5 mg at 04/28/20 0903     hydrOXYzine (ATARAX) tablet 25-50 mg  25-50 mg Oral TID PRN         linaclotide (LINZESS) capsule 290 mcg  290 mcg Oral Daily   Stopped at 04/27/20 1759     lithium ER (LITHOBID) CR tablet 300 mg  300 mg Oral At Bedtime         magnesium hydroxide (MILK OF MAGNESIA) suspension 30 mL  30 mL Oral At Bedtime PRN         nicotine (NICODERM CQ) 21 MG/24HR 24 hr patch 1 patch  1 patch Transdermal Daily   1 patch at 04/28/20 0913     nicotine Patch in Place   Transdermal Q8H   Stopped at 04/28/20 0042     OLANZapine zydis (zyPREXA) ODT tab 5-10 mg  5-10 mg Oral BID PRN        Or     OLANZapine (zyPREXA) injection 5-10 mg  5-10 mg Intramuscular BID PRN         OLANZapine zydis (zyPREXA) ODT tab 5 mg  5 mg Oral TID         pantoprazole (PROTONIX) EC tablet 40 mg  40 mg Oral QAM AC   40 mg at 04/28/20 0634     polyethylene glycol (MIRALAX) Packet 17 g  17 g Oral Daily         simvastatin (ZOCOR) tablet 20 mg  20 mg Oral At Bedtime   20 mg at 04/27/20 2012     sucralfate (CARAFATE) tablet 1 g  1 g Oral BID   1 g at 04/28/20 0758     traZODone (DESYREL) tablet 50 mg  50 mg Oral At Bedtime PRN   50 mg at 04/27/20 0118     triamcinolone (KENALOG) 0.1 % ointment   Topical BID PRN         umeclidinium-vilanterol (ANORO ELLIPTA) 62.5-25 MCG/INH oral inhaler 1 puff  1 puff Inhalation Daily   1 puff at 04/28/20 0904     No current Epic-ordered outpatient medications on file.              10 point ROS negative see H&P       Allergies:     Allergies   Allergen Reactions     Codeine      Penicillins             Psychiatric Examination:   /79   Pulse 63   Temp 98.1  F (36.7  " C) (Tympanic)   Resp 14   Ht 1.753 m (5' 9\")   Wt 82.8 kg (182 lb 8 oz)   SpO2 95%   BMI 26.95 kg/m    Weight is 182 lbs 8 oz  Body mass index is 26.95 kg/m .    Appearance:  poorly groomed  Attitude:  guarded and uncooperative  Eye Contact:  poor   Mood:  irritable  Affect:  intensity is flat  Speech:  clear, coherent  Psychomotor Behavior:  no evidence of tardive dyskinesia, dystonia, or tics  Thought Process:  linear  Associations:  no loose associations  Thought Content:  no evidence of psychotic thought and passive suicidal ideation present  Insight:  limited  Judgment:  limited  Oriented to:  time, person, and place  Attention Span and Concentration:  limited  Recent and Remote Memory:  intact  Fund of Knowledge: appropriate  Muscle Strength and Tone: normal  Gait and Station: Normal           Labs:     Results for orders placed or performed during the hospital encounter of 04/26/20   UA with Microscopic     Status: Abnormal   Result Value Ref Range    Color Urine Light Yellow     Appearance Urine Clear     Glucose Urine Negative NEG^Negative mg/dL    Bilirubin Urine Negative NEG^Negative    Ketones Urine Negative NEG^Negative mg/dL    Specific Gravity Urine 1.024 1.003 - 1.035    Blood Urine Trace (A) NEG^Negative    pH Urine 5.5 4.7 - 8.0 pH    Protein Albumin Urine Negative NEG^Negative mg/dL    Urobilinogen mg/dL Normal 0.0 - 2.0 mg/dL    Nitrite Urine Negative NEG^Negative    Leukocyte Esterase Urine Negative NEG^Negative    Source Midstream Urine     WBC Urine <1 0 - 5 /HPF    RBC Urine 1 0 - 2 /HPF    Bacteria Urine None (A) NEG^Negative /HPF    Mucous Urine Present (A) NEG^Negative /LPF    Hyaline Casts 1 (A) OTO2^O - 2 /LPF   Urine Drugs of Abuse Screen Panel 13     Status: Abnormal   Result Value Ref Range    Cannabinoids (95-keh-4-carboxy-9-THC) Not Detected NDET^Not Detected ng/mL    Phencyclidine (Phencyclidine) Not Detected NDET^Not Detected ng/mL    Cocaine (Benzoylecgonine) Not Detected " NDET^Not Detected ng/mL    Methamphetamine (d-Methamphetamine) Detected, Abnormal Result (A) NDET^Not Detected ng/mL    Opiates (Morphine) Not Detected NDET^Not Detected ng/mL    Amphetamine (d-Amphetamine) Detected, Abnormal Result (A) NDET^Not Detected ng/mL    Benzodiazepines (Nordiazepam) Not Detected NDET^Not Detected ng/mL    Tricyclic Antidepressants (Desipramine) Not Detected NDET^Not Detected ng/mL    Methadone (Methadone) Not Detected NDET^Not Detected ng/mL    Barbiturates (Butalbital) Not Detected NDET^Not Detected ng/mL    Oxycodone (Oxycodone) Not Detected NDET^Not Detected ng/mL    Propoxyphene (Norpropoxyphene) Not Detected NDET^Not Detected ng/mL    Buprenorphine (Buprenorphine) Detected, Abnormal Result (A) NDET^Not Detected ng/mL   Acetaminophen level     Status: Abnormal   Result Value Ref Range    Acetaminophen Level <2 (L) 10 - 20 mg/L   Alcohol ethyl     Status: None   Result Value Ref Range    Ethanol g/dL <0.01 0.01 g/dL   CBC with platelets differential     Status: Abnormal   Result Value Ref Range    WBC 7.3 4.0 - 11.0 10e9/L    RBC Count 4.32 (L) 4.4 - 5.9 10e12/L    Hemoglobin 13.9 13.3 - 17.7 g/dL    Hematocrit 40.6 40.0 - 53.0 %    MCV 94 78 - 100 fl    MCH 32.2 26.5 - 33.0 pg    MCHC 34.2 31.5 - 36.5 g/dL    RDW 12.7 10.0 - 15.0 %    Platelet Count 169 150 - 450 10e9/L    Diff Method Automated Method     % Neutrophils 53.4 %    % Lymphocytes 32.5 %    % Monocytes 9.6 %    % Eosinophils 3.7 %    % Basophils 0.5 %    % Immature Granulocytes 0.3 %    Nucleated RBCs 0 0 /100    Absolute Neutrophil 3.9 1.6 - 8.3 10e9/L    Absolute Lymphocytes 2.4 0.8 - 5.3 10e9/L    Absolute Monocytes 0.7 0.0 - 1.3 10e9/L    Absolute Eosinophils 0.3 0.0 - 0.7 10e9/L    Absolute Basophils 0.0 0.0 - 0.2 10e9/L    Abs Immature Granulocytes 0.0 0 - 0.4 10e9/L    Absolute Nucleated RBC 0.0    Comprehensive metabolic panel     Status: Abnormal   Result Value Ref Range    Sodium 141 133 - 144 mmol/L    Potassium  3.7 3.4 - 5.3 mmol/L    Chloride 112 (H) 94 - 109 mmol/L    Carbon Dioxide 24 20 - 32 mmol/L    Anion Gap 5 3 - 14 mmol/L    Glucose 127 (H) 70 - 99 mg/dL    Urea Nitrogen 20 7 - 30 mg/dL    Creatinine 0.84 0.66 - 1.25 mg/dL    GFR Estimate >90 >60 mL/min/[1.73_m2]    GFR Estimate If Black >90 >60 mL/min/[1.73_m2]    Calcium 9.3 8.5 - 10.1 mg/dL    Bilirubin Total 0.2 0.2 - 1.3 mg/dL    Albumin 3.4 3.4 - 5.0 g/dL    Protein Total 7.2 6.8 - 8.8 g/dL    Alkaline Phosphatase 135 40 - 150 U/L    ALT 20 0 - 70 U/L    AST 14 0 - 45 U/L   Salicylate level     Status: None   Result Value Ref Range    Salicylate Level <2 mg/dL   TSH     Status: Abnormal   Result Value Ref Range    TSH 0.26 (L) 0.40 - 4.00 mU/L   Treponema Abs w Reflex to RPR and Titer     Status: None   Result Value Ref Range    Treponema Antibodies Nonreactive NR^Nonreactive            Plan:     Continue Valium for today and tomorrow decreased to 2mg then discontinue  Add third dose Zyprexa 5mg (tid)  Start Lithium 300mg at bed  ELOS 3-5 days for mood stabilization and safe discharge planning

## 2020-04-29 ENCOUNTER — TELEPHONE (OUTPATIENT)
Dept: FAMILY MEDICINE | Facility: OTHER | Age: 60
End: 2020-04-29

## 2020-04-29 VITALS
RESPIRATION RATE: 16 BRPM | DIASTOLIC BLOOD PRESSURE: 63 MMHG | OXYGEN SATURATION: 93 % | WEIGHT: 182.5 LBS | SYSTOLIC BLOOD PRESSURE: 131 MMHG | HEIGHT: 69 IN | HEART RATE: 70 BPM | BODY MASS INDEX: 27.03 KG/M2 | TEMPERATURE: 97.8 F

## 2020-04-29 PROCEDURE — 99239 HOSP IP/OBS DSCHRG MGMT >30: CPT | Mod: 95 | Performed by: NURSE PRACTITIONER

## 2020-04-29 PROCEDURE — 25000132 ZZH RX MED GY IP 250 OP 250 PS 637: Mod: GY | Performed by: NURSE PRACTITIONER

## 2020-04-29 PROCEDURE — 99232 SBSQ HOSP IP/OBS MODERATE 35: CPT | Performed by: NURSE PRACTITIONER

## 2020-04-29 RX ORDER — SIMVASTATIN 20 MG
20 TABLET ORAL DAILY
Qty: 30 TABLET | Refills: 0 | Status: SHIPPED | OUTPATIENT
Start: 2020-04-29 | End: 2023-09-05

## 2020-04-29 RX ORDER — ATENOLOL 100 MG/1
100 TABLET ORAL DAILY
Qty: 30 TABLET | Refills: 0 | Status: SHIPPED | OUTPATIENT
Start: 2020-04-29 | End: 2023-09-05

## 2020-04-29 RX ORDER — LINACLOTIDE 290 UG/1
290 CAPSULE, GELATIN COATED ORAL DAILY
Qty: 30 CAPSULE | Refills: 0 | Status: SHIPPED | OUTPATIENT
Start: 2020-04-29 | End: 2023-09-05

## 2020-04-29 RX ORDER — LITHIUM CARBONATE 300 MG/1
300 TABLET, FILM COATED, EXTENDED RELEASE ORAL AT BEDTIME
Qty: 30 TABLET | Refills: 0 | Status: SHIPPED | OUTPATIENT
Start: 2020-04-29 | End: 2023-09-14

## 2020-04-29 RX ORDER — OLANZAPINE 5 MG/1
5 TABLET ORAL 2 TIMES DAILY PRN
Qty: 60 TABLET | Refills: 0 | Status: SHIPPED | OUTPATIENT
Start: 2020-04-29 | End: 2023-09-14

## 2020-04-29 RX ORDER — ALBUTEROL SULFATE 90 UG/1
2 AEROSOL, METERED RESPIRATORY (INHALATION) EVERY 4 HOURS PRN
Qty: 1 INHALER | Refills: 0 | Status: SHIPPED | OUTPATIENT
Start: 2020-04-29 | End: 2023-09-05

## 2020-04-29 RX ORDER — IPRATROPIUM BROMIDE AND ALBUTEROL 20; 100 UG/1; UG/1
1 SPRAY, METERED RESPIRATORY (INHALATION) 4 TIMES DAILY
Qty: 1 INHALER | Refills: 0 | Status: SHIPPED | OUTPATIENT
Start: 2020-04-29 | End: 2023-10-11

## 2020-04-29 RX ORDER — TRIAMCINOLONE ACETONIDE 1 MG/G
OINTMENT TOPICAL 2 TIMES DAILY
Qty: 63 G | Refills: 0 | Status: SHIPPED | OUTPATIENT
Start: 2020-04-29 | End: 2023-09-05

## 2020-04-29 RX ORDER — PANTOPRAZOLE SODIUM 40 MG/1
40 TABLET, DELAYED RELEASE ORAL DAILY
Qty: 30 TABLET | Refills: 0 | Status: SHIPPED | OUTPATIENT
Start: 2020-04-29 | End: 2023-09-05

## 2020-04-29 RX ADMIN — ASPIRIN 81 MG: 81 TABLET, COATED ORAL at 08:31

## 2020-04-29 RX ADMIN — PANTOPRAZOLE SODIUM 40 MG: 40 TABLET, DELAYED RELEASE ORAL at 07:13

## 2020-04-29 RX ADMIN — UMECLIDINIUM BROMIDE AND VILANTEROL TRIFENATATE 1 PUFF: 62.5; 25 POWDER RESPIRATORY (INHALATION) at 08:36

## 2020-04-29 RX ADMIN — ATENOLOL 100 MG: 25 TABLET ORAL at 08:31

## 2020-04-29 RX ADMIN — OLANZAPINE 5 MG: 5 TABLET, ORALLY DISINTEGRATING ORAL at 08:31

## 2020-04-29 RX ADMIN — NICOTINE 1 PATCH: 21 PATCH, EXTENDED RELEASE TRANSDERMAL at 08:29

## 2020-04-29 RX ADMIN — SUCRALFATE 1 G: 1 TABLET ORAL at 08:31

## 2020-04-29 RX ADMIN — DIAZEPAM 2 MG: 2 TABLET ORAL at 08:31

## 2020-04-29 ASSESSMENT — ACTIVITIES OF DAILY LIVING (ADL)
ORAL_HYGIENE: INDEPENDENT
HYGIENE/GROOMING: INDEPENDENT

## 2020-04-29 NOTE — PLAN OF CARE
Discharge Note    Patient Discharged to home on 4/29/2020 12:47 PM via No transportation concerns.     Patient informed of discharge instructions in AVS. patient verbalizes understanding and denies having any questions pertaining to AVS. Patient stable at time of discharge. Patient denies SI, HI, and thoughts of self harm at time of discharge. All personal belongings returned to patient. Discharge prescriptions sent to [facility] [place] [place] via electronic communication. Psych evaluation, history and physical, AVS, and discharge summary faxed to next level of care- Dr. Estevez per .     Vira Fatima, RN  4/29/2020  12:54 PM

## 2020-04-29 NOTE — PROGRESS NOTES
[facility]    Medical Services Progress Note/Discharge Note     Date of Service (when I saw the patient): 04/29/2020    Assessment & Plan     Principal Problem:    Suicidal behavior     Active Medical Problems:    COPD  (H)- pt denies chest pain, sob, difficulty breathing. Ordered home dose Combivent and Albuterol.   4/29/20- pt denies cp, sob, difficulty breathing.        Hypertension-hx of high blood pressure. Pt reports he takes atenolol for daily control. Bp is 176/60. Pt denies cp, sob.   4/29/20- vitals stable 131/63, pt denies chest pain, sob.        Polysubstance abuse (H)- utox positive for methamphetamines and amphetamines.     GERD- ordered home does of Protonix. Pt denies reflux, heartburn, difficulty swallowing., or epigastric pain.       Penile lesion- according to ER note pt has a small round lesion on glans of penis with no drainage or tenderness. Treponema Abs is pending.   4/29/20- Treponema- nonreactive, pt to follow up with PCP after discharge. Pt verbalized understanding,     Code Status: Prior.    Kira Posey CNP    Interval History   Pt being discharged today. 30 day prescriptions sent to pt pharmacy for GERD, BP, COPD. Pt is to follow up with PCP within 7 days to recheck TSH  And other med management. Pt verbalized understanding.     -Data reviewed today: I reviewed all new labs and imaging results over the last 24 hours.     Physical Exam   Temp: 97.8  F (36.6  C) Temp src: Tympanic BP: 131/63 Pulse: 70 Heart Rate: 66 Resp: 16 SpO2: 93 % O2 Device: None (Room air)    Vitals:    04/27/20 0055   Weight: 82.8 kg (182 lb 8 oz)     Vital Signs with Ranges  Temp:  [97.7  F (36.5  C)-98.2  F (36.8  C)] 97.8  F (36.6  C)  Pulse:  [63-70] 70  Heart Rate:  [66] 66  Resp:  [16-18] 16  BP: (125-151)/(63-72) 131/63  SpO2:  [93 %-96 %] 93 %  No intake/output data recorded.    Constitutional: NAD, vitals stable, appears well, well developed  Respiratory: good effort, speaks in full sentences,  no audible wheeze, symmetric rise and fall of chest , CTA bilaterally.   Cardiovascular: RRR, S1, S2, no extra heart sounds, no edema   GI: normal inspection, bowel sounds normal x 4, no tenderness, no guarding, no masses, no organomegaly   Skin/Integumen: small round penile lesion per ed, no drainage, no tenderness per pt report otherwise skin warm, dry, no rashes, no cyanosis       Medications       Data   Recent Labs   Lab 04/26/20  1959   WBC 7.3   HGB 13.9   MCV 94         POTASSIUM 3.7   CHLORIDE 112*   CO2 24   BUN 20   CR 0.84   ANIONGAP 5   RACHEL 9.3   *   ALBUMIN 3.4   PROTTOTAL 7.2   BILITOTAL 0.2   ALKPHOS 135   ALT 20   AST 14       No results found for this or any previous visit (from the past 24 hour(s)).

## 2020-04-29 NOTE — PLAN OF CARE
Face to face end of shift report obtained from ALFREDO Negron.     Problem: Adult Behavioral Health Plan of Care  Goal: Patient-Specific Goal (Individualization)  Description: Pt will sleep at least 6-8 hours per NOC shift.  Pt will eat at least 75% of meals.  Pt will participate in at least 50% of groups.   Pt will follow treatment team recommendations and be medication compliant.    4/29/2020 0048 by Malaika Rausch RN  Outcome: No Change     Problem: Suicide Risk  Goal: Absence of Self-Harm  Description: Pt will report no suicidal ideation.  Pt will report manageable levels of anxiety and depression.  Pt will remain free of self harm.  4/29/2020 0048 by Malaika Rausch RN  Outcome: No Change     Problem: Thought Process Alteration  Goal: Optimal Thought Clarity  4/29/2020 0048 by Malaika Rausch RN  Outcome: No Change     Pt observed resting in bed.Pt appears to be sleeping in bed with eyes closed, having regular respirations and position changes. 15 minutes and PRN safety checks completed with no noted complains.    Patient slept 5.75 hours. Will continue to monitor.    Face to face end of shift report communicated to oncoming RN.  Malaika Rausch RN  7:25 AM

## 2020-04-29 NOTE — PLAN OF CARE
Problem: Adult Behavioral Health Plan of Care  Goal: Patient-Specific Goal (Individualization)  Description: Pt will sleep at least 6-8 hours per NOC shift.  Pt will eat at least 75% of meals.  Pt will participate in at least 50% of groups.   Pt will follow treatment team recommendations and be medication compliant.    4/29/2020 0940 by Vira Fatima, RN  Outcome: Adequate for Discharge  Note:        Problem: Suicide Risk  Goal: Absence of Self-Harm  Description: Pt will report no suicidal ideation.  Pt will report manageable levels of anxiety and depression.  Pt will remain free of self harm.  4/29/2020 0940 by Vira Fatima, RN  Outcome: Adequate for Discharge     Problem: Thought Process Alteration  Goal: Optimal Thought Clarity  4/29/2020 0940 by Vira Fatima, RN  Outcome: Adequate for Discharge

## 2020-04-29 NOTE — DISCHARGE SUMMARY
"[facility]    Discharge Summary  Adult Psychiatry    Date of Admission:  4/26/2020  Date of Discharge:  4/29/2020  Discharging Provider: Nguyen Huntley    Discharge Diagnoses   Principal Discharge Diagnosis: Bipolar I Disorder, Current episode depressed  Secondary Discharge Diagnosis: Substance Abuse Disorder, severe  Medical Diagnoses addressed this admission: Pain    History of Present Illness   (per ED) Rodney Goodwin is a 60 year old male who presents with suicidal ideation.  Patient reports since January this year he noticed white specs with black eyes coming from his skin on his arms, legs, abdominal wall, he also noticed them underneath his dentures.  He reports he can't live anymore with his problem and wanted to shot himself with the gun.  One of his daughter called patient and called PD and Patient was brought to ED. Patient reports he has history of chronic epigastric pain and he was diagnosed with H. pylori in the past.  Today he denies any chest pain shortness of breath, no fever chills, no cough.  He reports intermittently he spits up this white specks.  No concerns about his bowel movement or urination. He reports feeling depressed.  He also reports he has history of heroin use in the past.  Today he denies using illicit drugs or drinking alcohol.     (per H&P) Patient is lying in his bed, agrees to interview via telemedicine.  He tells me \"I'm tired of living like this and my sister had enough and called the police\".  When asked how he has been living, he clarifies that he deals with abdominal pain, his life is boring, and nothing helps.  Patient reports depressed mood, sleeps about 4 hours a night and is \"hyperactive\" all his life.  He tells me that when he used to take Rittilan he did not have any problems, and has not been prescribed that in a long time.  He also states Heroin helps, \"but that's illegal\"  Patient reports switching to suboxone recently from Methadone and it " "didn't work.  He reports he \"ran out\" of all his medications in March and decided not to take any of them any more because nothing helped - although when asked how he was doing 2 months ago, he stated \"I guess I was doing better\".  Patient has extensive substance abuse history listed below, last used meth 3 days ago.  Patient denies auditory hallucination today, states \"I haven't seen any bugs since I got here\".  Review of records show patient has multiple ED visits for both abdominal pain and seeing \"bugs\" on his skin, most recently in January and February.  Patient denies history of suicide attempts.  He reports history of inpatient hospitalizations \"over 20 years ago\".  He reports not taking any medications for mental health since 2000, and he has had many trials of medications, states \"nothing helps\".  Past medications include, likely more: Seroquel, Depakote, remeron, Ambien, trazodone, Paxil, wellbutrin, benzodiazepines (which is listed as an allergy when he was prescribed suboxone). Patient currently lives alone in [place] in an apartment, states he usually gets along fairly well on his own, and belongs to a [employer].  He has ex-wife and 3 kids with no contact with them.  Patient receives disability for bipolar disorder.  He denies having any legal issues pending.  He states he can't work because his body hurts.       Chemical Use History:   Long history of drug abuse/addictions: Heroin, opioids, meth, benzos, stimulants, thc, mushrooms, fentynal, soma.  Patient reports not using any illicit drugs \"for years\" except this past Friday he did meth and likely others \"because I couldn't stand being like this\".  Records indicate CD treatment at least 12 times.  Patient was prescribed Methadone since 2014, and in January was getting suboxone from [facility], he stopped taking it in March as he reports \"it didn't do anything\".        Video-Visit Details     Type of service:  Video Visit     Video Start Time " "(time video started): 0904  Video End Time (time video stopped): 0915    Originating Location (pt. Location): [facility]     Distant Location (provider location):  [facility]     Mode of Communication:  Video Conference via [employer]     Physician has received verbal consent for a Video Visit from the patient? Yes        Hospital Course   Patient reports feeling a little better, endorses continued depressed mood however he states \"I just wanted to get started on something and back on my other medications\" and requests to go home today.  He was started on Zyprexa 5mg bid and Lithium 300mg at bedtime.  Patient will need to follow up with PCP for additional adjustments.  He also needs to follow up with PCP regarding low TSH.  Patient denies suicidal thoughts, states \"I'm over that\", no longer endorses hallucination, and reports improved abdominal pain re-starting at home medications.  Patient reports he has been sleeping much better, mainly kept to himself on the unit, and remains appropriate and polite.  He denies having access to guns in his home, states \"I live in an old people apartment building, its pretty quiet\".  He states he needs to get home as he has a lot of things to do, like get started on his garden he has every year.  Patient resides in [place], medications sent to [facility] and appointments are scheduled, additional resources are provided.  At the time of discharge, there is no evidence this patient is in imminent danger of self harm or harming others and will discharge to his home today.        MARCO ANTONIO Renteria CNP    Psychiatry Follow-up:      [facility]  PCP - Dr. Estevez - May 5th @ 10:15 - please wear a mask and arrive 15 minutes prior to appointment   Phone: [phone]    Fax: [phone]     [facility]  [address] [place] [address]  Phone:[phone]     Resources:   Crisis Intervention: 668.838.6223 or 518-550-3361 " (TTY: 465.958.3487).  Call anytime for help.  National [place] on Mental Illness (www.[place].gricel.org): 873.482.9224 or 334-323-2450.  Alcoholics Anonymous (www.alcoholics-anonymous.org): Check your phone book for your local chapter.  Suicide Awareness Voices of Education (SAVE) (www.save.org): 968-977-XOSC (6881)  National Suicide Prevention Line (www.mentalChildcare Brid[place]emn.org): 573-983-QJRZ (2493)  Mental Health Consumer/Survivor Network of [place] (www.mhcsn.net): 840.106.6926 or 394-539-5132  Mental Health Association of [place] (www.mentalhealth.org): 734.206.3649 or 304-909-5406    BEHAVIORAL TEAM DISCUSSION     Progress: moderate  Continued Stay Criteria/Rationale: Medications ordered, Denies suicidal thoughts or psychosis today   Medical/Physical: Pain  Precautions:   Falls precaution?: YES       Behavioral Orders   Procedures     Code 1 - Restrict to Unit     Routine Programming       As clinically indicated     Status 15       Every 15 minutes.     Plan: Patient will discharge to home today  Continue taking PTA medical medications, started Zyprexa and Lithium  Patient is scheduled with PCP appointment for hospital follow up        Participants:  Nguyen Huntley NP,  SW, Nursing        Medications:         Significant Results and Procedures      TSH 0.26 - Patient will follow up with PCP May 5th as scheduled    Pending Results   None    Unresulted Labs Ordered in the Past 30 Days of this Admission     No orders found from 3/27/2020 to 4/27/2020.          Code Status   Full Code    Primary Care Physician   Tristan Estevez    Physical Exam   Appearance:  awake, alert  Attitude:  cooperative  Eye Contact:  fair  Mood:  depressed and better  Affect:  mood congruent  Speech:  clear, coherent  Psychomotor Behavior:  no evidence of tardive dyskinesia, dystonia, or tics  Thought Process:  goal oriented  Associations:  no loose associations  Thought Content:  no evidence of suicidal ideation or homicidal ideation and no evidence of  psychotic thought  Insight:  fair  Judgment:  fair  Oriented to:  time, person, and place  Attention Span and Concentration:  intact  Recent and Remote Memory:  fair  Language: Able to name objects, Able to repeat phrases and Able to read and write  Fund of Knowledge: appropriate  Muscle Strength and Tone: normal  Gait and Station: Normal    Time Spent on this Encounter   Nguyen WHITE APRN CNP, personally saw the patient today and spent greater than 30 minutes discharging this patient.    Discharge Disposition   Discharged to home  Condition at discharge: Stable    Consultations This Hospital Stay   None    Discharge Orders   No discharge procedures on file.  Discharge Medications   Current Discharge Medication List      START taking these medications    Details   lithium ER (LITHOBID) 300 MG CR tablet Take 1 tablet (300 mg) by mouth At Bedtime  Qty: 30 tablet, Refills: 0    Associated Diagnoses: Bipolar 1 disorder (H)      OLANZapine (ZYPREXA) 5 MG tablet Take 1 tablet (5 mg) by mouth 2 times daily as needed (anxiety/sleep/hallucination)  Qty: 60 tablet, Refills: 0    Associated Diagnoses: Bipolar 1 disorder (H)         CONTINUE these medications which have NOT CHANGED    Details   atenolol (TENORMIN) 100 MG tablet Take 100 mg by mouth daily       LINZESS 290 MCG capsule Take 290 mcg by mouth daily       pantoprazole (PROTONIX) 40 MG EC tablet Take 40 mg by mouth daily      polyethylene glycol (MIRALAX/GLYCOLAX) powder Take 17 g by mouth daily       simvastatin (ZOCOR) 20 MG tablet Take 20 mg by mouth daily      VENTOLIN  (90 Base) MCG/ACT inhaler Inhale 2 puffs into the lungs every 4 hours as needed for shortness of breath / dyspnea     Comments: Pharmacy may dispense brand covered by insurance (Proair, or proventil or ventolin or generic albuterol inhaler)      ASPIRIN LOW DOSE 81 MG EC tablet Take 81 mg by mouth daily       Ipratropium-Albuterol (COMBIVENT RESPIMAT)  MCG/ACT inhaler  Inhale 1 puff into the lungs 4 times daily Space evenly during waking hours.      naloxone (NARCAN) 4 MG/0.1ML nasal spray Spray 4 mg into one nostril alternating nostrils once as needed for opioid reversal Put cone in nose and push 1/2 of contents into each nostril. Repeat every 3 minutes if no response until assistance arrives      sucralfate (CARAFATE) 1 GM tablet Take 1 tablet by mouth 2 times daily       triamcinolone (KENALOG) 0.1 % external ointment Apply topically 2 times daily Apply externally to the affected area twice daily           Allergies   Allergies   Allergen Reactions     Codeine      Penicillins      Data   Results for orders placed or performed during the hospital encounter of 04/26/20   UA with Microscopic     Status: Abnormal   Result Value Ref Range    Color Urine Light Yellow     Appearance Urine Clear     Glucose Urine Negative NEG^Negative mg/dL    Bilirubin Urine Negative NEG^Negative    Ketones Urine Negative NEG^Negative mg/dL    Specific Gravity Urine 1.024 1.003 - 1.035    Blood Urine Trace (A) NEG^Negative    pH Urine 5.5 4.7 - 8.0 pH    Protein Albumin Urine Negative NEG^Negative mg/dL    Urobilinogen mg/dL Normal 0.0 - 2.0 mg/dL    Nitrite Urine Negative NEG^Negative    Leukocyte Esterase Urine Negative NEG^Negative    Source Midstream Urine     WBC Urine <1 0 - 5 /HPF    RBC Urine 1 0 - 2 /HPF    Bacteria Urine None (A) NEG^Negative /HPF    Mucous Urine Present (A) NEG^Negative /LPF    Hyaline Casts 1 (A) OTO2^O - 2 /LPF   Urine Drugs of Abuse Screen Panel 13     Status: Abnormal   Result Value Ref Range    Cannabinoids (20-hpf-0-carboxy-9-THC) Not Detected NDET^Not Detected ng/mL    Phencyclidine (Phencyclidine) Not Detected NDET^Not Detected ng/mL    Cocaine (Benzoylecgonine) Not Detected NDET^Not Detected ng/mL    Methamphetamine (d-Methamphetamine) Detected, Abnormal Result (A) NDET^Not Detected ng/mL    Opiates (Morphine) Not Detected NDET^Not Detected ng/mL    Amphetamine  (d-Amphetamine) Detected, Abnormal Result (A) NDET^Not Detected ng/mL    Benzodiazepines (Nordiazepam) Not Detected NDET^Not Detected ng/mL    Tricyclic Antidepressants (Desipramine) Not Detected NDET^Not Detected ng/mL    Methadone (Methadone) Not Detected NDET^Not Detected ng/mL    Barbiturates (Butalbital) Not Detected NDET^Not Detected ng/mL    Oxycodone (Oxycodone) Not Detected NDET^Not Detected ng/mL    Propoxyphene (Norpropoxyphene) Not Detected NDET^Not Detected ng/mL    Buprenorphine (Buprenorphine) Detected, Abnormal Result (A) NDET^Not Detected ng/mL   Acetaminophen level     Status: Abnormal   Result Value Ref Range    Acetaminophen Level <2 (L) 10 - 20 mg/L   Alcohol ethyl     Status: None   Result Value Ref Range    Ethanol g/dL <0.01 0.01 g/dL   CBC with platelets differential     Status: Abnormal   Result Value Ref Range    WBC 7.3 4.0 - 11.0 10e9/L    RBC Count 4.32 (L) 4.4 - 5.9 10e12/L    Hemoglobin 13.9 13.3 - 17.7 g/dL    Hematocrit 40.6 40.0 - 53.0 %    MCV 94 78 - 100 fl    MCH 32.2 26.5 - 33.0 pg    MCHC 34.2 31.5 - 36.5 g/dL    RDW 12.7 10.0 - 15.0 %    Platelet Count 169 150 - 450 10e9/L    Diff Method Automated Method     % Neutrophils 53.4 %    % Lymphocytes 32.5 %    % Monocytes 9.6 %    % Eosinophils 3.7 %    % Basophils 0.5 %    % Immature Granulocytes 0.3 %    Nucleated RBCs 0 0 /100    Absolute Neutrophil 3.9 1.6 - 8.3 10e9/L    Absolute Lymphocytes 2.4 0.8 - 5.3 10e9/L    Absolute Monocytes 0.7 0.0 - 1.3 10e9/L    Absolute Eosinophils 0.3 0.0 - 0.7 10e9/L    Absolute Basophils 0.0 0.0 - 0.2 10e9/L    Abs Immature Granulocytes 0.0 0 - 0.4 10e9/L    Absolute Nucleated RBC 0.0    Comprehensive metabolic panel     Status: Abnormal   Result Value Ref Range    Sodium 141 133 - 144 mmol/L    Potassium 3.7 3.4 - 5.3 mmol/L    Chloride 112 (H) 94 - 109 mmol/L    Carbon Dioxide 24 20 - 32 mmol/L    Anion Gap 5 3 - 14 mmol/L    Glucose 127 (H) 70 - 99 mg/dL    Urea Nitrogen 20 7 - 30 mg/dL     Creatinine 0.84 0.66 - 1.25 mg/dL    GFR Estimate >90 >60 mL/min/[1.73_m2]    GFR Estimate If Black >90 >60 mL/min/[1.73_m2]    Calcium 9.3 8.5 - 10.1 mg/dL    Bilirubin Total 0.2 0.2 - 1.3 mg/dL    Albumin 3.4 3.4 - 5.0 g/dL    Protein Total 7.2 6.8 - 8.8 g/dL    Alkaline Phosphatase 135 40 - 150 U/L    ALT 20 0 - 70 U/L    AST 14 0 - 45 U/L   Salicylate level     Status: None   Result Value Ref Range    Salicylate Level <2 mg/dL   TSH     Status: Abnormal   Result Value Ref Range    TSH 0.26 (L) 0.40 - 4.00 mU/L   Treponema Abs w Reflex to RPR and Titer     Status: None   Result Value Ref Range    Treponema Antibodies Nonreactive NR^Nonreactive

## 2020-04-29 NOTE — TELEPHONE ENCOUNTER
[facility] calling to schedule this patient 2 weeks hospital follow up with Dr Roberts. Patient can be reached at [phone].

## 2020-04-29 NOTE — PLAN OF CARE
Pt will be discharging this afternoon via [facility] bus -  at [facility] at 2:10.     Pt is discharging at the recommendation of the treatment team. Pt is discharging to home transported by self. Pt denies having any thoughts of hurting themself or anyone else. Pt denies anxiety or depression. Pt has follow up with [facility]. Discharge instructions, including; demographic sheet, psychiatric evaluation, discharge summary, and AVS were faxed to these next level of care providers.

## 2020-04-29 NOTE — DISCHARGE INSTRUCTIONS
Behavioral Discharge Planning and Instructions    Summary: Rodney was admitted to  with increased suicidal ideation with a plan     Main Diagnosis:  Bipolar I Disorder, current episode depressed, Anxiety, ADHD, by history, Substance Abuse Disorder, history of Heroin, opioids, meth, benzos, stimulants, thc, mushrooms, fentynal, soma    Major Treatments, Procedures and Findings: Stabilize with medications, connect with community programs.    Symptoms to Report: feeling more aggressive, increased confusion, losing more sleep, mood getting worse or thoughts of suicide    Lifestyle Adjustment: Take all medications as prescribed, meet with doctor/ medication provider, out patient therapist, , and [facility] worker as scheduled. Abstain from alcohol or any unprescribed drugs.    Psychiatry Follow-up:     [facility]  PCP - Dr. Estevez - May 5th @ 10:15 - please wear a mask and arrive 15 minutes prior to appointment   Phone: [phone]    Fax: [phone]    [facility]  [address] [place] [address]  Phone:[phone]    Resources:   Crisis Intervention: 395.606.5199 or 878-053-7367 (TTY: 425.240.4884).  Call anytime for help.  National [place] on Mental Illness (www.[place].gricel.org): 371.159.6757 or 575-983-4773.  Alcoholics Anonymous (www.alcoholics-anonymous.org): Check your phone book for your local chapter.  Suicide Awareness Voices of Education (SAVE) (www.save.org): 435-242-IGET (5383)  National Suicide Prevention Line (www.mentalhealthmn.org): 886-002-KEAU (4029)  Mental Health Consumer/Survivor Network of [place] (www.mhcsn.net): 546.226.1723 or 590-618-0131  Mental [place] Association [place] (www.mentalhealth.org): 239.699.9388 or 681-750-6051    General Medication Instructions:   See your medication sheet(s) for instructions.   Take all medicines as directed.  Make no changes unless your doctor suggests them.   Go to all your doctor visits.  Be sure to have all your required lab  tests. This way, your medicines can be refilled on time.  Do not use any drugs not prescribed by your doctor.  Avoid alcohol.    If you were tested, we will call you with your COVID-19 result. You don't need to call us to check on your result. You can also use the information at the end of this document to sign up for [facility] TE2 where you can get your results and a message about those results sent to you through the TE2 application.    Regardless of if you have been tested or not:  Patient who have symptoms (cough, fever, or shortness of breath), need to isolate for 7 days from when symptoms started AND 72 hours after fever resolves (without fever reducing medications) AND improvement of respiratory symptoms (whichever is longer).      Isolate yourself at home (in own room/own bathroom if possible)    Do Not allow any visitors    Do Not go to work or school    Do Not go to Sikhism,  centers, shopping, or other public places.    Do Not shake hands.    Avoid close and intimate contact with others (hugging, kissing).    Follow CDC recommendations for household cleaning of frequently touched services.     After the initial 7 days, continue to isolate yourself from household members as much as possible. To continue decrease the risk of community spread and exposure, you and any members of your household should limit activities in public for 14 days after starting home isolation.     You can reference the following CDC link for helpful home isolation/care tips:  https://www.cdc.gov/coronavirus/2019-ncov/downloads/10Things.pdf    Protect Others:    Cover Your Mouth and Nose with a mask, disposable tissue or wash cloth to avoid spreading germs to others.    Wash your hands and face frequently with soap and water    Call Back If: Breathing difficulty develops or you become worse.    For more information about COVID19 and options for caring for yourself at home, please visit the CDC website at  https://www.cdc.gov/coronavirus/2019-ncov/about/steps-when-sick.html  For more options for care at [facility], please visit our website at [email or web address]    For more information, please use the [place] Department of Health COVID-19 Website: https://www.health.[facility]./diseases/coronavirus/index.html  [place] Department of Health ([facility]) COVID-19 Hotlines (Interpreters available):      Health questions: Phone Number: 969.172.2423 or 1-912.110.2394 and Hours: 7 a.m. to 7 p.m.    Schools and  questions: Phone Number: 110.464.1635 or 1-161.500.7633 and Hours 7 a.m. to 7 p.m.

## 2020-07-08 ENCOUNTER — MEDICAL CORRESPONDENCE (OUTPATIENT)
Dept: HEALTH INFORMATION MANAGEMENT | Facility: CLINIC | Age: 60
End: 2020-07-08

## 2020-12-08 ENCOUNTER — HOSPITAL ENCOUNTER (OUTPATIENT)
Facility: HOSPITAL | Age: 60
End: 2020-12-08
Attending: INTERNAL MEDICINE | Admitting: INTERNAL MEDICINE
Payer: MEDICARE

## 2021-03-02 ENCOUNTER — ANESTHESIA EVENT (OUTPATIENT)
Dept: SURGERY | Facility: HOSPITAL | Age: 61
End: 2021-03-02

## 2021-03-02 RX ORDER — SODIUM CHLORIDE, SODIUM LACTATE, POTASSIUM CHLORIDE, CALCIUM CHLORIDE 600; 310; 30; 20 MG/100ML; MG/100ML; MG/100ML; MG/100ML
INJECTION, SOLUTION INTRAVENOUS CONTINUOUS
Status: CANCELLED | OUTPATIENT
Start: 2021-03-02

## 2021-03-02 RX ORDER — HYDROMORPHONE HYDROCHLORIDE 1 MG/ML
.3-.5 INJECTION, SOLUTION INTRAMUSCULAR; INTRAVENOUS; SUBCUTANEOUS EVERY 10 MIN PRN
Status: CANCELLED | OUTPATIENT
Start: 2021-03-02

## 2021-03-02 RX ORDER — ALBUTEROL SULFATE 0.83 MG/ML
2.5 SOLUTION RESPIRATORY (INHALATION) EVERY 4 HOURS PRN
Status: CANCELLED | OUTPATIENT
Start: 2021-03-02

## 2021-03-02 RX ORDER — ONDANSETRON 2 MG/ML
4 INJECTION INTRAMUSCULAR; INTRAVENOUS EVERY 30 MIN PRN
Status: CANCELLED | OUTPATIENT
Start: 2021-03-02

## 2021-03-02 RX ORDER — MEPERIDINE HYDROCHLORIDE 25 MG/ML
12.5 INJECTION INTRAMUSCULAR; INTRAVENOUS; SUBCUTANEOUS
Status: CANCELLED | OUTPATIENT
Start: 2021-03-02

## 2021-03-02 RX ORDER — FENTANYL CITRATE 50 UG/ML
25-50 INJECTION, SOLUTION INTRAMUSCULAR; INTRAVENOUS
Status: CANCELLED | OUTPATIENT
Start: 2021-03-02

## 2021-03-02 RX ORDER — NALOXONE HYDROCHLORIDE 0.4 MG/ML
0.2 INJECTION, SOLUTION INTRAMUSCULAR; INTRAVENOUS; SUBCUTANEOUS
Status: CANCELLED | OUTPATIENT
Start: 2021-03-02 | End: 2021-03-03

## 2021-03-02 RX ORDER — NALOXONE HYDROCHLORIDE 0.4 MG/ML
0.4 INJECTION, SOLUTION INTRAMUSCULAR; INTRAVENOUS; SUBCUTANEOUS
Status: CANCELLED | OUTPATIENT
Start: 2021-03-02 | End: 2021-03-03

## 2021-03-02 RX ORDER — ONDANSETRON 4 MG/1
4 TABLET, ORALLY DISINTEGRATING ORAL EVERY 30 MIN PRN
Status: CANCELLED | OUTPATIENT
Start: 2021-03-02

## 2021-03-02 ASSESSMENT — COPD QUESTIONNAIRES: COPD: 1

## 2021-03-02 ASSESSMENT — LIFESTYLE VARIABLES: TOBACCO_USE: 1

## 2021-03-02 NOTE — ANESTHESIA PREPROCEDURE EVALUATION
Anesthesia Pre-Procedure Evaluation    Patient: Rodney Goodwin   MRN: [id number] : 1960        Preoperative Diagnosis: Screening for malignant neoplasm of colon [Z12.11]  History of Avila's esophagus [Z87.19]  H pylori ulcer [K27.9, B96.81]   Procedure : Procedure(s):  UPPER ENDOSCOPY AND COLONOSCOPY     Past Medical History:   Diagnosis Date     Acute exacerbation of chronic obstructive pulmonary disease (H) 10/28/2016     Anxiety      Backache 2008    Overview:  IMO Update 10/11     Bipolar 1 disorder (H) 2014     Bipolar affective disorder (H)      Cellulitis and abscess of forearm 2014     Chlordiazepoxide dependence (H) 2010    Overview:  IMO Update 10/11     Chronic constipation      COPD exacerbation (H) 2015     Costochondritis 2015     Degeneration of lumbar or lumbosacral intervertebral disc 2006    Overview:  IMO Update 10/11     Depressive disorder      Drug abuse (H)      Elevated blood sugar 10/29/2016     Heroin abuse (H) 9/15/2015     Heroin addiction (H) 2010    Overview:  See social notes IMO Update 10/11     Hypertension      IV drug user 2010    Overview:  IMO Update 10/11     Lumbosacral spondylosis without myelopathy 2008     Narcotic addiction (H) 10/29/2016     Opioid use disorder, severe, dependence (H)     [facility] records; prior Methadone; [facility]; AA/NA     Osteoarthrosis, pelvic region and thigh 2008    Overview:  IMO Update 10/11     Penile lesion 2020     Suicidal behavior 2020     Venous insufficiency of both lower extremities 2015     Do you wish to do the replacement in the background? yes        Past Surgical History:   Procedure Laterality Date     ARTHROSCOPY KNEE BILATERAL       COLONOSCOPY  2020    Multiple, repeat due      COLONOSCOPY N/A 2015    Procedure: COLONOSCOPY;  Surgeon: Justin Andujar MD;  Location: [facility]     DECOMPRESSION CUBITAL TUNNEL Left 2017    Procedure:  DECOMPRESSION CUBITAL TUNNEL;;  Surgeon: Jhonny Zhao MD;  Location: [facility]     DENTAL SURGERY       HERNIA REPAIR      Inguinal, left     RELEASE CARPAL TUNNEL Left 11/21/2017    Procedure: RELEASE CARPAL TUNNEL;  LEFT ELBOW CUBITAL and CARPAL TUNNEL RELEASE;  Surgeon: Jhonny Zhao MD;  Location: [facility]      Allergies   Allergen Reactions     Codeine      Penicillins       Social History     Tobacco Use     Smoking status: Current Some Day Smoker     Packs/day: 1.00     Years: 30.00     Pack years: 30.00     Types: Cigarettes     Smokeless tobacco: Never Used     Tobacco comment: Tried to Quit (NO)   Substance Use Topics     Alcohol use: No     Alcohol/week: 0.0 standard drinks      Wt Readings from Last 1 Encounters:   03/06/20 81.6 kg (180 lb)        Anesthesia Evaluation            ROS/MED HX  ENT/Pulmonary:     (+) tobacco use, Current use, 1 packs/day, COPD,     Neurologic:       Cardiovascular:     (+) hypertension-----    METS/Exercise Tolerance:     Hematologic: Comments: Venous insufficiency of both lower extremities      Musculoskeletal: Comment: Degeneration of lumbar or lumbosacral intervertebral disc  (+) arthritis,     GI/Hepatic:     (+) GERD, bowel prep,     Renal/Genitourinary:       Endo:       Psychiatric/Substance Use: Comment: Drug abuse herion  Opioid use disorder, severe, dependence   Suicidal behavior    (+) psychiatric history bipolar, anxiety and depression H/O chronic opiod use .     Infectious Disease:       Malignancy:       Other:               OUTSIDE LABS:  CBC:   Lab Results   Component Value Date    WBC 7.3 04/26/2020    WBC 7.9 04/09/2020    HGB 13.9 04/26/2020    HGB 12.8 (L) 04/09/2020    HCT 40.6 04/26/2020    HCT 37.8 (L) 04/09/2020     04/26/2020     (L) 04/09/2020     BMP:   Lab Results   Component Value Date     04/26/2020     04/09/2020    POTASSIUM 3.7 04/26/2020    POTASSIUM 3.7 04/09/2020    CHLORIDE 112 (H) 04/26/2020    CHLORIDE 106  04/09/2020    CO2 24 04/26/2020    CO2 27 04/09/2020    BUN 20 04/26/2020    BUN 18 04/09/2020    CR 0.84 04/26/2020    CR 0.85 04/09/2020     (H) 04/26/2020     (H) 04/09/2020     COAGS:   Lab Results   Component Value Date    INR 1.06 01/20/2014     POC: No results found for: BGM, HCG, HCGS  HEPATIC:   Lab Results   Component Value Date    ALBUMIN 3.4 04/26/2020    PROTTOTAL 7.2 04/26/2020    ALT 20 04/26/2020    AST 14 04/26/2020    ALKPHOS 135 04/26/2020    BILITOTAL 0.2 04/26/2020     OTHER:   Lab Results   Component Value Date    LACT 0.7 09/28/2019    A1C 5.8 10/30/2016    RACHEL 9.3 04/26/2020    MAG 2.0 04/16/2015    LIPASE 74 04/09/2020    AMYLASE 26 (L) 11/03/2017    TSH 0.26 (L) 04/26/2020    T4 1.32 11/13/2014    CRP 6.5 11/03/2017    SED 10 04/11/2015       Anesthesia Plan          Anesthesia Type: MAC.     - Reason for MAC: straight local not clinically adequate              Consents            Postoperative Care            Comments:    No MARCO ANTONIO Nesbitt CRNA

## 2021-03-05 ENCOUNTER — ANESTHESIA (OUTPATIENT)
Dept: SURGERY | Facility: HOSPITAL | Age: 61
End: 2021-03-05

## 2022-02-06 ENCOUNTER — APPOINTMENT (OUTPATIENT)
Dept: GENERAL RADIOLOGY | Facility: HOSPITAL | Age: 62
End: 2022-02-06
Attending: STUDENT IN AN ORGANIZED HEALTH CARE EDUCATION/TRAINING PROGRAM
Payer: MEDICARE

## 2022-02-06 ENCOUNTER — HOSPITAL ENCOUNTER (EMERGENCY)
Facility: HOSPITAL | Age: 62
Discharge: HOME | End: 2022-02-06
Attending: STUDENT IN AN ORGANIZED HEALTH CARE EDUCATION/TRAINING PROGRAM | Admitting: STUDENT IN AN ORGANIZED HEALTH CARE EDUCATION/TRAINING PROGRAM
Payer: MEDICARE

## 2022-02-06 VITALS
OXYGEN SATURATION: 93 % | SYSTOLIC BLOOD PRESSURE: 137 MMHG | RESPIRATION RATE: 22 BRPM | DIASTOLIC BLOOD PRESSURE: 74 MMHG | HEART RATE: 58 BPM | TEMPERATURE: 97.8 F

## 2022-02-06 DIAGNOSIS — U07.1 INFECTION DUE TO 2019 NOVEL CORONAVIRUS: ICD-10-CM

## 2022-02-06 LAB
ALBUMIN SERPL-MCNC: 3.5 G/DL (ref 3.4–5)
ALP SERPL-CCNC: 143 U/L (ref 40–150)
ALT SERPL W P-5'-P-CCNC: 29 U/L (ref 0–70)
ANION GAP SERPL CALCULATED.3IONS-SCNC: 7 MMOL/L (ref 3–14)
AST SERPL W P-5'-P-CCNC: 23 U/L (ref 0–45)
BASOPHILS # BLD AUTO: 0 10E3/UL (ref 0–0.2)
BASOPHILS NFR BLD AUTO: 1 %
BILIRUB DIRECT SERPL-MCNC: <0.1 MG/DL (ref 0–0.2)
BILIRUB SERPL-MCNC: 0.2 MG/DL (ref 0.2–1.3)
BUN SERPL-MCNC: 17 MG/DL (ref 7–30)
CALCIUM SERPL-MCNC: 8.7 MG/DL (ref 8.5–10.1)
CHLORIDE BLD-SCNC: 104 MMOL/L (ref 94–109)
CO2 SERPL-SCNC: 25 MMOL/L (ref 20–32)
CREAT SERPL-MCNC: 1.08 MG/DL (ref 0.66–1.25)
EOSINOPHIL # BLD AUTO: 0.1 10E3/UL (ref 0–0.7)
EOSINOPHIL NFR BLD AUTO: 2 %
ERYTHROCYTE [DISTWIDTH] IN BLOOD BY AUTOMATED COUNT: 11.8 % (ref 10–15)
FLUAV RNA SPEC QL NAA+PROBE: NEGATIVE
FLUBV RNA RESP QL NAA+PROBE: NEGATIVE
GFR SERPL CREATININE-BSD FRML MDRD: 79 ML/MIN/1.73M2
GLUCOSE BLD-MCNC: 150 MG/DL (ref 70–99)
GROUP A STREP BY PCR: NOT DETECTED
HCT VFR BLD AUTO: 40.7 % (ref 40–53)
HGB BLD-MCNC: 13.9 G/DL (ref 13.3–17.7)
HOLD SPECIMEN: NORMAL
IMM GRANULOCYTES # BLD: 0 10E3/UL
IMM GRANULOCYTES NFR BLD: 1 %
LIPASE SERPL-CCNC: 134 U/L (ref 73–393)
LYMPHOCYTES # BLD AUTO: 1.9 10E3/UL (ref 0.8–5.3)
LYMPHOCYTES NFR BLD AUTO: 28 %
MCH RBC QN AUTO: 32 PG (ref 26.5–33)
MCHC RBC AUTO-ENTMCNC: 34.2 G/DL (ref 31.5–36.5)
MCV RBC AUTO: 94 FL (ref 78–100)
MONOCYTES # BLD AUTO: 0.7 10E3/UL (ref 0–1.3)
MONOCYTES NFR BLD AUTO: 11 %
NEUTROPHILS # BLD AUTO: 3.9 10E3/UL (ref 1.6–8.3)
NEUTROPHILS NFR BLD AUTO: 57 %
NRBC # BLD AUTO: 0 10E3/UL
NRBC BLD AUTO-RTO: 0 /100
PLATELET # BLD AUTO: 172 10E3/UL (ref 150–450)
POTASSIUM BLD-SCNC: 3.8 MMOL/L (ref 3.4–5.3)
PROT SERPL-MCNC: 7.4 G/DL (ref 6.8–8.8)
RBC # BLD AUTO: 4.34 10E6/UL (ref 4.4–5.9)
RSV RNA SPEC NAA+PROBE: NEGATIVE
SARS-COV-2 RNA RESP QL NAA+PROBE: POSITIVE
SODIUM SERPL-SCNC: 136 MMOL/L (ref 133–144)
WBC # BLD AUTO: 6.6 10E3/UL (ref 4–11)

## 2022-02-06 PROCEDURE — 99284 EMERGENCY DEPT VISIT MOD MDM: CPT | Mod: 25

## 2022-02-06 PROCEDURE — 83690 ASSAY OF LIPASE: CPT | Performed by: STUDENT IN AN ORGANIZED HEALTH CARE EDUCATION/TRAINING PROGRAM

## 2022-02-06 PROCEDURE — 99284 EMERGENCY DEPT VISIT MOD MDM: CPT | Performed by: STUDENT IN AN ORGANIZED HEALTH CARE EDUCATION/TRAINING PROGRAM

## 2022-02-06 PROCEDURE — 71045 X-RAY EXAM CHEST 1 VIEW: CPT

## 2022-02-06 PROCEDURE — C9803 HOPD COVID-19 SPEC COLLECT: HCPCS

## 2022-02-06 PROCEDURE — 87651 STREP A DNA AMP PROBE: CPT | Performed by: STUDENT IN AN ORGANIZED HEALTH CARE EDUCATION/TRAINING PROGRAM

## 2022-02-06 PROCEDURE — 87637 SARSCOV2&INF A&B&RSV AMP PRB: CPT | Performed by: STUDENT IN AN ORGANIZED HEALTH CARE EDUCATION/TRAINING PROGRAM

## 2022-02-06 PROCEDURE — 85014 HEMATOCRIT: CPT | Performed by: STUDENT IN AN ORGANIZED HEALTH CARE EDUCATION/TRAINING PROGRAM

## 2022-02-06 PROCEDURE — 82248 BILIRUBIN DIRECT: CPT | Performed by: STUDENT IN AN ORGANIZED HEALTH CARE EDUCATION/TRAINING PROGRAM

## 2022-02-06 PROCEDURE — 80053 COMPREHEN METABOLIC PANEL: CPT | Performed by: STUDENT IN AN ORGANIZED HEALTH CARE EDUCATION/TRAINING PROGRAM

## 2022-02-06 PROCEDURE — 36415 COLL VENOUS BLD VENIPUNCTURE: CPT | Performed by: STUDENT IN AN ORGANIZED HEALTH CARE EDUCATION/TRAINING PROGRAM

## 2022-02-06 PROCEDURE — 85041 AUTOMATED RBC COUNT: CPT | Performed by: STUDENT IN AN ORGANIZED HEALTH CARE EDUCATION/TRAINING PROGRAM

## 2022-02-06 NOTE — DISCHARGE INSTRUCTIONS
Return to the emergency department for worsening symptoms or new concerning symptoms, follow-up with your primary care provider as needed, ibuprofen and Tylenol for pain control and fever control.  Continue symptomatic management for your sore throat.  It will get better in time.

## 2022-02-06 NOTE — ED TRIAGE NOTES
Pt in for evaluation of abdominal pain, cough, shortness of breath, sore throat and fever. States diagnosed with bronchitis 3 weeks ago, sx improved after abx and steroids. Over the last week more short of breath and feeling much worse. covid negative 3 weeks ago. Has not been tested since.

## 2022-02-06 NOTE — ED NOTES
"Pt discharge instructions reviewed with the patient.  Pt was informed to isolate, maintain fluid intake, and to take tylenol/ibuprofen at needed for sx.  Pt was also informed he could check into monoclonal antibody treatment with the pharmacy. Pt appeared upset that we were unable to fix his swollen throat which is causing his difficulty breathing.  Pt was informed to come back if he has worsening SOB. \"Well that's why I'm here.\"  \"We are all going to die someday.\"  Pt walked out of room and off the unit.  "

## 2022-02-06 NOTE — ED NOTES
Face to face report given with opportunity to observe patient.    Report given to Niecy Castro RN   2/6/2022  11:07 AM

## 2022-02-06 NOTE — ED PROVIDER NOTES
History     Chief Complaint   Patient presents with     Pharyngitis     Shortness of Breath     Abdominal Pain     HPI  Rodney Goodwin is a 61 year old male past medical history of COPD, GERD, polysubstance abuse, personality disorder presents to the emergency department today complaining of a sore throat.  He states that he had negative Covid test 3 weeks ago and was treated for bronchitis at the time, felt like his cough and shortness of breath had improved but have not completely gone away since then.  He states that over the past 5 or so days he is now developed headache, loss of taste, loss of smell, nausea, sore throat.  He states is difficult to swallow because of his sore throat, he has no other complaints at this time.  He does state that he has not been vaccinated against COVID, notes that he is couch surfing right now and is homeless.  Denies neck stiffness.  States he has been using over-the-counter medications like ibuprofen, Tylenol, Excedrin help with his symptoms and the last time he took some was this morning.    Allergies:  Allergies   Allergen Reactions     Codeine      Penicillins        Problem List:    Patient Active Problem List    Diagnosis Date Noted     COPD (chronic obstructive pulmonary disease) (H) 04/28/2020     Priority: Medium     Suicidal behavior 04/27/2020     Priority: Medium     Gastroesophageal reflux disease without esophagitis 04/27/2020     Priority: Medium     Penile lesion 04/27/2020     Priority: Medium     Hypertension      Priority: Medium     Elevated blood sugar 10/29/2016     Priority: Medium     Narcotic addiction (H) 10/29/2016     Priority: Medium     Heroin abuse (H) 09/15/2015     Priority: Medium     Costochondritis 02/26/2015     Priority: Medium     Venous insufficiency of both lower extremities 02/26/2015     Priority: Medium     Do you wish to do the replacement in the background? yes         Tobacco dependence 01/06/2015     Priority: Medium     Cluster  B personality disorder (H) 12/25/2014     Priority: Medium     Vs cluster B traits per [facility] records 11/2014       Polysubstance abuse (H) 12/25/2014     Priority: Medium     Heroin, benzodiazepines, amphetamine, prescription opiates, THC abuse. Dealing drugs, IVDU as recently as 11/2014.       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     Priority: Medium     Cellulitis and abscess of forearm 04/04/2014     Priority: Medium     IV drug user 08/09/2010     Priority: Medium     Overview:   IMO Update 10/11       Chlordiazepoxide dependence (H) 08/09/2010     Priority: Medium     Overview:   IMO Update 10/11       Heroin addiction (H) 08/09/2010     Priority: Medium     Overview:   See social notes  IMO Update 10/11       Osteoarthrosis, pelvic region and thigh 11/13/2008     Priority: Medium     Overview:   IMO Update 10/11       Lumbosacral spondylosis without myelopathy 11/13/2008     Priority: Medium     Backache 06/09/2008     Priority: Medium     Overview:   IMO Update 10/11       Degeneration of lumbar or lumbosacral intervertebral disc 07/25/2006     Priority: Medium     Overview:   IMO Update 10/11          Past Medical History:    Past Medical History:   Diagnosis Date     Acute exacerbation of chronic obstructive pulmonary disease (H) 10/28/2016     Anxiety      Backache 6/9/2008     Bipolar 1 disorder (H) 11/11/2014     Bipolar affective disorder (H)      Cellulitis and abscess of forearm 4/4/2014     Chlordiazepoxide dependence (H) 8/9/2010     Chronic constipation      COPD exacerbation (H) 9/14/2015     Costochondritis 2/26/2015     Degeneration of lumbar or lumbosacral intervertebral disc 7/25/2006     Depressive disorder      Drug abuse (H)      Elevated blood sugar 10/29/2016     Heroin abuse (H) 9/15/2015     Heroin addiction (H) 8/9/2010     Hypertension      IV drug user 8/9/2010      Lumbosacral spondylosis without myelopathy 11/13/2008     Narcotic addiction (H) 10/29/2016     Opioid use disorder, severe, dependence (H) 2020     Osteoarthrosis, pelvic region and thigh 11/13/2008     Penile lesion 4/27/2020     Suicidal behavior 4/27/2020     Venous insufficiency of both lower extremities 2/26/2015       Past Surgical History:    Past Surgical History:   Procedure Laterality Date     ARTHROSCOPY KNEE BILATERAL       COLONOSCOPY  01/29/2020    Multiple, repeat due 2015     COLONOSCOPY N/A 7/31/2015    Procedure: COLONOSCOPY;  Surgeon: Justin Andujar MD;  Location: [facility]     DECOMPRESSION CUBITAL TUNNEL Left 11/21/2017    Procedure: DECOMPRESSION CUBITAL TUNNEL;;  Surgeon: Jhonny Zhao MD;  Location: [facility]     DENTAL SURGERY       HERNIA REPAIR      Inguinal, left     RELEASE CARPAL TUNNEL Left 11/21/2017    Procedure: RELEASE CARPAL TUNNEL;  LEFT ELBOW CUBITAL and CARPAL TUNNEL RELEASE;  Surgeon: Jhonny Zhao MD;  Location: [facility]       Family History:    Family History   Problem Relation Age of Onset     Diabetes Mother      Cerebrovascular Disease Father      Pancreatic Cancer Brother        Social History:  Marital Status:   [4]  Social History     Tobacco Use     Smoking status: Current Some Day Smoker     Packs/day: 1.00     Years: 30.00     Pack years: 30.00     Types: Cigarettes     Smokeless tobacco: Never Used     Tobacco comment: Tried to Quit (NO)   Substance Use Topics     Alcohol use: No     Alcohol/week: 0.0 standard drinks     Drug use: Not Currently     Types: IV, Methamphetamines        Medications:    ASPIRIN LOW DOSE 81 MG EC tablet  atenolol (TENORMIN) 100 MG tablet  Ipratropium-Albuterol (COMBIVENT RESPIMAT)  MCG/ACT inhaler  LINZESS 290 MCG capsule  lithium ER (LITHOBID) 300 MG CR tablet  naloxone (NARCAN) 4 MG/0.1ML nasal spray  OLANZapine (ZYPREXA) 5 MG tablet  pantoprazole (PROTONIX) 40 MG EC tablet  polyethylene glycol (MIRALAX/GLYCOLAX)  powder  simvastatin (ZOCOR) 20 MG tablet  sucralfate (CARAFATE) 1 GM tablet  triamcinolone (KENALOG) 0.1 % external ointment  VENTOLIN  (90 Base) MCG/ACT inhaler          Review of Systems  A complete review of systems was performed and is otherwise negative.     Physical Exam   BP: 175/93  Pulse: 72  Temp: 97.5  F (36.4  C)  Resp: 22  SpO2: 96 %      Physical Exam  Constitutional: Alert and conversant. NAD   HENT: NCAT, pharyngeal erythmea  Eyes: Normal pupils   Neck: supple   CV: Normal rate, regular rhythm, no murmur   Pulmonary/Chest: Non-labored respirations, clear to auscultation bilaterally   Abdominal: Soft, non-tender, non-distended   MSK: JORDAN.   Neuro: Alert and appropriate   Skin: Warm and dry. No diaphoresis. No rashes on exposed skin    Psych: Appropriate mood and affect     ED Course              ED Course as of 02/06/22 1219   Sun Feb 06, 2022   1123 Differential includes but is not limited to appendicitis, cholecystitis, pancreatitis, nephrolithiasis, gastroesophageal reflux disease, gastritis, pyelonephritis, urinary tract infection, testicular torsion, mesenteric ischemia, strangulated hernia, aortic aneurysm.   But with the presence of headache, cough, fevers, shortness of breath, loss of taste and smell, certainly very suspicious for Covid infection.  We will check that as well as a strep throat.  He does have enlarged tonsils but no exudates.  Vitals all look normal, afebrile but he did take an antipyretic before arriving.  Benign abdominal exam, doubt surgical intra-abdominal pathology.  He is complaining of swelling and pain in his left neck, likely lymph nodes there appears to be some tender swelling, I have considered the possibility of an abscess, however, no white blood cell count elevated, and no fever or signs of sepsis.  This is likely adenopathy related to viral infection.  He feels like he has difficulty breathing because his throat closes up when he lies flat.  Would consider  CT scan if Covid is negative   1217 SARS CoV2 PCR(!): Positive  Explains the sore throat   1217 Patient appropriate for further outpatient management discharged in stable condition with all questions answered return precautions given.     Procedures              Results for orders placed or performed during the hospital encounter of 02/06/22 (from the past 24 hour(s))   CBC with Platelets & Differential    Narrative    The following orders were created for panel order CBC with Platelets & Differential.  Procedure                               Abnormality         Status                     ---------                               -----------         ------                     CBC with platelets and d...[[id number]]  Abnormal            Final result                 Please view results for these tests on the individual orders.   Basic metabolic panel   Result Value Ref Range    Sodium 136 133 - 144 mmol/L    Potassium 3.8 3.4 - 5.3 mmol/L    Chloride 104 94 - 109 mmol/L    Carbon Dioxide (CO2) 25 20 - 32 mmol/L    Anion Gap 7 3 - 14 mmol/L    Urea Nitrogen 17 7 - 30 mg/dL    Creatinine 1.08 0.66 - 1.25 mg/dL    Calcium 8.7 8.5 - 10.1 mg/dL    Glucose 150 (H) 70 - 99 mg/dL    GFR Estimate 79 >60 mL/min/1.73m2   Hepatic function panel   Result Value Ref Range    Bilirubin Total 0.2 0.2 - 1.3 mg/dL    Bilirubin Direct <0.1 0.0 - 0.2 mg/dL    Protein Total 7.4 6.8 - 8.8 g/dL    Albumin 3.5 3.4 - 5.0 g/dL    Alkaline Phosphatase 143 40 - 150 U/L    AST 23 0 - 45 U/L    ALT 29 0 - 70 U/L   Lipase   Result Value Ref Range    Lipase 134 73 - 393 U/L   CBC with platelets and differential   Result Value Ref Range    WBC Count 6.6 4.0 - 11.0 10e3/uL    RBC Count 4.34 (L) 4.40 - 5.90 10e6/uL    Hemoglobin 13.9 13.3 - 17.7 g/dL    Hematocrit 40.7 40.0 - 53.0 %    MCV 94 78 - 100 fL    MCH 32.0 26.5 - 33.0 pg    MCHC 34.2 31.5 - 36.5 g/dL    RDW 11.8 10.0 - 15.0 %    Platelet Count 172 150 - 450 10e3/uL    % Neutrophils 57 %    %  Lymphocytes 28 %    % Monocytes 11 %    % Eosinophils 2 %    % Basophils 1 %    % Immature Granulocytes 1 %    NRBCs per 100 WBC 0 <1 /100    Absolute Neutrophils 3.9 1.6 - 8.3 10e3/uL    Absolute Lymphocytes 1.9 0.8 - 5.3 10e3/uL    Absolute Monocytes 0.7 0.0 - 1.3 10e3/uL    Absolute Eosinophils 0.1 0.0 - 0.7 10e3/uL    Absolute Basophils 0.0 0.0 - 0.2 10e3/uL    Absolute Immature Granulocytes 0.0 <=0.4 10e3/uL    Absolute NRBCs 0.0 10e3/uL   Extra Tube    Narrative    The following orders were created for panel order Extra Tube.  Procedure                               Abnormality         Status                     ---------                               -----------         ------                     Extra Blue Top Tube[[id number]]                              Final result               Extra Serum Separator Tu...[[id number]]                      Final result               Extra Green Top (Lithium...[[id number]]                      Final result                 Please view results for these tests on the individual orders.   Extra Blue Top Tube   Result Value Ref Range    Hold Specimen JIC    Extra Serum Separator Tube (SST)   Result Value Ref Range    Hold Specimen JIC    Extra Green Top (Lithium Heparin) ON ICE   Result Value Ref Range    Hold Specimen done    Symptomatic; Unknown Influenza A/B & SARS-CoV2 (COVID-19) Virus PCR Multiplex Nasopharyngeal    Specimen: Nasopharyngeal; Swab   Result Value Ref Range    Influenza A PCR Negative Negative    Influenza B PCR Negative Negative    RSV PCR Negative Negative    SARS CoV2 PCR Positive (A) Negative    Narrative    Testing was performed using the Xpert Xpress CoV2/Flu/RSV Assay on the [employer]pert Instrument. This test should be ordered for the detection of SARS-CoV-2 and influenza viruses in individuals who meet clinical and/or epidemiological criteria. Test performance is unknown in asymptomatic patients. This test is for in vitro diagnostic use under the FDA  EUA for laboratories certified under CLIA to perform high or moderate complexity testing. This test has not been FDA cleared or approved. A negative result does not rule out the presence of PCR inhibitors in the specimen or target RNA in concentration below the limit of detection for the assay. If only one viral target is positive but coinfection with multiple targets is suspected, the sample should be re-tested with another FDA cleared, approved, or authorized test, if coinfection would change clinical management. This test was validated by the [facility]. These laboratories are certified under the Clinical  Laboratory Improvement Amendments of 1988 (CLIA-88) as qualified to perform high complexity laboratory testing.   XR Chest Port 1 View    Narrative    Procedure:XR CHEST PORT 1 VIEW    Clinical history:Male, 61 years, SOB    Technique: Single view was obtained.    Comparison: 3/12/2018    Findings: The cardiac silhouette is normal. The pulmonary vasculature  is normal.    The lungs demonstrates slight increase in density at the left lung  base, partially silhouetting left hemidiaphragm. Bony structures are  unremarkable.      Impression    Impression:   Slight increase in density at the left lung base suggesting subtle  pneumonia and/or atelectasis.     This report is in agreement with the preliminary report.    KAELA ANN MD         SYSTEM ID:  [id number]   Group A Streptococcus PCR Throat Swab    Specimen: Throat; Swab   Result Value Ref Range    Group A strep by PCR Not Detected Not Detected    Narrative    The Xpert Xpress Strep A test, performed on the [employer], is a rapid, qualitative in vitro diagnostic test for the detection of Streptococcus pyogenes (Group A ß-hemolytic Streptococcus, Strep A) in throat swab specimens from patients with signs and symptoms of pharyngitis. The Xpert Xpress Strep A test can be used as an aid in the diagnosis of Group A  Streptococcal pharyngitis. The assay is not intended to monitor treatment for Group A Streptococcus infections. The Xpert Xpress Strep A test utilizes an automated real-time polymerase chain reaction (PCR) to detect Streptococcus pyogenes DNA.       Medications - No data to display    Assessments & Plan (with Medical Decision Making)     I have reviewed the nursing notes.    I have reviewed the findings, diagnosis, plan and need for follow up with the patient.    New Prescriptions    No medications on file       Final diagnoses:   Infection due to 2019 novel coronavirus       2/6/2022   [facility]     Paras Bess MD  02/06/22 [id number]

## 2022-06-17 ENCOUNTER — APPOINTMENT (OUTPATIENT)
Dept: GENERAL RADIOLOGY | Facility: HOSPITAL | Age: 62
End: 2022-06-17
Attending: STUDENT IN AN ORGANIZED HEALTH CARE EDUCATION/TRAINING PROGRAM
Payer: MEDICARE

## 2022-06-17 ENCOUNTER — HOSPITAL ENCOUNTER (EMERGENCY)
Facility: HOSPITAL | Age: 62
Discharge: HOME | End: 2022-06-17
Attending: STUDENT IN AN ORGANIZED HEALTH CARE EDUCATION/TRAINING PROGRAM | Admitting: STUDENT IN AN ORGANIZED HEALTH CARE EDUCATION/TRAINING PROGRAM
Payer: MEDICARE

## 2022-06-17 VITALS
DIASTOLIC BLOOD PRESSURE: 79 MMHG | HEART RATE: 59 BPM | BODY MASS INDEX: 28.94 KG/M2 | RESPIRATION RATE: 16 BRPM | WEIGHT: 195.99 LBS | TEMPERATURE: 98 F | OXYGEN SATURATION: 93 % | SYSTOLIC BLOOD PRESSURE: 143 MMHG

## 2022-06-17 DIAGNOSIS — J44.1 COPD EXACERBATION (H): ICD-10-CM

## 2022-06-17 DIAGNOSIS — R06.02 SHORTNESS OF BREATH: ICD-10-CM

## 2022-06-17 LAB
ANION GAP SERPL CALCULATED.3IONS-SCNC: 3 MMOL/L (ref 3–14)
BASO+EOS+MONOS # BLD AUTO: 0 10E3/UL (ref 0–2.2)
BASO+EOS+MONOS NFR BLD AUTO: 0 %
BASOPHILS # BLD AUTO: 0.1 10E3/UL (ref 0–0.2)
BASOPHILS NFR BLD AUTO: 1 %
BUN SERPL-MCNC: 17 MG/DL (ref 7–30)
CALCIUM SERPL-MCNC: 8.8 MG/DL (ref 8.5–10.1)
CHLORIDE BLD-SCNC: 108 MMOL/L (ref 94–109)
CO2 SERPL-SCNC: 27 MMOL/L (ref 20–32)
CREAT SERPL-MCNC: 1 MG/DL (ref 0.66–1.25)
EOSINOPHIL # BLD AUTO: 0.2 10E3/UL (ref 0–0.7)
EOSINOPHIL NFR BLD AUTO: 3 %
ERYTHROCYTE [DISTWIDTH] IN BLOOD BY AUTOMATED COUNT: 12.3 % (ref 10–15)
FLUAV RNA SPEC QL NAA+PROBE: NEGATIVE
FLUBV RNA RESP QL NAA+PROBE: NEGATIVE
GFR SERPL CREATININE-BSD FRML MDRD: 86 ML/MIN/1.73M2
GLUCOSE BLD-MCNC: 105 MG/DL (ref 70–99)
GROUP A STREP BY PCR: NOT DETECTED
HCT VFR BLD AUTO: 44.1 % (ref 40–53)
HGB BLD-MCNC: 15 G/DL (ref 13.3–17.7)
HOLD SPECIMEN: NORMAL
IMM GRANULOCYTES # BLD: 0 10E3/UL
IMM GRANULOCYTES NFR BLD: 0 %
LYMPHOCYTES # BLD AUTO: 2.4 10E3/UL (ref 0.8–5.3)
LYMPHOCYTES NFR BLD AUTO: 31 %
MCH RBC QN AUTO: 32.2 PG (ref 26.5–33)
MCHC RBC AUTO-ENTMCNC: 34 G/DL (ref 31.5–36.5)
MCV RBC AUTO: 95 FL (ref 78–100)
MONOCYTES # BLD AUTO: 0.8 10E3/UL (ref 0–1.3)
MONOCYTES NFR BLD AUTO: 11 %
NEUTROPHILS # BLD AUTO: 4.3 10E3/UL (ref 1.6–8.3)
NEUTROPHILS NFR BLD AUTO: 55 %
NRBC # BLD AUTO: 0 10E3/UL
NRBC BLD AUTO-RTO: 0 /100
PLATELET # BLD AUTO: 141 10E3/UL (ref 150–450)
POTASSIUM BLD-SCNC: 3.8 MMOL/L (ref 3.4–5.3)
PROCALCITONIN SERPL-MCNC: <0.05 NG/ML
RBC # BLD AUTO: 4.66 10E6/UL (ref 4.4–5.9)
RSV RNA SPEC NAA+PROBE: NEGATIVE
SARS-COV-2 RNA RESP QL NAA+PROBE: NEGATIVE
SODIUM SERPL-SCNC: 138 MMOL/L (ref 133–144)
WBC # BLD AUTO: 7.8 10E3/UL (ref 4–11)

## 2022-06-17 PROCEDURE — 93005 ELECTROCARDIOGRAM TRACING: CPT

## 2022-06-17 PROCEDURE — 85025 COMPLETE CBC W/AUTO DIFF WBC: CPT | Performed by: STUDENT IN AN ORGANIZED HEALTH CARE EDUCATION/TRAINING PROGRAM

## 2022-06-17 PROCEDURE — 87651 STREP A DNA AMP PROBE: CPT | Performed by: STUDENT IN AN ORGANIZED HEALTH CARE EDUCATION/TRAINING PROGRAM

## 2022-06-17 PROCEDURE — C9803 HOPD COVID-19 SPEC COLLECT: HCPCS

## 2022-06-17 PROCEDURE — 36415 COLL VENOUS BLD VENIPUNCTURE: CPT | Performed by: STUDENT IN AN ORGANIZED HEALTH CARE EDUCATION/TRAINING PROGRAM

## 2022-06-17 PROCEDURE — 82310 ASSAY OF CALCIUM: CPT | Performed by: STUDENT IN AN ORGANIZED HEALTH CARE EDUCATION/TRAINING PROGRAM

## 2022-06-17 PROCEDURE — 84145 PROCALCITONIN (PCT): CPT | Performed by: STUDENT IN AN ORGANIZED HEALTH CARE EDUCATION/TRAINING PROGRAM

## 2022-06-17 PROCEDURE — 258N000003 HC RX IP 258 OP 636: Performed by: STUDENT IN AN ORGANIZED HEALTH CARE EDUCATION/TRAINING PROGRAM

## 2022-06-17 PROCEDURE — 71045 X-RAY EXAM CHEST 1 VIEW: CPT

## 2022-06-17 PROCEDURE — 96360 HYDRATION IV INFUSION INIT: CPT

## 2022-06-17 PROCEDURE — 250N000012 HC RX MED GY IP 250 OP 636 PS 637: Performed by: STUDENT IN AN ORGANIZED HEALTH CARE EDUCATION/TRAINING PROGRAM

## 2022-06-17 PROCEDURE — 99284 EMERGENCY DEPT VISIT MOD MDM: CPT | Performed by: STUDENT IN AN ORGANIZED HEALTH CARE EDUCATION/TRAINING PROGRAM

## 2022-06-17 PROCEDURE — 250N000013 HC RX MED GY IP 250 OP 250 PS 637: Performed by: STUDENT IN AN ORGANIZED HEALTH CARE EDUCATION/TRAINING PROGRAM

## 2022-06-17 PROCEDURE — 250N000009 HC RX 250: Performed by: STUDENT IN AN ORGANIZED HEALTH CARE EDUCATION/TRAINING PROGRAM

## 2022-06-17 PROCEDURE — 87637 SARSCOV2&INF A&B&RSV AMP PRB: CPT | Performed by: STUDENT IN AN ORGANIZED HEALTH CARE EDUCATION/TRAINING PROGRAM

## 2022-06-17 PROCEDURE — 93010 ELECTROCARDIOGRAM REPORT: CPT | Performed by: INTERNAL MEDICINE

## 2022-06-17 PROCEDURE — 99285 EMERGENCY DEPT VISIT HI MDM: CPT | Mod: 25

## 2022-06-17 PROCEDURE — 94640 AIRWAY INHALATION TREATMENT: CPT

## 2022-06-17 PROCEDURE — 999N000157 HC STATISTIC RCP TIME EA 10 MIN

## 2022-06-17 RX ORDER — AZITHROMYCIN 250 MG/1
500 TABLET, FILM COATED ORAL ONCE
Status: COMPLETED | OUTPATIENT
Start: 2022-06-17 | End: 2022-06-17

## 2022-06-17 RX ORDER — PREDNISONE 20 MG/1
60 TABLET ORAL ONCE
Status: COMPLETED | OUTPATIENT
Start: 2022-06-17 | End: 2022-06-17

## 2022-06-17 RX ORDER — IPRATROPIUM BROMIDE AND ALBUTEROL SULFATE 2.5; .5 MG/3ML; MG/3ML
1 SOLUTION RESPIRATORY (INHALATION) EVERY 4 HOURS PRN
Qty: 75 ML | Refills: 0 | Status: SHIPPED | OUTPATIENT
Start: 2022-06-17 | End: 2022-06-27

## 2022-06-17 RX ORDER — AZITHROMYCIN 250 MG/1
TABLET, FILM COATED ORAL
Qty: 6 TABLET | Refills: 0 | Status: SHIPPED | OUTPATIENT
Start: 2022-06-17 | End: 2023-09-05

## 2022-06-17 RX ORDER — PREDNISONE 20 MG/1
40 TABLET ORAL DAILY
Qty: 10 TABLET | Refills: 0 | Status: SHIPPED | OUTPATIENT
Start: 2022-06-17 | End: 2022-06-22

## 2022-06-17 RX ORDER — IPRATROPIUM BROMIDE AND ALBUTEROL SULFATE 2.5; .5 MG/3ML; MG/3ML
3 SOLUTION RESPIRATORY (INHALATION)
Status: COMPLETED | OUTPATIENT
Start: 2022-06-17 | End: 2022-06-17

## 2022-06-17 RX ADMIN — IPRATROPIUM BROMIDE AND ALBUTEROL SULFATE 3 ML: .5; 3 SOLUTION RESPIRATORY (INHALATION) at 14:10

## 2022-06-17 RX ADMIN — AZITHROMYCIN MONOHYDRATE 500 MG: 250 TABLET ORAL at 14:04

## 2022-06-17 RX ADMIN — PREDNISONE 60 MG: 20 TABLET ORAL at 14:04

## 2022-06-17 RX ADMIN — IPRATROPIUM BROMIDE AND ALBUTEROL SULFATE 3 ML: .5; 3 SOLUTION RESPIRATORY (INHALATION) at 14:07

## 2022-06-17 RX ADMIN — IPRATROPIUM BROMIDE AND ALBUTEROL SULFATE 3 ML: .5; 3 SOLUTION RESPIRATORY (INHALATION) at 14:08

## 2022-06-17 RX ADMIN — SODIUM CHLORIDE 1000 ML: 9 INJECTION, SOLUTION INTRAVENOUS at 14:04

## 2022-06-17 NOTE — ED TRIAGE NOTES
Patient presents today with complaints of lung pain and cough and trouble catching his breath for two weeks. He is tearful about how miserable he feels. Once he calms down, he sounds better.

## 2022-06-17 NOTE — ED NOTES
Patient has frequent/incessant cough/coughing fits, patients O2 above 96% during fits. Patient reports taking cough syrup with no relief.

## 2022-06-17 NOTE — ED NOTES
Patient reports hx of COPD.  Has been coughing for about 2 weeks and worse the past 5 days.  Had an appointment yesterday but pt reported they cancelled his appointment. Pt is an everyday smoker or cigarettes and does smoke marijuana

## 2022-06-17 NOTE — ED NOTES
Patient provided with take home pulse oximeter. Patient instructed on how to use. Return demo and verbal understanding provided.

## 2022-06-17 NOTE — ED PROVIDER NOTES
History     Chief Complaint   Patient presents with     Shortness of Breath     Cough     HPI  Rodney Goodwin is a 62 year old male hx of COPD (off medications), polysubstance abuse (active) but stopped IV drugs, meth last dose was 4 days ago, smoker, presents to the ED today with 2 wk of cough worsening over the last few days with wheezing. Associated with sore throat, congestion, LUQ pain he thinks I from coughing, subjective fevers and chills, last covid was in February.   Currently homeless.     Allergies:  Allergies   Allergen Reactions     Codeine      Penicillins        Problem List:    Patient Active Problem List    Diagnosis Date Noted     COPD (chronic obstructive pulmonary disease) (H) 04/28/2020     Priority: Medium     Suicidal behavior 04/27/2020     Priority: Medium     Gastroesophageal reflux disease without esophagitis 04/27/2020     Priority: Medium     Penile lesion 04/27/2020     Priority: Medium     Hypertension      Priority: Medium     Elevated blood sugar 10/29/2016     Priority: Medium     Narcotic addiction (H) 10/29/2016     Priority: Medium     Heroin abuse (H) 09/15/2015     Priority: Medium     Costochondritis 02/26/2015     Priority: Medium     Venous insufficiency of both lower extremities 02/26/2015     Priority: Medium     Do you wish to do the replacement in the background? yes         Tobacco dependence 01/06/2015     Priority: Medium     Cluster B personality disorder (H) 12/25/2014     Priority: Medium     Vs cluster B traits per OSH records 11/2014       Polysubstance abuse (H) 12/25/2014     Priority: Medium     Heroin, benzodiazepines, amphetamine, prescription opiates, THC abuse. Dealing drugs, IVDU as recently as 11/2014.       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     Priority: Medium     Cellulitis and abscess of forearm 04/04/2014     Priority: Medium      IV drug user 08/09/2010     Priority: Medium     Overview:   IMO Update 10/11       Chlordiazepoxide dependence (H) 08/09/2010     Priority: Medium     Overview:   IMO Update 10/11       Heroin addiction (H) 08/09/2010     Priority: Medium     Overview:   See social notes  IMO Update 10/11       Osteoarthrosis, pelvic region and thigh 11/13/2008     Priority: Medium     Overview:   IMO Update 10/11       Lumbosacral spondylosis without myelopathy 11/13/2008     Priority: Medium     Backache 06/09/2008     Priority: Medium     Overview:   IMO Update 10/11       Degeneration of lumbar or lumbosacral intervertebral disc 07/25/2006     Priority: Medium     Overview:   IMO Update 10/11          Past Medical History:    Past Medical History:   Diagnosis Date     Acute exacerbation of chronic obstructive pulmonary disease (H) 10/28/2016     Anxiety      Backache 6/9/2008     Bipolar 1 disorder (H) 11/11/2014     Bipolar affective disorder (H)      Cellulitis and abscess of forearm 4/4/2014     Chlordiazepoxide dependence (H) 8/9/2010     Chronic constipation      COPD exacerbation (H) 9/14/2015     Costochondritis 2/26/2015     Degeneration of lumbar or lumbosacral intervertebral disc 7/25/2006     Depressive disorder      Drug abuse (H)      Elevated blood sugar 10/29/2016     Heroin abuse (H) 9/15/2015     Heroin addiction (H) 8/9/2010     Hypertension      IV drug user 8/9/2010     Lumbosacral spondylosis without myelopathy 11/13/2008     Narcotic addiction (H) 10/29/2016     Opioid use disorder, severe, dependence (H) 2020     Osteoarthrosis, pelvic region and thigh 11/13/2008     Penile lesion 4/27/2020     Suicidal behavior 4/27/2020     Venous insufficiency of both lower extremities 2/26/2015       Past Surgical History:    Past Surgical History:   Procedure Laterality Date     ARTHROSCOPY KNEE BILATERAL       COLONOSCOPY  01/29/2020    Multiple, repeat due 2015     COLONOSCOPY N/A 7/31/2015    Procedure:  COLONOSCOPY;  Surgeon: Justin Andujar MD;  Location: [facility]     DECOMPRESSION CUBITAL TUNNEL Left 11/21/2017    Procedure: DECOMPRESSION CUBITAL TUNNEL;;  Surgeon: Jhonny Zhao MD;  Location: [facility]     DENTAL SURGERY       HERNIA REPAIR      Inguinal, left     RELEASE CARPAL TUNNEL Left 11/21/2017    Procedure: RELEASE CARPAL TUNNEL;  LEFT ELBOW CUBITAL and CARPAL TUNNEL RELEASE;  Surgeon: Jhonny Zhao MD;  Location: [facility]       Family History:    Family History   Problem Relation Age of Onset     Diabetes Mother      Cerebrovascular Disease Father      Pancreatic Cancer Brother        Social History:  Marital Status:   [4]  Social History     Tobacco Use     Smoking status: Current Some Day Smoker     Packs/day: 1.00     Years: 30.00     Pack years: 30.00     Types: Cigarettes     Smokeless tobacco: Never Used     Tobacco comment: Tried to Quit (NO)   Substance Use Topics     Alcohol use: No     Alcohol/week: 0.0 standard drinks     Drug use: Not Currently     Types: IV, Methamphetamines        Medications:    azithromycin (ZITHROMAX) 250 MG tablet  ipratropium - albuterol 0.5 mg/2.5 mg/3 mL (DUONEB) 0.5-2.5 (3) MG/3ML neb solution  predniSONE (DELTASONE) 20 MG tablet  ASPIRIN LOW DOSE 81 MG EC tablet  atenolol (TENORMIN) 100 MG tablet  Ipratropium-Albuterol (COMBIVENT RESPIMAT)  MCG/ACT inhaler  LINZESS 290 MCG capsule  lithium ER (LITHOBID) 300 MG CR tablet  naloxone (NARCAN) 4 MG/0.1ML nasal spray  OLANZapine (ZYPREXA) 5 MG tablet  pantoprazole (PROTONIX) 40 MG EC tablet  polyethylene glycol (MIRALAX/GLYCOLAX) powder  simvastatin (ZOCOR) 20 MG tablet  sucralfate (CARAFATE) 1 GM tablet  triamcinolone (KENALOG) 0.1 % external ointment  VENTOLIN  (90 Base) MCG/ACT inhaler          Review of Systems  A complete review of systems was performed and is otherwise negative.     Physical Exam   BP: 168/96  Pulse: 63  Temp: 97.6  F (36.4  C)  Resp: 22  Weight: 88.9 kg (195 lb 15.8 oz)  SpO2: 97  %      Physical Exam  Constitutional: Alert and conversant. NAD   HENT: NCAT   Eyes: Normal pupils   Neck: supple   CV: Normal rate, regular rhythm, no murmur   Pulmonary/Chest: Some mild increased work of breathing but very little accessory muscle usage, diffuse expiratory wheezing  Abdominal: Soft, non-tender, non-distended   MSK: JORDAN.   Neuro: Alert and appropriate   Skin: Warm and dry. No diaphoresis. No rashes on exposed skin    Psych: Appropriate mood and affect     ED Course              ED Course as of 06/17/22 1458   Fri Jun 17, 2022   1246 Ddx includes but is not limited to URI, COPD, endocarditis, PNA, CHF (pulmonary edema, pleural effusion) empyema, PE, ptx, ACS (unlikely given hx and exam), aortic dissection (but no signifncant CP to support the diagnosis)   1252 Mostly likely diagnosis based on presentation is PNA, COPD, CHF.    1401 With the significant wheezing, this seems most consistent with COPD, no pleural effusions pulmonary edema or lower extremity swelling to suggest CHF.  No specific indication at this time further laboratory work-up regarding that, no leukocytosis to support pneumonia, pending read on the chest x-ray although there is no frankly obvious consolidations that I can see   1402 Given the increased sputum production worsening fevers of the last few days appropriate for azithromycin treatment for his COPD exacerbation, duo nebs and prednisone also ordered   1402 After treatment with prednisone azithromycin and 3 DuoNeb's, significant improvement in respiratory status, he is no longer breathing using any accessory muscles, while there is still some wheezing, the airflow that I can hear in his lungs is improved throughout.  No leukocytosis to suggest a pneumonia, chest x-ray without concerning or acute findings.  Doubt PE in his case based on his symptoms and presentation.  Negative viral panel.  Strep throat not detected likely postnasal drip, no concerning findings in side of his  oropharynx, no peritonsillar abscess, no neck stiffness to suggest retropharyngeal abscess, nothing obvious on his chest x-ray to suggest a bacterial tracheitis.   1458 Patient appropriate for further outpatient management oxygenating 98% on room air, discharged in stable condition all question answered return precautions given with prednisone azithromycin and DuoNeb's for home close primary care follow-up recommended.  He did receive 1 L normal saline bolus for rehydration     Procedures         Results for orders placed or performed during the hospital encounter of 06/17/22 (from the past 24 hour(s))   Symptomatic; Unknown Influenza A/B & SARS-CoV2 (COVID-19) Virus PCR Multiplex Nasopharyngeal    Specimen: Nasopharyngeal; Swab   Result Value Ref Range    Influenza A PCR Negative Negative    Influenza B PCR Negative Negative    RSV PCR Negative Negative    SARS CoV2 PCR Negative Negative    Narrative    Testing was performed using the Xpert Xpress CoV2/Flu/RSV Assay on the [employer]pert Instrument. This test should be ordered for the detection of SARS-CoV-2 and influenza viruses in individuals who meet clinical and/or epidemiological criteria. Test performance is unknown in asymptomatic patients. This test is for in vitro diagnostic use under the FDA EUA for laboratories certified under CLIA to perform high or moderate complexity testing. This test has not been FDA cleared or approved. A negative result does not rule out the presence of PCR inhibitors in the specimen or target RNA in concentration below the limit of detection for the assay. If only one viral target is positive but coinfection with multiple targets is suspected, the sample should be re-tested with another FDA cleared, approved, or authorized test, if coinfection would change clinical management. This test was validated by the [facility]. These laboratories are certified under the Clinical  Laboratory Improvement Amendments of  1988 (CLIA-88) as qualified to perform high complexity laboratory testing.   CBC with platelets differential    Narrative    The following orders were created for panel order CBC with platelets differential.  Procedure                               Abnormality         Status                     ---------                               -----------         ------                     CBC with platelets and d...[[id number]]  Abnormal            Final result                 Please view results for these tests on the individual orders.   Basic metabolic panel   Result Value Ref Range    Sodium 138 133 - 144 mmol/L    Potassium 3.8 3.4 - 5.3 mmol/L    Chloride 108 94 - 109 mmol/L    Carbon Dioxide (CO2) 27 20 - 32 mmol/L    Anion Gap 3 3 - 14 mmol/L    Urea Nitrogen 17 7 - 30 mg/dL    Creatinine 1.00 0.66 - 1.25 mg/dL    Calcium 8.8 8.5 - 10.1 mg/dL    Glucose 105 (H) 70 - 99 mg/dL    GFR Estimate 86 >60 mL/min/1.73m2   CBC with platelets and differential   Result Value Ref Range    WBC Count 7.8 4.0 - 11.0 10e3/uL    RBC Count 4.66 4.40 - 5.90 10e6/uL    Hemoglobin 15.0 13.3 - 17.7 g/dL    Hematocrit 44.1 40.0 - 53.0 %    MCV 95 78 - 100 fL    MCH 32.2 26.5 - 33.0 pg    MCHC 34.0 31.5 - 36.5 g/dL    RDW 12.3 10.0 - 15.0 %    Platelet Count 141 (L) 150 - 450 10e3/uL    % Neutrophils 55 %    % Lymphocytes 31 %    % Monocytes 11 %    Mids % (Monos, Eos, Basos) 0 %    % Eosinophils 3 %    % Basophils 1 %    % Immature Granulocytes 0 %    NRBCs per 100 WBC 0 <1 /100    Absolute Neutrophils 4.3 1.6 - 8.3 10e3/uL    Absolute Lymphocytes 2.4 0.8 - 5.3 10e3/uL    Absolute Monocytes 0.8 0.0 - 1.3 10e3/uL    Mids Abs (Monos, Eos, Basos) 0.0 0.0 - 2.2 10e3/uL    Absolute Eosinophils 0.2 0.0 - 0.7 10e3/uL    Absolute Basophils 0.1 0.0 - 0.2 10e3/uL    Absolute Immature Granulocytes 0.0 <=0.4 10e3/uL    Absolute NRBCs 0.0 10e3/uL   Extra Tube    Narrative    The following orders were created for panel order Extra Tube.  Procedure                                Abnormality         Status                     ---------                               -----------         ------                     Extra Blue Top Tube[[id number]]                              Final result               Extra Red Top Tube[[id number]]                               Final result               Extra Heparinized Syringe[[id number]]                        Final result                 Please view results for these tests on the individual orders.   Extra Blue Top Tube   Result Value Ref Range    Hold Specimen JIC    Extra Red Top Tube   Result Value Ref Range    Hold Specimen JIC    Extra Heparinized Syringe   Result Value Ref Range    Hold Specimen Done    Group A Streptococcus PCR Throat Swab    Specimen: Throat; Swab   Result Value Ref Range    Group A strep by PCR Not Detected Not Detected    Narrative    The Xpert Xpress Strep A test, performed on the [employer], is a rapid, qualitative in vitro diagnostic test for the detection of Streptococcus pyogenes (Group A ß-hemolytic Streptococcus, Strep A) in throat swab specimens from patients with signs and symptoms of pharyngitis. The Xpert Xpress Strep A test can be used as an aid in the diagnosis of Group A Streptococcal pharyngitis. The assay is not intended to monitor treatment for Group A Streptococcus infections. The Xpert Xpress Strep A test utilizes an automated real-time polymerase chain reaction (PCR) to detect Streptococcus pyogenes DNA.   XR Chest Port 1 View    Narrative    PROCEDURE:  XR CHEST PORT 1 VIEW    HISTORY:  cough SOB, fevers.     COMPARISON:  None.    FINDINGS:   The cardiac silhouette is normal in size. The pulmonary vasculature is  normal.  There is a nodular density overlying the anterior left sixth  rib. This may represent a nipple shadow. Chest x-ray with nipple  markers would be confirmatory No pleural effusion or pneumothorax.      Impression    IMPRESSION:  Nodular density overlying  the left sixth rib possibly a  nipple shadow. No  pulmonary infiltrates are seen.      GABRIELE MANDUJANO MD         SYSTEM ID:  [id number]       Medications   0.9% sodium chloride BOLUS (1,000 mLs Intravenous New Bag 6/17/22 1404)   predniSONE (DELTASONE) tablet 60 mg (60 mg Oral Given 6/17/22 1404)   ipratropium - albuterol 0.5 mg/2.5 mg/3 mL (DUONEB) neb solution 3 mL (3 mLs Nebulization Given 6/17/22 1410)   azithromycin (ZITHROMAX) tablet 500 mg (500 mg Oral Given 6/17/22 1404)       Assessments & Plan (with Medical Decision Making)     I have reviewed the nursing notes.    I have reviewed the findings, diagnosis, plan and need for follow up with the patient.      New Prescriptions    AZITHROMYCIN (ZITHROMAX) 250 MG TABLET    Two tablets on the first day, then one tablet daily for the next 4 days    IPRATROPIUM - ALBUTEROL 0.5 MG/2.5 MG/3 ML (DUONEB) 0.5-2.5 (3) MG/3ML NEB SOLUTION    Take 1 vial (3 mLs) by nebulization every 4 hours as needed for shortness of breath / dyspnea or wheezing    PREDNISONE (DELTASONE) 20 MG TABLET    Take 2 tablets (40 mg) by mouth daily for 5 days       Final diagnoses:   Shortness of breath   COPD exacerbation (H)       6/17/2022   [facility]     Paras Bess MD  06/17/22 [id number]

## 2022-06-17 NOTE — DISCHARGE INSTRUCTIONS
Return to the emergency department for worsening symptoms or new concerning symptoms, please follow-up with primary care provider within the next week.  Call schedule appointment.  Use not using your nebulizers at least 4 the next 2 to 3 days every 4-6 hours , You can start using them as needed after that.  Take the prednisone burst for the next 5 days.  Use the azithromycin that I have prescribed you, take the entire regimen and completed even if you are starting to feel better.

## 2022-08-09 ENCOUNTER — HOSPITAL ENCOUNTER (EMERGENCY)
Facility: HOSPITAL | Age: 62
Discharge: HOME | End: 2022-08-09
Attending: STUDENT IN AN ORGANIZED HEALTH CARE EDUCATION/TRAINING PROGRAM | Admitting: STUDENT IN AN ORGANIZED HEALTH CARE EDUCATION/TRAINING PROGRAM
Payer: MEDICARE

## 2022-08-09 VITALS
RESPIRATION RATE: 24 BRPM | OXYGEN SATURATION: 97 % | WEIGHT: 198 LBS | HEART RATE: 60 BPM | BODY MASS INDEX: 29.33 KG/M2 | DIASTOLIC BLOOD PRESSURE: 76 MMHG | HEIGHT: 69 IN | SYSTOLIC BLOOD PRESSURE: 124 MMHG | TEMPERATURE: 96.9 F

## 2022-08-09 DIAGNOSIS — J02.9 SORE THROAT: ICD-10-CM

## 2022-08-09 DIAGNOSIS — R07.89 CHEST TIGHTNESS: ICD-10-CM

## 2022-08-09 DIAGNOSIS — R05.9 COUGH: ICD-10-CM

## 2022-08-09 PROCEDURE — 99283 EMERGENCY DEPT VISIT LOW MDM: CPT

## 2022-08-09 PROCEDURE — 93005 ELECTROCARDIOGRAM TRACING: CPT

## 2022-08-09 PROCEDURE — 99284 EMERGENCY DEPT VISIT MOD MDM: CPT | Performed by: STUDENT IN AN ORGANIZED HEALTH CARE EDUCATION/TRAINING PROGRAM

## 2022-08-09 PROCEDURE — 93010 ELECTROCARDIOGRAM REPORT: CPT | Performed by: INTERNAL MEDICINE

## 2022-08-09 ASSESSMENT — ACTIVITIES OF DAILY LIVING (ADL): ADLS_ACUITY_SCORE: 35

## 2022-08-09 NOTE — ED NOTES
Reports he has been having chest tightness, breathing difficulty and cough for a few months. Unable to get in to see PCP until September. Reports he has Hx of being on medications for his COPD but just recently restarted his nebulizer. Last taken neb last night, did not feel it improved his symptoms. Patient states he is homeless and has only been taking his amlodipine for medications and only has 1 left. Feels his chest tightness/ pain has been worse for the past week, lower anterior chest/ epigastric region. Denies fever/ runny nose.

## 2022-08-09 NOTE — ED TRIAGE NOTES
Patient presents with complaints of dry coughing, painful swallowing (R>L) and difficulty breathing.  Patient states that his initial symptoms started 2 weeks ago.  States for the past week symptoms have been getting worse and not better.  Has been trying to get into the clinic but was unable x 1 month;

## 2022-08-09 NOTE — ED PROVIDER NOTES
History     Chief Complaint   Patient presents with     Pharyngitis     Breathing Problem     Chest Pain     HPI  Rodney Goodwin is a 62 year old male with PMH COPD, GERD, Polysubstance abuse, bipolar disorder, back pain, presenting with multiple problems. Primary complain is a sore throat. Seems worse in the mornings. Better later in the day. Ongoing for several months, worse over the past 2 weeks. Not short of breath but states he does feel like his chest is tight. Still able to eat/drink.  He has not taken any Tylenol or ibuprofen.  He sometimes states that the left hurts worse than the right, but other days the right seems to hurt more than the left.  On further questioning, he also notes that he has had a dry cough for several months, worse over the past several weeks.  He is unsure if it is worse in the morning or at night.  He does not have any increased shortness of breath.  He is supposed to be using a nebulizer for COPD, and has it, but it has not provided relief of his cough/sore throat.  He initially told the nurse he had chest pain, but denied any chest pain to me. He then stated that he did have some chest pain. He has no nausea/vomiting/abdominal pain.  He wanted to get into clinic, but could not get an appointment until September 9, and they told him that if his throat hurt worse he should come to the emergency room. No fevers.    Allergies:  Allergies   Allergen Reactions     Codeine      Penicillins        Problem List:    Patient Active Problem List    Diagnosis Date Noted     COPD (chronic obstructive pulmonary disease) (H) 04/28/2020     Priority: Medium     Suicidal behavior 04/27/2020     Priority: Medium     Gastroesophageal reflux disease without esophagitis 04/27/2020     Priority: Medium     Penile lesion 04/27/2020     Priority: Medium     Hypertension      Priority: Medium     Elevated blood sugar 10/29/2016     Priority: Medium     Narcotic addiction (H) 10/29/2016     Priority:  Medium     Heroin abuse (H) 09/15/2015     Priority: Medium     Costochondritis 02/26/2015     Priority: Medium     Venous insufficiency of both lower extremities 02/26/2015     Priority: Medium     Do you wish to do the replacement in the background? yes         Tobacco dependence 01/06/2015     Priority: Medium     Cluster B personality disorder (H) 12/25/2014     Priority: Medium     Vs cluster B traits per OSH records 11/2014       Polysubstance abuse (H) 12/25/2014     Priority: Medium     Heroin, benzodiazepines, amphetamine, prescription opiates, THC abuse. Dealing drugs, IVDU as recently as 11/2014.       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     Priority: Medium     Cellulitis and abscess of forearm 04/04/2014     Priority: Medium     IV drug user 08/09/2010     Priority: Medium     Overview:   IMO Update 10/11       Chlordiazepoxide dependence (H) 08/09/2010     Priority: Medium     Overview:   IMO Update 10/11       Heroin addiction (H) 08/09/2010     Priority: Medium     Overview:   See social notes  IMO Update 10/11       Osteoarthrosis, pelvic region and thigh 11/13/2008     Priority: Medium     Overview:   IMO Update 10/11       Lumbosacral spondylosis without myelopathy 11/13/2008     Priority: Medium     Backache 06/09/2008     Priority: Medium     Overview:   IMO Update 10/11       Degeneration of lumbar or lumbosacral intervertebral disc 07/25/2006     Priority: Medium     Overview:   IMO Update 10/11          Past Medical History:    Past Medical History:   Diagnosis Date     Acute exacerbation of chronic obstructive pulmonary disease (H) 10/28/2016     Anxiety      Backache 6/9/2008     Bipolar 1 disorder (H) 11/11/2014     Bipolar affective disorder (H)      Cellulitis and abscess of forearm 4/4/2014     Chlordiazepoxide dependence (H) 8/9/2010     Chronic constipation      COPD  exacerbation (H) 9/14/2015     Costochondritis 2/26/2015     Degeneration of lumbar or lumbosacral intervertebral disc 7/25/2006     Depressive disorder      Drug abuse (H)      Elevated blood sugar 10/29/2016     Heroin abuse (H) 9/15/2015     Heroin addiction (H) 8/9/2010     Hypertension      IV drug user 8/9/2010     Lumbosacral spondylosis without myelopathy 11/13/2008     Narcotic addiction (H) 10/29/2016     Opioid use disorder, severe, dependence (H) 2020     Osteoarthrosis, pelvic region and thigh 11/13/2008     Penile lesion 4/27/2020     Suicidal behavior 4/27/2020     Venous insufficiency of both lower extremities 2/26/2015       Past Surgical History:    Past Surgical History:   Procedure Laterality Date     ARTHROSCOPY KNEE BILATERAL       COLONOSCOPY  01/29/2020    Multiple, repeat due 2015     COLONOSCOPY N/A 7/31/2015    Procedure: COLONOSCOPY;  Surgeon: Justin Andujar MD;  Location: [facility]     DECOMPRESSION CUBITAL TUNNEL Left 11/21/2017    Procedure: DECOMPRESSION CUBITAL TUNNEL;;  Surgeon: Jhonny Zhao MD;  Location: [facility]     DENTAL SURGERY       HERNIA REPAIR      Inguinal, left     RELEASE CARPAL TUNNEL Left 11/21/2017    Procedure: RELEASE CARPAL TUNNEL;  LEFT ELBOW CUBITAL and CARPAL TUNNEL RELEASE;  Surgeon: Jhonny Zhao MD;  Location: [facility]       Family History:    Family History   Problem Relation Age of Onset     Diabetes Mother      Cerebrovascular Disease Father      Pancreatic Cancer Brother        Social History:  Marital Status:   [4]  Social History     Tobacco Use     Smoking status: Current Some Day Smoker     Packs/day: 1.00     Years: 30.00     Pack years: 30.00     Types: Cigarettes     Smokeless tobacco: Never Used     Tobacco comment: Tried to Quit (NO)   Substance Use Topics     Alcohol use: No     Alcohol/week: 0.0 standard drinks     Drug use: Not Currently     Types: IV, Methamphetamines        Medications:    ASPIRIN LOW DOSE 81 MG EC tablet  atenolol  "(TENORMIN) 100 MG tablet  azithromycin (ZITHROMAX) 250 MG tablet  ipratropium - albuterol 0.5 mg/2.5 mg/3 mL (DUONEB) 0.5-2.5 (3) MG/3ML neb solution  Ipratropium-Albuterol (COMBIVENT RESPIMAT)  MCG/ACT inhaler  LINZESS 290 MCG capsule  lithium ER (LITHOBID) 300 MG CR tablet  naloxone (NARCAN) 4 MG/0.1ML nasal spray  OLANZapine (ZYPREXA) 5 MG tablet  pantoprazole (PROTONIX) 40 MG EC tablet  polyethylene glycol (MIRALAX/GLYCOLAX) powder  simvastatin (ZOCOR) 20 MG tablet  sucralfate (CARAFATE) 1 GM tablet  triamcinolone (KENALOG) 0.1 % external ointment  VENTOLIN  (90 Base) MCG/ACT inhaler          Review of Systems   All other systems reviewed and are negative.      Physical Exam   BP: 158/85  Pulse: 63  Temp: 96.9  F (36.1  C)  Resp: 24  Height: 175.3 cm (5' 9\")  Weight: 89.8 kg (198 lb)  SpO2: 98 %      Physical Exam  Vitals and nursing note reviewed.   Constitutional:       General: He is not in acute distress.     Appearance: He is well-developed. He is not ill-appearing or toxic-appearing.   HENT:      Head: Normocephalic and atraumatic.      Right Ear: No tenderness. No middle ear effusion.      Left Ear: No tenderness.  No middle ear effusion.      Nose: No congestion or rhinorrhea.      Mouth/Throat:      Mouth: Mucous membranes are moist. No oral lesions.      Pharynx: Oropharynx is clear. Uvula midline. No pharyngeal swelling, oropharyngeal exudate, posterior oropharyngeal erythema or uvula swelling.      Tonsils: No tonsillar exudate or tonsillar abscesses. 0 on the right. 0 on the left.   Eyes:      Conjunctiva/sclera: Conjunctivae normal.      Pupils: Pupils are equal, round, and reactive to light.   Neck:      Thyroid: No thyromegaly.   Cardiovascular:      Rate and Rhythm: Normal rate and regular rhythm.      Heart sounds: Normal heart sounds.   Pulmonary:      Effort: Pulmonary effort is normal. No respiratory distress.      Breath sounds: Normal breath sounds. No stridor. No wheezing. "   Abdominal:      General: Bowel sounds are normal.      Palpations: Abdomen is soft.   Musculoskeletal:      Cervical back: Normal range of motion and neck supple.   Lymphadenopathy:      Cervical: No cervical adenopathy.   Skin:     General: Skin is warm and dry.      Capillary Refill: Capillary refill takes less than 2 seconds.   Neurological:      General: No focal deficit present.      Mental Status: He is alert and oriented to person, place, and time.   Psychiatric:         Mood and Affect: Mood normal.         ED Course         Procedures            EKG Interpretation:      Interpreted by VERA FRANCO MD  Time reviewed: 0900  Symptoms at time of EKG: cough, SOB, sore throat, chest tightness   Rhythm: normal sinus   Rate: normal  Axis: normal  Ectopy: none  Conduction: normal  ST Segments/ T Waves: Non-specific ST-T wave changes  Q Waves: none  Comparison to prior: Unchanged    Clinical Impression: normal EKG       No results found for this or any previous visit (from the past 24 hour(s)).    Medications - No data to display    Assessments & Plan (with Medical Decision Making)     I have reviewed the nursing notes.    Well appearing 62yoM with intermittent sore throat for months, worse over past several weeks. Normal exam without swelling, erythema, fluctuance, induration of the throat. No external submandibular swelling. No evdience of tre's angina. Plan on CXR/Trop/TSH/etc in work-up of CP. No wheezing on exam and normal oxygenation is reassuring. Given the duration, low suspicion for acute infection.    Patient left the ER before any work-up could be obtained. He did not appear upset with care, did not voice any concerns, but just got up, put on his light jacket, and walked out in no distress.    I have reviewed the findings, diagnosis, plan and need for follow up with the patient.    New Prescriptions    No medications on file       Final diagnoses:   Sore throat   Cough   Chest tightness        8/9/2022   [facility]     Anmol Colunga MD  08/09/22 0929

## 2022-08-09 NOTE — ED NOTES
"Patient states \"I just want to go, theres nothing you can do for me\". Asked patient to stay while I speak with the Dr. Noted patient walking out of the department. No AMA form signed by patient. MD notified and aware.   "

## 2022-09-07 ENCOUNTER — MEDICAL CORRESPONDENCE (OUTPATIENT)
Dept: CT IMAGING | Facility: HOSPITAL | Age: 62
End: 2022-09-07

## 2022-09-08 ENCOUNTER — HOSPITAL ENCOUNTER (OUTPATIENT)
Dept: CT IMAGING | Facility: HOSPITAL | Age: 62
Discharge: HOME | End: 2022-09-08
Attending: FAMILY MEDICINE | Admitting: FAMILY MEDICINE
Payer: MEDICARE

## 2022-09-08 DIAGNOSIS — R91.1 SOLITARY PULMONARY NODULE: ICD-10-CM

## 2022-09-08 PROCEDURE — 71250 CT THORAX DX C-: CPT

## 2022-09-14 NOTE — PROGRESS NOTES
"Clinic Care Coordination Contact  Care Team Conversations    Outreach via telephone to pt this date.  Wanted to touch base, as we have not seen him in quite some time for Suboxone.  Also missed the establish care visit with Dr. Carl on 3.2.20.  States his PCP - Dr. Estevez - is back and he wants to stay with him.  States he has been laid up lately due to his abdominal pain.  Quit his Suboxone on 2.20.20.  States he \"does not need to be on anything\".  Offered to make him an appt to talk about it, he declined.  Informed him that if he would like to restart Suboxone, to call CC.  He verbalized understanding.    Nadya Medellin RN-BSN  Clinic Care Coordinator  [phone] opt. #[phone]            " Let pt know that I added her yearly labs to her chart to be done just before her visit in 6 months

## 2023-01-16 ENCOUNTER — HOSPITAL ENCOUNTER (EMERGENCY)
Facility: HOSPITAL | Age: 63
Discharge: LEFT BEFORE DISCHARGE | End: 2023-01-17
Attending: EMERGENCY MEDICINE | Admitting: EMERGENCY MEDICINE
Payer: MEDICARE

## 2023-01-16 ENCOUNTER — APPOINTMENT (OUTPATIENT)
Dept: GENERAL RADIOLOGY | Facility: HOSPITAL | Age: 63
End: 2023-01-16
Attending: NURSE PRACTITIONER
Payer: MEDICARE

## 2023-01-16 DIAGNOSIS — R05.1 ACUTE COUGH: ICD-10-CM

## 2023-01-16 LAB
ALBUMIN SERPL BCG-MCNC: 3.9 G/DL (ref 3.5–5.2)
ALP SERPL-CCNC: 135 U/L (ref 40–129)
ALT SERPL W P-5'-P-CCNC: 21 U/L (ref 10–50)
ANION GAP SERPL CALCULATED.3IONS-SCNC: 12 MMOL/L (ref 7–15)
APAP SERPL-MCNC: 25.5 UG/ML (ref 10–30)
AST SERPL W P-5'-P-CCNC: 27 U/L (ref 10–50)
BASOPHILS # BLD AUTO: 0.1 10E3/UL (ref 0–0.2)
BASOPHILS NFR BLD AUTO: 1 %
BILIRUB SERPL-MCNC: 0.2 MG/DL
BUN SERPL-MCNC: 21.2 MG/DL (ref 8–23)
CALCIUM SERPL-MCNC: 8.9 MG/DL (ref 8.8–10.2)
CHLORIDE SERPL-SCNC: 103 MMOL/L (ref 98–107)
CREAT SERPL-MCNC: 1.09 MG/DL (ref 0.67–1.17)
D DIMER PPP FEU-MCNC: 0.52 UG/ML FEU (ref 0–0.5)
DEPRECATED HCO3 PLAS-SCNC: 21 MMOL/L (ref 22–29)
EOSINOPHIL # BLD AUTO: 0.1 10E3/UL (ref 0–0.7)
EOSINOPHIL NFR BLD AUTO: 2 %
ERYTHROCYTE [DISTWIDTH] IN BLOOD BY AUTOMATED COUNT: 12.6 % (ref 10–15)
FLUAV RNA SPEC QL NAA+PROBE: NEGATIVE
FLUBV RNA RESP QL NAA+PROBE: NEGATIVE
GFR SERPL CREATININE-BSD FRML MDRD: 78 ML/MIN/1.73M2
GLUCOSE SERPL-MCNC: 108 MG/DL (ref 70–99)
HCT VFR BLD AUTO: 41.7 % (ref 40–53)
HGB BLD-MCNC: 14.4 G/DL (ref 13.3–17.7)
IMM GRANULOCYTES # BLD: 0.1 10E3/UL
IMM GRANULOCYTES NFR BLD: 1 %
INR PPP: 1.07 (ref 0.85–1.15)
LACTATE SERPL-SCNC: 1.4 MMOL/L (ref 0.7–2)
LYMPHOCYTES # BLD AUTO: 1.5 10E3/UL (ref 0.8–5.3)
LYMPHOCYTES NFR BLD AUTO: 20 %
MAGNESIUM SERPL-MCNC: 1.9 MG/DL (ref 1.7–2.3)
MCH RBC QN AUTO: 32.3 PG (ref 26.5–33)
MCHC RBC AUTO-ENTMCNC: 34.5 G/DL (ref 31.5–36.5)
MCV RBC AUTO: 94 FL (ref 78–100)
MONOCYTES # BLD AUTO: 0.9 10E3/UL (ref 0–1.3)
MONOCYTES NFR BLD AUTO: 12 %
NEUTROPHILS # BLD AUTO: 4.7 10E3/UL (ref 1.6–8.3)
NEUTROPHILS NFR BLD AUTO: 64 %
NRBC # BLD AUTO: 0 10E3/UL
NRBC BLD AUTO-RTO: 0 /100
PLATELET # BLD AUTO: 122 10E3/UL (ref 150–450)
POTASSIUM SERPL-SCNC: 3.7 MMOL/L (ref 3.4–5.3)
PROCALCITONIN SERPL IA-MCNC: 0.11 NG/ML
PROT SERPL-MCNC: 6.9 G/DL (ref 6.4–8.3)
RBC # BLD AUTO: 4.46 10E6/UL (ref 4.4–5.9)
RSV RNA SPEC NAA+PROBE: NEGATIVE
SALICYLATES SERPL-MCNC: <0.5 MG/DL
SARS-COV-2 RNA RESP QL NAA+PROBE: NEGATIVE
SODIUM SERPL-SCNC: 136 MMOL/L (ref 136–145)
TROPONIN T SERPL HS-MCNC: 9 NG/L
WBC # BLD AUTO: 7.3 10E3/UL (ref 4–11)

## 2023-01-16 PROCEDURE — 93010 ELECTROCARDIOGRAM REPORT: CPT | Performed by: INTERNAL MEDICINE

## 2023-01-16 PROCEDURE — 83605 ASSAY OF LACTIC ACID: CPT | Performed by: EMERGENCY MEDICINE

## 2023-01-16 PROCEDURE — 87637 SARSCOV2&INF A&B&RSV AMP PRB: CPT | Performed by: EMERGENCY MEDICINE

## 2023-01-16 PROCEDURE — 84484 ASSAY OF TROPONIN QUANT: CPT | Performed by: NURSE PRACTITIONER

## 2023-01-16 PROCEDURE — 94640 AIRWAY INHALATION TREATMENT: CPT

## 2023-01-16 PROCEDURE — 80053 COMPREHEN METABOLIC PANEL: CPT | Performed by: NURSE PRACTITIONER

## 2023-01-16 PROCEDURE — 80179 DRUG ASSAY SALICYLATE: CPT | Performed by: EMERGENCY MEDICINE

## 2023-01-16 PROCEDURE — 36415 COLL VENOUS BLD VENIPUNCTURE: CPT | Performed by: NURSE PRACTITIONER

## 2023-01-16 PROCEDURE — 80143 DRUG ASSAY ACETAMINOPHEN: CPT | Performed by: EMERGENCY MEDICINE

## 2023-01-16 PROCEDURE — 99285 EMERGENCY DEPT VISIT HI MDM: CPT | Mod: 25,CS

## 2023-01-16 PROCEDURE — 71045 X-RAY EXAM CHEST 1 VIEW: CPT

## 2023-01-16 PROCEDURE — 99284 EMERGENCY DEPT VISIT MOD MDM: CPT | Mod: CS | Performed by: EMERGENCY MEDICINE

## 2023-01-16 PROCEDURE — 93005 ELECTROCARDIOGRAM TRACING: CPT

## 2023-01-16 PROCEDURE — 85379 FIBRIN DEGRADATION QUANT: CPT | Performed by: NURSE PRACTITIONER

## 2023-01-16 PROCEDURE — 999N000157 HC STATISTIC RCP TIME EA 10 MIN

## 2023-01-16 PROCEDURE — 85610 PROTHROMBIN TIME: CPT | Performed by: EMERGENCY MEDICINE

## 2023-01-16 PROCEDURE — 250N000009 HC RX 250: Performed by: EMERGENCY MEDICINE

## 2023-01-16 PROCEDURE — 85025 COMPLETE CBC W/AUTO DIFF WBC: CPT | Performed by: NURSE PRACTITIONER

## 2023-01-16 PROCEDURE — C9803 HOPD COVID-19 SPEC COLLECT: HCPCS

## 2023-01-16 PROCEDURE — 84145 PROCALCITONIN (PCT): CPT | Performed by: EMERGENCY MEDICINE

## 2023-01-16 PROCEDURE — 83735 ASSAY OF MAGNESIUM: CPT | Performed by: NURSE PRACTITIONER

## 2023-01-16 RX ORDER — GUAIFENESIN 200 MG/10ML
10 LIQUID ORAL ONCE
Status: COMPLETED | OUTPATIENT
Start: 2023-01-16 | End: 2023-01-17

## 2023-01-16 RX ORDER — DOXYCYCLINE 100 MG/1
100 CAPSULE ORAL ONCE
Status: COMPLETED | OUTPATIENT
Start: 2023-01-16 | End: 2023-01-17

## 2023-01-16 RX ORDER — IPRATROPIUM BROMIDE AND ALBUTEROL SULFATE 2.5; .5 MG/3ML; MG/3ML
3 SOLUTION RESPIRATORY (INHALATION) ONCE
Status: COMPLETED | OUTPATIENT
Start: 2023-01-16 | End: 2023-01-16

## 2023-01-16 RX ADMIN — IPRATROPIUM BROMIDE AND ALBUTEROL SULFATE 3 ML: 2.5; .5 SOLUTION RESPIRATORY (INHALATION) at 23:21

## 2023-01-16 ASSESSMENT — ACTIVITIES OF DAILY LIVING (ADL): ADLS_ACUITY_SCORE: 35

## 2023-01-17 VITALS
DIASTOLIC BLOOD PRESSURE: 80 MMHG | SYSTOLIC BLOOD PRESSURE: 147 MMHG | HEART RATE: 71 BPM | TEMPERATURE: 98.5 F | RESPIRATION RATE: 20 BRPM | OXYGEN SATURATION: 94 %

## 2023-01-17 ASSESSMENT — ENCOUNTER SYMPTOMS
FEVER: 0
SHORTNESS OF BREATH: 1
CHILLS: 0

## 2023-01-17 NOTE — ED NOTES
Poison control called and was updated with current labs we have, only one not back was the acetaminophen, but state all the other labs look great at this time.

## 2023-01-17 NOTE — ED TRIAGE NOTES
"Pt reports that he \"can't breath\" for the past 3 days. Pt has COPD and emphyzema. Pt used to have home O2 but hasn't for the past couple years. Pt also has a dry cough for 3 days and has been using dayquil/nyquil. Pt has used a \"bottle of nyquil and half a bottle of dayquil over the past 3 days.\" Pt states he's just been taking sips here and there. Pt \"feels like I'm drunk and I haven't had any alcohol.\"      "

## 2023-01-17 NOTE — ED NOTES
Patient appears to have left against medical advice.  Nurse arrived to room to find patient no longer in room.  Patient did not state that he was leaving or in a hurry to leave.  Patient stated that he has been feeling ill and wanted to been seen in ED.  Patient labs cxr and other workup was partially completed no antibiotics were given to patient.

## 2023-01-17 NOTE — ED NOTES
Multiple attempts to place IV were unsuccessful.  Patient stated history of IV drug use and that he usually needed anesthesia or US guided IV.  Patient has visible veins but attempts to advance iv catheter were unsuccessful.

## 2023-01-17 NOTE — ED PROVIDER NOTES
History     Chief Complaint   Patient presents with     Cough     Shortness of Breath     Drug Overdose     HPI  Rodney Goodwin is a 62 year old male who is here with coughing.  Mildly intermittently productive sputum, green however for the most part, dry.  Has been using significant amount of DayQuil and NyQuil, unsure how much but taking sips and is finished at least 1 bottle.  No fever or chills.  No abdominal pain.  No vomiting or diarrhea.    Allergies:  Allergies   Allergen Reactions     Codeine      Penicillins        Problem List:    Patient Active Problem List    Diagnosis Date Noted     COPD (chronic obstructive pulmonary disease) (H) 04/28/2020     Priority: Medium     Suicidal behavior 04/27/2020     Priority: Medium     Gastroesophageal reflux disease without esophagitis 04/27/2020     Priority: Medium     Penile lesion 04/27/2020     Priority: Medium     Hypertension      Priority: Medium     Elevated blood sugar 10/29/2016     Priority: Medium     Narcotic addiction (H) 10/29/2016     Priority: Medium     Heroin abuse (H) 09/15/2015     Priority: Medium     Costochondritis 02/26/2015     Priority: Medium     Venous insufficiency of both lower extremities 02/26/2015     Priority: Medium     Do you wish to do the replacement in the background? yes         Tobacco dependence 01/06/2015     Priority: Medium     Cluster B personality disorder (H) 12/25/2014     Priority: Medium     Vs cluster B traits per OSH records 11/2014       Polysubstance abuse (H) 12/25/2014     Priority: Medium     Heroin, benzodiazepines, amphetamine, prescription opiates, THC abuse. Dealing drugs, IVDU as recently as 11/2014.       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     Priority: Medium     Cellulitis and abscess of forearm 04/04/2014     Priority: Medium     IV drug user 08/09/2010     Priority: Medium      Overview:   IMO Update 10/11       Chlordiazepoxide dependence (H) 08/09/2010     Priority: Medium     Overview:   IMO Update 10/11       Heroin addiction (H) 08/09/2010     Priority: Medium     Overview:   See social notes  IMO Update 10/11       Osteoarthrosis, pelvic region and thigh 11/13/2008     Priority: Medium     Overview:   IMO Update 10/11       Lumbosacral spondylosis without myelopathy 11/13/2008     Priority: Medium     Backache 06/09/2008     Priority: Medium     Overview:   IMO Update 10/11       Degeneration of lumbar or lumbosacral intervertebral disc 07/25/2006     Priority: Medium     Overview:   IMO Update 10/11          Past Medical History:    Past Medical History:   Diagnosis Date     Acute exacerbation of chronic obstructive pulmonary disease (H) 10/28/2016     Anxiety      Backache 6/9/2008     Bipolar 1 disorder (H) 11/11/2014     Bipolar affective disorder (H)      Cellulitis and abscess of forearm 4/4/2014     Chlordiazepoxide dependence (H) 8/9/2010     Chronic constipation      COPD exacerbation (H) 9/14/2015     Costochondritis 2/26/2015     Degeneration of lumbar or lumbosacral intervertebral disc 7/25/2006     Depressive disorder      Drug abuse (H)      Elevated blood sugar 10/29/2016     Heroin abuse (H) 9/15/2015     Heroin addiction (H) 8/9/2010     Hypertension      IV drug user 8/9/2010     Lumbosacral spondylosis without myelopathy 11/13/2008     Narcotic addiction (H) 10/29/2016     Opioid use disorder, severe, dependence (H) 2020     Osteoarthrosis, pelvic region and thigh 11/13/2008     Penile lesion 4/27/2020     Suicidal behavior 4/27/2020     Venous insufficiency of both lower extremities 2/26/2015       Past Surgical History:    Past Surgical History:   Procedure Laterality Date     ARTHROSCOPY KNEE BILATERAL       COLONOSCOPY  01/29/2020    Multiple, repeat due 2015     COLONOSCOPY N/A 7/31/2015    Procedure: COLONOSCOPY;  Surgeon: Justin Andujar MD;  Location: [facility]      DECOMPRESSION CUBITAL TUNNEL Left 11/21/2017    Procedure: DECOMPRESSION CUBITAL TUNNEL;;  Surgeon: Jhonny Zhao MD;  Location: [facility]     DENTAL SURGERY       HERNIA REPAIR      Inguinal, left     RELEASE CARPAL TUNNEL Left 11/21/2017    Procedure: RELEASE CARPAL TUNNEL;  LEFT ELBOW CUBITAL and CARPAL TUNNEL RELEASE;  Surgeon: Jhonny Zhao MD;  Location: [facility]       Family History:    Family History   Problem Relation Age of Onset     Diabetes Mother      Cerebrovascular Disease Father      Pancreatic Cancer Brother        Social History:  Marital Status:   [4]  Social History     Tobacco Use     Smoking status: Some Days     Packs/day: 1.00     Years: 30.00     Pack years: 30.00     Types: Cigarettes     Smokeless tobacco: Never     Tobacco comments:     Tried to Quit (NO)   Substance Use Topics     Alcohol use: No     Alcohol/week: 0.0 standard drinks     Drug use: Not Currently     Types: IV, Methamphetamines        Medications:    ASPIRIN LOW DOSE 81 MG EC tablet  atenolol (TENORMIN) 100 MG tablet  azithromycin (ZITHROMAX) 250 MG tablet  ipratropium - albuterol 0.5 mg/2.5 mg/3 mL (DUONEB) 0.5-2.5 (3) MG/3ML neb solution  Ipratropium-Albuterol (COMBIVENT RESPIMAT)  MCG/ACT inhaler  LINZESS 290 MCG capsule  lithium ER (LITHOBID) 300 MG CR tablet  naloxone (NARCAN) 4 MG/0.1ML nasal spray  OLANZapine (ZYPREXA) 5 MG tablet  pantoprazole (PROTONIX) 40 MG EC tablet  polyethylene glycol (MIRALAX/GLYCOLAX) powder  simvastatin (ZOCOR) 20 MG tablet  sucralfate (CARAFATE) 1 GM tablet  triamcinolone (KENALOG) 0.1 % external ointment  VENTOLIN  (90 Base) MCG/ACT inhaler          Review of Systems   Constitutional: Negative for chills and fever.   Respiratory: Positive for shortness of breath.    Cardiovascular: Positive for chest pain.   All other systems reviewed and are negative.      Physical Exam   BP: 155/85  Pulse: 76  Temp: 98.5  F (36.9  C)  Resp: 20  SpO2: 94 %      Physical  Exam  Constitutional:       General: He is not in acute distress.     Appearance: Normal appearance.   HENT:      Head: Normocephalic and atraumatic.      Right Ear: External ear normal.      Left Ear: External ear normal.      Nose: Nose normal. No rhinorrhea.   Eyes:      Conjunctiva/sclera: Conjunctivae normal.   Cardiovascular:      Rate and Rhythm: Normal rate and regular rhythm.      Pulses: Normal pulses.   Pulmonary:      Effort: Pulmonary effort is normal. No respiratory distress.      Breath sounds: Decreased breath sounds and wheezing present. No rhonchi.   Abdominal:      General: There is no distension.      Palpations: Abdomen is soft.      Tenderness: There is no abdominal tenderness.   Musculoskeletal:         General: No deformity or signs of injury.   Skin:     General: Skin is warm and dry.      Capillary Refill: Capillary refill takes less than 2 seconds.   Neurological:      General: No focal deficit present.      Mental Status: He is alert. Mental status is at baseline.   Psychiatric:         Mood and Affect: Mood normal.         Behavior: Behavior normal.         ED Course                 Procedures             Critical Care time:               Results for orders placed or performed during the hospital encounter of 01/16/23 (from the past 24 hour(s))   Symptomatic Influenza A/B & SARS-CoV2 (COVID-19) Virus PCR Multiplex Nasopharyngeal    Specimen: Nasopharyngeal; Swab   Result Value Ref Range    Influenza A PCR Negative Negative    Influenza B PCR Negative Negative    RSV PCR Negative Negative    SARS CoV2 PCR Negative Negative    Narrative    Testing was performed using the Xpert Xpress CoV2/Flu/RSV Assay on the [employer] GeneXpert Instrument. This test should be ordered for the detection of SARS-CoV-2 and influenza viruses in individuals who meet clinical and/or epidemiological criteria. Test performance is unknown in asymptomatic patients. This test is for in vitro diagnostic use under the  FDA EUA for laboratories certified under CLIA to perform high or moderate complexity testing. This test has not been FDA cleared or approved. A negative result does not rule out the presence of PCR inhibitors in the specimen or target RNA in concentration below the limit of detection for the assay. If only one viral target is positive but coinfection with multiple targets is suspected, the sample should be re-tested with another FDA cleared, approved, or authorized test, if coinfection would change clinical management. This test was validated by the [facility]. These laboratories are certified under the Clinical Laboratory Improvement Amendments of 1988 (CLIA-88) as qualified to perform high complexity laboratory testing.   CBC with platelets differential    Narrative    The following orders were created for panel order CBC with platelets differential.  Procedure                               Abnormality         Status                     ---------                               -----------         ------                     CBC with platelets and d...[[id number]]  Abnormal            Final result                 Please view results for these tests on the individual orders.   D dimer quantitative   Result Value Ref Range    D-Dimer Quantitative 0.52 (H) 0.00 - 0.50 ug/mL FEU    Narrative    This D-dimer assay is intended for use in conjunction with a clinical pretest probability assessment model to exclude pulmonary embolism (PE) and deep venous thrombosis (DVT) in outpatients suspected of PE or DVT. The cut-off value is 0.50 ug/mL FEU.   Comprehensive metabolic panel   Result Value Ref Range    Sodium 136 136 - 145 mmol/L    Potassium 3.7 3.4 - 5.3 mmol/L    Chloride 103 98 - 107 mmol/L    Carbon Dioxide (CO2) 21 (L) 22 - 29 mmol/L    Anion Gap 12 7 - 15 mmol/L    Urea Nitrogen 21.2 8.0 - 23.0 mg/dL    Creatinine 1.09 0.67 - 1.17 mg/dL    Calcium 8.9 8.8 - 10.2 mg/dL    Glucose 108 (H) 70 - 99  mg/dL    Alkaline Phosphatase 135 (H) 40 - 129 U/L    AST 27 10 - 50 U/L    ALT 21 10 - 50 U/L    Protein Total 6.9 6.4 - 8.3 g/dL    Albumin 3.9 3.5 - 5.2 g/dL    Bilirubin Total 0.2 <=1.2 mg/dL    GFR Estimate 78 >60 mL/min/1.73m2   Troponin T, High Sensitivity   Result Value Ref Range    Troponin T, High Sensitivity 9 <=22 ng/L   Magnesium   Result Value Ref Range    Magnesium 1.9 1.7 - 2.3 mg/dL   INR   Result Value Ref Range    INR 1.07 0.85 - 1.15   Acetaminophen level   Result Value Ref Range    Acetaminophen 25.5 10.0 - 30.0 ug/mL   Salicylate level   Result Value Ref Range    Salicylate <0.5   mg/dL   Lactic acid whole blood   Result Value Ref Range    Lactic Acid 1.4 0.7 - 2.0 mmol/L   CBC with platelets and differential   Result Value Ref Range    WBC Count 7.3 4.0 - 11.0 10e3/uL    RBC Count 4.46 4.40 - 5.90 10e6/uL    Hemoglobin 14.4 13.3 - 17.7 g/dL    Hematocrit 41.7 40.0 - 53.0 %    MCV 94 78 - 100 fL    MCH 32.3 26.5 - 33.0 pg    MCHC 34.5 31.5 - 36.5 g/dL    RDW 12.6 10.0 - 15.0 %    Platelet Count 122 (L) 150 - 450 10e3/uL    % Neutrophils 64 %    % Lymphocytes 20 %    % Monocytes 12 %    % Eosinophils 2 %    % Basophils 1 %    % Immature Granulocytes 1 %    NRBCs per 100 WBC 0 <1 /100    Absolute Neutrophils 4.7 1.6 - 8.3 10e3/uL    Absolute Lymphocytes 1.5 0.8 - 5.3 10e3/uL    Absolute Monocytes 0.9 0.0 - 1.3 10e3/uL    Absolute Eosinophils 0.1 0.0 - 0.7 10e3/uL    Absolute Basophils 0.1 0.0 - 0.2 10e3/uL    Absolute Immature Granulocytes 0.1 <=0.4 10e3/uL    Absolute NRBCs 0.0 10e3/uL   Procalcitonin   Result Value Ref Range    Procalcitonin 0.11 (H) <0.05 ng/mL       Medications   ipratropium - albuterol 0.5 mg/2.5 mg/3 mL (DUONEB) neb solution 3 mL (3 mLs Nebulization Given 1/16/23 2321)   doxycycline hyclate (VIBRAMYCIN) capsule 100 mg (100 mg Oral Not Given 1/17/23 0016)   guaiFENesin (ROBITUSSIN) 20 mg/mL solution 10 mL (10 mLs Oral Not Given 1/17/23 0016)       Assessments & Plan (with  Medical Decision Making)     I have reviewed the nursing notes.    I have reviewed the findings, diagnosis, plan and need for follow up with the patient.  62-year-old male here with cough for several days.  There is potential for salicylate or acetaminophen overdose, labs have been obtained, all are below nomogram with no evidence of liver damage as given evidenced by CMP and INR.  Patient left prior to final disposition however was can be discharged without antibiotics.  Was cleared by poison control who were contacted discussed case by RN.  Was not hypoxic.  No infiltrate on x-ray so no indications for antibiotics.  Suspect viral etiology.    Medical Decision Making  The patient presented with a problem that is a chronic illness mild to moderate exacerbation, progression, or side effect of treatment.    The patient's evaluation involved:  ordering and review of 3+ test(s) (see EMR)    The patient's management involved prescription drug management.        Discharge Medication List as of 1/17/2023 12:23 AM          Final diagnoses:   Acute cough       1/16/2023   [facility]     Ryne Hart MD  01/17/23 0307

## 2023-01-17 NOTE — ED NOTES
Spoke with Phil at poison control. Recommended LFTs, acetaminophen level, salicylate level, and INR. If acetaminophen >10 or LFTs elevated recommended to start mucomyst.

## 2023-08-21 NOTE — PROGRESS NOTES
Assessment & Plan     Abdominal pain, generalized  - CBC with Platelets & Differential  - Comprehensive metabolic panel  - Lipase  - CRP, inflammation  - CTA Abdomen Pelvis with Contrast; Future  - UA Macroscopic with reflex to Microscopic and Culture; Future  - Helicobacter pylori Antigen Stool    Chronic obstructive pulmonary disease, unspecified COPD type (H)  - albuterol (PROAIR HFA/PROVENTIL HFA/VENTOLIN HFA) 108 (90 Base) MCG/ACT inhaler; Inhale 2 puffs into the lungs every 4 hours as needed for shortness of breath, wheezing or cough    Gastroesophageal reflux disease without esophagitis    Primary hypertension  - Albumin Random Urine Quantitative with Creat Ratio; Future  - Magnesium  - Lipid Profile  - TSH with free T4 reflex  - losartan (COZAAR) 50 MG tablet; Take 1 tablet (50 mg) by mouth daily  - carvedilol (COREG) 6.25 MG tablet; Take 1 tablet (6.25 mg) by mouth 2 times daily (with meals)  - ASPIRIN LOW DOSE 81 MG EC tablet; Take 1 tablet (81 mg) by mouth daily    Tobacco dependence    History of intravenous drug abuse (H)  - HIV Antigen Antibody Combo Cascade  - Hepatitis C Screen Reflex to HCV RNA Quant and Genotype  - Hepatitis C RNA, Quantitative by PCR with Confirmatory Reflex to Genotyping    JENKINS (dyspnea on exertion)  - XR CHEST 2 VW (Clinic Performed); Future  - albuterol (PROAIR HFA/PROVENTIL HFA/VENTOLIN HFA) 108 (90 Base) MCG/ACT inhaler; Inhale 2 puffs into the lungs every 4 hours as needed for shortness of breath, wheezing or cough    Screening PSA (prostate specific antigen)  - PSA, screen    Bladder mass  - Adult Urology  Referral; Future    AAA / PAD  - Vascular referral.   Start bringing down BP, ASA, will need statin, not adding multiple meds at once.    HIGH BP today and progressive LLQ pain x3 months, lost to scope follow-up, old imaging 4/2020 showing AAA slightly increased from previous.  CTA Abd/Pelvis done - AAA now up to 4 cm from previous 3.1 cm in 2020.   Atherosclerosis of Celiac, SMA, LILLIAN.  Bladder mass seen.  Vascular and Urology referrals.  Results discussed at appt.    Starting on Coreg and Losartan.  ASA 81mg.  Albuterol inhaler.  CXR no acute findings.  Close follow-up 1 week, will need statin added and follow-up with todays labs that aren't available yet.      Time spent on date of service: > 60 minutes including F2F H&P, chart review, orders, viewing results, discussion with radiologist, discussing imaging with patient, Rx meds, et al.    KASSI PAIZ, DO  [facility] - ABRAHAM Stevens is a 63 year old, presenting for the following health issues:  Establish Care      HPI     64yo male here to establish care.  Former Dr. Estevez patient.    Concern of stomach issues, ballooning out the past few months, had the same thing happen 3 yrs ago.  Feels his stools are okay.  Past constipation issues, says resolved with eating better.  States stomach ulcer maybe 4 yrs ago.  Current LLQ pain, ongoing and slowly worsening x3 months.  States last colonoscopy was in 2019 - states was supposed to be having annually but lost to follow-up, doesn't know why being scoped so often.    Stopped his Atenolol about 6 months ago, ran out.    Self stopped his other meds in 2020.  They included Linzess, olanzapine, simvastatin, protonix, carafate, lithium, and others.    Concern of reflux - coughing up caramel colored stuff.  Gets caught in his throat.  Dyspnea/cough walking up steps - chronic, unchanged, stable.  Does get chest pressure walking up steps.  States he has COPD, used to be on oxygen at one point - maybe 3 yrs ago.    Living in apartment now for 10 months, was on streets for a few years prior to that.      Bipolar disorder  Anxiety  HLP    Drinks 12 pk Coke per day (this is decreased for him)  Smoking 1 ppd currently, started age 23  No alcohol in 2 yrs - was heavy drinker - as much as he could until he passed out.          History   Smoking Status  "   Every Day    Packs/day: 1.00    Types: Cigarettes    Start date: 1983   Smokeless Tobacco    Never     No results found for: FEV1, [id number]        Review of Systems   Constitutional:  Negative for fever.   Respiratory:  Negative for shortness of breath.    Cardiovascular:  Negative for chest pain, palpitations and peripheral edema.            Objective    BP (!) 160/108   Pulse 92   Temp 97.8  F (36.6  C) (Tympanic)   Resp 18   Ht 1.753 m (5' 9\")   Wt 89.9 kg (198 lb 1.6 oz)   SpO2 98%   BMI 29.25 kg/m    Body mass index is 29.25 kg/m .  Physical Exam  Constitutional:       General: He is not in acute distress.     Appearance: Normal appearance.   HENT:      Head: Normocephalic and atraumatic.      Right Ear: Tympanic membrane normal.      Left Ear: Tympanic membrane normal.      Mouth/Throat:      Mouth: Mucous membranes are moist.      Pharynx: Oropharynx is clear.   Eyes:      Conjunctiva/sclera: Conjunctivae normal.      Pupils: Pupils are equal, round, and reactive to light.   Neck:      Vascular: No carotid bruit.   Cardiovascular:      Rate and Rhythm: Normal rate and regular rhythm.      Heart sounds: Normal heart sounds. No murmur heard.  Pulmonary:      Effort: Pulmonary effort is normal.      Breath sounds: Normal breath sounds. No wheezing.   Abdominal:      General: Bowel sounds are normal.      Palpations: Abdomen is soft.      Tenderness: There is no right CVA tenderness or left CVA tenderness.      Hernia: No hernia is present.      Comments: Tender LLQ   Genitourinary:     Testes: Normal.      Prostate: Normal.   Musculoskeletal:      Right lower leg: No edema.      Left lower leg: No edema.   Lymphadenopathy:      Cervical: No cervical adenopathy.   Skin:     Coloration: Skin is not jaundiced.   Neurological:      Mental Status: He is alert and oriented to person, place, and time.                              "

## 2023-08-23 ENCOUNTER — HOSPITAL ENCOUNTER (OUTPATIENT)
Dept: CT IMAGING | Facility: HOSPITAL | Age: 63
Discharge: HOME | End: 2023-08-23
Attending: FAMILY MEDICINE
Payer: MEDICARE

## 2023-08-23 ENCOUNTER — ANESTHESIA (OUTPATIENT)
Dept: CT IMAGING | Facility: HOSPITAL | Age: 63
End: 2023-08-23
Payer: MEDICARE

## 2023-08-23 ENCOUNTER — TELEPHONE (OUTPATIENT)
Dept: LAB | Facility: OTHER | Age: 63
End: 2023-08-23

## 2023-08-23 ENCOUNTER — ANESTHESIA EVENT (OUTPATIENT)
Dept: CT IMAGING | Facility: HOSPITAL | Age: 63
End: 2023-08-23
Payer: MEDICARE

## 2023-08-23 ENCOUNTER — ANCILLARY PROCEDURE (OUTPATIENT)
Dept: GENERAL RADIOLOGY | Facility: OTHER | Age: 63
End: 2023-08-23
Attending: FAMILY MEDICINE
Payer: MEDICARE

## 2023-08-23 ENCOUNTER — OFFICE VISIT (OUTPATIENT)
Dept: FAMILY MEDICINE | Facility: OTHER | Age: 63
End: 2023-08-23
Attending: FAMILY MEDICINE
Payer: MEDICARE

## 2023-08-23 VITALS
DIASTOLIC BLOOD PRESSURE: 108 MMHG | RESPIRATION RATE: 18 BRPM | OXYGEN SATURATION: 98 % | HEIGHT: 69 IN | BODY MASS INDEX: 29.34 KG/M2 | TEMPERATURE: 97.8 F | WEIGHT: 198.1 LBS | HEART RATE: 92 BPM | SYSTOLIC BLOOD PRESSURE: 160 MMHG

## 2023-08-23 DIAGNOSIS — N32.89 BLADDER MASS: ICD-10-CM

## 2023-08-23 DIAGNOSIS — I71.43 INFRARENAL ABDOMINAL AORTIC ANEURYSM (AAA) WITHOUT RUPTURE (H): ICD-10-CM

## 2023-08-23 DIAGNOSIS — R06.09 DOE (DYSPNEA ON EXERTION): ICD-10-CM

## 2023-08-23 DIAGNOSIS — Z12.5 SCREENING PSA (PROSTATE SPECIFIC ANTIGEN): ICD-10-CM

## 2023-08-23 DIAGNOSIS — J44.9 CHRONIC OBSTRUCTIVE PULMONARY DISEASE, UNSPECIFIED COPD TYPE (H): ICD-10-CM

## 2023-08-23 DIAGNOSIS — R10.84 ABDOMINAL PAIN, GENERALIZED: ICD-10-CM

## 2023-08-23 DIAGNOSIS — R10.84 ABDOMINAL PAIN, GENERALIZED: Primary | ICD-10-CM

## 2023-08-23 DIAGNOSIS — I10 PRIMARY HYPERTENSION: ICD-10-CM

## 2023-08-23 DIAGNOSIS — Z87.898 HISTORY OF INTRAVENOUS DRUG ABUSE: ICD-10-CM

## 2023-08-23 DIAGNOSIS — K21.9 GASTROESOPHAGEAL REFLUX DISEASE WITHOUT ESOPHAGITIS: ICD-10-CM

## 2023-08-23 DIAGNOSIS — I73.9 PAD (PERIPHERAL ARTERY DISEASE) (H): ICD-10-CM

## 2023-08-23 DIAGNOSIS — F17.200 TOBACCO DEPENDENCE: ICD-10-CM

## 2023-08-23 LAB
ALBUMIN SERPL BCG-MCNC: 4.1 G/DL (ref 3.5–5.2)
ALP SERPL-CCNC: 143 U/L (ref 40–129)
ALT SERPL W P-5'-P-CCNC: 18 U/L (ref 0–70)
ANION GAP SERPL CALCULATED.3IONS-SCNC: 13 MMOL/L (ref 7–15)
AST SERPL W P-5'-P-CCNC: 25 U/L (ref 0–45)
BASOPHILS # BLD AUTO: 0 10E3/UL (ref 0–0.2)
BASOPHILS NFR BLD AUTO: 1 %
BILIRUB SERPL-MCNC: 0.4 MG/DL
BUN SERPL-MCNC: 14.1 MG/DL (ref 8–23)
CALCIUM SERPL-MCNC: 9.4 MG/DL (ref 8.8–10.2)
CHLORIDE SERPL-SCNC: 102 MMOL/L (ref 98–107)
CHOLEST SERPL-MCNC: 205 MG/DL
CREAT SERPL-MCNC: 1.09 MG/DL (ref 0.67–1.17)
CREAT UR-MCNC: 178.9 MG/DL
CRP SERPL-MCNC: 8.81 MG/L
DEPRECATED HCO3 PLAS-SCNC: 23 MMOL/L (ref 22–29)
EOSINOPHIL # BLD AUTO: 0.2 10E3/UL (ref 0–0.7)
EOSINOPHIL NFR BLD AUTO: 2 %
ERYTHROCYTE [DISTWIDTH] IN BLOOD BY AUTOMATED COUNT: 12.2 % (ref 10–15)
GFR SERPL CREATININE-BSD FRML MDRD: 77 ML/MIN/1.73M2
GLUCOSE SERPL-MCNC: 106 MG/DL (ref 70–99)
HCT VFR BLD AUTO: 44.3 % (ref 40–53)
HDLC SERPL-MCNC: 42 MG/DL
HGB BLD-MCNC: 15.3 G/DL (ref 13.3–17.7)
IMM GRANULOCYTES # BLD: 0 10E3/UL
IMM GRANULOCYTES NFR BLD: 0 %
LDLC SERPL CALC-MCNC: 133 MG/DL
LIPASE SERPL-CCNC: 32 U/L (ref 13–60)
LYMPHOCYTES # BLD AUTO: 2.1 10E3/UL (ref 0.8–5.3)
LYMPHOCYTES NFR BLD AUTO: 27 %
MAGNESIUM SERPL-MCNC: 1.9 MG/DL (ref 1.7–2.3)
MCH RBC QN AUTO: 31.9 PG (ref 26.5–33)
MCHC RBC AUTO-ENTMCNC: 34.5 G/DL (ref 31.5–36.5)
MCV RBC AUTO: 92 FL (ref 78–100)
MICROALBUMIN UR-MCNC: 41.9 MG/L
MICROALBUMIN/CREAT UR: 23.42 MG/G CR (ref 0–17)
MONOCYTES # BLD AUTO: 0.8 10E3/UL (ref 0–1.3)
MONOCYTES NFR BLD AUTO: 11 %
NEUTROPHILS # BLD AUTO: 4.7 10E3/UL (ref 1.6–8.3)
NEUTROPHILS NFR BLD AUTO: 59 %
NONHDLC SERPL-MCNC: 163 MG/DL
NRBC # BLD AUTO: 0 10E3/UL
NRBC BLD AUTO-RTO: 0 /100
PLATELET # BLD AUTO: 164 10E3/UL (ref 150–450)
POTASSIUM SERPL-SCNC: 4.1 MMOL/L (ref 3.4–5.3)
PROT SERPL-MCNC: 7.5 G/DL (ref 6.4–8.3)
PSA SERPL DL<=0.01 NG/ML-MCNC: 0.74 NG/ML (ref 0–4.5)
RBC # BLD AUTO: 4.8 10E6/UL (ref 4.4–5.9)
SODIUM SERPL-SCNC: 138 MMOL/L (ref 136–145)
TRIGL SERPL-MCNC: 152 MG/DL
TSH SERPL DL<=0.005 MIU/L-ACNC: 0.87 UIU/ML (ref 0.3–4.2)
WBC # BLD AUTO: 7.8 10E3/UL (ref 4–11)

## 2023-08-23 PROCEDURE — 80053 COMPREHEN METABOLIC PANEL: CPT | Mod: ZL | Performed by: FAMILY MEDICINE

## 2023-08-23 PROCEDURE — 86803 HEPATITIS C AB TEST: CPT | Mod: ZL,GZ | Performed by: FAMILY MEDICINE

## 2023-08-23 PROCEDURE — 82570 ASSAY OF URINE CREATININE: CPT | Mod: ZL | Performed by: FAMILY MEDICINE

## 2023-08-23 PROCEDURE — G1010 CDSM STANSON: HCPCS

## 2023-08-23 PROCEDURE — 80061 LIPID PANEL: CPT | Mod: ZL | Performed by: FAMILY MEDICINE

## 2023-08-23 PROCEDURE — 99205 OFFICE O/P NEW HI 60 MIN: CPT | Performed by: FAMILY MEDICINE

## 2023-08-23 PROCEDURE — 87522 HEPATITIS C REVRS TRNSCRPJ: CPT | Mod: ZL | Performed by: FAMILY MEDICINE

## 2023-08-23 PROCEDURE — 86140 C-REACTIVE PROTEIN: CPT | Mod: ZL | Performed by: FAMILY MEDICINE

## 2023-08-23 PROCEDURE — 250N000011 HC RX IP 250 OP 636: Performed by: RADIOLOGY

## 2023-08-23 PROCEDURE — 71046 X-RAY EXAM CHEST 2 VIEWS: CPT | Mod: TC,FY

## 2023-08-23 PROCEDURE — 83690 ASSAY OF LIPASE: CPT | Mod: ZL | Performed by: FAMILY MEDICINE

## 2023-08-23 PROCEDURE — 85025 COMPLETE CBC W/AUTO DIFF WBC: CPT | Mod: ZL | Performed by: FAMILY MEDICINE

## 2023-08-23 PROCEDURE — G0463 HOSPITAL OUTPT CLINIC VISIT: HCPCS | Mod: 25

## 2023-08-23 PROCEDURE — 36415 COLL VENOUS BLD VENIPUNCTURE: CPT | Mod: ZL | Performed by: FAMILY MEDICINE

## 2023-08-23 PROCEDURE — 87389 HIV-1 AG W/HIV-1&-2 AB AG IA: CPT | Mod: ZL,GZ | Performed by: FAMILY MEDICINE

## 2023-08-23 PROCEDURE — 83735 ASSAY OF MAGNESIUM: CPT | Mod: ZL | Performed by: FAMILY MEDICINE

## 2023-08-23 PROCEDURE — 84443 ASSAY THYROID STIM HORMONE: CPT | Mod: ZL | Performed by: FAMILY MEDICINE

## 2023-08-23 PROCEDURE — G0103 PSA SCREENING: HCPCS | Mod: ZL | Performed by: FAMILY MEDICINE

## 2023-08-23 RX ORDER — IOPAMIDOL 755 MG/ML
100 INJECTION, SOLUTION INTRAVASCULAR ONCE
Status: COMPLETED | OUTPATIENT
Start: 2023-08-23 | End: 2023-08-23

## 2023-08-23 RX ORDER — ALBUTEROL SULFATE 90 UG/1
2 AEROSOL, METERED RESPIRATORY (INHALATION) EVERY 4 HOURS PRN
Qty: 18 G | Refills: 3 | Status: SHIPPED | OUTPATIENT
Start: 2023-08-23

## 2023-08-23 RX ORDER — CARVEDILOL 6.25 MG/1
6.25 TABLET ORAL 2 TIMES DAILY WITH MEALS
Qty: 60 TABLET | Refills: 0 | Status: SHIPPED | OUTPATIENT
Start: 2023-08-23 | End: 2023-10-11

## 2023-08-23 RX ORDER — ASPIRIN 81 MG/1
81 TABLET, COATED ORAL DAILY
Qty: 100 TABLET | Refills: 3 | Status: SHIPPED | OUTPATIENT
Start: 2023-08-23 | End: 2023-10-25

## 2023-08-23 RX ORDER — LOSARTAN POTASSIUM 50 MG/1
50 TABLET ORAL DAILY
Qty: 30 TABLET | Refills: 0 | Status: SHIPPED | OUTPATIENT
Start: 2023-08-23 | End: 2023-10-11

## 2023-08-23 RX ADMIN — IOPAMIDOL 100 ML: 755 INJECTION, SOLUTION INTRAVENOUS at 12:25

## 2023-08-23 ASSESSMENT — PAIN SCALES - GENERAL: PAINLEVEL: SEVERE PAIN (6)

## 2023-08-23 NOTE — TELEPHONE ENCOUNTER
Left message with sister Opal, for patient to return to clinic as agreed upon to receive CT results.  Sun Burgess LPN

## 2023-08-24 PROCEDURE — 87338 HPYLORI STOOL AG IA: CPT | Mod: ZL | Performed by: FAMILY MEDICINE

## 2023-08-24 PROCEDURE — 82274 ASSAY TEST FOR BLOOD FECAL: CPT | Mod: ZL | Performed by: FAMILY MEDICINE

## 2023-08-25 ENCOUNTER — APPOINTMENT (OUTPATIENT)
Dept: LAB | Facility: OTHER | Age: 63
End: 2023-08-25
Payer: MEDICARE

## 2023-08-25 LAB
HCV AB SERPL QL IA: REACTIVE
HEMOCCULT SP1 STL QL: NEGATIVE
HIV 1+2 AB+HIV1 P24 AG SERPL QL IA: NONREACTIVE

## 2023-08-26 LAB — HCV RNA SERPL NAA+PROBE-ACNC: NOT DETECTED IU/ML

## 2023-08-28 LAB — H PYLORI AG STL QL IA: POSITIVE

## 2023-08-29 ASSESSMENT — ENCOUNTER SYMPTOMS
SHORTNESS OF BREATH: 0
PALPITATIONS: 0
FEVER: 0

## 2023-08-30 ENCOUNTER — OFFICE VISIT (OUTPATIENT)
Dept: FAMILY MEDICINE | Facility: OTHER | Age: 63
End: 2023-08-30
Attending: FAMILY MEDICINE
Payer: MEDICARE

## 2023-08-30 VITALS
HEART RATE: 85 BPM | OXYGEN SATURATION: 95 % | SYSTOLIC BLOOD PRESSURE: 140 MMHG | DIASTOLIC BLOOD PRESSURE: 90 MMHG | BODY MASS INDEX: 29.33 KG/M2 | WEIGHT: 198.6 LBS | TEMPERATURE: 97.8 F

## 2023-08-30 DIAGNOSIS — R44.3 HALLUCINATIONS: ICD-10-CM

## 2023-08-30 DIAGNOSIS — R10.84 ABDOMINAL PAIN, GENERALIZED: ICD-10-CM

## 2023-08-30 DIAGNOSIS — A04.8 H. PYLORI INFECTION: Primary | ICD-10-CM

## 2023-08-30 DIAGNOSIS — E78.5 HYPERLIPIDEMIA LDL GOAL <70: ICD-10-CM

## 2023-08-30 DIAGNOSIS — I10 PRIMARY HYPERTENSION: ICD-10-CM

## 2023-08-30 LAB
ALBUMIN UR-MCNC: 10 MG/DL
APPEARANCE UR: CLEAR
BILIRUB UR QL STRIP: NEGATIVE
COLOR UR AUTO: ABNORMAL
GLUCOSE UR STRIP-MCNC: NEGATIVE MG/DL
HGB UR QL STRIP: ABNORMAL
KETONES UR STRIP-MCNC: NEGATIVE MG/DL
LEUKOCYTE ESTERASE UR QL STRIP: NEGATIVE
MUCOUS THREADS #/AREA URNS LPF: PRESENT /LPF
NITRATE UR QL: NEGATIVE
PH UR STRIP: 5.5 [PH] (ref 4.7–8)
RBC URINE: 4 /HPF
SP GR UR STRIP: 1.02 (ref 1–1.03)
SPERM #/AREA URNS HPF: PRESENT /HPF
SQUAMOUS EPITHELIAL: 0 /HPF
UROBILINOGEN UR STRIP-MCNC: NORMAL MG/DL
WBC URINE: 1 /HPF

## 2023-08-30 PROCEDURE — G0463 HOSPITAL OUTPT CLINIC VISIT: HCPCS

## 2023-08-30 PROCEDURE — 81001 URINALYSIS AUTO W/SCOPE: CPT | Mod: ZL | Performed by: FAMILY MEDICINE

## 2023-08-30 PROCEDURE — G0463 HOSPITAL OUTPT CLINIC VISIT: HCPCS | Mod: 25

## 2023-08-30 PROCEDURE — 99214 OFFICE O/P EST MOD 30 MIN: CPT | Performed by: FAMILY MEDICINE

## 2023-08-30 RX ORDER — TETRACYCLINE HYDROCHLORIDE 500 MG/1
500 CAPSULE ORAL 4 TIMES DAILY
Qty: 56 CAPSULE | Refills: 0 | Status: SHIPPED | OUTPATIENT
Start: 2023-08-30 | End: 2023-09-13

## 2023-08-30 RX ORDER — OMEPRAZOLE 40 MG/1
40 CAPSULE, DELAYED RELEASE ORAL 2 TIMES DAILY
Qty: 28 CAPSULE | Refills: 0 | Status: SHIPPED | OUTPATIENT
Start: 2023-08-30 | End: 2023-09-13

## 2023-08-30 RX ORDER — BISMUTH SUBSALICYLATE 262 MG/1
2 TABLET, CHEWABLE ORAL
Qty: 112 TABLET | Refills: 0 | Status: SHIPPED | OUTPATIENT
Start: 2023-08-30 | End: 2023-09-13

## 2023-08-30 RX ORDER — METRONIDAZOLE 250 MG/1
250 TABLET ORAL 4 TIMES DAILY
Qty: 56 TABLET | Refills: 0 | Status: SHIPPED | OUTPATIENT
Start: 2023-08-30 | End: 2023-09-13

## 2023-08-30 ASSESSMENT — PAIN SCALES - GENERAL: PAINLEVEL: NO PAIN (0)

## 2023-08-30 NOTE — PROGRESS NOTES
Assessment & Plan     Primary hypertension  Improving, continue current meds.  Will need to further titrate meds at future visit.  Not changing/adding too many meds at once.    H. pylori infection  Starting Quad therapy.  - omeprazole (PRILOSEC) 40 MG DR capsule; Take 1 capsule (40 mg) by mouth 2 times daily for 14 days  - bismuth subsalicylate (PEPTO BISMOL) 262 MG chewable tablet; Take 2 tablets (524 mg) by mouth 4 times daily (before meals and nightly) for 14 days  - tetracycline (ACHROMYCIN/SUMYCIN) 500 MG capsule; Take 1 capsule (500 mg) by mouth 4 times daily for 14 days  - metroNIDAZOLE (FLAGYL) 250 MG tablet; Take 1 tablet (250 mg) by mouth 4 times daily for 14 days    Hyperlipidemia LDL goal <70  Discussed with patient, will need statin added at next visit.    Hallucinations  No SI HI Sol.  Monitor as not bothering him excessively.  No currently on any mental health meds.    Abdominal pain, generalized  Needs UA that was previously ordered but not done.  - UA Macroscopic with reflex to Microscopic and Culture      Follow-up 2 weeks when finishing H pylori treatment.    KASSI PAIZ,   [facility] - ABRAHAM Stevens is a 63 year old, presenting for the following health issues:  Abdominal Pain      HPI     Here for 1 week follow-up.    HTN - much improved on Losartan and Coreg.  Not yet to goal.  Also started ASA 81mg, has PVD.  Haven't started statin yet due to not starting multiple meds at once.  Inhaler helps breathing.  Abd pain/bloating persists.  Stool came back positive for H pylori - results discussed and starting on meds today.  Not yet scheduled with Urology for bladder mass - referral previously placed.  Has been hallucinating a bit more than usual - does have known mental health issues.  No yet scheduled with vascular, referral previously placed.  Previous CT/labs discussed.    Abdominal Pain    Duration: Follow-up  Description (location/character/radiation): between  stomach and rib cage       Associated flank pain: left side  Intensity:  mild  Accompanying signs and symptoms:        Fever/Chills: no        Gas/Bloating: YES- gas and bloating       Nausea/vomitting: YES- nausea       Diarrhea: no        Dysuria or Hematuria: no   History (previous similar pain/trauma/previous testing): yes - H-pylori  Precipitating or alleviating factors:       Pain worse with eating/BM/urination: no       Pain relieved by BM: no   Therapies tried and outcome: None  LMP:  not applicable             Review of Systems   Constitutional:  Negative for fever.   Cardiovascular:  Negative for chest pain, palpitations and peripheral edema.   Neurological:  Negative for light-headedness.            Objective    BP (!) 140/90   Pulse 85   Temp 97.8  F (36.6  C) (Tympanic)   Wt 90.1 kg (198 lb 9.6 oz)   SpO2 95%   BMI 29.33 kg/m    Body mass index is 29.33 kg/m .  Physical Exam  Constitutional:       General: He is not in acute distress.     Appearance: Normal appearance.   Cardiovascular:      Rate and Rhythm: Normal rate and regular rhythm.      Heart sounds: Normal heart sounds. No murmur heard.  Pulmonary:      Effort: Pulmonary effort is normal.      Breath sounds: Normal breath sounds. No wheezing.   Abdominal:      General: Bowel sounds are normal. There is no distension.   Neurological:      Mental Status: He is alert and oriented to person, place, and time.

## 2023-09-05 ASSESSMENT — ENCOUNTER SYMPTOMS
PALPITATIONS: 0
FEVER: 0
LIGHT-HEADEDNESS: 0

## 2023-09-08 ENCOUNTER — HOSPITAL ENCOUNTER (EMERGENCY)
Facility: HOSPITAL | Age: 63
Discharge: LEFT BEFORE DISCHARGE | End: 2023-09-08
Payer: MEDICARE

## 2023-09-08 VITALS
SYSTOLIC BLOOD PRESSURE: 162 MMHG | DIASTOLIC BLOOD PRESSURE: 95 MMHG | HEART RATE: 88 BPM | RESPIRATION RATE: 16 BRPM | OXYGEN SATURATION: 97 % | TEMPERATURE: 98.4 F

## 2023-09-08 NOTE — ED TRIAGE NOTES
Patient woke up with a headache, sore throat and neck pain.     Triage Assessment       Row Name 09/08/23 0140       Triage Assessment (Adult)    Airway WDL WDL       Respiratory WDL    Respiratory WDL WDL       Skin Circulation/Temperature WDL    Skin Circulation/Temperature WDL WDL       Cardiac WDL    Cardiac WDL WDL       Peripheral/Neurovascular WDL    Peripheral Neurovascular WDL WDL       Cognitive/Neuro/Behavioral WDL    Cognitive/Neuro/Behavioral WDL WDL

## 2023-09-12 NOTE — PROGRESS NOTES
Assessment & Plan     Abdominal pain, generalized  H. pylori infection  PAD  Elevated alkaline phosphatase level    H. pylori has now been treated but he is still concerned about ongoing abdominal pain - seems improved/changed.  Exam is benign, but we will get updated labs today.  Placing referral for scopes.  He has known vascular disease of the celiac axis, SMA, and LILLIAN.  Question if this could be contributing - awaiting vascular consult.  Already on baby ASA, haven't started statin yet due to multiple new meds recently.  Dark stools likely related to Quad Therapy, but no previous colonoscopy so will need to be scoped.  I'm going to continue his PPI for now.    - Adult GI  Referral - Procedure Only; Future  - CRP inflammation; Future  - CBC with Platelets & Differential  - Comprehensive metabolic panel  - Lipase  - CRP inflammation  - omeprazole (PRILOSEC) 40 MG DR capsule; Take 1 capsule (40 mg) by mouth 2 times daily  - GGT    Patient elopes after his lab draw instead of waiting for results and to discuss plan moving forward.  We did talk about scopes prior to him going to lab.  Alk phos trending up, GGT added on and normal, will plan to get isoenzymes with his next clinic visit - general surgery consult for upper/lower scopes.  CBC normal, lipase normal, CRP improved.  I'll see him back after scopes.      KASSI PAIZ, DO  [facility] - CATHIEDANNIE Stevens is a 63 year old, presenting for the following health issues:  RECHECK        9/13/2023     9:03 AM   Additional Questions   Roomed by Lisseth Jackson   Accompanied by none         9/13/2023     9:03 AM   Patient Reported Additional Medications   Patient reports taking the following new medications none       HPI     63-year-old male here for a 2-week follow-up, last night was his last dose of his H. pylori quad therapy.  Today he is tearful, anxious, upset that I am running late to see him.  He states he also ran out of [facility]  SUBJECTIVE:                                                      Marcelino Ventura is a 15 year old male, here for a routine health maintenance visit.    Patient was roomed by: Razia He    Well Child     Social History  Patient accompanied by:  Mother  Questions or concerns?: No    Forms to complete? No  Child lives with::  Mother, father and brother  Languages spoken in the home:  English  Recent family changes/ special stressors?:  None noted    Safety / Health Risk    TB Exposure:     No TB exposure    Child always wear seatbelt?  Yes  Helmet 5-18 Year Old: rides in a trailer and does wear a helmet.    Home Safety Survey:      Firearms in the home?: YES          Are trigger locks present?  Yes        Is ammunition stored separately? Yes    Daily Activities    Dental     Dental provider: patient has a dental home      Water source:  City water    Sports physical needed: No        Media    TV in child's room: YES    Types of media used: iPad    School    Name of school: [employer]    Grade level: 8th    School performance: Has IEP.    Activities    Minimum of 60 minutes per day of physical activity: Yes    Activities: busy work things     Diet     Child gets at least 4 servings fruit or vegetables daily: Yes    Servings of juice, non-diet soda, punch or sports drinks per day: milk and water.  Some V8 juice    Sleep       Sleep concerns: no concerns- sleeps well through night     Bedtime: 20:30     Sleep duration (hours): 10        Cardiac risk assessment:     Family history (males <55, females <65) of angina (chest pain), heart attack, heart surgery for clogged arteries, or stroke: no    Biological parent(s) with a total cholesterol over 240:  no    VISION:  Attempted-did a few shapes- due for opthalmology exam.      HEARING:  Testing not done:  Attempted.      QUESTIONS/CONCERNS: None        ============================================================    PSYCHO-SOCIAL/DEPRESSION  General screening:  Pediatric  "Symptom Checklist-Youth PASS (<30 pass), no followup necessary  He does get anxious frequently, mom doesn't think he needs medications.  Was prescribed prozac, but never started taking it.     PROBLEM LIST  Patient Active Problem List   Diagnosis     Allergic rhinitis     Autism spectrum disorder     Delayed immunizations     Food allergy     Behavior concern     Screening for cholesterol level     MEDICATIONS  Current Outpatient Prescriptions   Medication Sig Dispense Refill     diphenhydrAMINE (BENADRYL) 25 MG capsule Take 1 capsule (25 mg) by mouth every 6 hours as needed for itching or allergies (must be dye free.) 90 capsule 11     Multiple Vitamin (MULTI-VITAMINS) TABS Take 1 tablet by mouth daily       Probiotic Product ([employer] PROBIOTIC MAXIMUM STRENGTH) CAPS Take by mouth daily        ALLERGY  Allergies   Allergen Reactions     No Clinical Screening - See Comments Unknown     Sesame seeds     Peanuts  [Nuts] Unknown     Shellfish-Derived Products Unknown       IMMUNIZATIONS  Immunization History   Administered Date(s) Administered     DTaP / Hep B / IPV 2005     Pedvax-hib 2005     Pneumococcal (PCV 7) 2005       HEALTH HISTORY SINCE LAST VISIT  No surgery, major illness or injury since last physical exam    DRUGS  Smoking:  no  Passive smoke exposure:  no  Alcohol:  no  Drugs:  no    SEXUALITY  Sexual activity: No    ROS  GENERAL: See health history, nutrition and daily activities   SKIN: No  rash, hives or significant lesions  HEENT: Hearing/vision: see above.  No eye, nasal, ear symptoms.  RESP: No cough or other concerns  CV: No concerns  GI: See nutrition and elimination.  No concerns.  : See elimination. No concerns  NEURO: No headaches or concerns.    OBJECTIVE:   EXAM  BP 98/40 (BP Location: Right arm, Patient Position: Sitting, Cuff Size: Adult Regular)  Pulse 84  Temp 97.8  F (36.6  C) (Tympanic)  Resp 20  Ht 5' 6\" (1.676 m)  Wt 105 lb 4.8 oz (47.8 kg)  BMI 17 kg/m2  86 %ile " and walked here today.  He states his abdominal pain does not seem to be as severe as it was, it has moved from the lower abdomen to the left upper quadrant/epigastric area.  He is very concerned that he was having dark watery stools and he went to the ER on 9/8 for this but he left without being seen.  He is no longer having watery stools but it is still dark-colored.    He has not yet scheduled with vascular or urology, [facility] is looking into this today for us.        Review of Systems   Constitutional:  Negative for fever.   Respiratory:  Negative for shortness of breath.    Cardiovascular:  Negative for peripheral edema.   Gastrointestinal:  Negative for vomiting.   Neurological:  Negative for light-headedness.            Objective    BP (!) 141/94 (BP Location: Left arm, Patient Position: Sitting, Cuff Size: Adult Large)   Pulse 100   Temp 96.9  F (36.1  C) (Tympanic)   Wt 91.6 kg (202 lb)   SpO2 94%   BMI 29.83 kg/m    Body mass index is 29.83 kg/m .  Physical Exam  Constitutional:       General: He is not in acute distress.     Comments: Anxious, aggitated   HENT:      Head: Normocephalic and atraumatic.   Eyes:      General: No scleral icterus.     Conjunctiva/sclera: Conjunctivae normal.   Cardiovascular:      Rate and Rhythm: Normal rate and regular rhythm.      Heart sounds: Normal heart sounds. No murmur heard.  Pulmonary:      Effort: Pulmonary effort is normal.      Breath sounds: Normal breath sounds. No wheezing.   Abdominal:      General: Bowel sounds are normal. There is no distension.      Palpations: Abdomen is soft. There is no mass.      Tenderness: There is no abdominal tenderness. There is no guarding.   Skin:     General: Skin is warm and dry.      Coloration: Skin is not jaundiced.   Neurological:      Mental Status: He is alert and oriented to person, place, and time.                               based on CDC 2-20 Years stature-for-age data using vitals from 6/7/2018.  51 %ile based on CDC 2-20 Years weight-for-age data using vitals from 6/7/2018.  22 %ile based on CDC 2-20 Years BMI-for-age data using vitals from 6/7/2018.  Blood pressure percentiles are 9.1 % systolic and 3.8 % diastolic based on the August 2017 AAP Clinical Practice Guideline.  GENERAL: Active, alert, in no acute distress.  SKIN: normal freckles,  No significant rash, abnormal pigmentation or lesions  HEAD: Normocephalic  EYES: Pupils equal, round, reactive, Extraocular muscles intact. Normal conjunctivae.  EARS: Normal canals. Tympanic membranes are normal; gray and translucent.  NOSE: Normal without discharge.  MOUTH/THROAT: Clear. No oral lesions. braces  NECK: Supple, no masses.  No thyromegaly.  LYMPH NODES: No adenopathy  LUNGS: Clear. No rales, rhonchi, wheezing or retractions  HEART: Regular rhythm. Normal S1/S2. No murmurs. Normal pulses.  ABDOMEN: Soft, non-tender, not distended, no masses or hepatosplenomegaly. Bowel sounds normal.   NEUROLOGIC: No focal findings. Cranial nerves grossly intact: DTR's normal. Normal gait, strength and tone  BACK: Spine is straight, no scoliosis.  EXTREMITIES: Full range of motion, no deformities  -M: Normal male external genitalia. Viraj stage 4,  both testes descended, no hernia.      ASSESSMENT/PLAN:       ICD-10-CM    1. Encounter for routine child health examination w/o abnormal findings Z00.129 PURE TONE HEARING TEST, AIR     SCREENING, VISUAL ACUITY, QUANTITATIVE, BILAT     BEHAVIORAL / EMOTIONAL ASSESSMENT [18377]   2. Autism spectrum disorder F84.0    3. Food allergy Z91.018    4. Chronic rhinitis, unspecified type J31.0 diphenhydrAMINE (BENADRYL) 25 MG capsule   5. Chronic nonseasonal allergic rhinitis due to other allergen J30.89 ALLERGY/ASTHMA PEDS REFERRAL     Mom would like more information about exactly which foods and environmental allergens Marcelino is allergic to.  We will refer  to allergy  Anticipatory Guidance  Reviewed Anticipatory Guidance in patient instructions  Special attention given to: importance of developing speech, food and environmental allergies.      Preventive Care Plan  Immunizations    Reviewed, parents decline all immunizations because of Concerns about side effects/safety.  Risks of not vaccinating discussed.  Discussed additional risk on a cruise ship.   Referrals/Ongoing Specialty care: Ongoing Specialty care by school and OT  See other orders in EpicCare.  Cleared for sports:  Not addressed  BMI at 22 %ile based on CDC 2-20 Years BMI-for-age data using vitals from 6/7/2018.  No weight concerns.  Dyslipidemia risk:    None  Dental visit recommended: Dental home established, continue care every 6 months      FOLLOW-UP:     in 1 year for a Preventive Care visit    Resources  HPV and Cancer Prevention:  What Parents Should Know  What Kids Should Know About HPV and Cancer  Goal Tracker: Be More Active  Goal Tracker: Less Screen Time  Goal Tracker: Drink More Water  Goal Tracker: Eat More Fruits and Veggies    Gayathri Freeman MD  [facility] AND [facility]

## 2023-09-13 ENCOUNTER — OFFICE VISIT (OUTPATIENT)
Dept: FAMILY MEDICINE | Facility: OTHER | Age: 63
End: 2023-09-13
Attending: FAMILY MEDICINE
Payer: MEDICARE

## 2023-09-13 VITALS
OXYGEN SATURATION: 94 % | BODY MASS INDEX: 29.83 KG/M2 | DIASTOLIC BLOOD PRESSURE: 94 MMHG | SYSTOLIC BLOOD PRESSURE: 141 MMHG | WEIGHT: 202 LBS | HEART RATE: 100 BPM | TEMPERATURE: 96.9 F

## 2023-09-13 DIAGNOSIS — R10.84 ABDOMINAL PAIN, GENERALIZED: Primary | ICD-10-CM

## 2023-09-13 DIAGNOSIS — I73.9 PAD (PERIPHERAL ARTERY DISEASE) (H): ICD-10-CM

## 2023-09-13 DIAGNOSIS — R74.8 ELEVATED ALKALINE PHOSPHATASE LEVEL: ICD-10-CM

## 2023-09-13 DIAGNOSIS — A04.8 H. PYLORI INFECTION: ICD-10-CM

## 2023-09-13 LAB
ALBUMIN SERPL BCG-MCNC: 4.2 G/DL (ref 3.5–5.2)
ALP SERPL-CCNC: 150 U/L (ref 40–129)
ALT SERPL W P-5'-P-CCNC: 26 U/L (ref 0–70)
ANION GAP SERPL CALCULATED.3IONS-SCNC: 11 MMOL/L (ref 7–15)
AST SERPL W P-5'-P-CCNC: 29 U/L (ref 0–45)
BASOPHILS # BLD AUTO: 0.1 10E3/UL (ref 0–0.2)
BASOPHILS NFR BLD AUTO: 1 %
BILIRUB SERPL-MCNC: <0.2 MG/DL
BUN SERPL-MCNC: 23.8 MG/DL (ref 8–23)
CALCIUM SERPL-MCNC: 9.5 MG/DL (ref 8.8–10.2)
CHLORIDE SERPL-SCNC: 100 MMOL/L (ref 98–107)
CREAT SERPL-MCNC: 0.99 MG/DL (ref 0.67–1.17)
CRP SERPL-MCNC: 5.18 MG/L
DEPRECATED HCO3 PLAS-SCNC: 24 MMOL/L (ref 22–29)
EGFRCR SERPLBLD CKD-EPI 2021: 87 ML/MIN/1.73M2
EOSINOPHIL # BLD AUTO: 0.2 10E3/UL (ref 0–0.7)
EOSINOPHIL NFR BLD AUTO: 2 %
ERYTHROCYTE [DISTWIDTH] IN BLOOD BY AUTOMATED COUNT: 12.6 % (ref 10–15)
GLUCOSE SERPL-MCNC: 118 MG/DL (ref 70–99)
HCT VFR BLD AUTO: 46.8 % (ref 40–53)
HGB BLD-MCNC: 15.8 G/DL (ref 13.3–17.7)
IMM GRANULOCYTES # BLD: 0 10E3/UL
IMM GRANULOCYTES NFR BLD: 0 %
LIPASE SERPL-CCNC: 25 U/L (ref 13–60)
LYMPHOCYTES # BLD AUTO: 2 10E3/UL (ref 0.8–5.3)
LYMPHOCYTES NFR BLD AUTO: 26 %
MCH RBC QN AUTO: 31.3 PG (ref 26.5–33)
MCHC RBC AUTO-ENTMCNC: 33.8 G/DL (ref 31.5–36.5)
MCV RBC AUTO: 93 FL (ref 78–100)
MONOCYTES # BLD AUTO: 0.7 10E3/UL (ref 0–1.3)
MONOCYTES NFR BLD AUTO: 10 %
NEUTROPHILS # BLD AUTO: 4.7 10E3/UL (ref 1.6–8.3)
NEUTROPHILS NFR BLD AUTO: 61 %
NRBC # BLD AUTO: 0 10E3/UL
NRBC BLD AUTO-RTO: 0 /100
PLATELET # BLD AUTO: 162 10E3/UL (ref 150–450)
POTASSIUM SERPL-SCNC: 4.4 MMOL/L (ref 3.4–5.3)
PROT SERPL-MCNC: 7 G/DL (ref 6.4–8.3)
RBC # BLD AUTO: 5.05 10E6/UL (ref 4.4–5.9)
SODIUM SERPL-SCNC: 135 MMOL/L (ref 136–145)
WBC # BLD AUTO: 7.8 10E3/UL (ref 4–11)

## 2023-09-13 PROCEDURE — 85004 AUTOMATED DIFF WBC COUNT: CPT | Mod: ZL | Performed by: FAMILY MEDICINE

## 2023-09-13 PROCEDURE — 80053 COMPREHEN METABOLIC PANEL: CPT | Mod: ZL | Performed by: FAMILY MEDICINE

## 2023-09-13 PROCEDURE — 83690 ASSAY OF LIPASE: CPT | Mod: ZL | Performed by: FAMILY MEDICINE

## 2023-09-13 PROCEDURE — 99214 OFFICE O/P EST MOD 30 MIN: CPT | Performed by: FAMILY MEDICINE

## 2023-09-13 PROCEDURE — 36415 COLL VENOUS BLD VENIPUNCTURE: CPT | Mod: ZL | Performed by: FAMILY MEDICINE

## 2023-09-13 PROCEDURE — 86140 C-REACTIVE PROTEIN: CPT | Mod: ZL | Performed by: FAMILY MEDICINE

## 2023-09-13 PROCEDURE — G0463 HOSPITAL OUTPT CLINIC VISIT: HCPCS

## 2023-09-13 PROCEDURE — 82977 ASSAY OF GGT: CPT | Mod: ZL | Performed by: FAMILY MEDICINE

## 2023-09-13 RX ORDER — OMEPRAZOLE 40 MG/1
40 CAPSULE, DELAYED RELEASE ORAL 2 TIMES DAILY
Qty: 28 CAPSULE | Refills: 0 | Status: SHIPPED | OUTPATIENT
Start: 2023-09-13 | End: 2023-10-11

## 2023-09-13 ASSESSMENT — PAIN SCALES - GENERAL: PAINLEVEL: SEVERE PAIN (7)

## 2023-09-15 LAB — GGT SERPL-CCNC: 41 U/L (ref 8–61)

## 2023-09-19 ASSESSMENT — ENCOUNTER SYMPTOMS
FEVER: 0
SHORTNESS OF BREATH: 0
VOMITING: 0
LIGHT-HEADEDNESS: 0

## 2023-09-26 ENCOUNTER — OFFICE VISIT (OUTPATIENT)
Dept: SURGERY | Facility: OTHER | Age: 63
End: 2023-09-26
Attending: NURSE PRACTITIONER
Payer: MEDICARE

## 2023-09-26 ENCOUNTER — HOSPITAL ENCOUNTER (OUTPATIENT)
Facility: HOSPITAL | Age: 63
End: 2023-09-26
Attending: SURGERY | Admitting: SURGERY
Payer: MEDICARE

## 2023-09-26 ENCOUNTER — LAB (OUTPATIENT)
Dept: LAB | Facility: OTHER | Age: 63
End: 2023-09-26
Attending: NURSE PRACTITIONER
Payer: MEDICARE

## 2023-09-26 ENCOUNTER — PREP FOR PROCEDURE (OUTPATIENT)
Dept: SURGERY | Facility: OTHER | Age: 63
End: 2023-09-26

## 2023-09-26 VITALS
DIASTOLIC BLOOD PRESSURE: 90 MMHG | SYSTOLIC BLOOD PRESSURE: 146 MMHG | HEART RATE: 88 BPM | OXYGEN SATURATION: 96 % | HEIGHT: 69 IN | BODY MASS INDEX: 29.47 KG/M2 | WEIGHT: 199 LBS | TEMPERATURE: 98.3 F

## 2023-09-26 DIAGNOSIS — R10.13 EPIGASTRIC PAIN: Primary | ICD-10-CM

## 2023-09-26 DIAGNOSIS — K92.1 MELENA: Primary | ICD-10-CM

## 2023-09-26 DIAGNOSIS — Z86.19 HISTORY OF HELICOBACTER PYLORI INFECTION: ICD-10-CM

## 2023-09-26 DIAGNOSIS — R10.13 EPIGASTRIC PAIN: ICD-10-CM

## 2023-09-26 DIAGNOSIS — R74.8 ELEVATED ALKALINE PHOSPHATASE LEVEL: ICD-10-CM

## 2023-09-26 DIAGNOSIS — R14.0 BLOATING: ICD-10-CM

## 2023-09-26 DIAGNOSIS — K92.1 MELENA: ICD-10-CM

## 2023-09-26 DIAGNOSIS — Z86.0100 HISTORY OF COLONIC POLYPS: ICD-10-CM

## 2023-09-26 LAB — ALP SERPL-CCNC: 152 U/L (ref 40–129)

## 2023-09-26 PROCEDURE — 84075 ASSAY ALKALINE PHOSPHATASE: CPT | Mod: ZL

## 2023-09-26 PROCEDURE — 99213 OFFICE O/P EST LOW 20 MIN: CPT | Performed by: NURSE PRACTITIONER

## 2023-09-26 PROCEDURE — G0463 HOSPITAL OUTPT CLINIC VISIT: HCPCS

## 2023-09-26 PROCEDURE — 36415 COLL VENOUS BLD VENIPUNCTURE: CPT | Mod: ZL

## 2023-09-26 RX ORDER — BISACODYL 5 MG/1
5 TABLET, DELAYED RELEASE ORAL DAILY PRN
Qty: 4 TABLET | Refills: 0 | Status: SHIPPED | OUTPATIENT
Start: 2023-09-26 | End: 2023-11-21

## 2023-09-26 ASSESSMENT — PAIN SCALES - GENERAL: PAINLEVEL: NO PAIN (0)

## 2023-09-26 NOTE — PROGRESS NOTES
CLINIC NOTE - CONSULT  9/26/2023    Patient : Rodney Goodwin    Referring Physician :  Dr. Cline    Reason for Referral : Upper and lower endoscopy    This is a 63 year old male with a need for an upper and lower endoscopy.  Upper endoscopy is needed for Abdominal Pain, Melena, and history of H. pylori , bloating.  Lower endoscopy is needed for History of Colon Polyps.      Last colonoscopy : Years ago  Family history of colon cancer : NO  Family history of colon polyps : NO  Personal history of colon cancer : NO  Personal history of colon polyps : YES---was getting colonoscopies yearly  Rectal bleeding : NO  Changes in bowel habits :NO  Personal history of inflammatory bowel disease : NO    Past Medical History:  Past Medical History:   Diagnosis Date    Acute exacerbation of chronic obstructive pulmonary disease (H) 10/28/2016    Anxiety     Backache 6/9/2008    Overview:  IMO Update 10/11    Bipolar 1 disorder (H) 11/11/2014    Bipolar affective disorder (H)     Cellulitis and abscess of forearm 4/4/2014    Chlordiazepoxide dependence (H) 8/9/2010    Overview:  IMO Update 10/11    Chronic constipation     COPD exacerbation (H) 9/14/2015    Costochondritis 2/26/2015    Degeneration of lumbar or lumbosacral intervertebral disc 7/25/2006    Overview:  IMO Update 10/11    Depressive disorder     Drug abuse (H)     Elevated blood sugar 10/29/2016    Heroin abuse (H) 9/15/2015    Heroin addiction (H) 8/9/2010    Overview:  See social notes IMO Update 10/11    Hypertension     IV drug user 8/9/2010    Overview:  IMO Update 10/11    Lumbosacral spondylosis without myelopathy 11/13/2008    Narcotic addiction (H) 10/29/2016    Opioid use disorder, severe, dependence (H) 2020    [facility] records; prior Methadone; [facility]; AA/NA    Osteoarthrosis, pelvic region and thigh 11/13/2008    Overview:  IMO Update 10/11    Penile lesion 4/27/2020    Suicidal behavior 4/27/2020    Venous insufficiency of both lower extremities  2/26/2015     Do you wish to do the replacement in the background? yes         Past Surgical History:  Past Surgical History:   Procedure Laterality Date    ARTHROSCOPY KNEE BILATERAL      COLONOSCOPY  01/29/2020    Multiple, repeat due 2015    COLONOSCOPY N/A 7/31/2015    Procedure: COLONOSCOPY;  Surgeon: Justin Andujar MD;  Location: [facility]    DECOMPRESSION CUBITAL TUNNEL Left 11/21/2017    Procedure: DECOMPRESSION CUBITAL TUNNEL;;  Surgeon: Jhonny Zhao MD;  Location: [facility] OR    DENTAL SURGERY      HERNIA REPAIR      Inguinal, left    RELEASE CARPAL TUNNEL Left 11/21/2017    Procedure: RELEASE CARPAL TUNNEL;  LEFT ELBOW CUBITAL and CARPAL TUNNEL RELEASE;  Surgeon: Jhonny Zhao MD;  Location: [facility] OR       Family History History:  Family History   Problem Relation Age of Onset    Diabetes Mother     Cerebrovascular Disease Father     Pancreatic Cancer Brother        History of Tobacco Use:  History   Smoking Status    Every Day    Packs/day: 1.00    Types: Cigarettes    Start date: 1983   Smokeless Tobacco    Never       Current Medications:  Current Outpatient Medications   Medication Sig Dispense Refill    albuterol (PROAIR HFA/PROVENTIL HFA/VENTOLIN HFA) 108 (90 Base) MCG/ACT inhaler Inhale 2 puffs into the lungs every 4 hours as needed for shortness of breath, wheezing or cough 18 g 3    ASPIRIN LOW DOSE 81 MG EC tablet Take 1 tablet (81 mg) by mouth daily 100 tablet 3    carvedilol (COREG) 6.25 MG tablet Take 1 tablet (6.25 mg) by mouth 2 times daily (with meals) 60 tablet 0    Ipratropium-Albuterol (COMBIVENT RESPIMAT)  MCG/ACT inhaler Inhale 1 puff into the lungs 4 times daily Space evenly during waking hours. 1 Inhaler 0    losartan (COZAAR) 50 MG tablet Take 1 tablet (50 mg) by mouth daily 30 tablet 0    omeprazole (PRILOSEC) 40 MG DR capsule Take 1 capsule (40 mg) by mouth 2 times daily 28 capsule 0    sucralfate (CARAFATE) 1 GM tablet Take 1 tablet by mouth 2 times daily    "      Allergies:  Allergies   Allergen Reactions    Codeine     Penicillins        ROS:  Constitutional: negative  Eyes: negative  Ears, nose, mouth, throat, and face: positive for globus sensation  Respiratory: negative  Cardiovascular: positive atherosclerotic calcification  Gastrointestinal: positive for bloating, abdominal pain, history of colon polyps, history of H. Pylori, melena  Genitourinary:positive for urinary changes, bladder mass  Integument/breast: negative  Hematologic/lymphatic: negative  Musculoskeletal:negative  Neurological: negative  Behavioral/Psych: positive for ADHD, bipolar, and tobacco use  Endocrine: negative  Allergic/Immunologic: negative    PHYSICAL EXAM:     Vital signs: BP (!) 146/90 (BP Location: Right arm, Cuff Size: Adult Large)   Pulse 88   Temp 98.3  F (36.8  C) (Tympanic)   Ht 1.753 m (5' 9\")   Wt 90.3 kg (199 lb)   SpO2 96%   BMI 29.39 kg/m     BMI: Body mass index is 29.39 kg/m .   General: Normal, healthy, cooperative, in no acute distress, alert   Skin: no rashes   Lungs: clear to auscultation   CV: Regular rate and rhythm   Abdominal: soft, non-tender to palpation   Extremities: No cyanosis, clubbing or edema noted bilaterally in Upper and Lower Extremities   Neurological: without deficit    Assessment:   63 year old male with need for upper endoscopy for Abdominal Pain, Melena, and bloating, history of H. pylori  and lower endoscopy for History of Colon Polyps:    Plan:   Will schedule an esophagogastroduodenoscopy and colonoscopy.  The procedures with their risks, benefits and alternatives were explained.  Risks include but are not limited to bleeding, perforation, missing lesions, need for additional procedures, reaction to anesthesia.  All the patients questions were answered.  The patient consents to proceed.  The procedures will be scheduled.    Patient will need pre-operative clearance for these procedures.     "

## 2023-09-26 NOTE — PATIENT INSTRUCTIONS
Thank you for allowing Nel Tejada CNP and our surgical team to participate in your care. Please call our health unit coordinator at [phone] with scheduling questions or the nurse at [phone] with any other questions or concerns.      You have been scheduled for:  Colonoscopy and Upper Endoscopy with  on 10/12/23.   You will use Golytely bowel prep.  Please see handout for additional instruction.  You will need a pre-operative appointment with your primary care provider.  You may call [phone] or [phone] with any questions.

## 2023-09-28 LAB
ALP BONE SERPL-CCNC: 57 U/L
ALP LIVER SERPL-CCNC: 96 U/L
ALP OTHER SERPL-CCNC: 0 U/L
ALP SERPL-CCNC: 153 U/L

## 2023-10-05 RX ORDER — BISACODYL 5 MG/1
10 TABLET, DELAYED RELEASE ORAL ONCE
Qty: 2 TABLET | Refills: 0 | Status: SHIPPED | OUTPATIENT
Start: 2023-10-05 | End: 2023-10-05

## 2023-10-09 RX ORDER — HYDRALAZINE HYDROCHLORIDE 20 MG/ML
2.5-5 INJECTION INTRAMUSCULAR; INTRAVENOUS EVERY 10 MIN PRN
Status: CANCELLED | OUTPATIENT
Start: 2023-10-09

## 2023-10-09 RX ORDER — SODIUM CHLORIDE, SODIUM LACTATE, POTASSIUM CHLORIDE, CALCIUM CHLORIDE 600; 310; 30; 20 MG/100ML; MG/100ML; MG/100ML; MG/100ML
INJECTION, SOLUTION INTRAVENOUS CONTINUOUS
Status: CANCELLED | OUTPATIENT
Start: 2023-10-09

## 2023-10-09 RX ORDER — LABETALOL 20 MG/4 ML (5 MG/ML) INTRAVENOUS SYRINGE
10
Status: CANCELLED | OUTPATIENT
Start: 2023-10-09

## 2023-10-09 RX ORDER — ONDANSETRON 4 MG/1
4 TABLET, ORALLY DISINTEGRATING ORAL EVERY 30 MIN PRN
Status: CANCELLED | OUTPATIENT
Start: 2023-10-09

## 2023-10-09 RX ORDER — FENTANYL CITRATE 50 UG/ML
50 INJECTION, SOLUTION INTRAMUSCULAR; INTRAVENOUS EVERY 5 MIN PRN
Status: CANCELLED | OUTPATIENT
Start: 2023-10-09

## 2023-10-09 RX ORDER — ONDANSETRON 2 MG/ML
4 INJECTION INTRAMUSCULAR; INTRAVENOUS EVERY 30 MIN PRN
Status: CANCELLED | OUTPATIENT
Start: 2023-10-09

## 2023-10-09 RX ORDER — LIDOCAINE 40 MG/G
CREAM TOPICAL
Status: CANCELLED | OUTPATIENT
Start: 2023-10-09

## 2023-10-10 ENCOUNTER — TELEPHONE (OUTPATIENT)
Dept: FAMILY MEDICINE | Facility: OTHER | Age: 63
End: 2023-10-10

## 2023-10-10 NOTE — TELEPHONE ENCOUNTER
11:23 AM    Reason for Call: PREOP    Patient is having an EGD and Colonoscopy with Dr Rodriguez on 10/12/23 he is requesting to have this appointment to be moved up in the morning since he will start taking the prep in the afternoon and he is afraid he might have an accident while here at the clinic for his Preop with Fariba Louis at 2:45pm arriving and 3pm appointment.    The patient is requesting an appointment for an earlier time with Dr Cline or Fariba Louis.    Was an appointment offered for this call? No  If yes : Appointment type              Date    Preferred method for responding to this message: Telephone Call  What is your phone number ? [phone]    If we cannot reach you directly, may we leave a detailed response at the number you provided? Yes    Can this message wait until your PCP/provider returns, if unavailable today? No,

## 2023-10-10 NOTE — TELEPHONE ENCOUNTER
Please call pt to schedule  Sun BYRON Burgess    11:23 AM     Reason for Call: PREOP     Patient is having an EGD and Colonoscopy with Dr Rodriguez on 10/12/23 he is requesting to have this appointment to be moved up in the morning since he will start taking the prep in the afternoon and he is afraid he might have an accident while here at the clinic for his Preop with Fariba Louis at 2:45pm arriving and 3pm appointment.     The patient is requesting an appointment for an earlier time with Dr Cline or Fariba Louis.

## 2023-10-11 ENCOUNTER — LAB (OUTPATIENT)
Dept: LAB | Facility: OTHER | Age: 63
End: 2023-10-11
Payer: MEDICARE

## 2023-10-11 ENCOUNTER — OFFICE VISIT (OUTPATIENT)
Dept: FAMILY MEDICINE | Facility: OTHER | Age: 63
End: 2023-10-11
Attending: NURSE PRACTITIONER
Payer: MEDICARE

## 2023-10-11 VITALS
DIASTOLIC BLOOD PRESSURE: 90 MMHG | WEIGHT: 205 LBS | SYSTOLIC BLOOD PRESSURE: 180 MMHG | BODY MASS INDEX: 30.27 KG/M2 | TEMPERATURE: 97.2 F | HEART RATE: 91 BPM | OXYGEN SATURATION: 97 %

## 2023-10-11 DIAGNOSIS — K92.1 MELENA: ICD-10-CM

## 2023-10-11 DIAGNOSIS — R10.13 EPIGASTRIC PAIN: ICD-10-CM

## 2023-10-11 DIAGNOSIS — A04.8 H. PYLORI INFECTION: ICD-10-CM

## 2023-10-11 DIAGNOSIS — Z86.0100 HISTORY OF COLONIC POLYPS: ICD-10-CM

## 2023-10-11 DIAGNOSIS — Z01.818 PREOP GENERAL PHYSICAL EXAM: Primary | ICD-10-CM

## 2023-10-11 DIAGNOSIS — Z01.818 PREOP GENERAL PHYSICAL EXAM: ICD-10-CM

## 2023-10-11 DIAGNOSIS — I10 PRIMARY HYPERTENSION: ICD-10-CM

## 2023-10-11 LAB
ANION GAP SERPL CALCULATED.3IONS-SCNC: 10 MMOL/L (ref 7–15)
BASO+EOS+MONOS # BLD AUTO: NORMAL 10*3/UL
BASO+EOS+MONOS NFR BLD AUTO: NORMAL %
BASOPHILS # BLD AUTO: 0.1 10E3/UL (ref 0–0.2)
BASOPHILS NFR BLD AUTO: 1 %
BUN SERPL-MCNC: 18.1 MG/DL (ref 8–23)
CALCIUM SERPL-MCNC: 9.5 MG/DL (ref 8.8–10.2)
CHLORIDE SERPL-SCNC: 102 MMOL/L (ref 98–107)
CREAT SERPL-MCNC: 1.18 MG/DL (ref 0.67–1.17)
DEPRECATED HCO3 PLAS-SCNC: 26 MMOL/L (ref 22–29)
EGFRCR SERPLBLD CKD-EPI 2021: 70 ML/MIN/1.73M2
EOSINOPHIL # BLD AUTO: 0.2 10E3/UL (ref 0–0.7)
EOSINOPHIL NFR BLD AUTO: 2 %
ERYTHROCYTE [DISTWIDTH] IN BLOOD BY AUTOMATED COUNT: 12.5 % (ref 10–15)
GLUCOSE SERPL-MCNC: 99 MG/DL (ref 70–99)
HCT VFR BLD AUTO: 46 % (ref 40–53)
HGB BLD-MCNC: 15.6 G/DL (ref 13.3–17.7)
IMM GRANULOCYTES # BLD: 0 10E3/UL
IMM GRANULOCYTES NFR BLD: 0 %
LYMPHOCYTES # BLD AUTO: 2.5 10E3/UL (ref 0.8–5.3)
LYMPHOCYTES NFR BLD AUTO: 31 %
MCH RBC QN AUTO: 31.3 PG (ref 26.5–33)
MCHC RBC AUTO-ENTMCNC: 33.9 G/DL (ref 31.5–36.5)
MCV RBC AUTO: 92 FL (ref 78–100)
MONOCYTES # BLD AUTO: 0.9 10E3/UL (ref 0–1.3)
MONOCYTES NFR BLD AUTO: 11 %
NEUTROPHILS # BLD AUTO: 4.4 10E3/UL (ref 1.6–8.3)
NEUTROPHILS NFR BLD AUTO: 55 %
NRBC # BLD AUTO: 0 10E3/UL
NRBC BLD AUTO-RTO: 0 /100
PLATELET # BLD AUTO: 166 10E3/UL (ref 150–450)
POTASSIUM SERPL-SCNC: 4.2 MMOL/L (ref 3.4–5.3)
RBC # BLD AUTO: 4.98 10E6/UL (ref 4.4–5.9)
SODIUM SERPL-SCNC: 138 MMOL/L (ref 135–145)
WBC # BLD AUTO: 8.1 10E3/UL (ref 4–11)

## 2023-10-11 PROCEDURE — 80048 BASIC METABOLIC PNL TOTAL CA: CPT | Mod: ZL

## 2023-10-11 PROCEDURE — 36415 COLL VENOUS BLD VENIPUNCTURE: CPT | Mod: ZL

## 2023-10-11 PROCEDURE — 93005 ELECTROCARDIOGRAM TRACING: CPT | Performed by: NURSE PRACTITIONER

## 2023-10-11 PROCEDURE — 85004 AUTOMATED DIFF WBC COUNT: CPT | Mod: ZL

## 2023-10-11 PROCEDURE — 93010 ELECTROCARDIOGRAM REPORT: CPT | Mod: 77 | Performed by: INTERNAL MEDICINE

## 2023-10-11 PROCEDURE — G0463 HOSPITAL OUTPT CLINIC VISIT: HCPCS | Mod: 25

## 2023-10-11 PROCEDURE — G0463 HOSPITAL OUTPT CLINIC VISIT: HCPCS

## 2023-10-11 PROCEDURE — 99214 OFFICE O/P EST MOD 30 MIN: CPT | Performed by: NURSE PRACTITIONER

## 2023-10-11 RX ORDER — POLYETHYLENE GLYCOL 3350, SODIUM CHLORIDE, SODIUM BICARBONATE, POTASSIUM CHLORIDE 420; 11.2; 5.72; 1.48 G/4L; G/4L; G/4L; G/4L
POWDER, FOR SOLUTION ORAL
COMMUNITY
Start: 2023-10-05 | End: 2023-11-21

## 2023-10-11 RX ORDER — OMEPRAZOLE 40 MG/1
40 CAPSULE, DELAYED RELEASE ORAL 2 TIMES DAILY
Qty: 28 CAPSULE | Refills: 0 | Status: SHIPPED | OUTPATIENT
Start: 2023-10-11 | End: 2023-10-25

## 2023-10-11 RX ORDER — LOSARTAN POTASSIUM 100 MG/1
100 TABLET ORAL DAILY
Qty: 30 TABLET | Refills: 1 | Status: SHIPPED | OUTPATIENT
Start: 2023-10-11 | End: 2023-10-25

## 2023-10-11 RX ORDER — CARVEDILOL 6.25 MG/1
6.25 TABLET ORAL 2 TIMES DAILY WITH MEALS
Qty: 60 TABLET | Refills: 0 | Status: SHIPPED | OUTPATIENT
Start: 2023-10-11 | End: 2023-10-25

## 2023-10-11 RX ORDER — LOSARTAN POTASSIUM 50 MG/1
50 TABLET ORAL DAILY
Qty: 30 TABLET | Refills: 0 | Status: SHIPPED | OUTPATIENT
Start: 2023-10-11 | End: 2023-10-11

## 2023-10-11 ASSESSMENT — PAIN SCALES - GENERAL: PAINLEVEL: NO PAIN (0)

## 2023-10-11 NOTE — PROGRESS NOTES
[facility] [place]  [address]  [place] [address]  Phone: [phone]  Primary Provider: Abdifatah Cline  Pre-op Performing Provider: SARWAT SALMON      PREOPERATIVE EVALUATION:  Today's date: 10/11/2023    Rodney is a 63 year old male who presents for a preoperative evaluation.      10/11/2023     8:54 AM   Additional Questions   Roomed by Logan Nelson   Accompanied by None         10/11/2023     8:54 AM   Patient Reported Additional Medications   Patient reports taking the following new medications None       Surgical Information:  Surgery/Procedure: EGD and Colonoscopy  Surgery Location: [facility]  Surgeon: Dr. Rodriguez  Surgery Date: 10/12/23  Time of Surgery: TBD  Where patient plans to recover: At home with family  Fax number for surgical facility: Note does not need to be faxed, will be available electronically in Epic.    Assessment & Plan     The proposed surgical procedure is considered INTERMEDIATE risk.    Preop general physical exam  Epigastric pain  History of colonic polyps  Melena  Not cleared for elective surgical procedure   Uncontrolled blood pressure   Spoke with anesthesia - should have improved control of blood pressure before clearing for surgery  Plan to increase Losartan and follow up with his PCP in 2 weeks   - CBC with platelets and differential; Future  - Basic metabolic panel; Future  - EKG 12-lead complete w/read - (Clinic Performed)    H. pylori infection  Refilled   - omeprazole (PRILOSEC) 40 MG DR capsule; Take 1 capsule (40 mg) by mouth 2 times daily    Primary hypertension  Refilled and adjusted dose of losartan due to uncontrolled blood pressure   Follow up with PCP in 2 weeks   - carvedilol (COREG) 6.25 MG tablet; Take 1 tablet (6.25 mg) by mouth 2 times daily (with meals)  - losartan (COZAAR) 100 MG tablet; Take 1 tablet (100 mg) by mouth daily           Risks and Recommendations:  The patient has the following additional risks and recommendations for  perioperative complications:  Social and Substance:    - Active nicotine user, advised smoking cessation    Antiplatelet or Anticoagulation Medication Instructions:   - aspirin: last dose of aspirin was on 10/10/23    Additional Medication Instructions:  Patient is to take all scheduled medications on the day of surgery EXCEPT for modifications listed below:   - ACE/ARB: HOLD on day of surgery (minimum 11 hours for general anesthesia).   - Beta Blockers: Continue taking on the day of surgery.   - rescue Inhaler: Continue PRN. Bring to hospital on the day of surgery.    RECOMMENDATION:  Surgery not recommended due to uncontrolled hypertension   Reviewed with anesthesia and agreed EGD and colonoscopy should wait until blood pressure under control     Discussion of management or test interpretation with external physician/other qualified healthcare professional/appropriate source - anesthesia   Ordering of each unique test  Prescription drug management  I spent a total of 34 minutes on the day of the visit.   Time spent by me doing chart review, history and exam, documentation and further activities per the note      Subjective       HPI related to upcoming procedure: history of acid reflux, polyps and blood in stool           10/11/2023     9:04 AM   Preop Questions   1. Have you ever had a heart attack or stroke? No   2. Have you ever had surgery on your heart or blood vessels, such as a stent placement, a coronary artery bypass, or surgery on an artery in your head, neck, heart, or legs? No   3. Do you have chest pain with activity? No   4. Do you have a history of  heart failure? No   5. Do you currently have a cold, bronchitis or symptoms of other infection? No   6. Do you have a cough, shortness of breath, or wheezing? YES - Shortness of breathe/ COPD   7. Do you or anyone in your family have previous history of blood clots? No   8. Do you or does anyone in your family have a serious bleeding problem such as  prolonged bleeding following surgeries or cuts? No   9. Have you ever had problems with anemia or been told to take iron pills? No   10. Have you had any abnormal blood loss such as black, tarry or bloody stools? YES - Patient previously had black loose stool   11. Have you ever had a blood transfusion? No   12. Are you willing to have a blood transfusion if it is medically needed before, during, or after your surgery? Yes   13. Have you or any of your relatives ever had problems with anesthesia? No   14. Do you have sleep apnea, excessive snoring or daytime drowsiness? No   15. Do you have any artifical heart valves or other implanted medical devices like a pacemaker, defibrillator, or continuous glucose monitor? No   16. Do you have artificial joints? No   17. Are you allergic to latex? No     Health Care Directive:  Patient does not have a Health Care Directive or Living Will: Discussed advance care planning with patient; however, patient declined at this time.    Preoperative Review of :   reviewed - no record of controlled substances prescribed.      Status of Chronic Conditions:  See problem list for active medical problems.  Problems all longstanding and stable, except as noted/documented.  See ROS for pertinent symptoms related to these conditions.    COPD - Patient has a longstanding history of moderate-severe COPD . Patient has been doing well overall noting SOB and continues on medication regimen consisting of albuterol inhaler as needed without adverse reactions or side effects.    HYPERTENSION - Patient has longstanding history of HTN , currently denies any symptoms referable to elevated blood pressure. Specifically denies chest pain, palpitations, dyspnea, orthopnea, PND or peripheral edema. Blood pressure readings have not been in normal range. Current medication regimen is as listed below. Patient denies any side effects of medication.   Rodney did not take his medications today, but with review  of his blood pressure has been elevated for awhile     Review of Systems  CONSTITUTIONAL: NEGATIVE for fever, chills, change in weight  INTEGUMENTARY/SKIN: NEGATIVE for worrisome rashes, moles or lesions  EYES: NEGATIVE for vision changes or irritation  ENT/MOUTH: NEGATIVE for ear, mouth and throat problems  RESP:Hx COPD  CV: Hx HTN  GI: Hx GERD  : NEGATIVE for frequency, dysuria, or hematuria  MUSCULOSKELETAL: NEGATIVE for significant arthralgias or myalgia  NEURO: NEGATIVE for weakness, dizziness or paresthesias  ENDOCRINE: NEGATIVE for temperature intolerance, skin/hair changes  HEME: NEGATIVE for bleeding problems  PSYCHIATRIC: increased stress and anxiety     Patient Active Problem List    Diagnosis Date Noted    COPD (chronic obstructive pulmonary disease) (H) 04/28/2020     Priority: Medium    Suicidal behavior 04/27/2020     Priority: Medium    Gastroesophageal reflux disease without esophagitis 04/27/2020     Priority: Medium    Penile lesion 04/27/2020     Priority: Medium    Hypertension      Priority: Medium    Elevated blood sugar 10/29/2016     Priority: Medium    Narcotic addiction (H) 10/29/2016     Priority: Medium    Heroin abuse (H) 09/15/2015     Priority: Medium    Costochondritis 02/26/2015     Priority: Medium    Venous insufficiency of both lower extremities 02/26/2015     Priority: Medium     Do you wish to do the replacement in the background? yes        Tobacco dependence 01/06/2015     Priority: Medium    Cluster B personality disorder (H) 12/25/2014     Priority: Medium     Vs cluster B traits per O[facility] records 11/2014      Polysubstance abuse (H) 12/25/2014     Priority: Medium     Heroin, benzodiazepines, amphetamine, prescription opiates, THC abuse. Dealing drugs, IVDU as recently as 11/2014.      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     Priority: Medium    Cellulitis  and abscess of forearm 04/04/2014     Priority: Medium    IV drug user 08/09/2010     Priority: Medium     Overview:   IMO Update 10/11      Chlordiazepoxide dependence (H) 08/09/2010     Priority: Medium     Overview:   IMO Update 10/11      Heroin addiction (H) 08/09/2010     Priority: Medium     Overview:   See social notes  IMO Update 10/11      Osteoarthrosis, pelvic region and thigh 11/13/2008     Priority: Medium     Overview:   IMO Update 10/11      Lumbosacral spondylosis without myelopathy 11/13/2008     Priority: Medium    Backache 06/09/2008     Priority: Medium     Overview:   IMO Update 10/11      Degeneration of lumbar or lumbosacral intervertebral disc 07/25/2006     Priority: Medium     Overview:   IMO Update 10/11        Past Medical History:   Diagnosis Date    Acute exacerbation of chronic obstructive pulmonary disease (H) 10/28/2016    Anxiety     Backache 6/9/2008    Overview:  IMO Update 10/11    Bipolar 1 disorder (H) 11/11/2014    Bipolar affective disorder (H)     Cellulitis and abscess of forearm 4/4/2014    Chlordiazepoxide dependence (H) 8/9/2010    Overview:  IMO Update 10/11    Chronic constipation     COPD exacerbation (H) 9/14/2015    Costochondritis 2/26/2015    Degeneration of lumbar or lumbosacral intervertebral disc 7/25/2006    Overview:  IMO Update 10/11    Depressive disorder     Drug abuse (H)     Elevated blood sugar 10/29/2016    Heroin abuse (H) 9/15/2015    Heroin addiction (H) 8/9/2010    Overview:  See social notes IMO Update 10/11    Hypertension     IV drug user 8/9/2010    Overview:  IMO Update 10/11    Lumbosacral spondylosis without myelopathy 11/13/2008    Narcotic addiction (H) 10/29/2016    Opioid use disorder, severe, dependence (H) 2020    [facility] records; prior Methadone; [facility]; AA/NA    Osteoarthrosis, pelvic region and thigh 11/13/2008    Overview:  IMO Update 10/11    Penile lesion 4/27/2020    Suicidal behavior 4/27/2020    Venous insufficiency of both  lower extremities 2/26/2015     Do you wish to do the replacement in the background? yes       Past Surgical History:   Procedure Laterality Date    ARTHROSCOPY KNEE BILATERAL      COLONOSCOPY  01/29/2020    Multiple, repeat due 2015    COLONOSCOPY N/A 7/31/2015    Procedure: COLONOSCOPY;  Surgeon: Justin Andujar MD;  Location: [facility]    DECOMPRESSION CUBITAL TUNNEL Left 11/21/2017    Procedure: DECOMPRESSION CUBITAL TUNNEL;;  Surgeon: Jhonny Zhao MD;  Location: [facility] OR    DENTAL SURGERY      HERNIA REPAIR      Inguinal, left    RELEASE CARPAL TUNNEL Left 11/21/2017    Procedure: RELEASE CARPAL TUNNEL;  LEFT ELBOW CUBITAL and CARPAL TUNNEL RELEASE;  Surgeon: Jhonny Zhao MD;  Location: [facility] OR     Current Outpatient Medications   Medication Sig Dispense Refill    albuterol (PROAIR HFA/PROVENTIL HFA/VENTOLIN HFA) 108 (90 Base) MCG/ACT inhaler Inhale 2 puffs into the lungs every 4 hours as needed for shortness of breath, wheezing or cough 18 g 3    ASPIRIN LOW DOSE 81 MG EC tablet Take 1 tablet (81 mg) by mouth daily 100 tablet 3    losartan (COZAAR) 50 MG tablet Take 1 tablet (50 mg) by mouth daily 30 tablet 0    omeprazole (PRILOSEC) 40 MG DR capsule Take 1 capsule (40 mg) by mouth 2 times daily 28 capsule 0    sucralfate (CARAFATE) 1 GM tablet Take 1 tablet by mouth 2 times daily      bisacodyl (DULCOLAX) 5 MG EC tablet Take 1 tablet (5 mg) by mouth daily as needed for constipation Take 2 tablets by mouth 2 days prior to procedure. Take the remaining 2 tablets by mouth at 3pm the day prior to procedure. (Patient not taking: Reported on 10/11/2023) 4 tablet 0    carvedilol (COREG) 6.25 MG tablet Take 1 tablet (6.25 mg) by mouth 2 times daily (with meals) (Patient not taking: Reported on 10/11/2023) 60 tablet 0    GAVILYTE-G 236 g suspension  (Patient not taking: Reported on 10/11/2023)      polyethylene glycol-electrolytes (NULYTELY) 420 g solution TAKE 4,000 ML BY MOUTH ONCE FOR 1 DOSE--REFER TO GETTING READY FOR A  COLONOSCOPY INSTRUCTION HANDOUT (Patient not taking: Reported on 10/11/2023)         Allergies   Allergen Reactions    Codeine     Penicillins         Social History     Tobacco Use    Smoking status: Every Day     Packs/day: 1     Types: Cigarettes     Start date: 1983    Smokeless tobacco: Never    Tobacco comments:     Tried to Quit (NO)   Substance Use Topics    Alcohol use: No     Alcohol/week: 0.0 standard drinks of alcohol     Family History   Problem Relation Age of Onset    Diabetes Mother     Cerebrovascular Disease Father     Pancreatic Cancer Brother      History   Drug Use Unknown         Objective     BP (!) 180/90   Pulse 91   Temp 97.2  F (36.2  C) (Tympanic)   Wt 93 kg (205 lb)   SpO2 97%   BMI 30.27 kg/m      Physical Exam    GENERAL APPEARANCE: alert and active     EYES: EOMI,  PERRL     HENT: ear canals and TM's normal and nose and mouth without ulcers or lesions     NECK: no adenopathy, no asymmetry, masses, or scars and thyroid normal to palpation     RESP: lungs clear to auscultation - no rales, rhonchi or wheezes     CV: regular rates and rhythm, normal S1 S2, no S3 or S4 and no murmur, click or rub     ABDOMEN:  soft, nontender, no HSM or masses and bowel sounds normal     MS: extremities normal- no gross deformities noted, no evidence of inflammation in joints, FROM in all extremities.     SKIN: no suspicious lesions or rashes     NEURO: Normal strength and tone, sensory exam grossly normal, mentation intact and speech normal     PSYCH: mentation appears normal. and anxious     LYMPHATICS: No cervical adenopathy    Recent Labs   Lab Test 09/13/23  1017 08/23/23  1047 01/16/23  2244   HGB 15.8 15.3 14.4    164 122*   INR  --   --  1.07   * 138 136   POTASSIUM 4.4 4.1 3.7   CR 0.99 1.09 1.09        Diagnostics:  Recent Results (from the past 24 hour(s))   Basic metabolic panel    Collection Time: 10/11/23  8:56 AM   Result Value Ref Range    Sodium 138 135 - 145 mmol/L     Potassium 4.2 3.4 - 5.3 mmol/L    Chloride 102 98 - 107 mmol/L    Carbon Dioxide (CO2) 26 22 - 29 mmol/L    Anion Gap 10 7 - 15 mmol/L    Urea Nitrogen 18.1 8.0 - 23.0 mg/dL    Creatinine 1.18 (H) 0.67 - 1.17 mg/dL    GFR Estimate 70 >60 mL/min/1.73m2    Calcium 9.5 8.8 - 10.2 mg/dL    Glucose 99 70 - 99 mg/dL   CBC with platelets and differential    Collection Time: 10/11/23  8:56 AM   Result Value Ref Range    WBC Count 8.1 4.0 - 11.0 10e3/uL    RBC Count 4.98 4.40 - 5.90 10e6/uL    Hemoglobin 15.6 13.3 - 17.7 g/dL    Hematocrit 46.0 40.0 - 53.0 %    MCV 92 78 - 100 fL    MCH 31.3 26.5 - 33.0 pg    MCHC 33.9 31.5 - 36.5 g/dL    RDW 12.5 10.0 - 15.0 %    Platelet Count 166 150 - 450 10e3/uL    % Neutrophils 55 %    % Lymphocytes 31 %    % Monocytes 11 %    Mids % (Monos, Eos, Basos)      % Eosinophils 2 %    % Basophils 1 %    % Immature Granulocytes 0 %    NRBCs per 100 WBC 0 <1 /100    Absolute Neutrophils 4.4 1.6 - 8.3 10e3/uL    Absolute Lymphocytes 2.5 0.8 - 5.3 10e3/uL    Absolute Monocytes 0.9 0.0 - 1.3 10e3/uL    Mids Abs (Monos, Eos, Basos)      Absolute Eosinophils 0.2 0.0 - 0.7 10e3/uL    Absolute Basophils 0.1 0.0 - 0.2 10e3/uL    Absolute Immature Granulocytes 0.0 <=0.4 10e3/uL    Absolute NRBCs 0.0 10e3/uL   EKG 12-lead complete w/read - (Clinic Performed)    Collection Time: 10/11/23  9:30 AM   Result Value Ref Range    Systolic Blood Pressure  mmHg    Diastolic Blood Pressure  mmHg    Ventricular Rate 85 BPM    Atrial Rate 85 BPM    TX Interval 150 ms    QRS Duration 94 ms     ms    QTc 468 ms    P Axis 40 degrees    R AXIS 31 degrees    T Axis 140 degrees    Interpretation ECG       Sinus rhythm with Premature atrial complexes  Possible Inferior infarct , age undetermined  Cannot rule out Anterior infarct , age undetermined  T wave abnormality, consider lateral ischemia  Abnormal ECG  No previous ECGs available        EKG: SR with premature atrial complexes  Possible inferior infarct,  anterior infarct - age undetermined  T wave abnormality   Revised Cardiac Risk Index (RCRI):  The patient has the following serious cardiovascular risks for perioperative complications:   - No serious cardiac risks = 0 points     RCRI Interpretation: 1 point: Class II (low risk - 0.9% complication rate)         Signed Electronically by: MARCO ANTONIO Nielsen CNP  Copy of this evaluation report is provided to requesting physician.

## 2023-10-11 NOTE — Clinical Note
Good morning,  I cancelled Rodney's surgery for tomorrow. His blood pressure is not controlled yet.  I did increase the losartan to 100mg daily.  He has a follow up with you in 2 weeks   Fariba MARQUEZP-BC Family Nurse Practitioner

## 2023-10-11 NOTE — Clinical Note
Good morning,  Rodney has an elevated blood pressure, but did not take his medication today.  He looks like it has been elevated for awhile.  Does he need better control of his blood pressure before he can have an EGD and colonoscopy?  Fariba BERNARD [facility]-BC Family Nurse Practitioner

## 2023-10-11 NOTE — PATIENT INSTRUCTIONS
Preparing for Your Surgery  Getting started  A nurse will call you to review your health history and instructions. They will give you an arrival time based on your scheduled surgery time. Please be ready to share:  Your doctor's clinic name and phone number  Your medical, surgical, and anesthesia history  A list of allergies and sensitivities  A list of medicines, including herbal treatments and over-the-counter drugs  Whether the patient has a legal guardian (ask how to send us the papers in advance)  Please tell us if you're pregnant--or if there's any chance you might be pregnant. Some surgeries may injure a fetus (unborn baby), so they require a pregnancy test. Surgeries that are safe for a fetus don't always need a test, and you can choose whether to have one.   If you have a child who's having surgery, please ask for a copy of Preparing for Your Child's Surgery.    Preparing for surgery  Within 10 to 30 days of surgery: Have a pre-op exam (sometimes called an H&P, or History and Physical). This can be done at a clinic or pre-operative center.  If you're having a , you may not need this exam. Talk to your care team.  At your pre-op exam, talk to your care team about all medicines you take. If you need to stop any medicines before surgery, ask when to start taking them again.  We do this for your safety. Many medicines can make you bleed too much during surgery. Some change how well surgery (anesthesia) drugs work.  Call your insurance company to let them know you're having surgery. (If you don't have insurance, call [phone].)  Call your clinic if there's any change in your health. This includes signs of a cold or flu (sore throat, runny nose, cough, rash, fever). It also includes a scrape or scratch near the surgery site.  If you have questions on the day of surgery, call your hospital or surgery center.  Eating and drinking guidelines  For your safety: Unless your surgeon tells you otherwise,  follow the guidelines below.  Eat and drink as usual until 8 hours before you arrive for surgery. After that, no food or milk.  Drink clear liquids until 2 hours before you arrive. These are liquids you can see through, like water, Gatorade, and Propel Water. They also include plain black coffee and tea (no cream or milk), candy, and breath mints. You can spit out gum when you arrive.  If you drink alcohol: Stop drinking it the night before surgery.  If your care team tells you to take medicine on the morning of surgery, it's okay to take it with a sip of water.  Preventing infection  Shower or bathe the night before and morning of your surgery. Follow the instructions your clinic gave you. (If no instructions, use regular soap.)  Don't shave or clip hair near your surgery site. We'll remove the hair if needed.  Don't smoke or vape the morning of surgery. You may chew nicotine gum up to 2 hours before surgery. A nicotine patch is okay.  Note: Some surgeries require you to completely quit smoking and nicotine. Check with your surgeon.  Your care team will make every effort to keep you safe from infection. We will:  Clean our hands often with soap and water (or an alcohol-based hand rub).  Clean the skin at your surgery site with a special soap that kills germs.  Give you a special gown to keep you warm. (Cold raises the risk of infection.)  Wear special hair covers, masks, gowns and gloves during surgery.  Give antibiotic medicine, if prescribed. Not all surgeries need antibiotics.  What to bring on the day of surgery  Photo ID and insurance card  Copy of your health care directive, if you have one  Glasses and hearing aids (bring cases)  You can't wear contacts during surgery  Inhaler and eye drops, if you use them (tell us about these when you arrive)  CPAP machine or breathing device, if you use them  A few personal items, if spending the night  If you have . . .  A pacemaker, ICD (cardiac defibrillator) or other  implant: Bring the ID card.  An implanted stimulator: Bring the remote control.  A legal guardian: Bring a copy of the certified (court-stamped) guardianship papers.  Please remove any jewelry, including body piercings. Leave jewelry and other valuables at home.  If you're going home the day of surgery  You must have a responsible adult drive you home. They should stay with you overnight as well.  If you don't have someone to stay with you, and you aren't safe to go home alone, we may keep you overnight. Insurance often won't pay for this.  After surgery  If it's hard to control your pain or you need more pain medicine, please call your surgeon's office.  Questions?   If you have any questions for your care team, list them here: _________________________________________________________________________________________________________________________________________________________________________ ____________________________________ ____________________________________ ____________________________________  For informational purposes only. Not to replace the advice of your health care provider. Copyright   2003, 2019 [employer] [facility]. All rights reserved. Clinically reviewed by Hellen Shen MD. [employer] 031301 - REV 12/22.    How to Take Your Medication Before Surgery  - Take all of your medications before surgery except as noted below  - take your medication today   Aspirin should be stopped 7-10 days before a procedure. - hold dose today and tomorrow

## 2023-10-12 ENCOUNTER — TELEPHONE (OUTPATIENT)
Dept: SURGERY | Facility: OTHER | Age: 63
End: 2023-10-12

## 2023-10-12 NOTE — TELEPHONE ENCOUNTER
Called patient to reschedule EGD/Colonoscopy with Dr. Rodriguez. He was unable to have his procedure originally scheduled 10/11/23 but was unable due to uncontrolled blood pressure. Per Dr. Louis's note, Losartan dosage increased and patient to follow up in 2 weeks. Patient was frustrated with situation and that his procedure had to be delayed, and stated he does not wish to reschedule procedure at this time.     ALFREDO Avila Care Coordinator with General Surgery     10/12/2023 at 9:53 AM

## 2023-10-14 LAB
ATRIAL RATE - MUSE: 85 BPM
DIASTOLIC BLOOD PRESSURE - MUSE: NORMAL MMHG
INTERPRETATION ECG - MUSE: NORMAL
P AXIS - MUSE: 40 DEGREES
PR INTERVAL - MUSE: 150 MS
QRS DURATION - MUSE: 94 MS
QT - MUSE: 394 MS
QTC - MUSE: 468 MS
R AXIS - MUSE: 31 DEGREES
SYSTOLIC BLOOD PRESSURE - MUSE: NORMAL MMHG
T AXIS - MUSE: 140 DEGREES
VENTRICULAR RATE- MUSE: 85 BPM

## 2023-10-24 NOTE — PROGRESS NOTES
"  Assessment & Plan     Primary hypertension  BP up today, off his losartan - restart.  - carvedilol (COREG) 6.25 MG tablet; Take 1 tablet (6.25 mg) by mouth 2 times daily (with meals)  - losartan (COZAAR) 100 MG tablet; Take 1 tablet (100 mg) by mouth daily  - ASPIRIN LOW DOSE 81 MG EC tablet; Take 1 tablet (81 mg) by mouth daily    H. pylori infection  Continue PPI due to ongoing discomfort.  - omeprazole (PRILOSEC) 40 MG DR capsule; Take 1 capsule (40 mg) by mouth 2 times daily    See back in a couple weeks back on appropriate meds.      KASSI PAIZ, DO  [facility] - [place] is a 63 year old, presenting for the following health issues:  Hypertension        10/25/2023     8:14 AM   Additional Questions   Roomed by erasto batres   Accompanied by self       HPI     Hypertension Follow-up    Do you check your blood pressure regularly outside of the clinic? No   Are you following a low salt diet? No  Are your blood pressures ever more than 140 on the top number (systolic) OR more   than 90 on the bottom number (diastolic), for example 140/90? Yes  Still feels something wrong.  Doesn't hurt as bad  Concerned about what will be found, says past annual scope and always had polyps    Currently only on aspirin and carvedilol  Hasn't had losartan for about a week  Never called pharmacy for refills    Review of Systems   Constitutional:  Negative for fever.   Respiratory:  Negative for shortness of breath.    Cardiovascular:  Negative for chest pain.            Objective    BP (!) 151/90 (BP Location: Left arm, Patient Position: Sitting, Cuff Size: Adult Regular)   Pulse 81   Temp 97.7  F (36.5  C) (Tympanic)   Ht 1.753 m (5' 9\")   Wt 89.9 kg (198 lb 4.8 oz)   SpO2 96%   BMI 29.28 kg/m    Body mass index is 29.28 kg/m .  Physical Exam  Constitutional:       General: He is not in acute distress.     Appearance: Normal appearance.   Pulmonary:      Effort: Pulmonary effort is normal. "   Neurological:      Mental Status: He is alert and oriented to person, place, and time.

## 2023-10-25 ENCOUNTER — OFFICE VISIT (OUTPATIENT)
Dept: FAMILY MEDICINE | Facility: OTHER | Age: 63
End: 2023-10-25
Attending: FAMILY MEDICINE
Payer: MEDICARE

## 2023-10-25 VITALS
OXYGEN SATURATION: 96 % | WEIGHT: 198.3 LBS | BODY MASS INDEX: 29.37 KG/M2 | HEART RATE: 81 BPM | SYSTOLIC BLOOD PRESSURE: 151 MMHG | HEIGHT: 69 IN | TEMPERATURE: 97.7 F | DIASTOLIC BLOOD PRESSURE: 90 MMHG

## 2023-10-25 DIAGNOSIS — I10 PRIMARY HYPERTENSION: ICD-10-CM

## 2023-10-25 DIAGNOSIS — A04.8 H. PYLORI INFECTION: ICD-10-CM

## 2023-10-25 PROCEDURE — 99213 OFFICE O/P EST LOW 20 MIN: CPT | Performed by: FAMILY MEDICINE

## 2023-10-25 PROCEDURE — G0463 HOSPITAL OUTPT CLINIC VISIT: HCPCS | Performed by: FAMILY MEDICINE

## 2023-10-25 RX ORDER — CARVEDILOL 6.25 MG/1
6.25 TABLET ORAL 2 TIMES DAILY WITH MEALS
Qty: 60 TABLET | Refills: 1 | Status: SHIPPED | OUTPATIENT
Start: 2023-10-25 | End: 2024-02-02

## 2023-10-25 RX ORDER — OMEPRAZOLE 40 MG/1
40 CAPSULE, DELAYED RELEASE ORAL 2 TIMES DAILY
Qty: 60 CAPSULE | Refills: 1 | Status: SHIPPED | OUTPATIENT
Start: 2023-10-25 | End: 2024-02-02

## 2023-10-25 RX ORDER — LOSARTAN POTASSIUM 100 MG/1
100 TABLET ORAL DAILY
Qty: 30 TABLET | Refills: 1 | Status: SHIPPED | OUTPATIENT
Start: 2023-10-25 | End: 2023-12-14

## 2023-10-25 RX ORDER — ASPIRIN 81 MG/1
81 TABLET, COATED ORAL DAILY
Qty: 100 TABLET | Refills: 3 | Status: SHIPPED | OUTPATIENT
Start: 2023-10-25 | End: 2024-07-25

## 2023-10-25 ASSESSMENT — PAIN SCALES - GENERAL: PAINLEVEL: NO PAIN (0)

## 2023-10-31 ASSESSMENT — ENCOUNTER SYMPTOMS
SHORTNESS OF BREATH: 0
FEVER: 0

## 2023-11-07 NOTE — PROGRESS NOTES
Assessment & Plan     Chest pain, unspecified type  Stress test to be done once his lungs calm down.  ER for new/worsening symptoms.  - NM Lexiscan stress test; Future  - EKG 12-lead complete w/read - Clinics  - XR Chest 2 Views; Future  - CBC with Platelets & Differential  - Comprehensive metabolic panel    Productive cough/COPD Mild exacerbation  CXR okay.  Rx doxy and prednisone.  - XR Chest 2 Views; Future  - predniSONE (DELTASONE) 20 MG tablet; Take 3 tablets (60 mg) by mouth daily for 5 days  - doxycycline hyclate (VIBRA-TABS) 100 MG tablet; Take 1 tablet (100 mg) by mouth 2 times daily for 7 days    Primary hypertension  Improving, not yet to goal.        KASSI PAIZ, DO  [facility] - [place] is a 63 year old, presenting for the following health issues:  Hypertension        11/9/2023     9:21 AM   Additional Questions   Roomed by Lisseth Jackson   Accompanied by none         11/9/2023     9:21 AM   Patient Reported Additional Medications   Patient reports taking the following new medications none       HPI     Saw [facility] 10/11/23 for preop upper/lower scopes, not cleared due to HTN.  She had increase his meds, but he didn't do that by the time I saw him 10/25.  I advised him to increase.      Current meds:   Coreg 6.25mg bid  ASA 81mg  Omeprazole 40mg  Losartan 100mg    BP today somewhat improved.    Concern of epigastric burning up into throat.  His cough has changed from clear to colored dark brown - he questions blood?  This scared him so he stopped smoking on Sunday.  Using albuterol more often for about a month, helps for about 4 hours.  He mentions ongoing progressive exertional chest discomfort, going on prior to his breathing flare.  No current CP here in office.        Review of Systems   Constitutional:  Negative for fever.   Cardiovascular:  Negative for palpitations.   Neurological:  Negative for light-headedness.            Objective    BP (!) 142/84 (BP Location:  Left arm, Patient Position: Sitting, Cuff Size: Adult Regular)   Pulse 73   Temp 97.4  F (36.3  C) (Tympanic)   Wt 90.1 kg (198 lb 11.2 oz)   SpO2 98%   BMI 29.34 kg/m    Body mass index is 29.34 kg/m .  Physical Exam  Constitutional:       General: He is not in acute distress.     Appearance: Normal appearance.   HENT:      Head: Normocephalic and atraumatic.   Eyes:      Conjunctiva/sclera: Conjunctivae normal.      Pupils: Pupils are equal, round, and reactive to light.   Cardiovascular:      Rate and Rhythm: Normal rate and regular rhythm.      Heart sounds: Normal heart sounds. No murmur heard.  Pulmonary:      Effort: Pulmonary effort is normal.      Breath sounds: Wheezing and rhonchi present.   Abdominal:      General: Bowel sounds are normal. There is no distension.      Palpations: Abdomen is soft.      Tenderness: There is no abdominal tenderness.   Musculoskeletal:      Right lower leg: No edema.      Left lower leg: No edema.   Neurological:      Mental Status: He is alert and oriented to person, place, and time.                          EKG:  NSR, unable r/o inferior infarct age indeterminate, nonspecific T-wave abnl (lateral), rate 71, .

## 2023-11-09 ENCOUNTER — OFFICE VISIT (OUTPATIENT)
Dept: FAMILY MEDICINE | Facility: OTHER | Age: 63
End: 2023-11-09
Attending: FAMILY MEDICINE
Payer: MEDICARE

## 2023-11-09 ENCOUNTER — ANCILLARY PROCEDURE (OUTPATIENT)
Dept: GENERAL RADIOLOGY | Facility: OTHER | Age: 63
End: 2023-11-09
Attending: FAMILY MEDICINE
Payer: MEDICARE

## 2023-11-09 VITALS
WEIGHT: 198.7 LBS | BODY MASS INDEX: 29.34 KG/M2 | SYSTOLIC BLOOD PRESSURE: 142 MMHG | HEART RATE: 73 BPM | DIASTOLIC BLOOD PRESSURE: 84 MMHG | TEMPERATURE: 97.4 F | OXYGEN SATURATION: 98 %

## 2023-11-09 DIAGNOSIS — R05.8 PRODUCTIVE COUGH: ICD-10-CM

## 2023-11-09 DIAGNOSIS — R07.9 CHEST PAIN, UNSPECIFIED TYPE: Primary | ICD-10-CM

## 2023-11-09 DIAGNOSIS — J44.9 CHRONIC OBSTRUCTIVE PULMONARY DISEASE, UNSPECIFIED COPD TYPE (H): ICD-10-CM

## 2023-11-09 DIAGNOSIS — I10 PRIMARY HYPERTENSION: ICD-10-CM

## 2023-11-09 DIAGNOSIS — R07.9 CHEST PAIN, UNSPECIFIED TYPE: ICD-10-CM

## 2023-11-09 LAB
ALBUMIN SERPL BCG-MCNC: 4.2 G/DL (ref 3.5–5.2)
ALP SERPL-CCNC: 147 U/L (ref 40–129)
ALT SERPL W P-5'-P-CCNC: 22 U/L (ref 0–70)
ANION GAP SERPL CALCULATED.3IONS-SCNC: 10 MMOL/L (ref 7–15)
AST SERPL W P-5'-P-CCNC: 24 U/L (ref 0–45)
BASOPHILS # BLD AUTO: 0.1 10E3/UL (ref 0–0.2)
BASOPHILS NFR BLD AUTO: 1 %
BILIRUB SERPL-MCNC: 0.3 MG/DL
BUN SERPL-MCNC: 29.5 MG/DL (ref 8–23)
CALCIUM SERPL-MCNC: 9.5 MG/DL (ref 8.8–10.2)
CHLORIDE SERPL-SCNC: 103 MMOL/L (ref 98–107)
CREAT SERPL-MCNC: 1.09 MG/DL (ref 0.67–1.17)
DEPRECATED HCO3 PLAS-SCNC: 23 MMOL/L (ref 22–29)
EGFRCR SERPLBLD CKD-EPI 2021: 77 ML/MIN/1.73M2
EOSINOPHIL # BLD AUTO: 0.4 10E3/UL (ref 0–0.7)
EOSINOPHIL NFR BLD AUTO: 5 %
ERYTHROCYTE [DISTWIDTH] IN BLOOD BY AUTOMATED COUNT: 12.7 % (ref 10–15)
GLUCOSE SERPL-MCNC: 114 MG/DL (ref 70–99)
HCT VFR BLD AUTO: 43.6 % (ref 40–53)
HGB BLD-MCNC: 15 G/DL (ref 13.3–17.7)
IMM GRANULOCYTES # BLD: 0 10E3/UL
IMM GRANULOCYTES NFR BLD: 1 %
LYMPHOCYTES # BLD AUTO: 2.1 10E3/UL (ref 0.8–5.3)
LYMPHOCYTES NFR BLD AUTO: 28 %
MCH RBC QN AUTO: 32.1 PG (ref 26.5–33)
MCHC RBC AUTO-ENTMCNC: 34.4 G/DL (ref 31.5–36.5)
MCV RBC AUTO: 93 FL (ref 78–100)
MONOCYTES # BLD AUTO: 0.8 10E3/UL (ref 0–1.3)
MONOCYTES NFR BLD AUTO: 10 %
NEUTROPHILS # BLD AUTO: 4.2 10E3/UL (ref 1.6–8.3)
NEUTROPHILS NFR BLD AUTO: 55 %
NRBC # BLD AUTO: 0 10E3/UL
NRBC BLD AUTO-RTO: 0 /100
PLATELET # BLD AUTO: 181 10E3/UL (ref 150–450)
POTASSIUM SERPL-SCNC: 4.4 MMOL/L (ref 3.4–5.3)
PROT SERPL-MCNC: 7.3 G/DL (ref 6.4–8.3)
RBC # BLD AUTO: 4.68 10E6/UL (ref 4.4–5.9)
SODIUM SERPL-SCNC: 136 MMOL/L (ref 135–145)
WBC # BLD AUTO: 7.6 10E3/UL (ref 4–11)

## 2023-11-09 PROCEDURE — G0463 HOSPITAL OUTPT CLINIC VISIT: HCPCS

## 2023-11-09 PROCEDURE — 36415 COLL VENOUS BLD VENIPUNCTURE: CPT | Mod: ZL | Performed by: FAMILY MEDICINE

## 2023-11-09 PROCEDURE — 99214 OFFICE O/P EST MOD 30 MIN: CPT | Performed by: FAMILY MEDICINE

## 2023-11-09 PROCEDURE — G0463 HOSPITAL OUTPT CLINIC VISIT: HCPCS | Mod: 25

## 2023-11-09 PROCEDURE — 71046 X-RAY EXAM CHEST 2 VIEWS: CPT | Mod: TC

## 2023-11-09 PROCEDURE — 85014 HEMATOCRIT: CPT | Mod: ZL | Performed by: FAMILY MEDICINE

## 2023-11-09 PROCEDURE — 93005 ELECTROCARDIOGRAM TRACING: CPT | Performed by: FAMILY MEDICINE

## 2023-11-09 PROCEDURE — 80053 COMPREHEN METABOLIC PANEL: CPT | Mod: ZL | Performed by: FAMILY MEDICINE

## 2023-11-09 PROCEDURE — 93010 ELECTROCARDIOGRAM REPORT: CPT | Mod: 77 | Performed by: INTERNAL MEDICINE

## 2023-11-09 RX ORDER — PREDNISONE 20 MG/1
60 TABLET ORAL DAILY
Qty: 15 TABLET | Refills: 0 | Status: SHIPPED | OUTPATIENT
Start: 2023-11-09 | End: 2023-11-14

## 2023-11-09 RX ORDER — DOXYCYCLINE HYCLATE 100 MG
100 TABLET ORAL 2 TIMES DAILY
Qty: 14 TABLET | Refills: 0 | Status: SHIPPED | OUTPATIENT
Start: 2023-11-09 | End: 2023-11-16

## 2023-11-12 LAB
ATRIAL RATE - MUSE: 71 BPM
DIASTOLIC BLOOD PRESSURE - MUSE: NORMAL MMHG
INTERPRETATION ECG - MUSE: NORMAL
P AXIS - MUSE: 41 DEGREES
PR INTERVAL - MUSE: 160 MS
QRS DURATION - MUSE: 98 MS
QT - MUSE: 428 MS
QTC - MUSE: 465 MS
R AXIS - MUSE: 57 DEGREES
SYSTOLIC BLOOD PRESSURE - MUSE: NORMAL MMHG
T AXIS - MUSE: 217 DEGREES
VENTRICULAR RATE- MUSE: 71 BPM

## 2023-11-15 ENCOUNTER — TELEPHONE (OUTPATIENT)
Dept: NUCLEAR MEDICINE | Facility: HOSPITAL | Age: 63
End: 2023-11-15

## 2023-11-15 ASSESSMENT — ENCOUNTER SYMPTOMS
PALPITATIONS: 0
LIGHT-HEADEDNESS: 0
FEVER: 0

## 2023-11-15 NOTE — TELEPHONE ENCOUNTER
Made appointment reminder call regarding NM Lexiscan stress test appointment scheduled 11/16 at 0630. Reminded patient no caffeine after 6pm.  Patient is very apprehensive for the test and is very claustrophobic.

## 2023-11-16 ENCOUNTER — HOSPITAL ENCOUNTER (OUTPATIENT)
Dept: NUCLEAR MEDICINE | Facility: HOSPITAL | Age: 63
Setting detail: NUCLEAR MEDICINE
Discharge: HOME | End: 2023-11-16
Attending: FAMILY MEDICINE
Payer: MEDICARE

## 2023-11-16 ENCOUNTER — HOSPITAL ENCOUNTER (OUTPATIENT)
Dept: CARDIOLOGY | Facility: HOSPITAL | Age: 63
Setting detail: NUCLEAR MEDICINE
Discharge: HOME | End: 2023-11-16
Attending: FAMILY MEDICINE
Payer: MEDICARE

## 2023-11-16 DIAGNOSIS — R07.9 CHEST PAIN, UNSPECIFIED TYPE: ICD-10-CM

## 2023-11-16 LAB
CV BLOOD PRESSURE: 53 MMHG
CV STRESS MAX HR HE: 86
NUC STRESS EJECTION FRACTION: 50 %
RATE PRESSURE PRODUCT: NORMAL
STRESS ECHO BASELINE DIASTOLIC HE: 80
STRESS ECHO BASELINE HR: 66 BPM
STRESS ECHO BASELINE SYSTOLIC BP: 154
STRESS ECHO CALCULATED PERCENT HR: 54 %
STRESS ECHO LAST STRESS DIASTOLIC BP: 68
STRESS ECHO LAST STRESS SYSTOLIC BP: 148
STRESS ECHO TARGET HR: 159

## 2023-11-16 PROCEDURE — 343N000001 HC RX 343: Performed by: RADIOLOGY

## 2023-11-16 PROCEDURE — 93016 CV STRESS TEST SUPVJ ONLY: CPT | Performed by: INTERNAL MEDICINE

## 2023-11-16 PROCEDURE — 93017 CV STRESS TEST TRACING ONLY: CPT

## 2023-11-16 PROCEDURE — A9500 TC99M SESTAMIBI: HCPCS | Performed by: RADIOLOGY

## 2023-11-16 PROCEDURE — 250N000011 HC RX IP 250 OP 636: Performed by: INTERNAL MEDICINE

## 2023-11-16 PROCEDURE — 93018 CV STRESS TEST I&R ONLY: CPT | Performed by: INTERNAL MEDICINE

## 2023-11-16 PROCEDURE — 78452 HT MUSCLE IMAGE SPECT MULT: CPT | Mod: ME

## 2023-11-16 RX ORDER — AMINOPHYLLINE 25 MG/ML
INJECTION, SOLUTION INTRAVENOUS
Status: DISCONTINUED
Start: 2023-11-16 | End: 2023-11-16 | Stop reason: WASHOUT

## 2023-11-16 RX ORDER — REGADENOSON 0.08 MG/ML
0.4 INJECTION, SOLUTION INTRAVENOUS ONCE
Status: COMPLETED | OUTPATIENT
Start: 2023-11-16 | End: 2023-11-16

## 2023-11-16 RX ADMIN — Medication 29.7 MILLICURIE: at 10:26

## 2023-11-16 RX ADMIN — REGADENOSON 0.4 MG: 0.08 INJECTION, SOLUTION INTRAVENOUS at 10:26

## 2023-11-16 RX ADMIN — Medication 10.3 MILLICURIE: at 06:58

## 2023-11-21 ENCOUNTER — OFFICE VISIT (OUTPATIENT)
Dept: FAMILY MEDICINE | Facility: OTHER | Age: 63
End: 2023-11-21
Attending: FAMILY MEDICINE
Payer: MEDICARE

## 2023-11-21 ENCOUNTER — TELEPHONE (OUTPATIENT)
Dept: FAMILY MEDICINE | Facility: OTHER | Age: 63
End: 2023-11-21

## 2023-11-21 VITALS
SYSTOLIC BLOOD PRESSURE: 124 MMHG | BODY MASS INDEX: 28.8 KG/M2 | TEMPERATURE: 98.3 F | DIASTOLIC BLOOD PRESSURE: 72 MMHG | WEIGHT: 195 LBS | HEART RATE: 86 BPM | OXYGEN SATURATION: 98 % | RESPIRATION RATE: 17 BRPM

## 2023-11-21 DIAGNOSIS — J44.9 CHRONIC OBSTRUCTIVE PULMONARY DISEASE, UNSPECIFIED COPD TYPE (H): ICD-10-CM

## 2023-11-21 DIAGNOSIS — J02.9 SORE THROAT: ICD-10-CM

## 2023-11-21 DIAGNOSIS — R07.9 CHEST PAIN, UNSPECIFIED TYPE: Primary | ICD-10-CM

## 2023-11-21 DIAGNOSIS — R06.00 DYSPNEA, UNSPECIFIED TYPE: ICD-10-CM

## 2023-11-21 LAB — GROUP A STREP BY PCR: NOT DETECTED

## 2023-11-21 PROCEDURE — 250N000011 HC RX IP 250 OP 636: Mod: JW | Performed by: FAMILY MEDICINE

## 2023-11-21 PROCEDURE — 99214 OFFICE O/P EST MOD 30 MIN: CPT | Performed by: FAMILY MEDICINE

## 2023-11-21 PROCEDURE — 96372 THER/PROPH/DIAG INJ SC/IM: CPT | Performed by: FAMILY MEDICINE

## 2023-11-21 PROCEDURE — 250N000009 HC RX 250: Performed by: FAMILY MEDICINE

## 2023-11-21 PROCEDURE — 87651 STREP A DNA AMP PROBE: CPT | Mod: ZL | Performed by: FAMILY MEDICINE

## 2023-11-21 PROCEDURE — G0463 HOSPITAL OUTPT CLINIC VISIT: HCPCS | Mod: 25 | Performed by: FAMILY MEDICINE

## 2023-11-21 RX ORDER — TRIAMCINOLONE ACETONIDE 40 MG/ML
60 INJECTION, SUSPENSION INTRA-ARTICULAR; INTRAMUSCULAR ONCE
Status: COMPLETED | OUTPATIENT
Start: 2023-11-21 | End: 2023-11-21

## 2023-11-21 RX ORDER — IPRATROPIUM BROMIDE AND ALBUTEROL SULFATE 2.5; .5 MG/3ML; MG/3ML
1 SOLUTION RESPIRATORY (INHALATION) EVERY 6 HOURS PRN
Qty: 90 ML | Refills: 11 | Status: SHIPPED | OUTPATIENT
Start: 2023-11-21

## 2023-11-21 RX ORDER — IPRATROPIUM BROMIDE AND ALBUTEROL SULFATE 2.5; .5 MG/3ML; MG/3ML
3 SOLUTION RESPIRATORY (INHALATION) ONCE
Status: COMPLETED | OUTPATIENT
Start: 2023-11-21 | End: 2023-11-21

## 2023-11-21 RX ORDER — LEVOFLOXACIN 500 MG/1
500 TABLET, FILM COATED ORAL DAILY
Qty: 7 TABLET | Refills: 0 | Status: SHIPPED | OUTPATIENT
Start: 2023-11-21 | End: 2023-11-28

## 2023-11-21 RX ADMIN — IPRATROPIUM BROMIDE AND ALBUTEROL SULFATE 3 ML: 2.5; .5 SOLUTION RESPIRATORY (INHALATION) at 16:05

## 2023-11-21 RX ADMIN — TRIAMCINOLONE ACETONIDE 60 MG: 40 INJECTION, SUSPENSION INTRA-ARTICULAR; INTRAMUSCULAR at 16:50

## 2023-11-21 ASSESSMENT — PAIN SCALES - GENERAL: PAINLEVEL: EXTREME PAIN (8)

## 2023-11-21 NOTE — PROGRESS NOTES
Assessment & Plan     Chest pain, unspecified type  - CTA Chest with Contrast; Future  - Echocardiogram Complete; Future    Dyspnea, unspecified type  - INHALATION/NEBULIZER TREATMENT, INITIAL; Standing  - ipratropium - albuterol 0.5 mg/2.5 mg/3 mL (DUONEB) neb solution 3 mL  - INHALATION/NEBULIZER TREATMENT, INITIAL  - BNP-N terminal pro; Future  - CTA Chest with Contrast; Future  - CBC with Platelets & Differential; Future  - Comprehensive metabolic panel; Future  - Echocardiogram Complete; Future  - General PFT Lab (Please always keep checked); Future  - Pulmonary Function Test; Future  - General PFT Lab (Please always keep checked)  - levofloxacin (LEVAQUIN) 500 MG tablet; Take 1 tablet (500 mg) by mouth daily for 7 days  - triamcinolone (KENALOG-40) injection 60 mg    Chronic obstructive pulmonary disease, unspecified COPD type (H)  - ipratropium - albuterol 0.5 mg/2.5 mg/3 mL (DUONEB) 0.5-2.5 (3) MG/3ML neb solution; Take 1 vial (3 mLs) by nebulization every 6 hours as needed for shortness of breath, wheezing or cough  - umeclidinium-vilanterol (ANORO ELLIPTA) 62.5-25 MCG/ACT oral inhaler; Inhale 1 puff into the lungs daily  - levofloxacin (LEVAQUIN) 500 MG tablet; Take 1 tablet (500 mg) by mouth daily for 7 days  - triamcinolone (KENALOG-40) injection 60 mg    Sore throat  - Group A Streptococcus PCR Throat Swab    Patient declined blood work.  Some improvement with neb.      KASSI PAIZ DO  [facility] - [place] is a 63 year old, presenting for the following health issues:  URI      HPI     Ongoing chest discomfort, ST, productive cough.  Seems worse supine/reclining.  Doxy/Prednisone was of minimal benefit.    Previous stress test showing old MI, no new ischemia.          Review of Systems   Constitutional:  Negative for fever.   Respiratory:  Positive for shortness of breath and wheezing.    Cardiovascular:  Positive for chest pain. Negative for palpitations.             Objective    /72   Pulse 86   Temp 98.3  F (36.8  C) (Tympanic)   Resp 17   Wt 88.5 kg (195 lb)   SpO2 98%   BMI 28.80 kg/m    Body mass index is 28.8 kg/m .  Physical Exam  Constitutional:       General: He is not in acute distress.     Appearance: Normal appearance.   Cardiovascular:      Rate and Rhythm: Normal rate and regular rhythm.      Heart sounds: Normal heart sounds. No murmur heard.  Pulmonary:      Effort: Pulmonary effort is normal.      Comments: Diminished, improved with neb  Musculoskeletal:      Right lower leg: No edema.      Left lower leg: No edema.   Neurological:      Mental Status: He is alert and oriented to person, place, and time.

## 2023-11-21 NOTE — TELEPHONE ENCOUNTER
Patient called stating that he is feeling worse and is requesting more medicine.  Patient was seen on 11/9/23 by Marlyn.      Patient said he was going to go to the ER the past couple of night's because of not feeling any better but they don't do anything for him there.    Call him back and let him know if more meds are sent in please.            Productive cough/COPD Mild exacerbation  CXR okay.  Rx doxy and prednisone.  - XR Chest 2 Views; Future  - predniSONE (DELTASONE) 20 MG tablet; Take 3 tablets (60 mg) by mouth daily for 5 days  - doxycycline hyclate (VIBRA-TABS) 100 MG tablet; Take 1 tablet (100 mg) by mouth 2 times daily for 7 days     Primary hypertension

## 2023-11-27 ENCOUNTER — HOSPITAL ENCOUNTER (OUTPATIENT)
Dept: CT IMAGING | Facility: HOSPITAL | Age: 63
Discharge: HOME | End: 2023-11-27
Attending: FAMILY MEDICINE
Payer: MEDICARE

## 2023-11-27 ENCOUNTER — HOSPITAL ENCOUNTER (OUTPATIENT)
Dept: CARDIOLOGY | Facility: HOSPITAL | Age: 63
Discharge: HOME | End: 2023-11-27
Attending: FAMILY MEDICINE
Payer: MEDICARE

## 2023-11-27 DIAGNOSIS — R06.00 DYSPNEA, UNSPECIFIED TYPE: ICD-10-CM

## 2023-11-27 DIAGNOSIS — R07.9 CHEST PAIN, UNSPECIFIED TYPE: ICD-10-CM

## 2023-11-27 LAB — LVEF ECHO: NORMAL

## 2023-11-27 PROCEDURE — 93306 TTE W/DOPPLER COMPLETE: CPT

## 2023-11-27 PROCEDURE — 250N000011 HC RX IP 250 OP 636: Performed by: STUDENT IN AN ORGANIZED HEALTH CARE EDUCATION/TRAINING PROGRAM

## 2023-11-27 PROCEDURE — 93306 TTE W/DOPPLER COMPLETE: CPT | Mod: 26 | Performed by: INTERNAL MEDICINE

## 2023-11-27 PROCEDURE — G1010 CDSM STANSON: HCPCS

## 2023-11-27 RX ORDER — IOPAMIDOL 755 MG/ML
66 INJECTION, SOLUTION INTRAVASCULAR ONCE
Status: COMPLETED | OUTPATIENT
Start: 2023-11-27 | End: 2023-11-27

## 2023-11-27 RX ADMIN — IOPAMIDOL 66 ML: 755 INJECTION, SOLUTION INTRAVENOUS at 10:15

## 2023-11-27 ASSESSMENT — ENCOUNTER SYMPTOMS
SHORTNESS OF BREATH: 1
FEVER: 0
PALPITATIONS: 0
WHEEZING: 1

## 2023-12-05 ENCOUNTER — HOSPITAL ENCOUNTER (OUTPATIENT)
Dept: RESPIRATORY THERAPY | Facility: HOSPITAL | Age: 63
Discharge: HOME | End: 2023-12-05
Attending: FAMILY MEDICINE | Admitting: INTERNAL MEDICINE
Payer: MEDICARE

## 2023-12-05 DIAGNOSIS — R06.00 DYSPNEA, UNSPECIFIED TYPE: Primary | ICD-10-CM

## 2023-12-05 PROCEDURE — 250N000009 HC RX 250: Performed by: FAMILY MEDICINE

## 2023-12-05 PROCEDURE — 94726 PLETHYSMOGRAPHY LUNG VOLUMES: CPT | Mod: 26 | Performed by: INTERNAL MEDICINE

## 2023-12-05 PROCEDURE — 94060 EVALUATION OF WHEEZING: CPT | Mod: 26 | Performed by: INTERNAL MEDICINE

## 2023-12-05 PROCEDURE — 94729 DIFFUSING CAPACITY: CPT | Mod: 26 | Performed by: INTERNAL MEDICINE

## 2023-12-05 PROCEDURE — 94729 DIFFUSING CAPACITY: CPT

## 2023-12-05 PROCEDURE — 94060 EVALUATION OF WHEEZING: CPT

## 2023-12-05 PROCEDURE — 94726 PLETHYSMOGRAPHY LUNG VOLUMES: CPT

## 2023-12-05 RX ORDER — ALBUTEROL SULFATE 0.83 MG/ML
2.5 SOLUTION RESPIRATORY (INHALATION)
Status: COMPLETED | OUTPATIENT
Start: 2023-12-05 | End: 2023-12-05

## 2023-12-05 RX ADMIN — ALBUTEROL SULFATE 2.5 MG: 2.5 SOLUTION RESPIRATORY (INHALATION) at 10:43

## 2023-12-13 ENCOUNTER — TELEPHONE (OUTPATIENT)
Dept: OTOLARYNGOLOGY | Facility: OTHER | Age: 63
End: 2023-12-13

## 2023-12-13 NOTE — TELEPHONE ENCOUNTER
Called pt and told him that Dr. Ordoñez would like him to shave his mustache, patient agreed to shave

## 2023-12-14 ENCOUNTER — OFFICE VISIT (OUTPATIENT)
Dept: OTOLARYNGOLOGY | Facility: OTHER | Age: 63
End: 2023-12-14
Attending: OTOLARYNGOLOGY
Payer: MEDICARE

## 2023-12-14 VITALS
OXYGEN SATURATION: 91 % | DIASTOLIC BLOOD PRESSURE: 85 MMHG | WEIGHT: 195.11 LBS | HEIGHT: 69 IN | HEART RATE: 85 BPM | BODY MASS INDEX: 28.9 KG/M2 | TEMPERATURE: 97.3 F | SYSTOLIC BLOOD PRESSURE: 135 MMHG

## 2023-12-14 DIAGNOSIS — L98.9 SKIN LESION OF FACE: Primary | ICD-10-CM

## 2023-12-14 PROCEDURE — 11442 EXC FACE-MM B9+MARG 1.1-2 CM: CPT | Performed by: OTOLARYNGOLOGY

## 2023-12-14 PROCEDURE — 88305 TISSUE EXAM BY PATHOLOGIST: CPT | Mod: 26 | Performed by: PATHOLOGY

## 2023-12-14 PROCEDURE — 88305 TISSUE EXAM BY PATHOLOGIST: CPT | Mod: TC | Performed by: OTOLARYNGOLOGY

## 2023-12-14 PROCEDURE — 250N000009 HC RX 250: Performed by: OTOLARYNGOLOGY

## 2023-12-14 PROCEDURE — G0463 HOSPITAL OUTPT CLINIC VISIT: HCPCS | Mod: 25

## 2023-12-14 RX ORDER — GINSENG 100 MG
CAPSULE ORAL ONCE
Status: COMPLETED | OUTPATIENT
Start: 2023-12-14 | End: 2023-12-14

## 2023-12-14 RX ADMIN — BACITRACIN ZINC 0.9 G: 500 OINTMENT TOPICAL at 09:50

## 2023-12-14 ASSESSMENT — PAIN SCALES - GENERAL: PAINLEVEL: NO PAIN (0)

## 2023-12-14 NOTE — LETTER
2023         RE: Rodney Goodwin  [address]  [place] [address]        Dear Colleague,    Thank you for referring your patient, Rodney Goodwin, to the [facility] - [place]. Please see a copy of my visit note below.    Otolaryngology Consultation    Patient: Rodney Goodwin  : 1960    Referral:  Dr. Cline for facial skin lesion    This patient presents today for a right upper lip facial skin lesion.  Present his entire life, unsure if present at birth. It has been changing and growing, Bleeds heavily with shaving and bothersome.  No known history of carcinoma skin or melanoma.  Excess sun exposure with sun burns throughout life. No immunodeficiency.  There is no history of solid organ transplant.        Current Outpatient Rx   Medication Sig Dispense Refill     albuterol (PROAIR HFA/PROVENTIL HFA/VENTOLIN HFA) 108 (90 Base) MCG/ACT inhaler Inhale 2 puffs into the lungs every 4 hours as needed for shortness of breath, wheezing or cough 18 g 3     ASPIRIN LOW DOSE 81 MG EC tablet Take 1 tablet (81 mg) by mouth daily 100 tablet 3     carvedilol (COREG) 6.25 MG tablet Take 1 tablet (6.25 mg) by mouth 2 times daily (with meals) 60 tablet 1     ipratropium - albuterol 0.5 mg/2.5 mg/3 mL (DUONEB) 0.5-2.5 (3) MG/3ML neb solution Take 1 vial (3 mLs) by nebulization every 6 hours as needed for shortness of breath, wheezing or cough 90 mL 11     losartan (COZAAR) 100 MG tablet Take 1 tablet (100 mg) by mouth daily 30 tablet 1     omeprazole (PRILOSEC) 40 MG DR capsule Take 1 capsule (40 mg) by mouth 2 times daily 60 capsule 1     umeclidinium-vilanterol (ANORO ELLIPTA) 62.5-25 MCG/ACT oral inhaler Inhale 1 puff into the lungs daily 60 each 1       Allergies: Codeine and Penicillins     Past Medical History:   Diagnosis Date     Acute exacerbation of chronic obstructive pulmonary disease (H) 10/28/2016     Anxiety      Backache 2008    Overview:  IMO Update 10/11     Bipolar 1  disorder (H) 11/11/2014     Bipolar affective disorder (H)      Cellulitis and abscess of forearm 4/4/2014     Chlordiazepoxide dependence (H) 8/9/2010    Overview:  IMO Update 10/11     Chronic constipation      COPD exacerbation (H) 9/14/2015     Costochondritis 2/26/2015     Degeneration of lumbar or lumbosacral intervertebral disc 7/25/2006    Overview:  IMO Update 10/11     Depressive disorder      Drug abuse (H)      Elevated blood sugar 10/29/2016     Heroin abuse (H) 9/15/2015     Heroin addiction (H) 8/9/2010    Overview:  See social notes IMO Update 10/11     Hypertension      IV drug user 8/9/2010    Overview:  IMO Update 10/11     Lumbosacral spondylosis without myelopathy 11/13/2008     Narcotic addiction (H) 10/29/2016     Opioid use disorder, severe, dependence (H) 2020    [facility] records; prior Methadone; [facility]; AA/NA     Osteoarthrosis, pelvic region and thigh 11/13/2008    Overview:  IMO Update 10/11     Penile lesion 4/27/2020     Suicidal behavior 4/27/2020     Venous insufficiency of both lower extremities 2/26/2015     Do you wish to do the replacement in the background? yes         Past Surgical History:   Procedure Laterality Date     ARTHROSCOPY KNEE BILATERAL       COLONOSCOPY  01/29/2020    Multiple, repeat due 2015     COLONOSCOPY N/A 7/31/2015    Procedure: COLONOSCOPY;  Surgeon: Justin Andujar MD;  Location: [facility]     DECOMPRESSION CUBITAL TUNNEL Left 11/21/2017    Procedure: DECOMPRESSION CUBITAL TUNNEL;;  Surgeon: Jhonny Zhao MD;  Location: [facility] OR     DENTAL SURGERY       HERNIA REPAIR      Inguinal, left     RELEASE CARPAL TUNNEL Left 11/21/2017    Procedure: RELEASE CARPAL TUNNEL;  LEFT ELBOW CUBITAL and CARPAL TUNNEL RELEASE;  Surgeon: Jhonny Zhao MD;  Location: [facility] OR       ENT family history reviewed    Social History     Tobacco Use     Smoking status: Every Day     Packs/day: 1     Types: Cigarettes     Start date: 1983     Smokeless tobacco: Never     Tobacco comments:     " Tried to Quit (NO)   Vaping Use     Vaping Use: Never used   Substance Use Topics     Alcohol use: No     Alcohol/week: 0.0 standard drinks of alcohol     Drug use: Not Currently     Types: IV, Methamphetamines, Opiates, Marijuana       Review of Systems  ROS: 10 point ROS neg other than the symptoms noted above in the HPI     Physical Exam  /85 (BP Location: Right arm, Patient Position: Sitting, Cuff Size: Adult Regular)   Pulse 85   Temp 97.3  F (36.3  C) (Temporal)   Ht 1.753 m (5' 9.02\")   Wt 88.5 kg (195 lb 1.7 oz)   SpO2 91%   BMI 28.80 kg/m    General - The patient is well nourished and well developed, and appears to have good nutritional status.  Alert and oriented to person and place, answers questions and cooperates with examination appropriately.   Head and Face - Normocephalic and atraumatic, with no gross asymmetry noted.  The facial nerve is intact, with strong symmetric movements.  No palpable parotid mass   There is a right upper lip raised seborrheic appearing lesion measuring 1.3 cm.  No ulceration  Voice and Breathing - The patient was breathing comfortably without the use of accessory muscles. There was no wheezing, stridor, or stertor.  The patients voice was clear and strong, and had appropriate pitch and quality.  No ghazala peripheral digital clubbing or cyanosis   Eyes - Extraocular movements intact, and the pupils were reactive to light.  Sclera were not icteric or injected, conjunctiva were pink and moist.  Neck - No palpable enlarged fixed cervical lymph nodes.  No neck cysts or unusual tenderness to palpation.   No palpable fixed thyroid nodules or concerning goiter.  The trachea is grossly midline.   Nose - Nasal mucosa is pink and moist with no abnormal mucus.       Office Procedure:   I discussed the risks and complications of skin lesion excision, including local anesthesia, bleeding, infection, injury to the facial nerve, scar formation, hypertrophic healing or keloid, " numbness to area, recurrence of lesion, benign versus malignant pathology and possible need for further surgery. All questions were answered.    After consent was obtained,  I proceeded to cleanse the skin around the lesion with alcohol.  The patient was prepped and draped in the normal fashion and a timeout was taken.  I then demarcated the lesion parallel to relaxed skin tension lines in a natural crease.  The vermilion line was demarcated.  The area was prepped and draped in the normal sterile fashion.  A time out was taken.  I then infiltrated the skin with 1% lidocaine with 1:200,000 epinephrine.  I  used a 15-blade to create a shave excision of the wide based lesion.  I then elevated the skin ellipse and a thin cuff of underlying subdermal fat with curved iris scissors.  I dissected down through normal subcutaneous tissue for complete removal of the lesion.  After the lesion was completely removed, I then placed it in formalin for permanent section.  Hemostasis was achieved with direct pressure and hand held cautery. I then irrigated the wound and gently suctioned the area.  I advanced the adjacent skin for tension free closure using tenotomy scissors.  The total length of the incision was  2.0 cm.  I then proceeded to close the incision by using simple interrupted buried knot sutures, using 5-0 Vicryl.  The skin edges were then reapproximated using 5-0 nylon, simple interrupted sutures.  Antibiotic ointment was then applied and the wound was dressed.  The patient tolerated the procedure without any difficulty.    Impression/Plan      ICD-10-CM    1. Skin lesion of face  L98.9 lidocaine 2%-EPINEPHrine 1:100,000 injection 1 mL     bacitracin ointment     EXC BENIGN SKIN LESION FACE/EARS 1.1-2.0 CM          Additional surgery may need to be scheduled based on final pathology.  This was discussed today.    Further procedures may include skin excision with frozens and flap repair.    The risks of surgery were  discussed, if further surgery is necessary.  These include but are not limited to anesthesia, scar or keloid formation, infection, flap failure, change in appearance of surrounding facial structures,  numbness to the skin, injury to the facial nerve with permanent facial weakness which is exceedingly rare but possible.  Temporary weakness to the face may occur with the use of local anesthesia.    The patient expressed understanding.  All questions were answered.  Photos were taken.      I have instructed the patient on wound care and signs of infection.  Written instructions provided.  We will contact the patient with pathology results.    Sunscreen use and skin cancer preventive measures were reinforced          Tameka Ordoñez D.O.  Otolaryngology/Head and Neck Surgery  Allergy    Again, thank you for allowing me to participate in the care of your patient.        Sincerely,        Tameka Ordoñez MD

## 2023-12-14 NOTE — PROGRESS NOTES
Otolaryngology Consultation    Patient: Rodney Goodwin  : 1960    Referral:  Dr. Cline for facial skin lesion    This patient presents today for a right upper lip facial skin lesion.  Present his entire life, unsure if present at birth. It has been changing and growing, Bleeds heavily with shaving and bothersome.  No known history of carcinoma skin or melanoma.  Excess sun exposure with sun burns throughout life. No immunodeficiency.  There is no history of solid organ transplant.        Current Outpatient Rx   Medication Sig Dispense Refill    albuterol (PROAIR HFA/PROVENTIL HFA/VENTOLIN HFA) 108 (90 Base) MCG/ACT inhaler Inhale 2 puffs into the lungs every 4 hours as needed for shortness of breath, wheezing or cough 18 g 3    ASPIRIN LOW DOSE 81 MG EC tablet Take 1 tablet (81 mg) by mouth daily 100 tablet 3    carvedilol (COREG) 6.25 MG tablet Take 1 tablet (6.25 mg) by mouth 2 times daily (with meals) 60 tablet 1    ipratropium - albuterol 0.5 mg/2.5 mg/3 mL (DUONEB) 0.5-2.5 (3) MG/3ML neb solution Take 1 vial (3 mLs) by nebulization every 6 hours as needed for shortness of breath, wheezing or cough 90 mL 11    losartan (COZAAR) 100 MG tablet Take 1 tablet (100 mg) by mouth daily 30 tablet 1    omeprazole (PRILOSEC) 40 MG DR capsule Take 1 capsule (40 mg) by mouth 2 times daily 60 capsule 1    umeclidinium-vilanterol (ANORO ELLIPTA) 62.5-25 MCG/ACT oral inhaler Inhale 1 puff into the lungs daily 60 each 1       Allergies: Codeine and Penicillins     Past Medical History:   Diagnosis Date    Acute exacerbation of chronic obstructive pulmonary disease (H) 10/28/2016    Anxiety     Backache 2008    Overview:  IMO Update 10/11    Bipolar 1 disorder (H) 2014    Bipolar affective disorder (H)     Cellulitis and abscess of forearm 2014    Chlordiazepoxide dependence (H) 2010    Overview:  IMO Update 10/11    Chronic constipation     COPD exacerbation (H) 2015    Costochondritis 2015     Degeneration of lumbar or lumbosacral intervertebral disc 7/25/2006    Overview:  IMO Update 10/11    Depressive disorder     Drug abuse (H)     Elevated blood sugar 10/29/2016    Heroin abuse (H) 9/15/2015    Heroin addiction (H) 8/9/2010    Overview:  See social notes IMO Update 10/11    Hypertension     IV drug user 8/9/2010    Overview:  IMO Update 10/11    Lumbosacral spondylosis without myelopathy 11/13/2008    Narcotic addiction (H) 10/29/2016    Opioid use disorder, severe, dependence (H) 2020    [facility] records; prior Methadone; [facility]; AA/NA    Osteoarthrosis, pelvic region and thigh 11/13/2008    Overview:  IMO Update 10/11    Penile lesion 4/27/2020    Suicidal behavior 4/27/2020    Venous insufficiency of both lower extremities 2/26/2015     Do you wish to do the replacement in the background? yes         Past Surgical History:   Procedure Laterality Date    ARTHROSCOPY KNEE BILATERAL      COLONOSCOPY  01/29/2020    Multiple, repeat due 2015    COLONOSCOPY N/A 7/31/2015    Procedure: COLONOSCOPY;  Surgeon: Justin Andujar MD;  Location: [facility]    DECOMPRESSION CUBITAL TUNNEL Left 11/21/2017    Procedure: DECOMPRESSION CUBITAL TUNNEL;;  Surgeon: Jhonny Zhao MD;  Location: [facility]    DENTAL SURGERY      HERNIA REPAIR      Inguinal, left    RELEASE CARPAL TUNNEL Left 11/21/2017    Procedure: RELEASE CARPAL TUNNEL;  LEFT ELBOW CUBITAL and CARPAL TUNNEL RELEASE;  Surgeon: Jhonny Zhao MD;  Location: [facility]       ENT family history reviewed    Social History     Tobacco Use    Smoking status: Every Day     Packs/day: 1     Types: Cigarettes     Start date: 1983    Smokeless tobacco: Never    Tobacco comments:     Tried to Quit (NO)   Vaping Use    Vaping Use: Never used   Substance Use Topics    Alcohol use: No     Alcohol/week: 0.0 standard drinks of alcohol    Drug use: Not Currently     Types: IV, Methamphetamines, Opiates, Marijuana       Review of Systems  ROS: 10 point ROS neg other than the symptoms  "noted above in the HPI     Physical Exam  /85 (BP Location: Right arm, Patient Position: Sitting, Cuff Size: Adult Regular)   Pulse 85   Temp 97.3  F (36.3  C) (Temporal)   Ht 1.753 m (5' 9.02\")   Wt 88.5 kg (195 lb 1.7 oz)   SpO2 91%   BMI 28.80 kg/m    General - The patient is well nourished and well developed, and appears to have good nutritional status.  Alert and oriented to person and place, answers questions and cooperates with examination appropriately.   Head and Face - Normocephalic and atraumatic, with no gross asymmetry noted.  The facial nerve is intact, with strong symmetric movements.  No palpable parotid mass   There is a right upper lip raised seborrheic appearing lesion measuring 1.3 cm.  No ulceration  Voice and Breathing - The patient was breathing comfortably without the use of accessory muscles. There was no wheezing, stridor, or stertor.  The patients voice was clear and strong, and had appropriate pitch and quality.  No ghazala peripheral digital clubbing or cyanosis   Eyes - Extraocular movements intact, and the pupils were reactive to light.  Sclera were not icteric or injected, conjunctiva were pink and moist.  Neck - No palpable enlarged fixed cervical lymph nodes.  No neck cysts or unusual tenderness to palpation.   No palpable fixed thyroid nodules or concerning goiter.  The trachea is grossly midline.   Nose - Nasal mucosa is pink and moist with no abnormal mucus.       Office Procedure:   I discussed the risks and complications of skin lesion excision, including local anesthesia, bleeding, infection, injury to the facial nerve, scar formation, hypertrophic healing or keloid, numbness to area, recurrence of lesion, benign versus malignant pathology and possible need for further surgery. All questions were answered.    After consent was obtained,  I proceeded to cleanse the skin around the lesion with alcohol.  The patient was prepped and draped in the normal fashion and a " timeout was taken.  I then demarcated the lesion parallel to relaxed skin tension lines in a natural crease.  The vermilion line was demarcated.  The area was prepped and draped in the normal sterile fashion.  A time out was taken.  I then infiltrated the skin with 1% lidocaine with 1:200,000 epinephrine.  I  used a 15-blade to create a shave excision of the wide based lesion.  I then elevated the skin ellipse and a thin cuff of underlying subdermal fat with curved iris scissors.  I dissected down through normal subcutaneous tissue for complete removal of the lesion.  After the lesion was completely removed, I then placed it in formalin for permanent section.  Hemostasis was achieved with direct pressure and hand held cautery. I then irrigated the wound and gently suctioned the area.  I advanced the adjacent skin for tension free closure using tenotomy scissors.  The total length of the incision was  2.0 cm.  I then proceeded to close the incision by using simple interrupted buried knot sutures, using 5-0 Vicryl.  The skin edges were then reapproximated using 5-0 nylon, simple interrupted sutures.  Antibiotic ointment was then applied and the wound was dressed.  The patient tolerated the procedure without any difficulty.    Impression/Plan      ICD-10-CM    1. Skin lesion of face  L98.9 lidocaine 2%-EPINEPHrine 1:100,000 injection 1 mL     bacitracin ointment     EXC BENIGN SKIN LESION FACE/EARS 1.1-2.0 CM          Additional surgery may need to be scheduled based on final pathology.  This was discussed today.    Further procedures may include skin excision with frozens and flap repair.    The risks of surgery were discussed, if further surgery is necessary.  These include but are not limited to anesthesia, scar or keloid formation, infection, flap failure, change in appearance of surrounding facial structures,  numbness to the skin, injury to the facial nerve with permanent facial weakness which is exceedingly rare  but possible.  Temporary weakness to the face may occur with the use of local anesthesia.    The patient expressed understanding.  All questions were answered.  Photos were taken.      I have instructed the patient on wound care and signs of infection.  Written instructions provided.  We will contact the patient with pathology results.    Sunscreen use and skin cancer preventive measures were reinforced          Tameka Ordoñez D.O.  Otolaryngology/Head and Neck Surgery  Allergy

## 2023-12-14 NOTE — PATIENT INSTRUCTIONS
Thank you for allowing Dr. Ordoñez and our ENT team to participate in your care.  If your medications are too expensive, please give the nurse a call.  We can possibly change this medication.  If you have a scheduling or an appointment question please contact our Health Unit Coordinator at their direct line [phone].   ALL nursing questions or concerns can be directed to your ENT nurse, Noman, at: [phone]      POST PROCEDURE INSTRUCTIONS    Remove your dressing in 24 hours (If you have one)  Wash incision with Gentle Cleanser Twice Daily (Cetaphil, Baby Shampoo, etc)  Apply Bacitracin Ointment Three Times Daily; After 1 week, use Aquaphor Ointment   Cover with a clean dressing if in a dirty ada environment or when wet/soiled  Keep incision clean and dry   Do NOT soak in water such as a tub bath or swimming   Do NOT put make-up, powders, hairspray, lotions, etc on the incision     You can apply ice to the surgical area to help reduce swelling. (no longer than 20 minutes at a time)    You can use acetaminophen(Tylenol) or the prescription you received for pain.     If you have any bleeding, cover the wound with clean gauze and hold pressure for 10 Minutes. If the bleeding does not stop or is heavy and profuse, call the clinic or go to the Urgent Care/Emergency Department.    SIGNS OF INFECTION ARE:  Redness, swelling, red streaks, pus, drainage, warmth, fever, increased pain, foul smell.   Contact your primary health care provider if you notice any of the warning signs.     FOLLOW - UP  Follow-up in clinic for a nurse only visit in 7 days for suture removal.   Pathology results will be called to you when they are back. Usually 7-10 days.      6 WEEKS POST PROCEDURE    Apply ANY type of lotion to the suture site(Example - Vaseline Intensive Care or Vitamin E)  Massage the surgical area 1-2 times daily in a circular motion for 5 minutes, for a period of 2 months. This will help the scar heal better.

## 2023-12-19 LAB
PATH REPORT.COMMENTS IMP SPEC: NORMAL
PATH REPORT.FINAL DX SPEC: NORMAL
PATH REPORT.GROSS SPEC: NORMAL
PATH REPORT.MICROSCOPIC SPEC OTHER STN: NORMAL
PATH REPORT.RELEVANT HX SPEC: NORMAL
PHOTO IMAGE: NORMAL

## 2023-12-21 ENCOUNTER — OFFICE VISIT (OUTPATIENT)
Dept: OTOLARYNGOLOGY | Facility: OTHER | Age: 63
End: 2023-12-21
Attending: FAMILY MEDICINE
Payer: MEDICARE

## 2023-12-21 DIAGNOSIS — Z48.02 VISIT FOR SUTURE REMOVAL: Primary | ICD-10-CM

## 2023-12-21 NOTE — LETTER
12/21/2023         RE: Rodney Goodwin  [address]  [place] [address]        Dear Colleague,    Thank you for referring your patient, Rodney Goodwin, to the [facility] [place]. Please see a copy of my visit note below.    Patient arrived ambulatory, aox4, denies concerns at this time. 4 sutures removed from right upper lip. There was dry, crusty drainage that looks like blood. Patient denies drainage at home, believe it is old drainage. He states he is keeping the site cleaning. The site was cleansed prior to suture removal with an alcohol swab and gentle friction, 4 sutures were removed, and site cleansed again. No signs or symptoms of infection at the site. 2 steri strips were applied. Patient instructed to continue cleansing the site and monitor for signs of infection. Reviewed pathology results with patient. Patient discharged to home stable.       Again, thank you for allowing me to participate in the care of your patient.        Sincerely,        [facility] ENT NURSE

## 2023-12-21 NOTE — PROGRESS NOTES
Patient arrived ambulatory, aox4, denies concerns at this time. 4 sutures removed from right upper lip. There was dry, crusty drainage that looks like blood. Patient denies drainage at home, believe it is old drainage. He states he is keeping the site cleaning. The site was cleansed prior to suture removal with an alcohol swab and gentle friction, 4 sutures were removed, and site cleansed again. No signs or symptoms of infection at the site. 2 steri strips were applied. Patient instructed to continue cleansing the site and monitor for signs of infection. Reviewed pathology results with patient. Patient discharged to home stable.

## 2023-12-22 ENCOUNTER — APPOINTMENT (OUTPATIENT)
Dept: CT IMAGING | Facility: HOSPITAL | Age: 63
End: 2023-12-22
Attending: NURSE PRACTITIONER
Payer: MEDICARE

## 2023-12-22 ENCOUNTER — HOSPITAL ENCOUNTER (EMERGENCY)
Facility: HOSPITAL | Age: 63
Discharge: HOME | End: 2023-12-22
Attending: NURSE PRACTITIONER | Admitting: NURSE PRACTITIONER
Payer: MEDICARE

## 2023-12-22 ENCOUNTER — APPOINTMENT (OUTPATIENT)
Dept: ULTRASOUND IMAGING | Facility: HOSPITAL | Age: 63
End: 2023-12-22
Attending: NURSE PRACTITIONER
Payer: MEDICARE

## 2023-12-22 VITALS
OXYGEN SATURATION: 98 % | SYSTOLIC BLOOD PRESSURE: 139 MMHG | TEMPERATURE: 99 F | DIASTOLIC BLOOD PRESSURE: 91 MMHG | HEART RATE: 76 BPM | RESPIRATION RATE: 16 BRPM

## 2023-12-22 DIAGNOSIS — R10.31 RLQ ABDOMINAL PAIN: ICD-10-CM

## 2023-12-22 LAB
ALBUMIN SERPL BCG-MCNC: 4 G/DL (ref 3.5–5.2)
ALBUMIN UR-MCNC: NEGATIVE MG/DL
ALP SERPL-CCNC: 126 U/L (ref 40–150)
ALT SERPL W P-5'-P-CCNC: 29 U/L (ref 0–70)
ANION GAP SERPL CALCULATED.3IONS-SCNC: 11 MMOL/L (ref 7–15)
APPEARANCE UR: CLEAR
AST SERPL W P-5'-P-CCNC: 25 U/L (ref 0–45)
BASOPHILS # BLD AUTO: 0.1 10E3/UL (ref 0–0.2)
BASOPHILS NFR BLD AUTO: 1 %
BILIRUB SERPL-MCNC: 0.2 MG/DL
BILIRUB UR QL STRIP: NEGATIVE
BUN SERPL-MCNC: 22.7 MG/DL (ref 8–23)
CALCIUM SERPL-MCNC: 8.9 MG/DL (ref 8.8–10.2)
CHLORIDE SERPL-SCNC: 100 MMOL/L (ref 98–107)
COLOR UR AUTO: NORMAL
CREAT SERPL-MCNC: 1.03 MG/DL (ref 0.67–1.17)
DEPRECATED HCO3 PLAS-SCNC: 22 MMOL/L (ref 22–29)
EGFRCR SERPLBLD CKD-EPI 2021: 83 ML/MIN/1.73M2
EOSINOPHIL # BLD AUTO: 0.2 10E3/UL (ref 0–0.7)
EOSINOPHIL NFR BLD AUTO: 2 %
ERYTHROCYTE [DISTWIDTH] IN BLOOD BY AUTOMATED COUNT: 12.4 % (ref 10–15)
GLUCOSE SERPL-MCNC: 96 MG/DL (ref 70–99)
GLUCOSE UR STRIP-MCNC: NEGATIVE MG/DL
HCT VFR BLD AUTO: 40.5 % (ref 40–53)
HGB BLD-MCNC: 14.4 G/DL (ref 13.3–17.7)
HGB UR QL STRIP: NEGATIVE
HOLD SPECIMEN: NORMAL
IMM GRANULOCYTES # BLD: 0.1 10E3/UL
IMM GRANULOCYTES NFR BLD: 1 %
KETONES UR STRIP-MCNC: NEGATIVE MG/DL
LEUKOCYTE ESTERASE UR QL STRIP: NEGATIVE
LIPASE SERPL-CCNC: 50 U/L (ref 13–60)
LYMPHOCYTES # BLD AUTO: 3.4 10E3/UL (ref 0.8–5.3)
LYMPHOCYTES NFR BLD AUTO: 31 %
MCH RBC QN AUTO: 32.8 PG (ref 26.5–33)
MCHC RBC AUTO-ENTMCNC: 35.6 G/DL (ref 31.5–36.5)
MCV RBC AUTO: 92 FL (ref 78–100)
MONOCYTES # BLD AUTO: 1 10E3/UL (ref 0–1.3)
MONOCYTES NFR BLD AUTO: 10 %
NEUTROPHILS # BLD AUTO: 6.3 10E3/UL (ref 1.6–8.3)
NEUTROPHILS NFR BLD AUTO: 55 %
NITRATE UR QL: NEGATIVE
NRBC # BLD AUTO: 0 10E3/UL
NRBC BLD AUTO-RTO: 0 /100
PH UR STRIP: 5.5 [PH] (ref 4.7–8)
PLATELET # BLD AUTO: 150 10E3/UL (ref 150–450)
POTASSIUM SERPL-SCNC: 3.7 MMOL/L (ref 3.4–5.3)
PROT SERPL-MCNC: 6.7 G/DL (ref 6.4–8.3)
RBC # BLD AUTO: 4.39 10E6/UL (ref 4.4–5.9)
RBC URINE: 1 /HPF
SODIUM SERPL-SCNC: 133 MMOL/L (ref 135–145)
SP GR UR STRIP: 1.01 (ref 1–1.03)
SQUAMOUS EPITHELIAL: 0 /HPF
UROBILINOGEN UR STRIP-MCNC: NORMAL MG/DL
WBC # BLD AUTO: 11 10E3/UL (ref 4–11)
WBC URINE: 1 /HPF

## 2023-12-22 PROCEDURE — 76705 ECHO EXAM OF ABDOMEN: CPT | Mod: 26 | Performed by: NURSE PRACTITIONER

## 2023-12-22 PROCEDURE — 81001 URINALYSIS AUTO W/SCOPE: CPT | Performed by: NURSE PRACTITIONER

## 2023-12-22 PROCEDURE — 99284 EMERGENCY DEPT VISIT MOD MDM: CPT | Mod: 25 | Performed by: NURSE PRACTITIONER

## 2023-12-22 PROCEDURE — 36592 COLLECT BLOOD FROM PICC: CPT | Performed by: NURSE PRACTITIONER

## 2023-12-22 PROCEDURE — 80053 COMPREHEN METABOLIC PANEL: CPT | Performed by: NURSE PRACTITIONER

## 2023-12-22 PROCEDURE — 74177 CT ABD & PELVIS W/CONTRAST: CPT | Mod: MG

## 2023-12-22 PROCEDURE — 250N000011 HC RX IP 250 OP 636: Performed by: NURSE PRACTITIONER

## 2023-12-22 PROCEDURE — 99285 EMERGENCY DEPT VISIT HI MDM: CPT | Mod: 25

## 2023-12-22 PROCEDURE — 83690 ASSAY OF LIPASE: CPT | Performed by: NURSE PRACTITIONER

## 2023-12-22 PROCEDURE — 76705 ECHO EXAM OF ABDOMEN: CPT | Mod: TC

## 2023-12-22 PROCEDURE — 85025 COMPLETE CBC W/AUTO DIFF WBC: CPT | Performed by: NURSE PRACTITIONER

## 2023-12-22 RX ORDER — IOPAMIDOL 755 MG/ML
95 INJECTION, SOLUTION INTRAVASCULAR ONCE
Status: COMPLETED | OUTPATIENT
Start: 2023-12-22 | End: 2023-12-22

## 2023-12-22 RX ADMIN — IOPAMIDOL 95 ML: 755 INJECTION, SOLUTION INTRAVENOUS at 22:06

## 2023-12-22 ASSESSMENT — ENCOUNTER SYMPTOMS
ENDOCRINE NEGATIVE: 1
NAUSEA: 1
RESPIRATORY NEGATIVE: 1
ALLERGIC/IMMUNOLOGIC NEGATIVE: 1
EYES NEGATIVE: 1
NEUROLOGICAL NEGATIVE: 1
PSYCHIATRIC NEGATIVE: 1
BLOOD IN STOOL: 0
HEMATOLOGIC/LYMPHATIC NEGATIVE: 1
DYSURIA: 0
VOMITING: 0
FREQUENCY: 0
DIARRHEA: 0
CARDIOVASCULAR NEGATIVE: 1
ABDOMINAL PAIN: 1
CONSTITUTIONAL NEGATIVE: 1
CONSTIPATION: 0
FLANK PAIN: 1

## 2023-12-22 ASSESSMENT — ACTIVITIES OF DAILY LIVING (ADL)
ADLS_ACUITY_SCORE: 35
ADLS_ACUITY_SCORE: 35

## 2023-12-23 NOTE — ED NOTES
Face to face report given with opportunity to observe patient.    Report given to Cristiano Keller RN   12/22/2023  9:16 PM

## 2023-12-23 NOTE — ED NOTES
"Patient has hand holding pressure on the right lower abdomen complaining of pain there.  He has a history of a bladder mass and an upcoming appointment with \"Dr Velazquez\" to address this mass.  He says he just got over H Pylori.  Patient is fidgety and crying.      "

## 2023-12-23 NOTE — DISCHARGE INSTRUCTIONS
Pain control:   If your past medical conditions, allergies, current medications, or current status does not prevent you from using acetaminophen and/or ibuprofen, use the following:   Acetaminophen 650-1000 mg every 6 hours as needed for pain in addition to ibuprofen 400-600 mg every 6 hours as needed for pain.  Take these two medications together if wanted.    Remember that these are for AS NEEDED.  If not needed, do not take.          Follow-up with your primary care provider for reevaluation.  Contact your primary care provider if you have any questions or concerns.  Do not hesitate to return to the ER if any new or worsening symptoms.     Please read the attached instructions (if any).  They highlight more specific treatments and interventions for you at home.              Thank you for letting me participate in your care and wish you a fast and uneventful recovery,    Abdifatah BERNARD CNP    Do not hesitate to contact me with questions or concerns.  [email or web address]  [email or web address]

## 2023-12-23 NOTE — ED TRIAGE NOTES
Pt states he has a hx of H pylori, and a bladder mass, however his right lower abdominal pain is new, and just started this morning. Pt states he thinks its his appendix. Pt states he is nauseas but has not vomited.

## 2023-12-23 NOTE — ED NOTES
Patient is discharging to home given information on Abdominal pain, follow up with PCP.  Patient stated understanding of these instructions and is discharging by private auto.

## 2023-12-23 NOTE — ED PROVIDER NOTES
History     Chief Complaint   Patient presents with    Abdominal Pain     HPI  Rodney Goodwin is a 63 year old individual with history of bipolar 1 disorder, IV drug user, narcotic addiction, hypertension, GERD, COPD, comes in for right lower quadrant abdominal pain.  Patient states this pain started this morning.  Continues to have pain so comes in.  Denies any fever or chills.  No cough or shortness of breath.  States pain is constant in the right lower quadrant.  Does have history of bladder mass with some hematuria at times but no dysuria reported.  Last bowel movement today and normal denying hematochezia or melena.  Does state the pain is also in the back.  No trauma or overexertion reported.    Allergies:  Allergies   Allergen Reactions    Codeine     Penicillins        Problem List:    Patient Active Problem List    Diagnosis Date Noted    COPD (chronic obstructive pulmonary disease) (H) 04/28/2020     Priority: Medium    Suicidal behavior 04/27/2020     Priority: Medium    Gastroesophageal reflux disease without esophagitis 04/27/2020     Priority: Medium    Penile lesion 04/27/2020     Priority: Medium    Hypertension      Priority: Medium    Elevated blood sugar 10/29/2016     Priority: Medium    Narcotic addiction (H) 10/29/2016     Priority: Medium    Heroin abuse (H) 09/15/2015     Priority: Medium    Costochondritis 02/26/2015     Priority: Medium    Venous insufficiency of both lower extremities 02/26/2015     Priority: Medium     Do you wish to do the replacement in the background? yes        Tobacco dependence 01/06/2015     Priority: Medium    Cluster B personality disorder (H) 12/25/2014     Priority: Medium     Vs cluster B traits per OSH records 11/2014      Polysubstance abuse (H) 12/25/2014     Priority: Medium     Heroin, benzodiazepines, amphetamine, prescription opiates, THC abuse. Dealing drugs, IVDU as recently as 11/2014.      Hypoxemia 12/23/2014     Priority: Medium    Bipolar 1  disorder (H) 11/11/2014     Priority: Medium    Atypical bipolar affective disorder (H) 11/11/2014     Priority: Medium    Cellulitis and abscess of forearm 04/04/2014     Priority: Medium    IV drug user 08/09/2010     Priority: Medium     Overview:   IMO Update 10/11      Chlordiazepoxide dependence (H) 08/09/2010     Priority: Medium     Overview:   IMO Update 10/11      Heroin addiction (H) 08/09/2010     Priority: Medium     Overview:   See social notes  IMO Update 10/11      Osteoarthrosis, pelvic region and thigh 11/13/2008     Priority: Medium     Overview:   IMO Update 10/11      Lumbosacral spondylosis without myelopathy 11/13/2008     Priority: Medium    Backache 06/09/2008     Priority: Medium     Overview:   IMO Update 10/11      Degeneration of lumbar or lumbosacral intervertebral disc 07/25/2006     Priority: Medium     Overview:   IMO Update 10/11          Past Medical History:    Past Medical History:   Diagnosis Date    Acute exacerbation of chronic obstructive pulmonary disease (H) 10/28/2016    Anxiety     Backache 6/9/2008    Bipolar 1 disorder (H) 11/11/2014    Bipolar affective disorder (H)     Cellulitis and abscess of forearm 4/4/2014    Chlordiazepoxide dependence (H) 8/9/2010    Chronic constipation     COPD exacerbation (H) 9/14/2015    Costochondritis 2/26/2015    Degeneration of lumbar or lumbosacral intervertebral disc 7/25/2006    Depressive disorder     Drug abuse (H)     Elevated blood sugar 10/29/2016    Heroin abuse (H) 9/15/2015    Heroin addiction (H) 8/9/2010    Hypertension     IV drug user 8/9/2010    Lumbosacral spondylosis without myelopathy 11/13/2008    Narcotic addiction (H) 10/29/2016    Opioid use disorder, severe, dependence (H) 2020    Osteoarthrosis, pelvic region and thigh 11/13/2008    Penile lesion 4/27/2020    Suicidal behavior 4/27/2020    Venous insufficiency of both lower extremities 2/26/2015       Past Surgical History:    Past Surgical History:   Procedure  Laterality Date    ARTHROSCOPY KNEE BILATERAL      COLONOSCOPY  01/29/2020    Multiple, repeat due 2015    COLONOSCOPY N/A 7/31/2015    Procedure: COLONOSCOPY;  Surgeon: Justin Andujar MD;  Location: [facility]    DECOMPRESSION CUBITAL TUNNEL Left 11/21/2017    Procedure: DECOMPRESSION CUBITAL TUNNEL;;  Surgeon: Jhonny Zhao MD;  Location: [facility]    DENTAL SURGERY      HERNIA REPAIR      Inguinal, left    RELEASE CARPAL TUNNEL Left 11/21/2017    Procedure: RELEASE CARPAL TUNNEL;  LEFT ELBOW CUBITAL and CARPAL TUNNEL RELEASE;  Surgeon: Jhonny Zhao MD;  Location: [facility]       Family History:    Family History   Problem Relation Age of Onset    Diabetes Mother     Cerebrovascular Disease Father     Pancreatic Cancer Brother        Social History:  Marital Status:   [4]  Social History     Tobacco Use    Smoking status: Every Day     Packs/day: 1     Types: Cigarettes     Start date: 1983    Smokeless tobacco: Never    Tobacco comments:     Tried to Quit (NO)   Vaping Use    Vaping Use: Never used   Substance Use Topics    Alcohol use: No     Alcohol/week: 0.0 standard drinks of alcohol    Drug use: Not Currently     Types: IV, Methamphetamines, Opiates, Marijuana        Medications:    albuterol (PROAIR HFA/PROVENTIL HFA/VENTOLIN HFA) 108 (90 Base) MCG/ACT inhaler  ASPIRIN LOW DOSE 81 MG EC tablet  carvedilol (COREG) 6.25 MG tablet  ipratropium - albuterol 0.5 mg/2.5 mg/3 mL (DUONEB) 0.5-2.5 (3) MG/3ML neb solution  losartan (COZAAR) 100 MG tablet  omeprazole (PRILOSEC) 40 MG DR capsule  umeclidinium-vilanterol (ANORO ELLIPTA) 62.5-25 MCG/ACT oral inhaler          Review of Systems   Constitutional: Negative.    HENT: Negative.     Eyes: Negative.    Respiratory: Negative.     Cardiovascular: Negative.    Gastrointestinal:  Positive for abdominal pain (Right lower quadrant) and nausea. Negative for blood in stool, constipation, diarrhea and vomiting.   Endocrine: Negative.    Genitourinary:  Positive for flank  pain (Right-sided). Negative for decreased urine volume, dysuria, frequency, penile discharge, penile pain, penile swelling and testicular pain.   Skin: Negative.    Allergic/Immunologic: Negative.    Neurological: Negative.    Hematological: Negative.    Psychiatric/Behavioral: Negative.         Physical Exam   BP: 157/95  Pulse: 87  Temp: 99  F (37.2  C)  Resp: 16  SpO2: 100 %      GENERAL APPEARANCE:  The patient is a 63 year old well-developed, well-nourished individual in no acute distress that appears as stated age.  LUNGS:  Breathing is easy.  Breath sounds are equal and clear bilaterally.  No wheezes, rhonchi, or rales.  HEART:  Regular rate and rhythm with normal S1 and S2.  No murmurs, gallops, or rubs.  ABDOMEN:  Soft.  No mass, guarding, or rebound.  Unable to elicit any tenderness to palpation.  No organomegaly or hernia.  Bowel sounds are present.  No CVA tenderness or flank mass.  No abdominal bruits or thrills present upon auscultation/palpation.  GENITOURINARY:  Assessment completed with chaperone Ely Martinez RN present.  The phallus is circumcised.  There are no penile plaques or genital skin lesions.  The glans is normal.  The meatus is orthotropic, patent, and clear.  The testicles are descended bilaterally without masses or tenderness.  The epididymis and cords are normal.  The perineum is normal.  NEUROLOGIC:  No focal sensory or motor deficits are noted.  PSYCHIATRIC:  The patient is awake, alert, and oriented x4.  Recent and remote memory is intact.  Anxious mood and affect.  Cooperative with history and physical exam.  SKIN:  Warm, dry, and well perfused.  Good turgor.  No lesions, nodules, or rashes are noted.  No bruising noted.      Comment: Discrepancies between my note and notes on behalf of the nursing team or other care providers are secondary to my findings reflecting my physical examination and questioning of the patient.  Any conflicting information provided is not in line with  my examination of the patient.       ED Course              ED Course as of 12/22/23 2315   Fri Dec 22, 2023   2043 Labs ordered.   2100 In to see patient and history/physical completed.    2123 POCUS abdomen ultrasound shows no obvious acute gallbladder abnormalities.  Limited viewing of the aorta done but no obvious aneurysm.  Renal ultrasound shows no obvious abnormalities.  Does have bladder mass present but no free fluid present.   2314 No acute findings on CT scan.  Labs normal.  Patient to be discharged home with right lower quadrant pain that did resolve without any treatment in the ER.  Advised patient to return if worsening otherwise follow-up with PCP.  Patient ambulated out before discharge paperwork could be given.     POC US ABDOMEN LIMITED    Date/Time: 12/22/2023 9:15 PM    Performed by: Abdifatah Calzada APRN CNP  Authorized by: Abdifatah Calzada APRN CNP    Procedure details:     Indications: abdominal pain      Assessment for:  AAA, gallstones, hydronephrosis and intra-abdominal fluid    Aorta:  Visualized    Left renal:  Visualized    Right renal:  Visualized    Hepatobiliary:  Visualized    Bladder:  Visualized  Vascular findings:     Aorta comment:  Only superior view was obtained and was normal.  Unable to get inferior view.  Left renal findings:     Intra-abdominal fluid: not identified      Perinephric fluid: not identified      Hydronephrosis: none    Right renal findings:     Intra-abdominal fluid: not identified      Perinephric fluid: not identified      Hydronephrosis: none    Hepatobiliary findings:     Common bile duct:  Unable to visualize    Gallbladder wall:  Normal    Gallbladder stones: not identified      Intra-abdominal fluid: not identified      Sonographic Aviles's sign: negative    Bladder findings:     Free pelvic fluid: not identified           Results for orders placed or performed during the hospital encounter of 12/22/23 (from the past 24 hour(s))   [place] Draw  *Canceled*    Narrative    The following orders were created for panel order [place] Draw.  Procedure                               Abnormality         Status                     ---------                               -----------         ------                       Please view results for these tests on the individual orders.   [place] Draw *Canceled*    Narrative    The following orders were created for panel order [place] Draw.  Procedure                               Abnormality         Status                     ---------                               -----------         ------                     Extra Blue Top Tube[[id number]]                                                         Extra Red Top Tube[[id number]]                                                          Extra Green Top (Lithium...[[id number]]                                                 Extra Purple Top Tube[[id number]]                                                       Extra Green Top (Lithium...[[id number]]                                                   Please view results for these tests on the individual orders.   CBC with platelets differential    Narrative    The following orders were created for panel order CBC with platelets differential.  Procedure                               Abnormality         Status                     ---------                               -----------         ------                     CBC with platelets and d...[[id number]]  Abnormal            Final result                 Please view results for these tests on the individual orders.   Comprehensive metabolic panel   Result Value Ref Range    Sodium 133 (L) 135 - 145 mmol/L    Potassium 3.7 3.4 - 5.3 mmol/L    Carbon Dioxide (CO2) 22 22 - 29 mmol/L    Anion Gap 11 7 - 15 mmol/L    Urea Nitrogen 22.7 8.0 - 23.0 mg/dL    Creatinine 1.03 0.67 - 1.17 mg/dL    GFR Estimate 83 >60 mL/min/1.73m2    Calcium 8.9 8.8 - 10.2 mg/dL    Chloride 100 98 - 107 mmol/L     Glucose 96 70 - 99 mg/dL    Alkaline Phosphatase 126 40 - 150 U/L    AST 25 0 - 45 U/L    ALT 29 0 - 70 U/L    Protein Total 6.7 6.4 - 8.3 g/dL    Albumin 4.0 3.5 - 5.2 g/dL    Bilirubin Total 0.2 <=1.2 mg/dL   Lipase   Result Value Ref Range    Lipase 50 13 - 60 U/L   CBC with platelets and differential   Result Value Ref Range    WBC Count 11.0 4.0 - 11.0 10e3/uL    RBC Count 4.39 (L) 4.40 - 5.90 10e6/uL    Hemoglobin 14.4 13.3 - 17.7 g/dL    Hematocrit 40.5 40.0 - 53.0 %    MCV 92 78 - 100 fL    MCH 32.8 26.5 - 33.0 pg    MCHC 35.6 31.5 - 36.5 g/dL    RDW 12.4 10.0 - 15.0 %    Platelet Count 150 150 - 450 10e3/uL    % Neutrophils 55 %    % Lymphocytes 31 %    % Monocytes 10 %    % Eosinophils 2 %    % Basophils 1 %    % Immature Granulocytes 1 %    NRBCs per 100 WBC 0 <1 /100    Absolute Neutrophils 6.3 1.6 - 8.3 10e3/uL    Absolute Lymphocytes 3.4 0.8 - 5.3 10e3/uL    Absolute Monocytes 1.0 0.0 - 1.3 10e3/uL    Absolute Eosinophils 0.2 0.0 - 0.7 10e3/uL    Absolute Basophils 0.1 0.0 - 0.2 10e3/uL    Absolute Immature Granulocytes 0.1 <=0.4 10e3/uL    Absolute NRBCs 0.0 10e3/uL   Extra Tube    Narrative    The following orders were created for panel order Extra Tube.  Procedure                               Abnormality         Status                     ---------                               -----------         ------                     Extra Blood Culture Bottle[[id number]]                       Final result               Extra Blue Top Tube[[id number]]                              Final result               Extra Red Top Tube[[id number]]                               Final result               Extra Heparinized Syringe[[id number]]                        Final result                 Please view results for these tests on the individual orders.   Extra Blood Culture Bottle   Result Value Ref Range    Hold Specimen JIC    Extra Blue Top Tube   Result Value Ref Range    Hold Specimen JIC    Extra Red Top Tube    Result Value Ref Range    Hold Specimen JI    Extra Heparinized Syringe   Result Value Ref Range    Hold Specimen JI    UA with Microscopic reflex to Culture    Specimen: Urine, Midstream   Result Value Ref Range    Color Urine Straw Colorless, Straw, Light Yellow, Yellow    Appearance Urine Clear Clear    Glucose Urine Negative Negative mg/dL    Bilirubin Urine Negative Negative    Ketones Urine Negative Negative mg/dL    Specific Gravity Urine 1.010 1.003 - 1.035    Blood Urine Negative Negative    pH Urine 5.5 4.7 - 8.0    Protein Albumin Urine Negative Negative mg/dL    Urobilinogen Urine Normal Normal, 2.0 mg/dL    Nitrite Urine Negative Negative    Leukocyte Esterase Urine Negative Negative    RBC Urine 1 <=2 /HPF    WBC Urine 1 <=5 /HPF    Squamous Epithelials Urine 0 <=1 /HPF    Narrative    Urine Culture not indicated       CT abdomen pelvis with IV contrast:  1.  No acute abnormality.  2.  Stable 2 cm left posterior lateral bladder wall mass.  Further evaluation with cystoscopy recommended if not previously performed.  3.  Stable 4 cm abdominal aortic aneurysm.  4.  Severe degenerative changes of the lower spine.    Medications   iopamidol (ISOVUE-370) solution 95 mL (95 mLs Intravenous $Given 12/22/23 2206)   sodium chloride (PF) 0.9% PF flush 60 mL (60 mLs Intravenous $Given 12/22/23 2206)       Assessments & Plan (with Medical Decision Making)     I have reviewed the nursing notes.    I have reviewed the findings, diagnosis, plan and need for follow up with the patient.      Summary:  Patient presents to the ER today right lower quadrant abdominal pain.  Potential diagnosis which have been considered and evaluated include appendicitis, ureteral stone, UTI, diverticulitis, bowel obstruction, as well as others. Many of these have been excluded using the various modalities and assessment as noted on the chart. At the present time, the diagnosis given seems to be the most likely right lower quadrant  abdominal pain without etiology.  Upon arrival, vitals signs are normal.  The patient is alert and oriented.  Patient holding his right lower quadrant.  Cardiac and respiratory examination normal.  No obvious tenderness to palpation noted but patient is complaining of pain.  No CVA tenderness.  POCUS ultrasound of gallbladder showed no acute abnormalities.  No renal abnormalities.  Difficult to view aorta but no obvious aneurysm present.  Bladder does have mass but no free fluid.  IV established and lab work obtained showing WBC of 11.0 with hemoglobin of 14.4.  Electrolytes, renal, hepatic functions normal.  Lipase normal at 50.  UA negative.  Due to the severity of right lower quadrant pain did do CT abdomen pelvis with IV contrast showing no acute abnormalities.  After scan patient is and not having any pain anymore.  Advised patient to follow-up with PCP return if worsening.  Patient wanting to go.  IV was discontinued and patient ambulated out of the ER without receiving discharge paperwork.        Critical Care Time: None    Impression and plan discussed with patient. Questions answered, concerns addressed, indications for urgent re-evaluation reviewed, and  given. Patient/Parent/Caregiver agree with treatment plan and have no further questions at this time.      This note was created by the Dragon Voice Dictation System. Inadvertent typographical errors, due to software recognition problems, may still exist.             New Prescriptions    No medications on file       Final diagnoses:   RLQ abdominal pain       12/22/2023   [facility]       Abdifatah Calzada APRN CNP  12/22/23 5970

## 2024-02-02 ENCOUNTER — OFFICE VISIT (OUTPATIENT)
Dept: FAMILY MEDICINE | Facility: OTHER | Age: 64
End: 2024-02-02
Attending: FAMILY MEDICINE
Payer: MEDICARE

## 2024-02-02 VITALS
RESPIRATION RATE: 19 BRPM | TEMPERATURE: 97.9 F | SYSTOLIC BLOOD PRESSURE: 146 MMHG | WEIGHT: 197.3 LBS | HEART RATE: 87 BPM | DIASTOLIC BLOOD PRESSURE: 88 MMHG | BODY MASS INDEX: 29.22 KG/M2 | OXYGEN SATURATION: 97 % | HEIGHT: 69 IN

## 2024-02-02 DIAGNOSIS — I71.43 INFRARENAL ABDOMINAL AORTIC ANEURYSM (AAA) WITHOUT RUPTURE (H): ICD-10-CM

## 2024-02-02 DIAGNOSIS — F17.210 CIGARETTE NICOTINE DEPENDENCE WITHOUT COMPLICATION: Primary | ICD-10-CM

## 2024-02-02 DIAGNOSIS — N32.89 BLADDER MASS: ICD-10-CM

## 2024-02-02 DIAGNOSIS — A04.8 H. PYLORI INFECTION: ICD-10-CM

## 2024-02-02 DIAGNOSIS — I10 PRIMARY HYPERTENSION: ICD-10-CM

## 2024-02-02 DIAGNOSIS — K21.9 GASTROESOPHAGEAL REFLUX DISEASE WITHOUT ESOPHAGITIS: ICD-10-CM

## 2024-02-02 PROCEDURE — G0463 HOSPITAL OUTPT CLINIC VISIT: HCPCS | Mod: 25

## 2024-02-02 PROCEDURE — G0463 HOSPITAL OUTPT CLINIC VISIT: HCPCS

## 2024-02-02 PROCEDURE — 99214 OFFICE O/P EST MOD 30 MIN: CPT | Performed by: FAMILY MEDICINE

## 2024-02-02 RX ORDER — CARVEDILOL 12.5 MG/1
12.5 TABLET ORAL 2 TIMES DAILY WITH MEALS
Qty: 60 TABLET | Refills: 0 | Status: SHIPPED | OUTPATIENT
Start: 2024-02-02 | End: 2024-04-15

## 2024-02-02 RX ORDER — BUPROPION HYDROCHLORIDE 150 MG/1
TABLET, EXTENDED RELEASE ORAL
Qty: 60 TABLET | Refills: 0 | Status: SHIPPED | OUTPATIENT
Start: 2024-02-02 | End: 2024-07-25

## 2024-02-02 RX ORDER — OMEPRAZOLE 40 MG/1
40 CAPSULE, DELAYED RELEASE ORAL 2 TIMES DAILY
Qty: 60 CAPSULE | Refills: 1 | Status: SHIPPED | OUTPATIENT
Start: 2024-02-02 | End: 2024-05-07

## 2024-02-02 ASSESSMENT — PAIN SCALES - GENERAL: PAINLEVEL: NO PAIN (0)

## 2024-02-02 NOTE — PROGRESS NOTES
"  Assessment & Plan     Cigarette nicotine dependence without complication  He wishes to try meds for help smoking.  Hopefully Wellbutrin will also help his mood.  - buPROPion (WELLBUTRIN SR) 150 MG 12 hr tablet; Take 1 daily x3 days, then 1 twice daily after that.  Separate doses by at least 8 hours.  Last dose no later than 6pm.  Stop smoking after on pills for 1 week.    Primary hypertension  Increasing today  - carvedilol (COREG) 12.5 MG tablet; Take 1 tablet (12.5 mg) by mouth 2 times daily (with meals)    Infrarenal abdominal aortic aneurysm (AAA) without rupture (H24)  Following with Vascular, needs better BP control    Bladder mass  Still hasn't seen Urology.  Asking [facility] to work on getting him rescheduled.    H. pylori infection - treated - still having symptoms (but improved somewhat)  Continue PPI.  Previously wasn't cleared for anesthesia, need to keep working on optimization.  - omeprazole (PRILOSEC) 40 MG DR capsule; Take 1 capsule (40 mg) by mouth 2 times daily    Gastroesophageal reflux disease without esophagitis      Follow-up 3 weeks, sooner if needed.      Skye Stevens is a 63 year old, presenting for the following health issues:  Hypertension    HPI     AAA/Vascular - saw Dr Velazquez - told to quit smoking and see PCP for bp control  Urology - bladder mass - hasn't seen them yet - he cancelled, then says they haven't rescheduled.    Cut down smoking, coffee, drinking - he states he's \"an asshole\" - last few weeks a couple friends , but multiple others stopped talking to him.    Stopped Anoro as made his mouth too dry.    Still having some heartburn/discomfort, on omeprazole bid, scopes previously cancelled as not cleared for anesthesia    Hypertension Follow-up    Do you check your blood pressure regularly outside of the clinic? No   Are you following a low salt diet? Yes  Are your blood pressures ever more than 140 on the top number (systolic) OR more   than 90 on the bottom number " "(diastolic), for example 140/90? Yes    BP Readings from Last 3 Encounters:   02/02/24 (!) 142/100   12/22/23 139/91   12/14/23 135/85                  Objective    BP (!) 142/100   Pulse 87   Temp 97.9  F (36.6  C) (Tympanic)   Resp 19   Ht 1.753 m (5' 9\")   Wt 89.5 kg (197 lb 4.8 oz)   SpO2 97%   BMI 29.14 kg/m    Body mass index is 29.14 kg/m .  Physical Exam  Constitutional:       General: He is not in acute distress.     Appearance: Normal appearance.   Cardiovascular:      Rate and Rhythm: Normal rate and regular rhythm.   Pulmonary:      Effort: Pulmonary effort is normal.   Neurological:      Mental Status: He is alert and oriented to person, place, and time.                    Signed Electronically by: KASSI PAIZ DO    "

## 2024-02-20 ENCOUNTER — TELEPHONE (OUTPATIENT)
Dept: CARE COORDINATION | Facility: OTHER | Age: 64
End: 2024-02-20

## 2024-02-20 NOTE — TELEPHONE ENCOUNTER
RN CC received transferred message from patient wondering when his appointment with Dr. Cline was.  RN ELVIRA called patient back.  He states he has a 3 pm appointment with Urology Ramesh on 2/22 at 3 pm and wondering when his appointment with Dr. Cline was on 2/23.  RN CC let him know when appointment date/time was.  We discussed not seeing Ramesh appointment that he spoke of and offered to give patient the phone number.  Patient states not wanting phone number and knows date/time.  States that he is unsure of what the visit is as has a bladder mass but hasn't been able to have surgery as blood pressure too high.  Again offered him their phone number so could call and see what [facility] appointment is but patient declined.  Let him know usually a consult before any procedure but again offered phone number and he declined. Patient thankful to know when Dr. Cline appointment is.  Alba Gama RN  Care Coordination

## 2024-03-26 NOTE — PROGRESS NOTES
Preoperative Evaluation  [facility] [place]  [address]  [place] [address]  Phone: [phone]  Primary Provider: Abdifatah Paiz  Pre-op Performing Provider: ABDIFATAH PAIZ  Mar 27, 2024       Rodney is a 63 year old, presenting for the following:  Pre-Op Exam        3/27/2024    10:46 AM   Additional Questions   Roomed by Lisseth Jackson   Accompanied by none         3/27/2024    10:46 AM   Patient Reported Additional Medications   Patient reports taking the following new medications none     Surgical Information  Surgery/Procedure: cystoscopy, bipolar transurethral resection of bladder tumor with post op gemcitabine instillation   Surgery Location: [facility]  Surgeon: Dr. Knox  Surgery Date: 4/10/24  Time of Surgery: TBD  Where patient plans to recover: At home with family  Fax number for surgical facility:     Assessment & Plan         Preoperative examination  - EKG 12-lead complete w/read - Clinics  - CBC with Platelets & Differential; Future  - Comprehensive metabolic panel; Future    Bladder mass    Dyspnea, unspecified type  - BNP-N terminal pro  - CBC with Platelets & Differential  - Comprehensive metabolic panel    Exertional chest pain  - Adult Cardiology Eval  Referral    Coronary artery disease involving native coronary artery of native heart, unspecified whether angina present  - Adult Cardiology Eval  Referral    Hypertension           Recommendation  Spoke with on-call anesthesia at [facility].  Not cleared for cysto.  Needs cardiac workup prior to non-emergent procedure.  Arranged for him to see cardiology on Monday.  Already had stress test back in November showing old infarct without ischemia.  He is having sternal chest pressure with shoveling snow the past couple days (storm), better with resting for 20-30 minutes.  Will need a cath.        Subjective       HPI related to upcoming procedure: preop cysto, excision bladder tumor          3/27/2024    10:48 AM    Preop Questions   1. Have you ever had a heart attack or stroke? YES   2. Have you ever had surgery on your heart or blood vessels, such as a stent placement, a coronary artery bypass, or surgery on an artery in your head, neck, heart, or legs? No   3. Do you have chest pain with activity? YES   4. Do you have a history of  heart failure? No   5. Do you currently have a cold, bronchitis or symptoms of other infection? UNKNOWN - thinks he is starting to get sick   6. Do you have a cough, shortness of breath, or wheezing? YES - wheezing   7. Do you or anyone in your family have previous history of blood clots? No   8. Do you or does anyone in your family have a serious bleeding problem such as prolonged bleeding following surgeries or cuts? No   9. Have you ever had problems with anemia or been told to take iron pills? No   10. Have you had any abnormal blood loss such as black, tarry or bloody stools? No   11. Have you ever had a blood transfusion? No   12. Are you willing to have a blood transfusion if it is medically needed before, during, or after your surgery? Yes   13. Have you or any of your relatives ever had problems with anesthesia? No   14. Do you have sleep apnea, excessive snoring or daytime drowsiness? No   15. Do you have any artifical heart valves or other implanted medical devices like a pacemaker, defibrillator, or continuous glucose monitor? No   16. Do you have artificial joints? No   17. Are you allergic to latex? No     Health Care Directive  Patient does not have a Health Care Directive or Living Will: Discussed advance care planning with patient; however, patient declined at this time.    Preoperative Review of    reviewed - no record of controlled substances prescribed.      Status of Chronic Conditions:  POOR HISTORIAN  BMI 29.4  HTN - above goal - but acceptable for surgery - overall trend improving as we are adjusting his meds  Started Wellbutrin for smoking beginning of February.   Smoking less.  Down to 1/3 ppd from previous 1 ppd.  Bladder mass - reason for procedure  GERD  Chronic abdominal pain - past scopes delayed due to BP issues - not yet done.  Intrarenal AAA.  Saw vascular.  COPD - doesn't use his Anoro as doesn't like the powder.  Uses rescue inhaler maybe three times daily.    HLP - not on statin currently.  We've been holding on starting due to multiple other meds being added/changed recently.  Declines pneumonia vaccine.  CAD h/o past MI, see on past stress test in November, patient was unaware of this past event.      Patient Active Problem List    Diagnosis Date Noted    COPD (chronic obstructive pulmonary disease) (H) 04/28/2020     Priority: Medium    Suicidal behavior 04/27/2020     Priority: Medium    Gastroesophageal reflux disease without esophagitis 04/27/2020     Priority: Medium    Penile lesion 04/27/2020     Priority: Medium    Hypertension      Priority: Medium    Elevated blood sugar 10/29/2016     Priority: Medium    Narcotic addiction (H) 10/29/2016     Priority: Medium    Heroin abuse (H) 09/15/2015     Priority: Medium    Costochondritis 02/26/2015     Priority: Medium    Venous insufficiency of both lower extremities 02/26/2015     Priority: Medium     Do you wish to do the replacement in the background? yes        Tobacco dependence 01/06/2015     Priority: Medium    Cluster B personality disorder (H) 12/25/2014     Priority: Medium     Vs cluster B traits per OSH records 11/2014      Polysubstance abuse (H) 12/25/2014     Priority: Medium     Heroin, benzodiazepines, amphetamine, prescription opiates, THC abuse. Dealing drugs, IVDU as recently as 11/2014.      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     Priority: Medium    Cellulitis and abscess of forearm 04/04/2014     Priority: Medium    IV drug user 08/09/2010     Priority: Medium      Overview:   IMO Update 10/11      Chlordiazepoxide dependence (H) 08/09/2010     Priority: Medium     Overview:   IMO Update 10/11      Heroin addiction (H) 08/09/2010     Priority: Medium     Overview:   See social notes  IMO Update 10/11      Osteoarthrosis, pelvic region and thigh 11/13/2008     Priority: Medium     Overview:   IMO Update 10/11      Lumbosacral spondylosis without myelopathy 11/13/2008     Priority: Medium    Backache 06/09/2008     Priority: Medium     Overview:   IMO Update 10/11      Degeneration of lumbar or lumbosacral intervertebral disc 07/25/2006     Priority: Medium     Overview:   IMO Update 10/11        Past Medical History:   Diagnosis Date    Acute exacerbation of chronic obstructive pulmonary disease (H) 10/28/2016    Anxiety     Backache 6/9/2008    Overview:  IMO Update 10/11    Bipolar 1 disorder (H) 11/11/2014    Bipolar affective disorder (H)     Cellulitis and abscess of forearm 4/4/2014    Chlordiazepoxide dependence (H) 8/9/2010    Overview:  IMO Update 10/11    Chronic constipation     COPD exacerbation (H) 9/14/2015    Costochondritis 2/26/2015    Degeneration of lumbar or lumbosacral intervertebral disc 7/25/2006    Overview:  IMO Update 10/11    Depressive disorder     Drug abuse (H)     Elevated blood sugar 10/29/2016    Heroin abuse (H) 9/15/2015    Heroin addiction (H) 8/9/2010    Overview:  See social notes IMO Update 10/11    Hypertension     IV drug user 8/9/2010    Overview:  IMO Update 10/11    Lumbosacral spondylosis without myelopathy 11/13/2008    Narcotic addiction (H) 10/29/2016    Opioid use disorder, severe, dependence (H) 2020    CADT records; prior Methadone; [facility]; AA/NA    Osteoarthrosis, pelvic region and thigh 11/13/2008    Overview:  IMO Update 10/11    Penile lesion 4/27/2020    Suicidal behavior 4/27/2020    Venous insufficiency of both lower extremities 2/26/2015     Do you wish to do the replacement in the background? yes       Past  Surgical History:   Procedure Laterality Date    ARTHROSCOPY KNEE BILATERAL      COLONOSCOPY  01/29/2020    Multiple, repeat due 2015    COLONOSCOPY N/A 7/31/2015    Procedure: COLONOSCOPY;  Surgeon: Justin Andujar MD;  Location: [facility]    DECOMPRESSION CUBITAL TUNNEL Left 11/21/2017    Procedure: DECOMPRESSION CUBITAL TUNNEL;;  Surgeon: Jhonny Zhao MD;  Location: [facility]    DENTAL SURGERY      HERNIA REPAIR      Inguinal, left    RELEASE CARPAL TUNNEL Left 11/21/2017    Procedure: RELEASE CARPAL TUNNEL;  LEFT ELBOW CUBITAL and CARPAL TUNNEL RELEASE;  Surgeon: Jhonny Zhao MD;  Location: [facility] OR     Current Outpatient Medications   Medication Sig Dispense Refill    albuterol (PROAIR HFA/PROVENTIL HFA/VENTOLIN HFA) 108 (90 Base) MCG/ACT inhaler Inhale 2 puffs into the lungs every 4 hours as needed for shortness of breath, wheezing or cough 18 g 3    ASPIRIN LOW DOSE 81 MG EC tablet Take 1 tablet (81 mg) by mouth daily 100 tablet 3    buPROPion (WELLBUTRIN SR) 150 MG 12 hr tablet Take 1 daily x3 days, then 1 twice daily after that.  Separate doses by at least 8 hours.  Last dose no later than 6pm.  Stop smoking after on pills for 1 week. 60 tablet 0    carvedilol (COREG) 12.5 MG tablet Take 1 tablet (12.5 mg) by mouth 2 times daily (with meals) 60 tablet 0    ipratropium - albuterol 0.5 mg/2.5 mg/3 mL (DUONEB) 0.5-2.5 (3) MG/3ML neb solution Take 1 vial (3 mLs) by nebulization every 6 hours as needed for shortness of breath, wheezing or cough 90 mL 11    losartan (COZAAR) 100 MG tablet Take 1 tablet by mouth once daily 30 tablet 10    omeprazole (PRILOSEC) 40 MG DR capsule Take 1 capsule (40 mg) by mouth 2 times daily 60 capsule 1    umeclidinium-vilanterol (ANORO ELLIPTA) 62.5-25 MCG/ACT oral inhaler Inhale 1 puff into the lungs daily 60 each 1       Allergies   Allergen Reactions    Codeine     Penicillins         Social History     Tobacco Use    Smoking status: Every Day     Packs/day: 1     Types: Cigarettes      "Start date: 1983    Smokeless tobacco: Never    Tobacco comments:     Tried to Quit (NO)   Substance Use Topics    Alcohol use: No     Alcohol/week: 0.0 standard drinks of alcohol       History   Drug Use Unknown         Review of Systems   Constitutional:  Negative for fever.   Respiratory:  Negative for cough and shortness of breath.    Cardiovascular:  Positive for chest pain. Negative for peripheral edema.         Objective    /87 (BP Location: Left arm, Patient Position: Sitting, Cuff Size: Adult Large)   Pulse 92   Temp 96.9  F (36.1  C) (Tympanic)   Resp 20   Ht 1.753 m (5' 9\")   Wt 90.3 kg (199 lb 1.6 oz)   SpO2 97%   BMI 29.40 kg/m     Estimated body mass index is 29.4 kg/m  as calculated from the following:    Height as of this encounter: 1.753 m (5' 9\").    Weight as of this encounter: 90.3 kg (199 lb 1.6 oz).  Physical Exam  Constitutional:       General: He is not in acute distress.     Appearance: Normal appearance.   HENT:      Head: Normocephalic and atraumatic.   Eyes:      Conjunctiva/sclera: Conjunctivae normal.      Pupils: Pupils are equal, round, and reactive to light.   Cardiovascular:      Rate and Rhythm: Normal rate and regular rhythm.      Heart sounds: Normal heart sounds. No murmur heard.  Pulmonary:      Effort: Pulmonary effort is normal.      Breath sounds: Normal breath sounds. No wheezing, rhonchi or rales.   Abdominal:      General: Bowel sounds are normal.   Musculoskeletal:      Right lower leg: No edema.      Left lower leg: No edema.   Neurological:      Mental Status: He is alert and oriented to person, place, and time.           Recent Labs   Lab Test 12/22/23  2130 11/09/23  1030 08/23/23  1047 01/16/23  2244   HGB 14.4 15.0   < > 14.4    181   < > 122*   INR  --   --   --  1.07   * 136   < > 136   POTASSIUM 3.7 4.4   < > 3.7   CR 1.03 1.09   < > 1.09    < > = values in this interval not displayed.        Diagnostics  Recent Results (from the past " 24 hour(s))   BNP-N terminal pro    Collection Time: 03/27/24 11:34 AM   Result Value Ref Range    N Terminal Pro BNP Outpatient 417 0 - 900 pg/mL   Comprehensive metabolic panel    Collection Time: 03/27/24 11:34 AM   Result Value Ref Range    Sodium 137 135 - 145 mmol/L    Potassium 4.1 3.4 - 5.3 mmol/L    Carbon Dioxide (CO2) 23 22 - 29 mmol/L    Anion Gap 10 7 - 15 mmol/L    Urea Nitrogen 19.9 8.0 - 23.0 mg/dL    Creatinine 1.06 0.67 - 1.17 mg/dL    GFR Estimate 80 >60 mL/min/1.73m2    Calcium 9.0 8.8 - 10.2 mg/dL    Chloride 104 98 - 107 mmol/L    Glucose 104 (H) 70 - 99 mg/dL    Alkaline Phosphatase 141 40 - 150 U/L    AST 30 0 - 45 U/L    ALT 22 0 - 70 U/L    Protein Total 6.9 6.4 - 8.3 g/dL    Albumin 3.9 3.5 - 5.2 g/dL    Bilirubin Total 0.2 <=1.2 mg/dL   CBC with platelets and differential    Collection Time: 03/27/24 11:34 AM   Result Value Ref Range    WBC Count 7.0 4.0 - 11.0 10e3/uL    RBC Count 4.46 4.40 - 5.90 10e6/uL    Hemoglobin 14.4 13.3 - 17.7 g/dL    Hematocrit 41.9 40.0 - 53.0 %    MCV 94 78 - 100 fL    MCH 32.3 26.5 - 33.0 pg    MCHC 34.4 31.5 - 36.5 g/dL    RDW 12.2 10.0 - 15.0 %    Platelet Count 157 150 - 450 10e3/uL    % Neutrophils 69 %    % Lymphocytes 18 %    % Monocytes 9 %    % Eosinophils 2 %    % Basophils 1 %    % Immature Granulocytes 0 %    NRBCs per 100 WBC 0 <1 /100    Absolute Neutrophils 4.8 1.6 - 8.3 10e3/uL    Absolute Lymphocytes 1.3 0.8 - 5.3 10e3/uL    Absolute Monocytes 0.6 0.0 - 1.3 10e3/uL    Absolute Eosinophils 0.2 0.0 - 0.7 10e3/uL    Absolute Basophils 0.1 0.0 - 0.2 10e3/uL    Absolute Immature Granulocytes 0.0 <=0.4 10e3/uL    Absolute NRBCs 0.0 10e3/uL   EKG 12-lead complete w/read - Clinics    Collection Time: 03/27/24 11:48 AM   Result Value Ref Range    Systolic Blood Pressure  mmHg    Diastolic Blood Pressure  mmHg    Ventricular Rate 72 BPM    Atrial Rate 72 BPM    NH Interval 150 ms    QRS Duration 92 ms     ms    QTc 466 ms    P Axis 23 degrees     R AXIS 34 degrees    T Axis 127 degrees    Interpretation ECG       Sinus rhythm  Possible Inferior infarct (cited on or before 11-OCT-2023)  Abnormal ECG  When compared with ECG of 09-NOV-2023 10:07,  T wave inversion no longer evident in Inferior leads  Confirmed by MD Colin, Jaxson ([id number]) on 3/27/2024 2:30:17 PM            EKG:  Sinus rhythm, possible inferior infarct age indeterminate, nonspecific T wave abnl, rate 72, .  No significant change from 8/9/22.             Signed Electronically by: KASSI PAIZ DO  Copy of this evaluation report is provided to requesting physician.

## 2024-03-27 ENCOUNTER — PATIENT OUTREACH (OUTPATIENT)
Dept: CARE COORDINATION | Facility: OTHER | Age: 64
End: 2024-03-27

## 2024-03-27 ENCOUNTER — OFFICE VISIT (OUTPATIENT)
Dept: FAMILY MEDICINE | Facility: OTHER | Age: 64
End: 2024-03-27
Attending: FAMILY MEDICINE
Payer: MEDICARE

## 2024-03-27 VITALS
BODY MASS INDEX: 29.49 KG/M2 | HEIGHT: 69 IN | RESPIRATION RATE: 20 BRPM | WEIGHT: 199.1 LBS | SYSTOLIC BLOOD PRESSURE: 133 MMHG | DIASTOLIC BLOOD PRESSURE: 87 MMHG | TEMPERATURE: 96.9 F | OXYGEN SATURATION: 97 % | HEART RATE: 92 BPM

## 2024-03-27 DIAGNOSIS — R07.9 EXERTIONAL CHEST PAIN: ICD-10-CM

## 2024-03-27 DIAGNOSIS — Z01.818 PREOPERATIVE EXAMINATION: Primary | ICD-10-CM

## 2024-03-27 DIAGNOSIS — N32.89 BLADDER MASS: ICD-10-CM

## 2024-03-27 DIAGNOSIS — I10 PRIMARY HYPERTENSION: ICD-10-CM

## 2024-03-27 DIAGNOSIS — I25.10 CORONARY ARTERY DISEASE INVOLVING NATIVE CORONARY ARTERY OF NATIVE HEART, UNSPECIFIED WHETHER ANGINA PRESENT: ICD-10-CM

## 2024-03-27 LAB
ALBUMIN SERPL BCG-MCNC: 3.9 G/DL (ref 3.5–5.2)
ALP SERPL-CCNC: 141 U/L (ref 40–150)
ALT SERPL W P-5'-P-CCNC: 22 U/L (ref 0–70)
ANION GAP SERPL CALCULATED.3IONS-SCNC: 10 MMOL/L (ref 7–15)
AST SERPL W P-5'-P-CCNC: 30 U/L (ref 0–45)
ATRIAL RATE - MUSE: 72 BPM
BASOPHILS # BLD AUTO: 0.1 10E3/UL (ref 0–0.2)
BASOPHILS NFR BLD AUTO: 1 %
BILIRUB SERPL-MCNC: 0.2 MG/DL
BUN SERPL-MCNC: 19.9 MG/DL (ref 8–23)
CALCIUM SERPL-MCNC: 9 MG/DL (ref 8.8–10.2)
CHLORIDE SERPL-SCNC: 104 MMOL/L (ref 98–107)
CREAT SERPL-MCNC: 1.06 MG/DL (ref 0.67–1.17)
DEPRECATED HCO3 PLAS-SCNC: 23 MMOL/L (ref 22–29)
DIASTOLIC BLOOD PRESSURE - MUSE: NORMAL MMHG
EGFRCR SERPLBLD CKD-EPI 2021: 80 ML/MIN/1.73M2
EOSINOPHIL # BLD AUTO: 0.2 10E3/UL (ref 0–0.7)
EOSINOPHIL NFR BLD AUTO: 2 %
ERYTHROCYTE [DISTWIDTH] IN BLOOD BY AUTOMATED COUNT: 12.2 % (ref 10–15)
GLUCOSE SERPL-MCNC: 104 MG/DL (ref 70–99)
HCT VFR BLD AUTO: 41.9 % (ref 40–53)
HGB BLD-MCNC: 14.4 G/DL (ref 13.3–17.7)
IMM GRANULOCYTES # BLD: 0 10E3/UL
IMM GRANULOCYTES NFR BLD: 0 %
INTERPRETATION ECG - MUSE: NORMAL
LYMPHOCYTES # BLD AUTO: 1.3 10E3/UL (ref 0.8–5.3)
LYMPHOCYTES NFR BLD AUTO: 18 %
MCH RBC QN AUTO: 32.3 PG (ref 26.5–33)
MCHC RBC AUTO-ENTMCNC: 34.4 G/DL (ref 31.5–36.5)
MCV RBC AUTO: 94 FL (ref 78–100)
MONOCYTES # BLD AUTO: 0.6 10E3/UL (ref 0–1.3)
MONOCYTES NFR BLD AUTO: 9 %
NEUTROPHILS # BLD AUTO: 4.8 10E3/UL (ref 1.6–8.3)
NEUTROPHILS NFR BLD AUTO: 69 %
NRBC # BLD AUTO: 0 10E3/UL
NRBC BLD AUTO-RTO: 0 /100
NT-PROBNP SERPL-MCNC: 417 PG/ML (ref 0–900)
P AXIS - MUSE: 23 DEGREES
PLATELET # BLD AUTO: 157 10E3/UL (ref 150–450)
POTASSIUM SERPL-SCNC: 4.1 MMOL/L (ref 3.4–5.3)
PR INTERVAL - MUSE: 150 MS
PROT SERPL-MCNC: 6.9 G/DL (ref 6.4–8.3)
QRS DURATION - MUSE: 92 MS
QT - MUSE: 426 MS
QTC - MUSE: 466 MS
R AXIS - MUSE: 34 DEGREES
RBC # BLD AUTO: 4.46 10E6/UL (ref 4.4–5.9)
SODIUM SERPL-SCNC: 137 MMOL/L (ref 135–145)
SYSTOLIC BLOOD PRESSURE - MUSE: NORMAL MMHG
T AXIS - MUSE: 127 DEGREES
VENTRICULAR RATE- MUSE: 72 BPM
WBC # BLD AUTO: 7 10E3/UL (ref 4–11)

## 2024-03-27 PROCEDURE — 83880 ASSAY OF NATRIURETIC PEPTIDE: CPT | Mod: ZL | Performed by: FAMILY MEDICINE

## 2024-03-27 PROCEDURE — 93010 ELECTROCARDIOGRAM REPORT: CPT | Mod: 77 | Performed by: INTERNAL MEDICINE

## 2024-03-27 PROCEDURE — 93005 ELECTROCARDIOGRAM TRACING: CPT | Performed by: FAMILY MEDICINE

## 2024-03-27 PROCEDURE — G0463 HOSPITAL OUTPT CLINIC VISIT: HCPCS

## 2024-03-27 PROCEDURE — 99214 OFFICE O/P EST MOD 30 MIN: CPT | Performed by: FAMILY MEDICINE

## 2024-03-27 PROCEDURE — 36415 COLL VENOUS BLD VENIPUNCTURE: CPT | Mod: ZL | Performed by: FAMILY MEDICINE

## 2024-03-27 PROCEDURE — 80053 COMPREHEN METABOLIC PANEL: CPT | Mod: ZL | Performed by: FAMILY MEDICINE

## 2024-03-27 PROCEDURE — 85025 COMPLETE CBC W/AUTO DIFF WBC: CPT | Mod: ZL | Performed by: FAMILY MEDICINE

## 2024-03-27 ASSESSMENT — ENCOUNTER SYMPTOMS
FEVER: 0
COUGH: 0
SHORTNESS OF BREATH: 0

## 2024-03-27 NOTE — Clinical Note
Just fyi - he is open to [place] for his heart and has used medical transportation. Will need 5-7 days to set up transportation. Thank you Alba Gama RN CC

## 2024-03-27 NOTE — PROGRESS NOTES
Clinic Care Coordination Contact  Care Team Conversations    RN CC Anika states she was able to move a patient and open spot at 1 pm.  She is scheduling patient. She has updated PCP.  RN CC called patient and he will be here 4/1 for telemed at 12:45 pm arrival.  Alba aGma, ALFREDO  Care Coordination

## 2024-03-27 NOTE — PROGRESS NOTES
Clinic Care Coordination Contact  Care Team Conversations    RN CC received request from PCP to reach out to patient.  Patient has bladder mass needing to be taken care of and was scheduled for 4/10.  Patient now needing to have heart looked at.  PCP wanting to see if patient has transportation available and if would be able to go to [place] as hoping to get in sooner.  RN CC called patient and talked with him and he has medical transportation and familiar with that.  States open to being seen in [place] to have heart looked at.  States that he is wanting after April 1 so he has money available to him and also birthday [birth year] and wanting to have that day as doesn't know how many more he will get.  Patient is thankful for the call.  We discussed RN CC will look to follow up as needed with him once PCP advises.  Alba Gama, ALFREDO CC

## 2024-03-27 NOTE — PROGRESS NOTES
Clinic Care Coordination Contact  Care Team Conversations    RN ELVIRA received request from PCP to please talk with ALFREDO Donaldson regarding April 1 Dr Lamas to see if patient could get scheduled this day to get Cath ordered.  RN CC called patient and patient is open to coming in for telemed if Dr. Lamas able to meet with patient.  ALFREDO Donaldson Cardio attempting to connect with Dr. Lamas Nurse to see if this could be possible.    ALFREDO Donaldson to please call patient to get him scheduled if able.  Thank you  Alba Gama RN CC

## 2024-03-29 ENCOUNTER — TELEPHONE (OUTPATIENT)
Dept: FAMILY MEDICINE | Facility: OTHER | Age: 64
End: 2024-03-29

## 2024-04-01 ENCOUNTER — VIRTUAL VISIT (OUTPATIENT)
Dept: CARDIOLOGY | Facility: OTHER | Age: 64
End: 2024-04-01
Attending: FAMILY MEDICINE
Payer: MEDICARE

## 2024-04-01 ENCOUNTER — VIRTUAL VISIT (OUTPATIENT)
Dept: CARDIOLOGY | Facility: OTHER | Age: 64
End: 2024-04-01
Attending: INTERNAL MEDICINE
Payer: MEDICARE

## 2024-04-01 ENCOUNTER — CARE COORDINATION (OUTPATIENT)
Dept: CARDIOLOGY | Facility: CLINIC | Age: 64
End: 2024-04-01
Payer: MEDICARE

## 2024-04-01 VITALS
RESPIRATION RATE: 16 BRPM | HEIGHT: 69 IN | WEIGHT: 200.4 LBS | OXYGEN SATURATION: 96 % | BODY MASS INDEX: 29.68 KG/M2 | DIASTOLIC BLOOD PRESSURE: 80 MMHG | HEART RATE: 93 BPM | TEMPERATURE: 97.7 F | SYSTOLIC BLOOD PRESSURE: 142 MMHG

## 2024-04-01 VITALS
TEMPERATURE: 97.7 F | SYSTOLIC BLOOD PRESSURE: 142 MMHG | WEIGHT: 200.4 LBS | HEART RATE: 93 BPM | BODY MASS INDEX: 29.68 KG/M2 | HEIGHT: 69 IN | OXYGEN SATURATION: 96 % | RESPIRATION RATE: 16 BRPM | DIASTOLIC BLOOD PRESSURE: 80 MMHG

## 2024-04-01 DIAGNOSIS — R06.00 DYSPNEA, UNSPECIFIED TYPE: Primary | ICD-10-CM

## 2024-04-01 DIAGNOSIS — R07.9 CHEST PAIN, UNSPECIFIED TYPE: Primary | ICD-10-CM

## 2024-04-01 DIAGNOSIS — I25.10 CAD (CORONARY ARTERY DISEASE): ICD-10-CM

## 2024-04-01 DIAGNOSIS — R07.9 CHEST PAIN, UNSPECIFIED TYPE: ICD-10-CM

## 2024-04-01 DIAGNOSIS — R09.02 HYPOXEMIA: ICD-10-CM

## 2024-04-01 DIAGNOSIS — I50.23 ACUTE ON CHRONIC SYSTOLIC HEART FAILURE (H): ICD-10-CM

## 2024-04-01 LAB
ATRIAL RATE - MUSE: 89 BPM
DIASTOLIC BLOOD PRESSURE - MUSE: NORMAL MMHG
INTERPRETATION ECG - MUSE: NORMAL
P AXIS - MUSE: 42 DEGREES
PR INTERVAL - MUSE: 146 MS
QRS DURATION - MUSE: 90 MS
QT - MUSE: 376 MS
QTC - MUSE: 457 MS
R AXIS - MUSE: 54 DEGREES
SYSTOLIC BLOOD PRESSURE - MUSE: NORMAL MMHG
T AXIS - MUSE: 225 DEGREES
VENTRICULAR RATE- MUSE: 89 BPM

## 2024-04-01 PROCEDURE — G0463 HOSPITAL OUTPT CLINIC VISIT: HCPCS | Mod: 25,GT

## 2024-04-01 PROCEDURE — 93005 ELECTROCARDIOGRAM TRACING: CPT | Mod: GT | Performed by: INTERNAL MEDICINE

## 2024-04-01 PROCEDURE — 93010 ELECTROCARDIOGRAM REPORT: CPT | Performed by: INTERNAL MEDICINE

## 2024-04-01 PROCEDURE — 93005 ELECTROCARDIOGRAM TRACING: CPT | Mod: GT

## 2024-04-01 RX ORDER — LIDOCAINE 40 MG/G
CREAM TOPICAL
Status: CANCELLED | OUTPATIENT
Start: 2024-04-01

## 2024-04-01 RX ORDER — ASPIRIN 81 MG/1
243 TABLET, CHEWABLE ORAL ONCE
Status: CANCELLED | OUTPATIENT
Start: 2024-04-01

## 2024-04-01 RX ORDER — POTASSIUM CHLORIDE 1500 MG/1
40 TABLET, EXTENDED RELEASE ORAL
Status: CANCELLED | OUTPATIENT
Start: 2024-04-01

## 2024-04-01 RX ORDER — ASPIRIN 325 MG
325 TABLET ORAL ONCE
Status: CANCELLED | OUTPATIENT
Start: 2024-04-01 | End: 2024-04-01

## 2024-04-01 RX ORDER — SODIUM CHLORIDE 9 MG/ML
INJECTION, SOLUTION INTRAVENOUS CONTINUOUS
Status: CANCELLED | OUTPATIENT
Start: 2024-04-01

## 2024-04-01 RX ORDER — POTASSIUM CHLORIDE 1500 MG/1
20 TABLET, EXTENDED RELEASE ORAL
Status: CANCELLED | OUTPATIENT
Start: 2024-04-01

## 2024-04-01 ASSESSMENT — PAIN SCALES - GENERAL
PAINLEVEL: MODERATE PAIN (4)
PAINLEVEL: MODERATE PAIN (4)

## 2024-04-01 NOTE — NURSING NOTE
"Chief Complaint   Patient presents with    Consult       Initial BP (!) 142/80 (BP Location: Right arm, Patient Position: Sitting, Cuff Size: Adult Regular)   Pulse 93   Temp 97.7  F (36.5  C) (Tympanic)   Resp 16   Ht 1.753 m (5' 9\")   Wt 90.9 kg (200 lb 6.4 oz)   SpO2 96%   BMI 29.59 kg/m   Estimated body mass index is 29.59 kg/m  as calculated from the following:    Height as of this encounter: 1.753 m (5' 9\").    Weight as of this encounter: 90.9 kg (200 lb 6.4 oz).  Medication Reconciliation: complete    Anne-Marie Navarro RN    Per Adams Hendrix, pt to have COR Angiogram at [facility]. Reviewed allergies, labs, and medications.     -Pt will continue on Aspirin 81 mg daily, including day of procedure.     -Pt will HOLD     Angiogram teaching done using the \"Coronary Angiogram, Angioplasty and Stent Placement\" patient guide provided by the [facility].  Packet given, instructions reviewed, questions answered.  Pt verbalizes understanding. Now scheduled for to be determined. .  Pt is agreeable to plan.        "

## 2024-04-01 NOTE — NURSING NOTE
"Chief Complaint   Patient presents with    Consult       Initial BP (!) 142/80 (BP Location: Right arm, Patient Position: Sitting, Cuff Size: Adult Regular)   Pulse 93   Temp 97.7  F (36.5  C) (Tympanic)   Resp 16   Ht 1.753 m (5' 9\")   Wt 90.9 kg (200 lb 6.4 oz)   SpO2 96%   BMI 29.59 kg/m   Estimated body mass index is 29.59 kg/m  as calculated from the following:    Height as of this encounter: 1.753 m (5' 9\").    Weight as of this encounter: 90.9 kg (200 lb 6.4 oz).  Medication Reconciliation: complete    Anne-Marie Navarro RN    "

## 2024-04-01 NOTE — PROGRESS NOTES
Date: 4/1/2024    Time of Call: 2:21 PM     Diagnosis:  CAD, Chest pain     [ VORB ] Ordering provider: Dr. Lamas  Order: Coronary angiogram     Order received by: Tristan Kaur RN     Follow-up/additional notes: Orders placed. Called and spoke with patient and instructions reviewed. Patient is scheduled for 4/4/24 arriving at 9:30 to the [facility] Waiting room.       Pre-procedure instructions - Coronary Angiogram  Patient Education    Your arrival time is 9:30 on 4/4/24.  Location is [address]  Please plan on being at the hospital all day.  At any time, emergencies and/or urgent cases may come up which could delay the start of your procedure.    Pre-procedure instructions - Coronary Angiogram  Shower in the evening before or the morning of the procedure  No solid food for 8 hours prior and nothing to drink 2 hours prior to arrival time  You can take your morning medications (except for diabetic and blood thinners) with sips of water.  Take 325 mg of Asprin 24 hours prior to the procedure and the morning of procedure.   You will need to arrange a ride to drop you off and pick you up, as you will be unable to drive home.  Prior to discharge you may be required to lay flat for approximately 2-4 hours in the recovery unit to ensure proper clotting of the artery. You will need a responsible adult to stay with you for 24 hours post-procedure.              Diabetic Medication Instructions  Hold oral diabetic medication in morning of your procedure and for 48 hours after IV contrast is given  Typical instructions for insulin diabetic medication holding are below. However, please reach out to your Primary Care Provider or Endocrinologist for specific instructions  DO NOT take any oral diabetic medication, short-acting diabetes medications/insulin, humalog or regular insulin the morning of your test  Take   dose of long-acting  insulin (Lantus, Levemir) the day of your test  Remember to bring your glucometer and insulin with you to take after your test if needed  GLP-1 Agonists Instructions  DO NOT take injectable GLP-1 agonists semaglutide (Ozempic, Wegovy), dulaglutide (Trulicity), exenatide ER (Bydureon), tirzepatide (Mounjaro), or oral semaglutide (Rybelsus) for 7 days prior your procedure  Hold once daily injectable GLP-1 agonists exenatide (Byetta), liraglutide (Saxenda, Victoza), lixisenatide (Soligua) the day before and day of your procedure                  Anticoagulation Medication Instructions   NA    You will need to follow up with one of our cardiology APPs 1-2 weeks after your procedure. If you need help scheduling or rescheduling your appointment, please call [phone]

## 2024-04-01 NOTE — PROGRESS NOTES
This is a telemedicine visit due to logistical issues related to travel.    April 1, 2024     Dear Colleagues,     I had the pleasure of seeing Rodney Goodwin  in the [facility] as a new patient visit.  He is a 64-year-old male with no known past cardiac history that he does not however know that he did have a nuclear medicine stress test in 2018 which showed fixed defect.  He was recently found to have a bladder mass on CT scan concerning for bladder tumor and was supposed to undergo cystoscopy however during anesthesia evaluation he was found to have recurrent episodes of chest pain and as such cardiology evaluation was urgently requested.  Today he tells me that he never had any symptoms concerning for heart attack in the past.  However notes that for the past couple of months he is having crescendo chest pain and he had significant chest pain for at least half an hour when he was shoveling snow recently.  Ultimately upon further questioning he tells me that he has been having this crescendo chest pains for quite some time now, he describes these as sharp chest pain going through the chest and the heart area associated with some numbness.  His exercise tolerance has been significantly limited due to shortness of breath and significant fatigue with exertion.  Now he is essentially unable to do much rather than almost being bedbound.  Notes that previously he was able to do a lot however this has changed significantly.  No other complaints dizziness lightheadedness or palpitations.  Does not take aspirin      PAST MEDICAL HISTORY:  Past Medical History:   Diagnosis Date    Acute exacerbation of chronic obstructive pulmonary disease (H) 10/28/2016    Anxiety     Backache 6/9/2008    Overview:  IMO Update 10/11    Bipolar 1 disorder (H) 11/11/2014    Bipolar affective disorder (H)     Cellulitis and abscess of forearm 4/4/2014    Chlordiazepoxide dependence (H) 8/9/2010    Overview:  IMO  Update 10/11    Chronic constipation     COPD exacerbation (H) 9/14/2015    Costochondritis 2/26/2015    Degeneration of lumbar or lumbosacral intervertebral disc 7/25/2006    Overview:  IMO Update 10/11    Depressive disorder     Drug abuse (H)     Elevated blood sugar 10/29/2016    Heroin abuse (H) 9/15/2015    Heroin addiction (H) 8/9/2010    Overview:  See social notes IMO Update 10/11    Hypertension     IV drug user 8/9/2010    Overview:  IMO Update 10/11    Lumbosacral spondylosis without myelopathy 11/13/2008    Narcotic addiction (H) 10/29/2016    Opioid use disorder, severe, dependence (H) 2020    CADT records; prior Methadone; [facility]; AA/NA    Osteoarthrosis, pelvic region and thigh 11/13/2008    Overview:  IMO Update 10/11    Penile lesion 4/27/2020    Suicidal behavior 4/27/2020    Venous insufficiency of both lower extremities 2/26/2015     Do you wish to do the replacement in the background? yes       FAMILY HISTORY:  Family History   Problem Relation Age of Onset    Diabetes Mother     Cerebrovascular Disease Father     Pancreatic Cancer Brother      SOCIAL HISTORY:  Social History     Socioeconomic History    Marital status:     Number of children: 3    Years of education: 12   Occupational History     Employer: [employer]   Tobacco Use    Smoking status: Every Day     Packs/day: 1     Types: Cigarettes     Start date: 1983    Smokeless tobacco: Never    Tobacco comments:     Tried to Quit (NO)   Vaping Use    Vaping Use: Never used   Substance and Sexual Activity    Alcohol use: No     Alcohol/week: 0.0 standard drinks of alcohol    Drug use: Not Currently     Types: IV, Methamphetamines, Opiates, Marijuana    Sexual activity: Not Currently     Social Determinants of Health     Financial Resource Strain: Low Risk  (2/2/2024)    Financial Resource Strain     Within the past 12 months, have you or your family members you live with been unable to get utilities (heat, electricity) when it  was really needed?: No   Food Insecurity: Low Risk  (2/2/2024)    Food Insecurity     Within the past 12 months, did you worry that your food would run out before you got money to buy more?: No     Within the past 12 months, did the food you bought just not last and you didn t have money to get more?: Patient declined   Transportation Needs: Low Risk  (2/2/2024)    Transportation Needs     Within the past 12 months, has lack of transportation kept you from medical appointments, getting your medicines, non-medical meetings or appointments, work, or from getting things that you need?: No   Interpersonal Safety: Low Risk  (10/11/2023)    Interpersonal Safety     Do you feel physically and emotionally safe where you currently live?: Yes     Within the past 12 months, have you been hit, slapped, kicked or otherwise physically hurt by someone?: No     Within the past 12 months, have you been humiliated or emotionally abused in other ways by your partner or ex-partner?: No   Housing Stability: Low Risk  (2/2/2024)    Housing Stability     Do you have housing? : Yes     Are you worried about losing your housing?: No     CURRENT MEDICATIONS:  Current Outpatient Medications   Medication    albuterol (PROAIR HFA/PROVENTIL HFA/VENTOLIN HFA) 108 (90 Base) MCG/ACT inhaler    ASPIRIN LOW DOSE 81 MG EC tablet    buPROPion (WELLBUTRIN SR) 150 MG 12 hr tablet    carvedilol (COREG) 12.5 MG tablet    ipratropium - albuterol 0.5 mg/2.5 mg/3 mL (DUONEB) 0.5-2.5 (3) MG/3ML neb solution    losartan (COZAAR) 100 MG tablet    omeprazole (PRILOSEC) 40 MG DR capsule    umeclidinium-vilanterol (ANORO ELLIPTA) 62.5-25 MCG/ACT oral inhaler     No current facility-administered medications for this visit.     EXAM:  General: appears comfortable, alert and oriented  Head: normocephalic, atraumatic  Eyes: anicteric sclera, EOMI , PERRL  Neck: no adenopathy  Orophyarynx: moist mucosa, no lesions noted  Heart: regular, S1/S2, no murmurs, rubs or  gallop. Estimated JVP at 5 cmH2O  Lungs: CTAB, No wheezing.   Abdomen: soft, non-tender, bowel sounds present, no hepatosplenomegaly  Extremities: No LE edema today  Skin: no open lesions noted  Neuro: grossly non-focal     Labs:  Lab Results   Component Value Date    WBC 7.0 03/27/2024    HGB 14.4 03/27/2024    HCT 41.9 03/27/2024     03/27/2024     03/27/2024    POTASSIUM 4.1 03/27/2024    CHLORIDE 104 03/27/2024    CO2 23 03/27/2024    BUN 19.9 03/27/2024    CR 1.06 03/27/2024     (H) 03/27/2024    SED 10 04/11/2015    DD 0.52 (H) 01/16/2023    DIMER 291 10/28/2016    NTBNPI 326 09/28/2019    NTBNP 417 03/27/2024    TROPI <0.015 09/28/2019    TROPN 0.04 01/20/2014    AST 30 03/27/2024    ALT 22 03/27/2024    GGT 41 09/13/2023    ALKPHOS 141 03/27/2024    BILITOTAL 0.2 03/27/2024    INR 1.07 01/16/2023     MPI stress 2/21/2018:  Fixed defect inferiorly suggesting right coronary artery infarction. No evidence of reversible ischemia.     Nuclear stress 11/2023:    The nuclear stress test is abnormal.     There is a medium sized area of infarction in the entire inferior  segment(s) of the left ventricle, similar in appearance to 2/21/2018.     Left ventricular function is low normal.     The left ventricular ejection fraction at rest is 53%.  The left ventricular ejection fraction at stress is 50%.     A prior study was conducted on 2/21/2018.     TTE 11/27/23:  Global and regional left ventricular function is normal with an EF of 55-60%. Grade I or early diastolic dysfunction.  Global right ventricular function is normal.  Both atria appear normal.  Trace mitral insufficiency is present. Trace aortic insufficiency is present.  No aortic stenosis is present.    ASSESSMENT AND PLAN:  In summary, patient is a 64 year old male with above past medical history which is minimal for cardiac with positive stress test back in 2018 as well as 2023 with a large fixed defect in the RCA territory on nuclear  medicine stress test no reversible ischemia who was referred for further cardiac evaluation in the setting of her recently found bladder mass requiring cystoscopy.  He has what described present to angina although the chest pain with sharp pain is a little bit atypical however it seems to be exertion related comes and goes with minimal activity.  This is certainly concerning for underlying coronary disease especially in the setting of positive nuclear medicine stress test.  Anesthesia will not do any procedures until further evaluation by cardiology.  We did have a long discussion today with regards to risk benefits of coronary angiogram and I do believe we should proceed with urgent angiogram.  This will not only help us recertify him from the coronary standpoint but also for anesthesia standpoint.  He will need this bladder mass further evaluated and potentially removed.  As such we are requesting urgent coronary angiogram given that he has minimal exercise tolerance at this time limited by chest pain primarily.  I am hoping we can do this this week.  All risk benefits were discussed including the 5 potential outcomes to this.  If we are putting in a stent we might need to consider bare-metal stent in the setting of upcoming cystoscopy and potential bladder surgery.  He will start taking a baby aspirin.  We will see him after the angiogram completed       I appreciate the opportunity to participate in the care of Rodney Goodwin . Please do not hesitate to contact me with any further questions.  I have spent a total of 60 minutes today reviewing labs, imaging studies, discussing with colleagues, face-to-face time with patient and documentation in the medical record.  The longitudinal plan of care for the diagnosis(es)/condition(s) as documented were addressed during this visit. Due to the added complexity in care, I will continue to support Rodney in the subsequent management and with ongoing continuity of  care.    Sincerely,   Margarito Lamas MD  [facility] Division of Cardiology

## 2024-04-02 ENCOUNTER — TELEPHONE (OUTPATIENT)
Dept: FAMILY MEDICINE | Facility: OTHER | Age: 64
End: 2024-04-02

## 2024-04-02 NOTE — TELEPHONE ENCOUNTER
4:48 PM    Reason for Call: Phone Call    Description: Pt called stating they are needing an angiogram done. Called to discuss why [facility] cannot do this appt and why the pt has to go to [place] to have this done.     Was an appointment offered for this call? No  If yes : Appointment type              Date    Preferred method for responding to this message: Telephone Call  What is your phone number ?    If we cannot reach you directly, may we leave a detailed response at the number you provided? Yes    Can this message wait until your PCP/provider returns, if available today? Not applicable    Melissa Souza

## 2024-04-03 ENCOUNTER — TELEPHONE (OUTPATIENT)
Dept: FAMILY MEDICINE | Facility: OTHER | Age: 64
End: 2024-04-03

## 2024-04-03 NOTE — TELEPHONE ENCOUNTER
11:37 AM    Reason for Call: Phone Call    Description: Mariia is calling from Dr. Knox office to see if he has been cleared for surgery for cardiology and if so can you call Mariia.  She wants to know if he needs anymore work up or cancel surgery.    Was an appointment offered for this call? No  If yes : Appointment type              Date    Preferred method for responding to this message: Telephone Call  What is your phone number ? Mariia [phone]    If we cannot reach you directly, may we leave a detailed response at the number you provided? Yes    Can this message wait until your PCP/provider returns, if available today? YES, No provider is in today    FERNIE RICE

## 2024-04-04 ENCOUNTER — APPOINTMENT (OUTPATIENT)
Dept: LAB | Facility: CLINIC | Age: 64
End: 2024-04-04
Attending: INTERNAL MEDICINE
Payer: MEDICARE

## 2024-04-04 ENCOUNTER — APPOINTMENT (OUTPATIENT)
Dept: MEDSURG UNIT | Facility: CLINIC | Age: 64
End: 2024-04-04
Attending: INTERNAL MEDICINE
Payer: MEDICARE

## 2024-04-04 ENCOUNTER — HOSPITAL ENCOUNTER (OUTPATIENT)
Facility: CLINIC | Age: 64
Discharge: HOME | End: 2024-04-04
Attending: INTERNAL MEDICINE | Admitting: INTERNAL MEDICINE
Payer: MEDICARE

## 2024-04-04 ENCOUNTER — CARE COORDINATION (OUTPATIENT)
Dept: CARDIOLOGY | Facility: CLINIC | Age: 64
End: 2024-04-04

## 2024-04-04 VITALS
BODY MASS INDEX: 28.67 KG/M2 | TEMPERATURE: 97.8 F | DIASTOLIC BLOOD PRESSURE: 90 MMHG | HEIGHT: 69 IN | OXYGEN SATURATION: 96 % | SYSTOLIC BLOOD PRESSURE: 126 MMHG | HEART RATE: 67 BPM | RESPIRATION RATE: 16 BRPM | WEIGHT: 193.56 LBS

## 2024-04-04 DIAGNOSIS — I50.23 ACUTE ON CHRONIC SYSTOLIC HEART FAILURE (H): ICD-10-CM

## 2024-04-04 DIAGNOSIS — I25.10 CAD (CORONARY ARTERY DISEASE): ICD-10-CM

## 2024-04-04 DIAGNOSIS — R07.9 CHEST PAIN, UNSPECIFIED TYPE: ICD-10-CM

## 2024-04-04 DIAGNOSIS — I25.10 CORONARY ARTERY DISEASE INVOLVING NATIVE CORONARY ARTERY OF NATIVE HEART WITHOUT ANGINA PECTORIS: Primary | ICD-10-CM

## 2024-04-04 PROBLEM — Z98.890 STATUS POST CORONARY ANGIOGRAM: Status: ACTIVE | Noted: 2024-04-04

## 2024-04-04 LAB
ACT BLD: 251 SECONDS (ref 74–150)
ANION GAP SERPL CALCULATED.3IONS-SCNC: 11 MMOL/L (ref 7–15)
APTT PPP: 28 SECONDS (ref 22–38)
BUN SERPL-MCNC: 26 MG/DL (ref 8–23)
CALCIUM SERPL-MCNC: 9 MG/DL (ref 8.8–10.2)
CHLORIDE SERPL-SCNC: 103 MMOL/L (ref 98–107)
CHOLEST SERPL-MCNC: 221 MG/DL
CREAT SERPL-MCNC: 1.09 MG/DL (ref 0.67–1.17)
DEPRECATED HCO3 PLAS-SCNC: 24 MMOL/L (ref 22–29)
EGFRCR SERPLBLD CKD-EPI 2021: 77 ML/MIN/1.73M2
ERYTHROCYTE [DISTWIDTH] IN BLOOD BY AUTOMATED COUNT: 12.5 % (ref 10–15)
GLUCOSE SERPL-MCNC: 106 MG/DL (ref 70–99)
HCT VFR BLD AUTO: 44.8 % (ref 40–53)
HDLC SERPL-MCNC: 38 MG/DL
HGB BLD-MCNC: 15.3 G/DL (ref 13.3–17.7)
INR PPP: 0.93 (ref 0.85–1.15)
LDLC SERPL CALC-MCNC: 135 MG/DL
MCH RBC QN AUTO: 32.6 PG (ref 26.5–33)
MCHC RBC AUTO-ENTMCNC: 34.2 G/DL (ref 31.5–36.5)
MCV RBC AUTO: 96 FL (ref 78–100)
NONHDLC SERPL-MCNC: 183 MG/DL
PLATELET # BLD AUTO: 180 10E3/UL (ref 150–450)
POTASSIUM SERPL-SCNC: 4.2 MMOL/L (ref 3.4–5.3)
RBC # BLD AUTO: 4.69 10E6/UL (ref 4.4–5.9)
SODIUM SERPL-SCNC: 138 MMOL/L (ref 135–145)
TRIGL SERPL-MCNC: 240 MG/DL
WBC # BLD AUTO: 8.1 10E3/UL (ref 4–11)

## 2024-04-04 PROCEDURE — 85610 PROTHROMBIN TIME: CPT | Performed by: INTERNAL MEDICINE

## 2024-04-04 PROCEDURE — 250N000009 HC RX 250: Performed by: INTERNAL MEDICINE

## 2024-04-04 PROCEDURE — 272N000001 HC OR GENERAL SUPPLY STERILE: Performed by: INTERNAL MEDICINE

## 2024-04-04 PROCEDURE — 85347 COAGULATION TIME ACTIVATED: CPT

## 2024-04-04 PROCEDURE — 999N000142 HC STATISTIC PROCEDURE PREP ONLY

## 2024-04-04 PROCEDURE — 250N000011 HC RX IP 250 OP 636: Performed by: INTERNAL MEDICINE

## 2024-04-04 PROCEDURE — 93454 CORONARY ARTERY ANGIO S&I: CPT | Mod: 26 | Performed by: INTERNAL MEDICINE

## 2024-04-04 PROCEDURE — 999N000054 HC STATISTIC EKG NON-CHARGEABLE

## 2024-04-04 PROCEDURE — 999N000134 HC STATISTIC PP CARE STAGE 3

## 2024-04-04 PROCEDURE — 36415 COLL VENOUS BLD VENIPUNCTURE: CPT | Performed by: INTERNAL MEDICINE

## 2024-04-04 PROCEDURE — 82465 ASSAY BLD/SERUM CHOLESTEROL: CPT | Performed by: NURSE PRACTITIONER

## 2024-04-04 PROCEDURE — 258N000003 HC RX IP 258 OP 636: Performed by: INTERNAL MEDICINE

## 2024-04-04 PROCEDURE — 80048 BASIC METABOLIC PNL TOTAL CA: CPT | Performed by: INTERNAL MEDICINE

## 2024-04-04 PROCEDURE — 99152 MOD SED SAME PHYS/QHP 5/>YRS: CPT | Performed by: INTERNAL MEDICINE

## 2024-04-04 PROCEDURE — 250N000013 HC RX MED GY IP 250 OP 250 PS 637: Performed by: INTERNAL MEDICINE

## 2024-04-04 PROCEDURE — 99152 MOD SED SAME PHYS/QHP 5/>YRS: CPT | Mod: GC | Performed by: INTERNAL MEDICINE

## 2024-04-04 PROCEDURE — C1894 INTRO/SHEATH, NON-LASER: HCPCS | Performed by: INTERNAL MEDICINE

## 2024-04-04 PROCEDURE — 93005 ELECTROCARDIOGRAM TRACING: CPT

## 2024-04-04 PROCEDURE — 85027 COMPLETE CBC AUTOMATED: CPT | Performed by: INTERNAL MEDICINE

## 2024-04-04 PROCEDURE — C1725 CATH, TRANSLUMIN NON-LASER: HCPCS | Performed by: INTERNAL MEDICINE

## 2024-04-04 PROCEDURE — C9600 PERC DRUG-EL COR STENT SING: HCPCS | Performed by: INTERNAL MEDICINE

## 2024-04-04 PROCEDURE — 93454 CORONARY ARTERY ANGIO S&I: CPT | Performed by: INTERNAL MEDICINE

## 2024-04-04 PROCEDURE — 999N000248 HC STATISTIC IV INSERT WITH US BY RN

## 2024-04-04 PROCEDURE — C1726 CATH, BAL DIL, NON-VASCULAR: HCPCS | Performed by: INTERNAL MEDICINE

## 2024-04-04 PROCEDURE — C1887 CATHETER, GUIDING: HCPCS | Performed by: INTERNAL MEDICINE

## 2024-04-04 PROCEDURE — 92928 PRQ TCAT PLMT NTRAC ST 1 LES: CPT | Mod: RC | Performed by: INTERNAL MEDICINE

## 2024-04-04 PROCEDURE — 85730 THROMBOPLASTIN TIME PARTIAL: CPT | Performed by: INTERNAL MEDICINE

## 2024-04-04 PROCEDURE — 99153 MOD SED SAME PHYS/QHP EA: CPT | Performed by: INTERNAL MEDICINE

## 2024-04-04 PROCEDURE — C1874 STENT, COATED/COV W/DEL SYS: HCPCS | Performed by: INTERNAL MEDICINE

## 2024-04-04 PROCEDURE — C1769 GUIDE WIRE: HCPCS | Performed by: INTERNAL MEDICINE

## 2024-04-04 DEVICE — STENT CORONARY DES SYNERGY XD MR US 2.50X24MM H7493941824250: Type: IMPLANTABLE DEVICE | Status: FUNCTIONAL

## 2024-04-04 RX ORDER — OXYCODONE HYDROCHLORIDE 5 MG/1
5 TABLET ORAL EVERY 4 HOURS PRN
Status: DISCONTINUED | OUTPATIENT
Start: 2024-04-04 | End: 2024-04-04 | Stop reason: HOSPADM

## 2024-04-04 RX ORDER — ROSUVASTATIN CALCIUM 40 MG/1
40 TABLET, COATED ORAL DAILY
Status: DISCONTINUED | OUTPATIENT
Start: 2024-04-04 | End: 2024-04-04 | Stop reason: HOSPADM

## 2024-04-04 RX ORDER — FENTANYL CITRATE 50 UG/ML
INJECTION, SOLUTION INTRAMUSCULAR; INTRAVENOUS
Status: DISCONTINUED | OUTPATIENT
Start: 2024-04-04 | End: 2024-04-04 | Stop reason: HOSPADM

## 2024-04-04 RX ORDER — ASPIRIN 81 MG/1
81 TABLET, CHEWABLE ORAL DAILY
Qty: 30 TABLET | Refills: 3 | Status: SHIPPED | OUTPATIENT
Start: 2024-04-04 | End: 2024-05-07

## 2024-04-04 RX ORDER — LIDOCAINE 40 MG/G
CREAM TOPICAL
Status: DISCONTINUED | OUTPATIENT
Start: 2024-04-04 | End: 2024-04-04 | Stop reason: HOSPADM

## 2024-04-04 RX ORDER — ASPIRIN 81 MG/1
81 TABLET, CHEWABLE ORAL ONCE
Status: DISCONTINUED | OUTPATIENT
Start: 2024-04-04 | End: 2024-04-04 | Stop reason: HOSPADM

## 2024-04-04 RX ORDER — ASPIRIN 81 MG/1
81 TABLET ORAL DAILY
Status: DISCONTINUED | OUTPATIENT
Start: 2024-04-05 | End: 2024-04-04 | Stop reason: HOSPADM

## 2024-04-04 RX ORDER — NITROGLYCERIN 0.4 MG/1
0.4 TABLET SUBLINGUAL EVERY 5 MIN PRN
Status: DISCONTINUED | OUTPATIENT
Start: 2024-04-04 | End: 2024-04-04 | Stop reason: HOSPADM

## 2024-04-04 RX ORDER — ASPIRIN 81 MG/1
243 TABLET, CHEWABLE ORAL ONCE
Status: COMPLETED | OUTPATIENT
Start: 2024-04-04 | End: 2024-04-04

## 2024-04-04 RX ORDER — ONDANSETRON 2 MG/ML
4 INJECTION INTRAMUSCULAR; INTRAVENOUS EVERY 6 HOURS PRN
Status: DISCONTINUED | OUTPATIENT
Start: 2024-04-04 | End: 2024-04-04 | Stop reason: HOSPADM

## 2024-04-04 RX ORDER — ONDANSETRON 4 MG/1
4 TABLET, ORALLY DISINTEGRATING ORAL EVERY 6 HOURS PRN
Status: DISCONTINUED | OUTPATIENT
Start: 2024-04-04 | End: 2024-04-04 | Stop reason: HOSPADM

## 2024-04-04 RX ORDER — ACETAMINOPHEN 325 MG/1
650 TABLET ORAL EVERY 4 HOURS PRN
Status: DISCONTINUED | OUTPATIENT
Start: 2024-04-04 | End: 2024-04-04 | Stop reason: HOSPADM

## 2024-04-04 RX ORDER — ASPIRIN 325 MG
325 TABLET ORAL ONCE
Status: COMPLETED | OUTPATIENT
Start: 2024-04-04 | End: 2024-04-04

## 2024-04-04 RX ORDER — OXYCODONE HYDROCHLORIDE 5 MG/1
10 TABLET ORAL EVERY 4 HOURS PRN
Status: DISCONTINUED | OUTPATIENT
Start: 2024-04-04 | End: 2024-04-04 | Stop reason: HOSPADM

## 2024-04-04 RX ORDER — METOPROLOL TARTRATE 1 MG/ML
5 INJECTION, SOLUTION INTRAVENOUS
Status: DISCONTINUED | OUTPATIENT
Start: 2024-04-04 | End: 2024-04-04 | Stop reason: HOSPADM

## 2024-04-04 RX ORDER — ROSUVASTATIN CALCIUM 40 MG/1
40 TABLET, COATED ORAL DAILY
Qty: 30 TABLET | Refills: 3 | Status: SHIPPED | OUTPATIENT
Start: 2024-04-04 | End: 2024-05-07

## 2024-04-04 RX ORDER — POTASSIUM CHLORIDE 750 MG/1
40 TABLET, EXTENDED RELEASE ORAL
Status: DISCONTINUED | OUTPATIENT
Start: 2024-04-04 | End: 2024-04-04 | Stop reason: HOSPADM

## 2024-04-04 RX ORDER — SODIUM CHLORIDE 9 MG/ML
INJECTION, SOLUTION INTRAVENOUS CONTINUOUS
Status: SHIPPED | OUTPATIENT
Start: 2024-04-04 | End: 2024-04-04

## 2024-04-04 RX ORDER — HEPARIN SODIUM 1000 [USP'U]/ML
INJECTION, SOLUTION INTRAVENOUS; SUBCUTANEOUS
Status: DISCONTINUED | OUTPATIENT
Start: 2024-04-04 | End: 2024-04-04 | Stop reason: HOSPADM

## 2024-04-04 RX ORDER — NITROGLYCERIN 5 MG/ML
VIAL (ML) INTRAVENOUS
Status: DISCONTINUED | OUTPATIENT
Start: 2024-04-04 | End: 2024-04-04 | Stop reason: HOSPADM

## 2024-04-04 RX ORDER — HYDRALAZINE HYDROCHLORIDE 20 MG/ML
10 INJECTION INTRAMUSCULAR; INTRAVENOUS EVERY 4 HOURS PRN
Status: DISCONTINUED | OUTPATIENT
Start: 2024-04-04 | End: 2024-04-04 | Stop reason: HOSPADM

## 2024-04-04 RX ORDER — POTASSIUM CHLORIDE 750 MG/1
20 TABLET, EXTENDED RELEASE ORAL
Status: DISCONTINUED | OUTPATIENT
Start: 2024-04-04 | End: 2024-04-04 | Stop reason: HOSPADM

## 2024-04-04 RX ORDER — NICARDIPINE HYDROCHLORIDE 2.5 MG/ML
INJECTION INTRAVENOUS
Status: DISCONTINUED | OUTPATIENT
Start: 2024-04-04 | End: 2024-04-04 | Stop reason: HOSPADM

## 2024-04-04 RX ORDER — SODIUM CHLORIDE 9 MG/ML
INJECTION, SOLUTION INTRAVENOUS CONTINUOUS
Status: DISCONTINUED | OUTPATIENT
Start: 2024-04-04 | End: 2024-04-04 | Stop reason: HOSPADM

## 2024-04-04 RX ADMIN — SODIUM CHLORIDE: 9 INJECTION, SOLUTION INTRAVENOUS at 10:08

## 2024-04-04 ASSESSMENT — ACTIVITIES OF DAILY LIVING (ADL)
ADLS_ACUITY_SCORE: 35

## 2024-04-04 NOTE — PROGRESS NOTES
D/I/A: Pt roomed on 2A in bay 10.  Arrived via litter and accompanied by CCL RN On/Off: Off monitor.  VSSA.  Rhythm upon arrival SR on monitor.  Denies pain or sob.  Reviewed activity restrictions and when to notify RN, ie-changes to breathing or increased chest pressure or chest pain.  CCL access:  Right radial-TR band in place with 13 ml of air-start deflation at 1400.  P: Continue to monitor status.  Discharge to home once meeting criteria.

## 2024-04-04 NOTE — PROGRESS NOTES
Clinic Care Coordination Contact  Care Team Conversations    RN CC reviewed chart and patient met with Dr. Lamas and is at [place] today for cardiac cath with friend Mary. Will look to follow up as needed as patient has bladder mass and will see if navigating well or needing assistance.  Alba Gama, ALFREDO  Care Coordination

## 2024-04-04 NOTE — Clinical Note
dry, intact, no bleeding and no hematoma. TR band on right radial wrist with 13 ml air noted and site intact

## 2024-04-04 NOTE — Clinical Note
Stent deployed in the proximal right coronary artery. Max pressure = 11 oscar. Total duration = 10 seconds.

## 2024-04-04 NOTE — DISCHARGE INSTRUCTIONS
Going Home after an Coronary Angiogram or Stent Placement (Cardiac)  ______________________________________________      After you go home:  Have an adult stay with you for 24 hours.  Drink plenty of fluids.  You may eat your normal diet, unless your doctor tells you otherwise.  For 24 hours:  Relax and take it easy.  Do NOT smoke.  Do NOT make any important or legal decisions.  Do NOT drive or operate machines at home or at work.  Do NOT drink alcohol.  Remove the Band-Aid after 24 hours. If there is minor oozing, apply another Band-aid and remove it after 12 hours.  For 2 days, do NOT have sex or do any heavy exercise.  Do NOT take a bath, or use a hot tub or pool for at least 3 days. You may shower.    Care of wrist or arm site  It is normal to have soreness at the puncture site and mild tingling in your hand for up to 3 days.  For 2 days, do not use your hand or arm to support your weight (such as rising from a chair) or bend your wrist (such as lifting a garage door).  For 2 days, do not lift more than 5 pounds or exercise your arm (tennis, golf or bowling).    If you start bleeding from the site in your arm:  Sit down and press firmly on the site with your fingers for 10 minutes. Call your doctor as soon as you can.  If the bleeding stops, sit still and keep your wrist straight for 2 hours.    Medicines  If you have started taking Plavix or Effient, do not stop taking it until you talk to your heart doctor (cardiologist).  If you are on metformin (Glucophage), do not restart it until you have blood tests (within 2 to 3 days after discharge). When your doctor tells you it is safe, you may restart the metformin.  If you have stopped any other medicines, check with your nurse or provider about when to restart them.    Call 911 right away if you have bleeding that is heavy or does not stop.    Call your doctor if:  You have a large or growing hard lump around the site.  The site is red, swollen, hot or  tender.  Blood or fluid is draining from the site.  You have chills or a fever greater than 101 F (38 C).  Your leg or arm feels numb or cool.  You have hives, a rash or unusual itching.      [facility] [place]:  [phone] (7 days a week)

## 2024-04-04 NOTE — PRE-PROCEDURE
GENERAL PRE-PROCEDURE:   Date/Time:  4/4/2024 10:00 AM    Verbal consent obtained?: Yes    Written consent obtained?: Yes    Risks and benefits: Risks, benefits and alternatives were discussed    DC Plan: Appropriate discharge home plan in place for patients who are going home after procedure   Consent given by:  Patient  Patient states understanding of procedure being performed: Yes    Patient's understanding of procedure matches consent: Yes    Procedure consent matches procedure scheduled: Yes    Expected level of sedation:  Moderate  Appropriately NPO:  Yes  ASA Class:  2  Mallampati  :  Grade 1- soft palate, uvula, tonsillar pillars, and posterior pharyngeal wall visible  Lungs:  Lungs clear with good breath sounds bilaterally  Heart:  Normal heart sounds and rate  History & Physical reviewed:  History and physical reviewed and no updates needed  Statement of review:  I have reviewed the lab findings, diagnostic data, medications, and the plan for sedation

## 2024-04-04 NOTE — Clinical Note
The first balloon was inserted into the right coronary artery and proximal right coronary artery.Max pressure = 12 oscar. Total duration = 10 seconds.     Max pressure = 12 oscar. Total duration = 10 seconds.    Balloon reinflated a second time: Max pressure = 12 oscar. Total duration = 10 seconds.

## 2024-04-04 NOTE — PROGRESS NOTES
D/I/A: Pt tolerated post coronary angiogram recovery well. Patient tolerated oral intake and foods. Ambulated without any difficulty,  urinating, right wrist puncture sites are stable with no bleeding and no hematoma. Primapore in place. Pulses present. Patient has a . A+O x4 and making needs known. CMS intact.  VSSA.  Sinus rhythm on monitor.  IV access removed.  Education completed and outlined in AVS, medications reviewed with patient and picked up from pharmacy. Discharge instructions reviewed with patient and friend. Pt verbalized understanding. Copy given to patient. Questions answered prior to discharge.  Belongings returned to patient at discharge.      P: Pt's friend Mary transported patient home.  Patient to follow up with appts as per discharge instruction.

## 2024-04-04 NOTE — PROGRESS NOTES
Patient was recently seen for a cardiac risk assessment prior to upcoming procedure. Coronary angiogram ordered by provider. Patient is having the coronary angiogram today. Spoke with Dr. Lamas who will review the results and add an addendum to his note. Called and left message with this information to Mariia from Dr. Knox's office. Contact number: [phone]

## 2024-04-04 NOTE — PROGRESS NOTES
Pt prepped for CORS w/possible PCI. Pt alert and oriented. Pt states he is very sleepy this morning though. VSS. Sats >92% on RA. Aspirin 325 mg taken this morning. Radial pulses present. Bilateral groin sites prepped, pulses present and marked. NaCl infusing @ 150 ml/hr. EKG completed. Possible infarct noted on EKG. NP Santos notified and aware, no interventions needed.  Labs resulted. Consent signed. Pt's friend Mary at bedside.

## 2024-04-05 ENCOUNTER — PATIENT OUTREACH (OUTPATIENT)
Dept: CARE COORDINATION | Facility: OTHER | Age: 64
End: 2024-04-05

## 2024-04-05 RX ORDER — IOPAMIDOL 755 MG/ML
INJECTION, SOLUTION INTRAVASCULAR
Status: SHIPPED | OUTPATIENT
Start: 2024-04-04

## 2024-04-05 NOTE — PROGRESS NOTES
Clinic Care Coordination Contact  Care Team Conversations    RN CC received message that patient at  to talk to RN CC.  RN CC talked with patient and let him know Urology was alerted of procedure and new medication.  They will be reaching out to patient to let him know if needing to be rescheduled.  Patient anxious about wanting procedure completed.  We discussed up to Urology at this time as new medication needing 30 days uninterrupted.  Education provided regarding medication.  Patient verbalized understanding. Listening support provided. Will wait to see what Urology states.  Alba Gama RN  Care Coordination

## 2024-04-05 NOTE — PROGRESS NOTES
Clinic Care Coordination Contact  Care Team Conversations    Patient had Cardiac Cath completed with notes:  Conclusion         Prox RCA lesion is 80% stenosed.    Dist RCA lesion is 40% stenosed.    RPDA lesion is 50% stenosed.    1st Mrg lesion is 50% stenosed.    Mid LAD lesion is 50% stenosed.    2nd Diag lesion is 50% stenosed.     Severe stenosis of the RCA s/p successful BUDDY stenting with 2.5x24mm synergy XD  Moderate stenosis of OM2 and LAD at D2 bifurcation  Uncomplicated R Radial access  Continue DAPT ideally for 90 days uninterrupted, followed by aspirin 81mg daily lifelong. If interruption of DAPT is required for cystoscopy and biopsy, please continue DAPT uninterrupted for a minimum of 30 days, followed by aspiring 81mg daily lifelong.          Plan     Follow bedrest per protocol   Continued medical management and lifestyle modifications for cardiovascular risk factor optimizations.   Follow up with Dr. Lamas.   Arterial sheath removed from radial artery with TR band placement.   Discharge today per protocol         Continuation of dual antiplatelet therapy for 3 months   Post antiplatelet therapy of   Aspirin; give 81 mg qd .      Ticagrelor; give 180 mg now and 90 mg BID.   Continue high dose statin therapy  Continue DAPT ideally for 90 days uninterrupted, followed by aspirin 81mg daily lifelong.   If interruption of DAPT is required for cystoscopy and biopsy, please continue DAPT uninterrupted for a minimum of 30 days, followed by aspiring 81mg daily lifelong.     Recommendations    General Recommendations:  - Patient given specific instructions regarding care of arteriotomy site, activity restrictions, signs and symptoms of cardiac or vascular complications and to seek immediate medical evaluation should they occur.   - Recommended follow up with doctor Dr. Lamas.   - Arterial sheath removed from radial artery with TR Band placement.       Medications:  - Continue high dose statin therapy  indefinitely.   - Risk factor management for atherosclerosis.   - Recommend statin, ARB, beta-blocker and anti-platelet therapy for hyperlipidemia, hypertension, angina and CAD.        Plan  -Note to PCP to see if he would like patient to follow up and when.    Patient still scheduled for 4/10 cystoscopy. Talked with PCP regarding patient and will need to be seen again for new preop prior to Urology cystoscopy and biopsy.  -RN CC attempted to call patient x 2 and no answer and mailbox full.  -RN CC called Urology [facility] and had message sent back of recent cardiac cath and patient placed on new medication and message above stating DAPT needs to be on uninterrupted for 30 days.  Let them know to please call to reschedule patient if needed and call RN CC if any questions.  -Will wait to see that patient scheduled for follow up with Dr. Lamas.  Alba Gama, ALFREDO  Care Coordination

## 2024-04-05 NOTE — Clinical Note
If you could please help coordinate with Dr. Lamas office that would be appreciated.  Patient was supposed to be set up for follow up after stent placement.  Also has upcoming procedure please see my note. Thank you Alba Gama RN CC

## 2024-04-06 LAB
ATRIAL RATE - MUSE: 70 BPM
DIASTOLIC BLOOD PRESSURE - MUSE: NORMAL MMHG
INTERPRETATION ECG - MUSE: NORMAL
P AXIS - MUSE: 51 DEGREES
PR INTERVAL - MUSE: 160 MS
QRS DURATION - MUSE: 90 MS
QT - MUSE: 434 MS
QTC - MUSE: 468 MS
R AXIS - MUSE: 50 DEGREES
SYSTOLIC BLOOD PRESSURE - MUSE: NORMAL MMHG
T AXIS - MUSE: 155 DEGREES
VENTRICULAR RATE- MUSE: 70 BPM

## 2024-04-08 ENCOUNTER — TELEPHONE (OUTPATIENT)
Dept: CARDIOLOGY | Facility: CLINIC | Age: 64
End: 2024-04-08
Payer: MEDICARE

## 2024-04-08 DIAGNOSIS — I25.10 CAD S/P PERCUTANEOUS CORONARY ANGIOPLASTY: Primary | ICD-10-CM

## 2024-04-08 DIAGNOSIS — Z98.61 CAD S/P PERCUTANEOUS CORONARY ANGIOPLASTY: Primary | ICD-10-CM

## 2024-04-08 LAB
ATRIAL RATE - MUSE: 70 BPM
DIASTOLIC BLOOD PRESSURE - MUSE: NORMAL MMHG
INTERPRETATION ECG - MUSE: NORMAL
P AXIS - MUSE: 34 DEGREES
PR INTERVAL - MUSE: 152 MS
QRS DURATION - MUSE: 90 MS
QT - MUSE: 424 MS
QTC - MUSE: 457 MS
R AXIS - MUSE: 33 DEGREES
SYSTOLIC BLOOD PRESSURE - MUSE: NORMAL MMHG
T AXIS - MUSE: 127 DEGREES
VENTRICULAR RATE- MUSE: 70 BPM

## 2024-04-08 NOTE — PROGRESS NOTES
Clinic Care Coordination Contact  Care Team Conversations    RN CC received a call from Katie [facility] [phone] regarding patient.  She states that she is going to verify and get back to me but usually needs to be off Aspirin 7-10 days and Brilinta 4 days prior to procedure.  She states if there is a change she will call RN ELVIRA prior to 9 am.  She states she is looking to get patient rescheduled for 5/15.  He will need preop between May 6-8 and they are faxing us a form.  RN CC sent message to [place] to please get patient scheduled for preop between May 6-8.  RN CC let her know that patient is anxious to get this taken care of.  She will call him now to schedule.  Katie called back and patient scheduled for 5/15 and would like there by 7 am as I believe he uses medical transportation.  She states Dr would like him off Aspirin for 7 days and Brilinta for 4 days before but states he isn't sure Cardio will approve that so ok to stay on Aspirin but needs to be off Brilinta 4 days prior.  Note to Cardio as not seeing follow up set up with Dr. Lamas and want to run above message by him. Sent to ALFREDO [facility].  Alba Gama, ALFREDO  Care Coordination

## 2024-04-08 NOTE — TELEPHONE ENCOUNTER
Patient had a recent coronary angiogram with PCI. Called and spoke with patient who states he feels well. There is some bruising at the insertion site on his right wrist and it is feeling better today. Reviewed his medications, all questions answered to patient's satisfaction.

## 2024-04-10 ENCOUNTER — TELEPHONE (OUTPATIENT)
Dept: CARE COORDINATION | Facility: OTHER | Age: 64
End: 2024-04-10

## 2024-04-10 NOTE — TELEPHONE ENCOUNTER
Lanette, Alba, RN sent to Anne-Marie Navarro RN  If you could please help coordinate with Dr. Lamas office that would be appreciated.  Patient was supposed to be set up for follow up after stent placement.  Also has upcoming procedure please see my note.  Thank you  Alba Gama RN CC

## 2024-04-10 NOTE — TELEPHONE ENCOUNTER
Outreach to patient.   Patient scheduled to see  Dr. Lamas on 6/3/2024 @ 9:30 with an arrival time of 9:15 in the [facility] clinic.  Patient agreed and verbalized understanding.

## 2024-04-14 DIAGNOSIS — I10 PRIMARY HYPERTENSION: ICD-10-CM

## 2024-04-15 RX ORDER — CARVEDILOL 12.5 MG/1
12.5 TABLET ORAL 2 TIMES DAILY WITH MEALS
Qty: 60 TABLET | Refills: 0 | Status: SHIPPED | OUTPATIENT
Start: 2024-04-15 | End: 2024-05-07

## 2024-04-15 NOTE — TELEPHONE ENCOUNTER
Carvedilol 12.5 MG Oral Tablet       Last Written Prescription Date:  2/2/2024  Last Fill Quantity: 60,   # refills: 0  Last Office Visit: 3/27/2024  Future Office visit:    Next 5 appointments (look out 90 days)      May 07, 2024  8:00 AM  (Arrive by 7:45 AM)  Pre-Operative Physical with Abdifatah Cline DO  [facility] ([facility] ) [address]  [facility] [address]  [phone]     Jun 03, 2024  9:30 AM  (Arrive by 9:15 AM)  Return Visit with Margarito Lamas MD  [facility] ([facility] ) [address]  [facility] [address]  [phone]             Routing refill request to provider for review/approval because:    Beta-Blockers Protocol [id number]/14/2024 01:23 PM   Protocol Details Blood pressure under 140/90 in past 12 months        Kimberly Boecker, RN  '

## 2024-04-18 ENCOUNTER — HOSPITAL ENCOUNTER (OUTPATIENT)
Dept: CARDIAC REHAB | Facility: HOSPITAL | Age: 64
Discharge: HOME | End: 2024-04-18
Attending: INTERNAL MEDICINE
Payer: MEDICARE

## 2024-04-18 DIAGNOSIS — Z98.61 CAD S/P PERCUTANEOUS CORONARY ANGIOPLASTY: ICD-10-CM

## 2024-04-18 DIAGNOSIS — I25.10 CAD S/P PERCUTANEOUS CORONARY ANGIOPLASTY: ICD-10-CM

## 2024-04-18 PROCEDURE — 93798 PHYS/QHP OP CAR RHAB W/ECG: CPT

## 2024-04-18 PROCEDURE — 999N000108 HC STATISTIC OP CARDIAC VISIT #2

## 2024-04-18 PROCEDURE — 999N000109 HC STATISTIC OP CR VISIT

## 2024-04-18 PROCEDURE — 93797 PHYS/QHP OP CAR RHAB WO ECG: CPT

## 2024-04-22 ENCOUNTER — HOSPITAL ENCOUNTER (OUTPATIENT)
Dept: CARDIAC REHAB | Facility: HOSPITAL | Age: 64
Discharge: HOME | End: 2024-04-22
Attending: FAMILY MEDICINE
Payer: MEDICARE

## 2024-04-22 PROCEDURE — 93798 PHYS/QHP OP CAR RHAB W/ECG: CPT

## 2024-04-22 PROCEDURE — 999N000109 HC STATISTIC OP CR VISIT

## 2024-04-24 ENCOUNTER — HOSPITAL ENCOUNTER (OUTPATIENT)
Dept: CARDIAC REHAB | Facility: HOSPITAL | Age: 64
Discharge: HOME | End: 2024-04-24
Attending: FAMILY MEDICINE
Payer: MEDICARE

## 2024-04-24 PROCEDURE — 93798 PHYS/QHP OP CAR RHAB W/ECG: CPT

## 2024-04-24 PROCEDURE — 999N000109 HC STATISTIC OP CR VISIT

## 2024-04-29 ENCOUNTER — HOSPITAL ENCOUNTER (OUTPATIENT)
Dept: CARDIAC REHAB | Facility: HOSPITAL | Age: 64
Discharge: HOME | End: 2024-04-29
Attending: FAMILY MEDICINE
Payer: MEDICARE

## 2024-04-29 PROCEDURE — 999N000109 HC STATISTIC OP CR VISIT

## 2024-04-29 PROCEDURE — 93798 PHYS/QHP OP CAR RHAB W/ECG: CPT

## 2024-05-01 ENCOUNTER — PATIENT OUTREACH (OUTPATIENT)
Dept: CARE COORDINATION | Facility: OTHER | Age: 64
End: 2024-05-01

## 2024-05-01 ENCOUNTER — TELEPHONE (OUTPATIENT)
Dept: CARDIAC REHAB | Facility: HOSPITAL | Age: 64
End: 2024-05-01

## 2024-05-01 NOTE — TELEPHONE ENCOUNTER
5/1: Pt called rehab staff this AM to cancel appointment due to coughing up blood. Staff offered to call ambulance to which pt declined, stating he will call his primary. Pt states BP and SpO2 were stable this AM. Other symptoms include sweating and stomach pain, which pt attributed to his bladder tumor. Staff has contacted RNCC who has been made aware of situation.

## 2024-05-01 NOTE — PROGRESS NOTES
Clinic Care Coordination Contact  Care Team Conversations    RN CC received call from [facility], Cardiac Rehab.  She states that patient called to cancel his appointment today as states stomach pain and coughing up some blood.  She stated she offered ambulance and patient declined.  Please see her note.  She called RN CC to please reach out to patient.  She reports his vitals have been within normal limits but reports he was a bit sweaty at last visit.  RN CC called patient and talked with him.  Patient states that he has been waiting patiently to get his bladder tumor out and has continued to fail the preop.  States that since having stent placed he has felt a lot better.  States his stomach pain prior to stents was at a 8/9 and now at a 5.  States that he has more blood flowing and feels better.  He states in the morning he has a little bit of blood that he coughs up and also at night but nothing during the day.  He states this blood is red in color.  He refuses to be seen sooner by PCP or to go into ER.  He states he wants to just rest and wait for his upcoming preop visit.  He states he has no complaints and very thankful for all the care and concern he receives.  He states he just wanted to rest today so he is sure he can pass the preop and get his surgery.  He states he understands why he didn't get approved at other preop's and glad that he has got his heart taken care of.  He again refuses sooner preop and states no concerns at this time.  He is aware of his preop visit on 5/7 and his upcoming procedure date.  He states he has a lot of support at home and thankful for this as well.  Alba Gama RN  Care Coordination

## 2024-05-06 DIAGNOSIS — A04.8 H. PYLORI INFECTION: ICD-10-CM

## 2024-05-07 ENCOUNTER — OFFICE VISIT (OUTPATIENT)
Dept: FAMILY MEDICINE | Facility: OTHER | Age: 64
End: 2024-05-07
Attending: FAMILY MEDICINE
Payer: MEDICARE

## 2024-05-07 VITALS
DIASTOLIC BLOOD PRESSURE: 80 MMHG | HEART RATE: 93 BPM | OXYGEN SATURATION: 98 % | RESPIRATION RATE: 18 BRPM | TEMPERATURE: 97.7 F | WEIGHT: 187.4 LBS | BODY MASS INDEX: 27.67 KG/M2 | SYSTOLIC BLOOD PRESSURE: 118 MMHG

## 2024-05-07 DIAGNOSIS — L03.213 PERIORBITAL CELLULITIS OF RIGHT EYE: ICD-10-CM

## 2024-05-07 DIAGNOSIS — Z01.818 PREOPERATIVE EXAMINATION: Primary | ICD-10-CM

## 2024-05-07 DIAGNOSIS — I10 PRIMARY HYPERTENSION: ICD-10-CM

## 2024-05-07 DIAGNOSIS — E78.5 HYPERLIPIDEMIA LDL GOAL <70: ICD-10-CM

## 2024-05-07 DIAGNOSIS — H57.11 DISCOMFORT OF RIGHT EYE: ICD-10-CM

## 2024-05-07 DIAGNOSIS — N32.89 BLADDER MASS: ICD-10-CM

## 2024-05-07 DIAGNOSIS — I25.10 CORONARY ARTERY DISEASE INVOLVING NATIVE CORONARY ARTERY OF NATIVE HEART WITHOUT ANGINA PECTORIS: ICD-10-CM

## 2024-05-07 LAB
ALBUMIN SERPL BCG-MCNC: 4.1 G/DL (ref 3.5–5.2)
ALP SERPL-CCNC: 151 U/L (ref 40–150)
ALT SERPL W P-5'-P-CCNC: 27 U/L (ref 0–70)
ANION GAP SERPL CALCULATED.3IONS-SCNC: 9 MMOL/L (ref 7–15)
AST SERPL W P-5'-P-CCNC: 32 U/L (ref 0–45)
BASOPHILS # BLD AUTO: 0.1 10E3/UL (ref 0–0.2)
BASOPHILS NFR BLD AUTO: 1 %
BILIRUB SERPL-MCNC: 0.2 MG/DL
BUN SERPL-MCNC: 23 MG/DL (ref 8–23)
CALCIUM SERPL-MCNC: 9.6 MG/DL (ref 8.8–10.2)
CHLORIDE SERPL-SCNC: 106 MMOL/L (ref 98–107)
CREAT SERPL-MCNC: 1.11 MG/DL (ref 0.67–1.17)
DEPRECATED HCO3 PLAS-SCNC: 25 MMOL/L (ref 22–29)
EGFRCR SERPLBLD CKD-EPI 2021: 75 ML/MIN/1.73M2
EOSINOPHIL # BLD AUTO: 0.3 10E3/UL (ref 0–0.7)
EOSINOPHIL NFR BLD AUTO: 3 %
ERYTHROCYTE [DISTWIDTH] IN BLOOD BY AUTOMATED COUNT: 12.2 % (ref 10–15)
GLUCOSE SERPL-MCNC: 112 MG/DL (ref 70–99)
HCT VFR BLD AUTO: 43 % (ref 40–53)
HGB BLD-MCNC: 14.3 G/DL (ref 13.3–17.7)
IMM GRANULOCYTES # BLD: 0 10E3/UL
IMM GRANULOCYTES NFR BLD: 0 %
LYMPHOCYTES # BLD AUTO: 2 10E3/UL (ref 0.8–5.3)
LYMPHOCYTES NFR BLD AUTO: 23 %
MCH RBC QN AUTO: 31.2 PG (ref 26.5–33)
MCHC RBC AUTO-ENTMCNC: 33.3 G/DL (ref 31.5–36.5)
MCV RBC AUTO: 94 FL (ref 78–100)
MONOCYTES # BLD AUTO: 0.9 10E3/UL (ref 0–1.3)
MONOCYTES NFR BLD AUTO: 10 %
NEUTROPHILS # BLD AUTO: 5.3 10E3/UL (ref 1.6–8.3)
NEUTROPHILS NFR BLD AUTO: 62 %
NRBC # BLD AUTO: 0 10E3/UL
NRBC BLD AUTO-RTO: 0 /100
PLATELET # BLD AUTO: 145 10E3/UL (ref 150–450)
POTASSIUM SERPL-SCNC: 4.2 MMOL/L (ref 3.4–5.3)
PROT SERPL-MCNC: 7.4 G/DL (ref 6.4–8.3)
RBC # BLD AUTO: 4.59 10E6/UL (ref 4.4–5.9)
SODIUM SERPL-SCNC: 140 MMOL/L (ref 135–145)
WBC # BLD AUTO: 8.5 10E3/UL (ref 4–11)

## 2024-05-07 PROCEDURE — 85041 AUTOMATED RBC COUNT: CPT | Mod: ZL | Performed by: FAMILY MEDICINE

## 2024-05-07 PROCEDURE — 36415 COLL VENOUS BLD VENIPUNCTURE: CPT | Mod: ZL | Performed by: FAMILY MEDICINE

## 2024-05-07 PROCEDURE — G0463 HOSPITAL OUTPT CLINIC VISIT: HCPCS

## 2024-05-07 PROCEDURE — 99214 OFFICE O/P EST MOD 30 MIN: CPT | Performed by: FAMILY MEDICINE

## 2024-05-07 PROCEDURE — 80053 COMPREHEN METABOLIC PANEL: CPT | Mod: ZL | Performed by: FAMILY MEDICINE

## 2024-05-07 RX ORDER — OMEPRAZOLE 40 MG/1
40 CAPSULE, DELAYED RELEASE ORAL 2 TIMES DAILY
Qty: 60 CAPSULE | Refills: 3 | Status: SHIPPED | OUTPATIENT
Start: 2024-05-07 | End: 2024-07-25

## 2024-05-07 RX ORDER — TETRACAINE HYDROCHLORIDE 5 MG/ML
1 SOLUTION OPHTHALMIC ONCE
Status: COMPLETED | OUTPATIENT
Start: 2024-05-07 | End: 2024-05-07

## 2024-05-07 RX ORDER — SOFT LENS RINSE,STORE SOLUTION
1 SOLUTION, NON-ORAL MISCELLANEOUS ONCE
Status: COMPLETED | OUTPATIENT
Start: 2024-05-07 | End: 2024-05-07

## 2024-05-07 RX ORDER — CEFDINIR 300 MG/1
300 CAPSULE ORAL 2 TIMES DAILY
Qty: 14 CAPSULE | Refills: 0 | Status: SHIPPED | OUTPATIENT
Start: 2024-05-07 | End: 2024-05-14

## 2024-05-07 RX ORDER — CARVEDILOL 12.5 MG/1
12.5 TABLET ORAL 2 TIMES DAILY WITH MEALS
Qty: 180 TABLET | Refills: 1 | Status: SHIPPED | OUTPATIENT
Start: 2024-05-07

## 2024-05-07 RX ORDER — OMEPRAZOLE 40 MG/1
40 CAPSULE, DELAYED RELEASE ORAL 2 TIMES DAILY
Qty: 60 CAPSULE | Refills: 0 | OUTPATIENT
Start: 2024-05-07

## 2024-05-07 RX ORDER — ROSUVASTATIN CALCIUM 40 MG/1
40 TABLET, COATED ORAL DAILY
Qty: 90 TABLET | Refills: 3 | Status: SHIPPED | OUTPATIENT
Start: 2024-05-07

## 2024-05-07 RX ORDER — ASPIRIN 81 MG/1
81 TABLET, CHEWABLE ORAL DAILY
Qty: 90 TABLET | Refills: 3 | Status: SHIPPED | OUTPATIENT
Start: 2024-05-07

## 2024-05-07 RX ADMIN — Medication 1 ML: at 09:20

## 2024-05-07 RX ADMIN — TETRACAINE HYDROCHLORIDE 1 DROP: 5 SOLUTION OPHTHALMIC at 09:21

## 2024-05-07 ASSESSMENT — ENCOUNTER SYMPTOMS
SHORTNESS OF BREATH: 0
FEVER: 0
CHEST TIGHTNESS: 0
PALPITATIONS: 0

## 2024-05-07 ASSESSMENT — PAIN SCALES - GENERAL: PAINLEVEL: MODERATE PAIN (4)

## 2024-05-07 NOTE — PROGRESS NOTES
Preoperative Evaluation  [facility] [place]  [address]  [place] [address]  Phone: [phone]  Primary Provider: Abdifatah Paiz  Pre-op Performing Provider: ABDIFATAH PAIZ  May 7, 2024       Rodney is a 64 year old, presenting for the following:  Pre-Op Exam        5/7/2024     8:03 AM   Additional Questions   Roomed by jimmie allen lpn   Accompanied by self         5/7/2024     8:03 AM   Patient Reported Additional Medications   Patient reports taking the following new medications none     Surgical Information  Surgery/Procedure: cystoscopy, bipolar transurethral resection of bladder tumor with post op gemcitabine instillation   Surgery Location: [facility]  Surgeon: Armani  Surgery Date: 05/15/2024  Time of Surgery: TBD  Where patient plans to recover: At home alone with friends checking in  Fax number for surgical facility:     Assessment & Plan     The proposed surgical procedure is considered LOW risk.    Preoperative examination  - Comprehensive metabolic panel  - CBC with Platelets & Differential    Bladder mass    Discomfort of right eye  - tetracaine (PONTOCAINE) 0.5 % ophthalmic solution 1 drop  - fluorescein (FUL-JEOVANY) ophthalmic strip 1 strip  - eye wash (BSS PLUS) ophthalmic solution 1 mL    Periorbital cellulitis of right eye  - cefdinir (OMNICEF) 300 MG capsule; Take 1 capsule (300 mg) by mouth 2 times daily for 7 days    Primary hypertension  Needs refills  - carvedilol (COREG) 12.5 MG tablet; Take 1 tablet (12.5 mg) by mouth 2 times daily (with meals)    Coronary artery disease involving native coronary artery of native heart without angina pectoris  Needs refills  - aspirin (ASA) 81 MG chewable tablet; Take 1 tablet (81 mg) by mouth daily Starting tomorrow.  - rosuvastatin (CRESTOR) 40 MG tablet; Take 1 tablet (40 mg) by mouth daily    Hyperlipidemia LDL goal <70              - No identified additional risk factors other than previously addressed    Antiplatelet or  "Anticoagulation Medication Instructions  On ASA 81mg daily and Brilinta 90mg bid.    Additional Medication Instructions  Continue chronic meds except as mentioned otherwise.  Hold Losartan morning of surgery.    Recommendation  He just had cardiac stenting on 4/4/24.  He feels better than he has in a long time.  Medically he is considered optimized, although ideally we would be delaying any elective surgery by another 5 months due to recent stenting.  However, he has a bladder mass.  He is aware of the risks with moving forward with his cysto - he absolutely does not want to delay further.  The question is regarding if okay to hold his Brilinta for the cysto.    Per cardiac cath note: \"Continue DAPT ideally for 90 days uninterrupted, followed by aspirin 81mg daily lifelong. If interruption of DAPT is required for cystoscopy and biopsy, please continue DAPT uninterrupted for a minimum of 30 days, followed by aspiring 81mg daily lifelong.\"              Subjective       HPI related to upcoming procedure: preop cystoscopy, bladder tumor resection    Was coughing, blowing blood out of nose the last weekend approx the 1st of month, that all went away.  Breathing better.  States has feeling in hands/feet that he didn't have before the stent.  Feet used to be cold, no longer cold.  No dark of bloody stool    Feels like he got something in his right eye a few days ago, medial swelling, feels like 2nd eyeball                5/7/2024     8:01 AM   Preop Questions   1. Have you ever had a heart attack or stroke? MI (old), recent stenting with stable angina (resolved)   2. Have you ever had surgery on your heart or blood vessels, such as a stent placement, a coronary artery bypass, or surgery on an artery in your head, neck, heart, or legs? YES - stents    3. Do you have chest pain with activity? No   4. Do you have a history of  heart failure? UNKNOWN -    5. Do you currently have a cold, bronchitis or symptoms of other " infection? UNKNOWN - right eye having pain   6. Do you have a cough, shortness of breath, or wheezing? No   7. Do you or anyone in your family have previous history of blood clots? UNKNOWN -    8. Do you or does anyone in your family have a serious bleeding problem such as prolonged bleeding following surgeries or cuts? UNKNOWN - not aware of any for self   9. Have you ever had problems with anemia or been told to take iron pills? No   10. Have you had any abnormal blood loss such as black, tarry or bloody stools? YES - had last year but taken care of    11. Have you ever had a blood transfusion? No   12. Are you willing to have a blood transfusion if it is medically needed before, during, or after your surgery? Yes   13. Have you or any of your relatives ever had problems with anesthesia? UNKNOWN - no problems for self   14. Do you have sleep apnea, excessive snoring or daytime drowsiness? No   15. Do you have any artifical heart valves or other implanted medical devices like a pacemaker, defibrillator, or continuous glucose monitor? No   16. Do you have artificial joints? No   17. Are you allergic to latex? No     Health Care Directive  Patient does not have a Health Care Directive or Living Will: Discussed advance care planning with patient; however, patient declined at this time.    Preoperative Review of    reviewed - no record of controlled substances prescribed.      Status of Chronic Conditions:  BMI 27.7  HTN - controlled  Started Wellbutrin for smoking cessation beginning of February.  Smoking less.   Bladder mass - reason for procedure  GERD  Chronic abdominal pain - past scopes delayed due to BP issues - not yet done.  Intrarenal AAA.  Saw vascular.  COPD - doesn't use his Anoro as doesn't like the powder.  Uses rescue inhaler maybe three times daily.    HLP - on statin  CAD h/o past MI.  At previous preop 3/27 he endorsed exertional chest pain, that has resolved after his stenting on  4/4/24.    Patient Active Problem List    Diagnosis Date Noted    Acute on chronic systolic heart failure (H) 04/04/2024     Priority: Medium    CAD (coronary artery disease) 04/04/2024     Priority: Medium    Status post coronary angiogram 04/04/2024     Priority: Medium    Chest pain, unspecified type 04/04/2024     Priority: Medium    COPD (chronic obstructive pulmonary disease) (H) 04/28/2020     Priority: Medium    Suicidal behavior 04/27/2020     Priority: Medium    Gastroesophageal reflux disease without esophagitis 04/27/2020     Priority: Medium    Penile lesion 04/27/2020     Priority: Medium    Hypertension      Priority: Medium    Elevated blood sugar 10/29/2016     Priority: Medium    Narcotic addiction (H) 10/29/2016     Priority: Medium    Heroin abuse (H) 09/15/2015     Priority: Medium    Costochondritis 02/26/2015     Priority: Medium    Venous insufficiency of both lower extremities 02/26/2015     Priority: Medium     Do you wish to do the replacement in the background? yes        Tobacco dependence 01/06/2015     Priority: Medium    Cluster B personality disorder (H) 12/25/2014     Priority: Medium     Vs cluster B traits per OSH records 11/2014      Polysubstance abuse (H) 12/25/2014     Priority: Medium     Heroin, benzodiazepines, amphetamine, prescription opiates, THC abuse. Dealing drugs, IVDU as recently as 11/2014.      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     Priority: Medium    Cellulitis and abscess of forearm 04/04/2014     Priority: Medium    IV drug user 08/09/2010     Priority: Medium     Overview:   IMO Update 10/11      Chlordiazepoxide dependence (H) 08/09/2010     Priority: Medium     Overview:   IMO Update 10/11      Heroin addiction (H) 08/09/2010     Priority: Medium     Overview:   See social notes  IMO Update 10/11      Osteoarthrosis, pelvic region and thigh  11/13/2008     Priority: Medium     Overview:   IMO Update 10/11      Lumbosacral spondylosis without myelopathy 11/13/2008     Priority: Medium    Backache 06/09/2008     Priority: Medium     Overview:   IMO Update 10/11      Degeneration of lumbar or lumbosacral intervertebral disc 07/25/2006     Priority: Medium     Overview:   IMO Update 10/11        Past Medical History:   Diagnosis Date    Acute exacerbation of chronic obstructive pulmonary disease (H) 10/28/2016    Anxiety     Backache 6/9/2008    Overview:  IMO Update 10/11    Bipolar 1 disorder (H) 11/11/2014    Bipolar affective disorder (H)     Cellulitis and abscess of forearm 4/4/2014    Chlordiazepoxide dependence (H) 8/9/2010    Overview:  IMO Update 10/11    Chronic constipation     COPD exacerbation (H) 9/14/2015    Costochondritis 2/26/2015    Degeneration of lumbar or lumbosacral intervertebral disc 7/25/2006    Overview:  IMO Update 10/11    Depressive disorder     Drug abuse (H)     Elevated blood sugar 10/29/2016    Heroin abuse (H) 9/15/2015    Heroin addiction (H) 8/9/2010    Overview:  See social notes IMO Update 10/11    Hypertension     IV drug user 8/9/2010    Overview:  IMO Update 10/11    Lumbosacral spondylosis without myelopathy 11/13/2008    Narcotic addiction (H) 10/29/2016    Opioid use disorder, severe, dependence (H) 2020    CADT records; prior Methadone; [facility]; AA/NA    Osteoarthrosis, pelvic region and thigh 11/13/2008    Overview:  IMO Update 10/11    Penile lesion 4/27/2020    Suicidal behavior 4/27/2020    Venous insufficiency of both lower extremities 2/26/2015     Do you wish to do the replacement in the background? yes       Past Surgical History:   Procedure Laterality Date    ARTHROSCOPY KNEE BILATERAL      COLONOSCOPY  01/29/2020    Multiple, repeat due 2015    COLONOSCOPY N/A 7/31/2015    Procedure: COLONOSCOPY;  Surgeon: Justin Andujar MD;  Location: [facility] OR    CV CORONARY ANGIOGRAM N/A 4/4/2024    Procedure:  Coronary Angiogram;  Surgeon: Zak Stephens MD;  Location: [facility] CARDIAC CATH LAB    CV PCI N/A 4/4/2024    Procedure: Percutaneous Coronary Intervention;  Surgeon: Zak Stephens MD;  Location: [facility] CARDIAC CATH LAB    DECOMPRESSION CUBITAL TUNNEL Left 11/21/2017    Procedure: DECOMPRESSION CUBITAL TUNNEL;;  Surgeon: Jhonny Zhao MD;  Location: [facility] OR    DENTAL SURGERY      HERNIA REPAIR      Inguinal, left    RELEASE CARPAL TUNNEL Left 11/21/2017    Procedure: RELEASE CARPAL TUNNEL;  LEFT ELBOW CUBITAL and CARPAL TUNNEL RELEASE;  Surgeon: Jhonny Zhao MD;  Location: [facility] OR     Current Outpatient Medications   Medication Sig Dispense Refill    albuterol (PROAIR HFA/PROVENTIL HFA/VENTOLIN HFA) 108 (90 Base) MCG/ACT inhaler Inhale 2 puffs into the lungs every 4 hours as needed for shortness of breath, wheezing or cough 18 g 3    aspirin (ASA) 81 MG chewable tablet Take 1 tablet (81 mg) by mouth daily Starting tomorrow. 30 tablet 3    ASPIRIN LOW DOSE 81 MG EC tablet Take 1 tablet (81 mg) by mouth daily 100 tablet 3    buPROPion (WELLBUTRIN SR) 150 MG 12 hr tablet Take 1 daily x3 days, then 1 twice daily after that.  Separate doses by at least 8 hours.  Last dose no later than 6pm.  Stop smoking after on pills for 1 week. 60 tablet 0    carvedilol (COREG) 12.5 MG tablet TAKE 1 TABLET BY MOUTH TWICE DAILY WITH MEALS 60 tablet 0    ipratropium - albuterol 0.5 mg/2.5 mg/3 mL (DUONEB) 0.5-2.5 (3) MG/3ML neb solution Take 1 vial (3 mLs) by nebulization every 6 hours as needed for shortness of breath, wheezing or cough 90 mL 11    losartan (COZAAR) 100 MG tablet Take 1 tablet by mouth once daily 30 tablet 10    omeprazole (PRILOSEC) 40 MG DR capsule Take 1 capsule (40 mg) by mouth 2 times daily 60 capsule 1    rosuvastatin (CRESTOR) 40 MG tablet Take 1 tablet (40 mg) by mouth daily 30 tablet 3    ticagrelor (BRILINTA) 90 MG tablet Take 1 tablet (90 mg) by mouth 2 times daily Start this evening. 180  "tablet 3    umeclidinium-vilanterol (ANORO ELLIPTA) 62.5-25 MCG/ACT oral inhaler Inhale 1 puff into the lungs daily 60 each 1       Allergies   Allergen Reactions    Codeine     Penicillins         Social History     Tobacco Use    Smoking status: Every Day     Current packs/day: 1.00     Average packs/day: 1 pack/day for 41.3 years (41.3 ttl pk-yrs)     Types: Cigarettes     Start date: 1983    Smokeless tobacco: Never    Tobacco comments:     Tried to Quit (NO)   Substance Use Topics    Alcohol use: No     Alcohol/week: 0.0 standard drinks of alcohol       History   Drug Use Unknown         Review of Systems   Constitutional:  Negative for fever.   Respiratory:  Negative for chest tightness and shortness of breath.    Cardiovascular:  Negative for chest pain, palpitations and peripheral edema.   Chronic abdominal pain persists - but states down to 5/10 from 7/10 prior to stents      Objective    /80 (BP Location: Left arm, Patient Position: Sitting, Cuff Size: Adult Regular)   Pulse 93   Temp 97.7  F (36.5  C) (Tympanic)   Resp 18   Wt 85 kg (187 lb 6.4 oz)   SpO2 98%   BMI 27.67 kg/m     Estimated body mass index is 27.67 kg/m  as calculated from the following:    Height as of 4/4/24: 1.753 m (5' 9\").    Weight as of this encounter: 85 kg (187 lb 6.4 oz).  Physical Exam  Constitutional:       General: He is not in acute distress.     Appearance: Normal appearance.   HENT:      Head: Normocephalic and atraumatic.      Right Ear: Tympanic membrane normal.      Left Ear: Tympanic membrane normal.      Mouth/Throat:      Mouth: Mucous membranes are moist.      Pharynx: Oropharynx is clear.   Eyes:      Conjunctiva/sclera: Conjunctivae normal.      Pupils: Pupils are equal, round, and reactive to light.   Neck:      Vascular: No carotid bruit.   Cardiovascular:      Rate and Rhythm: Normal rate and regular rhythm.      Heart sounds: Normal heart sounds. No murmur heard.  Pulmonary:      Effort: Pulmonary " effort is normal.      Breath sounds: Normal breath sounds.   Abdominal:      General: Bowel sounds are normal. There is no distension.      Palpations: Abdomen is soft.      Tenderness: There is no abdominal tenderness. There is no guarding.   Musculoskeletal:      Cervical back: Normal range of motion.      Right lower leg: No edema.      Left lower leg: No edema.   Lymphadenopathy:      Cervical: No cervical adenopathy.   Neurological:      Mental Status: He is alert and oriented to person, place, and time.   Psychiatric:      Comments: anxious           Recent Labs   Lab Test 04/04/24  0907 03/27/24  1134 08/23/23  1047 01/16/23  2244   HGB 15.3 14.4   < > 14.4    157   < > 122*   INR 0.93  --   --  1.07    137   < > 136   POTASSIUM 4.2 4.1   < > 3.7   CR 1.09 1.06   < > 1.09    < > = values in this interval not displayed.        Diagnostics  Recent Results (from the past 48 hour(s))   Comprehensive metabolic panel    Collection Time: 05/07/24  9:12 AM   Result Value Ref Range    Sodium 140 135 - 145 mmol/L    Potassium 4.2 3.4 - 5.3 mmol/L    Carbon Dioxide (CO2) 25 22 - 29 mmol/L    Anion Gap 9 7 - 15 mmol/L    Urea Nitrogen 23.0 8.0 - 23.0 mg/dL    Creatinine 1.11 0.67 - 1.17 mg/dL    GFR Estimate 75 >60 mL/min/1.73m2    Calcium 9.6 8.8 - 10.2 mg/dL    Chloride 106 98 - 107 mmol/L    Glucose 112 (H) 70 - 99 mg/dL    Alkaline Phosphatase 151 (H) 40 - 150 U/L    AST 32 0 - 45 U/L    ALT 27 0 - 70 U/L    Protein Total 7.4 6.4 - 8.3 g/dL    Albumin 4.1 3.5 - 5.2 g/dL    Bilirubin Total 0.2 <=1.2 mg/dL   CBC with platelets and differential    Collection Time: 05/07/24  9:12 AM   Result Value Ref Range    WBC Count 8.5 4.0 - 11.0 10e3/uL    RBC Count 4.59 4.40 - 5.90 10e6/uL    Hemoglobin 14.3 13.3 - 17.7 g/dL    Hematocrit 43.0 40.0 - 53.0 %    MCV 94 78 - 100 fL    MCH 31.2 26.5 - 33.0 pg    MCHC 33.3 31.5 - 36.5 g/dL    RDW 12.2 10.0 - 15.0 %    Platelet Count 145 (L) 150 - 450 10e3/uL    %  Neutrophils 62 %    % Lymphocytes 23 %    % Monocytes 10 %    % Eosinophils 3 %    % Basophils 1 %    % Immature Granulocytes 0 %    NRBCs per 100 WBC 0 <1 /100    Absolute Neutrophils 5.3 1.6 - 8.3 10e3/uL    Absolute Lymphocytes 2.0 0.8 - 5.3 10e3/uL    Absolute Monocytes 0.9 0.0 - 1.3 10e3/uL    Absolute Eosinophils 0.3 0.0 - 0.7 10e3/uL    Absolute Basophils 0.1 0.0 - 0.2 10e3/uL    Absolute Immature Granulocytes 0.0 <=0.4 10e3/uL    Absolute NRBCs 0.0 10e3/uL          Signed Electronically by: KASSI PAIZ DO  Copy of this evaluation report is provided to requesting physician.

## 2024-05-08 NOTE — PROGRESS NOTES
Clinic Care Coordination Contact  Care Team Conversations    Patient had preop and RN CC talked with PCP to please advise.  He states Cardio had stated to go off Brilinta 4 days prior to procedure. Continue Aspirin per PCP. We discussed other medications and he states hold Losartan day of procedure as well.  He states that Urology will need to decide if urgent regarding recent stent placement.  Preop was faxed off by [facility].  RN CC called patient and talked with patient and went over above information.  We discussed importance of him calling [facility] to discuss as he hasn't heard from them yet.  We discussed being sure prior to stopping Brilinta and he verbalized understanding and will call them now.  We went over that it has to be their decision and he again verbalized understanding.  He states that he has had so much pain from the bladder tumor that he hopes they are able to continue with procedure.  He is encouraged to discuss with them and he states he will do that.  Patient states that he has a ride from a friend to procedure if this happens.  We discussed message to Cardio to please call patient regarding Brilinta to see if resumes or not as haven't heard back from their office. He verbalized understanding.  Alba Gama, ALFREDO  Care Coordination

## 2024-05-09 ENCOUNTER — TELEPHONE (OUTPATIENT)
Dept: FAMILY MEDICINE | Facility: OTHER | Age: 64
End: 2024-05-09

## 2024-05-09 NOTE — TELEPHONE ENCOUNTER
Faxed pre-op notes, demographics, med list, EKG to: [facility]     Procedure date: 5/15/24  Dr. Armani Knox      Fax:  [phone]

## 2024-05-14 NOTE — PROGRESS NOTES
Clinic Care Coordination Contact  Care Team Conversations    RN CC went over chart review with Janina, Manager regarding patient.  RN CC called Tristan Kaur, ALFREDO CC and left message regarding patient and situation and asked that they please reach out to patient as he has scheduled Urology procedure on 5/15.  Conflicting information per Brilinta and if/when to restart or not.  Patient has also called both Urology and [facility].    RN CC called patient and talked with him. He states he is scheduled for Urology procedure tomorrow and just waiting for a time.  Patient just wants to get this procedure completed so he can get on with his life as this has been causing him so much pain. Patient states he quit taking the Brilinta on 5/11 and verifies he will not take Losartan tomorrow either.    Alba Gama RN  Care Coordination

## 2024-05-15 ENCOUNTER — PATIENT OUTREACH (OUTPATIENT)
Dept: CARE COORDINATION | Facility: OTHER | Age: 64
End: 2024-05-15

## 2024-05-15 NOTE — PROGRESS NOTES
Clinic Care Coordination Contact  Care Team Conversations    RN CC reviewed chart with note:  PROCEDURE PERFORMED: Cystoscopy with a bipolar transurethral resection of bladder tumor with post operative gemcitabine instillation.   SPECIMENS: Bladder tumor  DRAINS: 18 brandon with 10 ml of water in the balloon   OPERATIVE FINDINGS: Tumor located on left lateral wall, just behind UO. UO uninvolved, resection area 3 cm .     Armnai Knox MD - 05/15/2024 9:44 AM CDT   Formatting of this note might be different from the original.  New prescriptions for oxycodone.  You may contact Dr. Knox's office at [phone] with any concerns.  Patient may restart daily Aspirin 81 mg today. Hold Brilinta until catheter is removed in a day or two.  Dr. Knox will contact you with the pathology results and set up a follow appointment at that time.   Armani Knox MD (Attending, Admitting)  NPI: [id number]  [phone] (Work)  [phone] (Fax)  [address] [place] [address]  Urology    RN CC will remain available if any questions or concerns.     Alba Gama, ALFREDO  Care Coordination

## 2024-05-16 ENCOUNTER — OFFICE VISIT (OUTPATIENT)
Dept: FAMILY MEDICINE | Facility: OTHER | Age: 64
End: 2024-05-16
Attending: FAMILY MEDICINE
Payer: MEDICARE

## 2024-05-16 ENCOUNTER — MEDICAL CORRESPONDENCE (OUTPATIENT)
Dept: HEALTH INFORMATION MANAGEMENT | Facility: CLINIC | Age: 64
End: 2024-05-16
Payer: MEDICARE

## 2024-05-16 ENCOUNTER — TELEPHONE (OUTPATIENT)
Dept: FAMILY MEDICINE | Facility: OTHER | Age: 64
End: 2024-05-16

## 2024-05-16 VITALS
SYSTOLIC BLOOD PRESSURE: 122 MMHG | WEIGHT: 198.5 LBS | HEIGHT: 69 IN | BODY MASS INDEX: 29.4 KG/M2 | DIASTOLIC BLOOD PRESSURE: 76 MMHG | TEMPERATURE: 98.3 F | HEART RATE: 87 BPM | RESPIRATION RATE: 17 BRPM | OXYGEN SATURATION: 96 %

## 2024-05-16 DIAGNOSIS — N32.89 BLADDER MASS: ICD-10-CM

## 2024-05-16 DIAGNOSIS — Z46.6 ENCOUNTER FOR FOLEY CATHETER REMOVAL: Primary | ICD-10-CM

## 2024-05-16 PROBLEM — D49.4 BLADDER TUMOR: Status: ACTIVE | Noted: 2024-03-20

## 2024-05-16 PROCEDURE — G0463 HOSPITAL OUTPT CLINIC VISIT: HCPCS

## 2024-05-16 PROCEDURE — 99212 OFFICE O/P EST SF 10 MIN: CPT | Performed by: FAMILY MEDICINE

## 2024-05-16 PROCEDURE — G0463 HOSPITAL OUTPT CLINIC VISIT: HCPCS | Mod: 25

## 2024-05-16 RX ORDER — OXYCODONE HYDROCHLORIDE 5 MG/1
5 TABLET ORAL
COMMUNITY
Start: 2024-05-15 | End: 2024-07-25

## 2024-05-16 ASSESSMENT — PAIN SCALES - GENERAL: PAINLEVEL: MODERATE PAIN (4)

## 2024-05-16 NOTE — TELEPHONE ENCOUNTER
Cha from [facility] called to discuss with care team removing catheter. Stated it is okay for it to be removed by PCP.     Can call Cha back at [phone]. If no answer, can call clinic main line at [phone].

## 2024-05-16 NOTE — TELEPHONE ENCOUNTER
12:17 PM    Reason for Call: OVERBOOK    Patient is having the following symptoms: Patient needs to be seen for catheter removed. Patient states he had surgery yesterday on 05/15/24 in [place], they advised patient to see  to pull it out. Patient states it is tugging. days.    The patient is requesting an appointment for [employer] with .    Was an appointment offered for this call? No  If yes : Appointment type              Date    Preferred method for responding to this message: Telephone Call  What is your phone number ?  [phone]     If we cannot reach you directly, may we leave a detailed response at the number you provided? Yes    Can this message wait until your PCP/provider returns, if unavailable today? Provider is in today    Brittnee Caro

## 2024-05-16 NOTE — PROGRESS NOTES
"  Assessment & Plan     Encounter for Castaneda catheter removal  Bladder mass    Castaneda removed without difficulty, patient reports immediate relief of his discomfort, advised to go to ER if doesn't urinate within 6 hours or if any concerns develop.        Skye Stevens is a 64 year old, presenting for the following health issues:  Surgical Followup    History of Present Illness       Reason for visit:  Take out cathidor    He eats 0-1 servings of fruits and vegetables daily.He consumes 11 or more sweetened beverage(s) daily.He exercises with enough effort to increase his heart rate 60 or more minutes per day.  He exercises with enough effort to increase his heart rate 5 days per week.   He is taking medications regularly.       Had cysto/bladder mass resection yesterday, surgeon told him to have PCP remove Castaneda today.    He states it's really bothering him and he wants it out ASAP.  No fever, no edema, feeling good otherwise.    Concern - F/U  Onset: 05/15/2024  Description: Bladder Tumor  Intensity: moderate  Progression of Symptoms:  cystoscopy, bipolar transurethral resection of bladder tumor with post   op gemcitabine instillation   Accompanying Signs & Symptoms: Tumor  Previous history of similar problem: na  Precipitating factors:        Worsened by: na  Alleviating factors:        Improved by: na  Therapies tried and outcome: None    Would like to have his catheter removed today.            Objective    /76   Pulse 87   Temp 98.3  F (36.8  C) (Tympanic)   Resp 17   Ht 1.753 m (5' 9\")   Wt 90 kg (198 lb 8 oz)   SpO2 96%   BMI 29.31 kg/m    Body mass index is 29.31 kg/m .  Physical Exam  Constitutional:       General: He is not in acute distress.     Appearance: Normal appearance.   Cardiovascular:      Rate and Rhythm: Normal rate and regular rhythm.   Pulmonary:      Effort: Pulmonary effort is normal.   Abdominal:      General: Bowel sounds are normal. There is no distension.      Palpations: " Abdomen is soft.      Tenderness: There is no abdominal tenderness. There is no guarding.   Musculoskeletal:      Right lower leg: No edema.      Left lower leg: No edema.   Neurological:      Mental Status: He is alert and oriented to person, place, and time.                    Signed Electronically by: KASSI PAIZ DO

## 2024-05-20 NOTE — PROGRESS NOTES
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 I discussed with the patient that with this type of pathology report he just needs to undergo surveillance cystoscopy going forward. I advised him that I will have my office contact him to set up a cystoscopy in about 3 months.  Electronically signed by Armani Knox MD at 05/16/2024 4:00 PM CDT     Not seeing patient scheduled at [facility] end of Aug.  Will continue to monitor.  Alba Gama RN  Care Coordination

## 2024-05-22 ENCOUNTER — TELEPHONE (OUTPATIENT)
Dept: CARDIAC REHAB | Facility: HOSPITAL | Age: 64
End: 2024-05-22

## 2024-05-22 NOTE — TELEPHONE ENCOUNTER
5/22: Staff attempted to contact patient to check in following surgery to see how things are going and gauge a timeline for return to rehab. No answer and voice mailbox was full. Staff will attempt again at another time.

## 2024-05-29 ENCOUNTER — TELEPHONE (OUTPATIENT)
Dept: CARDIAC REHAB | Facility: HOSPITAL | Age: 64
End: 2024-05-29

## 2024-05-29 NOTE — TELEPHONE ENCOUNTER
5/29: Staff spoke with pt regarding attendance to cardiac rehab. Pt states he would like to return to rehab, but kept forgetting due to other life stressors. Staff communicated that if pt does not return to rehab by Monday, 6/3 at 9am, he will be discharged from the program. Pt verbalized understanding.

## 2024-06-03 ENCOUNTER — PATIENT OUTREACH (OUTPATIENT)
Dept: CARE COORDINATION | Facility: OTHER | Age: 64
End: 2024-06-03

## 2024-06-03 NOTE — PROGRESS NOTES
Clinic Care Coordination Contact  Care Team Conversations    RN CC called patient x 2 to remind him of Cardio visit today.  No answer and nowhere to leave a message.  Will look to follow up with patient.  Patient has 8/21 Urology follow up Ramesh Marroquin.  Alba Gama, RN  Care Coordination

## 2024-06-10 ENCOUNTER — TELEPHONE (OUTPATIENT)
Dept: CARE COORDINATION | Facility: OTHER | Age: 64
End: 2024-06-10

## 2024-06-10 ENCOUNTER — OFFICE VISIT (OUTPATIENT)
Dept: FAMILY MEDICINE | Facility: OTHER | Age: 64
End: 2024-06-10
Attending: FAMILY MEDICINE
Payer: MEDICARE

## 2024-06-10 VITALS
DIASTOLIC BLOOD PRESSURE: 82 MMHG | HEIGHT: 69 IN | OXYGEN SATURATION: 98 % | HEART RATE: 81 BPM | TEMPERATURE: 98.1 F | BODY MASS INDEX: 28.91 KG/M2 | WEIGHT: 195.2 LBS | RESPIRATION RATE: 17 BRPM | SYSTOLIC BLOOD PRESSURE: 122 MMHG

## 2024-06-10 DIAGNOSIS — R10.31 RLQ ABDOMINAL PAIN: Primary | ICD-10-CM

## 2024-06-10 LAB
ALBUMIN SERPL BCG-MCNC: 4 G/DL (ref 3.5–5.2)
ALBUMIN UR-MCNC: NEGATIVE MG/DL
ALP SERPL-CCNC: 172 U/L (ref 40–150)
ALT SERPL W P-5'-P-CCNC: 27 U/L (ref 0–70)
ANION GAP SERPL CALCULATED.3IONS-SCNC: 11 MMOL/L (ref 7–15)
APPEARANCE UR: CLEAR
AST SERPL W P-5'-P-CCNC: 35 U/L (ref 0–45)
BASOPHILS # BLD AUTO: 0.1 10E3/UL (ref 0–0.2)
BASOPHILS NFR BLD AUTO: 1 %
BILIRUB SERPL-MCNC: 0.2 MG/DL
BILIRUB UR QL STRIP: NEGATIVE
BUN SERPL-MCNC: 18.2 MG/DL (ref 8–23)
CALCIUM SERPL-MCNC: 8.9 MG/DL (ref 8.8–10.2)
CHLORIDE SERPL-SCNC: 101 MMOL/L (ref 98–107)
COLOR UR AUTO: ABNORMAL
CREAT SERPL-MCNC: 0.98 MG/DL (ref 0.67–1.17)
CRP SERPL-MCNC: 8.32 MG/L
DEPRECATED HCO3 PLAS-SCNC: 22 MMOL/L (ref 22–29)
EGFRCR SERPLBLD CKD-EPI 2021: 87 ML/MIN/1.73M2
EOSINOPHIL # BLD AUTO: 0.3 10E3/UL (ref 0–0.7)
EOSINOPHIL NFR BLD AUTO: 4 %
ERYTHROCYTE [DISTWIDTH] IN BLOOD BY AUTOMATED COUNT: 12.9 % (ref 10–15)
GLUCOSE SERPL-MCNC: 147 MG/DL (ref 70–99)
GLUCOSE UR STRIP-MCNC: NEGATIVE MG/DL
HCT VFR BLD AUTO: 39.1 % (ref 40–53)
HGB BLD-MCNC: 13.4 G/DL (ref 13.3–17.7)
HGB UR QL STRIP: ABNORMAL
IMM GRANULOCYTES # BLD: 0.1 10E3/UL
IMM GRANULOCYTES NFR BLD: 1 %
KETONES UR STRIP-MCNC: NEGATIVE MG/DL
LEUKOCYTE ESTERASE UR QL STRIP: ABNORMAL
LYMPHOCYTES # BLD AUTO: 2 10E3/UL (ref 0.8–5.3)
LYMPHOCYTES NFR BLD AUTO: 26 %
MCH RBC QN AUTO: 31.1 PG (ref 26.5–33)
MCHC RBC AUTO-ENTMCNC: 34.3 G/DL (ref 31.5–36.5)
MCV RBC AUTO: 91 FL (ref 78–100)
MONOCYTES # BLD AUTO: 0.8 10E3/UL (ref 0–1.3)
MONOCYTES NFR BLD AUTO: 10 %
MUCOUS THREADS #/AREA URNS LPF: PRESENT /LPF
NEUTROPHILS # BLD AUTO: 4.4 10E3/UL (ref 1.6–8.3)
NEUTROPHILS NFR BLD AUTO: 58 %
NITRATE UR QL: NEGATIVE
NRBC # BLD AUTO: 0 10E3/UL
NRBC BLD AUTO-RTO: 0 /100
PH UR STRIP: 5.5 [PH] (ref 4.7–8)
PLATELET # BLD AUTO: 199 10E3/UL (ref 150–450)
POTASSIUM SERPL-SCNC: 4.3 MMOL/L (ref 3.4–5.3)
PROT SERPL-MCNC: 7.4 G/DL (ref 6.4–8.3)
RBC # BLD AUTO: 4.31 10E6/UL (ref 4.4–5.9)
RBC URINE: 7 /HPF
SODIUM SERPL-SCNC: 134 MMOL/L (ref 135–145)
SP GR UR STRIP: 1.01 (ref 1–1.03)
SPERM #/AREA URNS HPF: PRESENT /HPF
SQUAMOUS EPITHELIAL: <1 /HPF
UROBILINOGEN UR STRIP-MCNC: NORMAL MG/DL
WBC # BLD AUTO: 7.6 10E3/UL (ref 4–11)
WBC URINE: 7 /HPF

## 2024-06-10 PROCEDURE — G0463 HOSPITAL OUTPT CLINIC VISIT: HCPCS

## 2024-06-10 PROCEDURE — 86140 C-REACTIVE PROTEIN: CPT | Mod: ZL | Performed by: FAMILY MEDICINE

## 2024-06-10 PROCEDURE — G0463 HOSPITAL OUTPT CLINIC VISIT: HCPCS | Mod: 25

## 2024-06-10 PROCEDURE — 82247 BILIRUBIN TOTAL: CPT | Mod: ZL | Performed by: FAMILY MEDICINE

## 2024-06-10 PROCEDURE — 85025 COMPLETE CBC W/AUTO DIFF WBC: CPT | Mod: ZL | Performed by: FAMILY MEDICINE

## 2024-06-10 PROCEDURE — 36415 COLL VENOUS BLD VENIPUNCTURE: CPT | Mod: ZL | Performed by: FAMILY MEDICINE

## 2024-06-10 PROCEDURE — 99214 OFFICE O/P EST MOD 30 MIN: CPT | Performed by: FAMILY MEDICINE

## 2024-06-10 PROCEDURE — 84155 ASSAY OF PROTEIN SERUM: CPT | Mod: ZL | Performed by: FAMILY MEDICINE

## 2024-06-10 PROCEDURE — 81003 URINALYSIS AUTO W/O SCOPE: CPT | Mod: ZL | Performed by: FAMILY MEDICINE

## 2024-06-10 PROCEDURE — 87086 URINE CULTURE/COLONY COUNT: CPT | Mod: ZL | Performed by: FAMILY MEDICINE

## 2024-06-10 ASSESSMENT — PAIN SCALES - GENERAL: PAINLEVEL: MODERATE PAIN (5)

## 2024-06-10 NOTE — TELEPHONE ENCOUNTER
"RN CC received call from DI that patient left after they couldn't get IV in patient and he refused to be \"poked again.\" States that he wasn't going back to see PCP and that he was going home.  They stated they attempted to call PCP and wasn't able to get a hold of him.  Message was passed onto PCP.  Alba Gama RN  Care Coordination  "

## 2024-06-10 NOTE — PROGRESS NOTES
"  Assessment & Plan     RLQ abdominal pain  - CT Abdomen Pelvis w Contrast; Future  - Comprehensive metabolic panel  - CBC with Platelets & Differential  - CRP inflammation  - UA Macroscopic with reflex to Microscopic and Culture  - Urine Culture    Patient sent down for stat CT abdomen/pelvis with contrast.  I was notified that patient eloped from CT prior to imaging - apparently he is rescheduling.  He had already left prior to me being notified.  I informed the ER provider in case he shows up there.        Skye Stevens is a 64 year old, presenting for the following health issues:  Abdominal Pain        6/10/2024     3:00 PM   Additional Questions   Roomed by Sarah Hooper   Accompanied by self     HPI     64-year-old male here for concern of 3 to 4 days of progressive right lower quadrant abdominal pain, sharp, worsening with bending/walking or movement.  No dysuria, hematuria, flank pain, nausea/vomiting, constipation/diarrhea.  No fever.  He is feels he is urinating just fine.    He did have a cystoscopy and bladder tumor resection done on 5/15/2024, Castaneda removed on 5/16/2024.    Concern - Right Lower Quad Pain  Onset: last Thursday  Description: pain   Intensity: moderate, 5/10  Progression of Symptoms:  worsening   Accompanying Signs & Symptoms: pain, very uncomfortable. Unable to lift things.   Previous history of similar problem: na  Precipitating factors:        Worsened by: lifting, concerns for appendicitis  Alleviating factors:        Improved by: na  Therapies tried and outcome: None            Objective    /82   Pulse 81   Temp 98.1  F (36.7  C) (Tympanic)   Resp 17   Ht 1.753 m (5' 9\")   Wt 88.5 kg (195 lb 3.2 oz)   SpO2 98%   BMI 28.83 kg/m    Body mass index is 28.83 kg/m .  Physical Exam  Constitutional:       Comments: No acute distress.  Does appear to be in pain with movement.   Cardiovascular:      Rate and Rhythm: Normal rate and regular rhythm.      Heart sounds: Normal " heart sounds. No murmur heard.  Pulmonary:      Effort: Pulmonary effort is normal.      Breath sounds: Normal breath sounds.   Abdominal:      Comments: Tender right lower quadrant, palpation of the right upper quadrant also causes pain in the right lower quadrant.  No distention.  Bowel sounds are normal.  No CVA tenderness.  No guarding.   Neurological:      Mental Status: He is alert and oriented to person, place, and time.                    Signed Electronically by: KASSI PAIZ DO

## 2024-06-12 ENCOUNTER — TELEPHONE (OUTPATIENT)
Dept: FAMILY MEDICINE | Facility: OTHER | Age: 64
End: 2024-06-12

## 2024-06-12 LAB — BACTERIA UR CULT: NORMAL

## 2024-06-20 ENCOUNTER — PATIENT OUTREACH (OUTPATIENT)
Dept: CARE COORDINATION | Facility: OTHER | Age: 64
End: 2024-06-20

## 2024-06-20 NOTE — PROGRESS NOTES
Clinic Care Coordination Contact  Care Team Conversations    RN CC called patient x 2 with busy signal.  RN CC will attempt again at future date as patient left without completing stat CT abd/pelvis on 6/10.  Patient hasn't rescheduled and they haven't been able to get a hold of patient.  Alba Gmaa, RN  Care Coordination

## 2024-07-03 ENCOUNTER — PATIENT OUTREACH (OUTPATIENT)
Dept: CARE COORDINATION | Facility: OTHER | Age: 64
End: 2024-07-03

## 2024-07-03 NOTE — PROGRESS NOTES
Clinic Care Coordination Contact  Care Team Conversations    RN CC called patient and his phone is not taking calls right now.  Will attempt to call patient another time.   Alba Gama, ALFREDO  Care Coordination

## 2024-07-18 ENCOUNTER — PATIENT OUTREACH (OUTPATIENT)
Dept: CARE COORDINATION | Facility: OTHER | Age: 64
End: 2024-07-18

## 2024-07-18 NOTE — PROGRESS NOTES
Clinic Care Coordination Contact  Care Team Conversations    RN CC attempted to call patient and phone states not accepting calls at this time.    Note to [facility] [place] [place] to please reach out to get patient scheduled for follow up and if not able to contact please send letter.    Patient has 8/21 Urology follow up visit.  Needing to connect patient with Cardio follow up once patient into clinic as was supposed to see Dr. Lamas after placement of stent and had missed appointment.    ALFREDO FELIX talked with Jocelyn Dominguez and patient scheduled for both visits and mailed to him.  Alba Gama, ALFREDO  Care Coordination

## 2024-07-28 NOTE — PROGRESS NOTES
July 29, 2024     I had the pleasure of seeing Rodney Goodwin  in the [facility].  He is a 64-year-old male with no known past cardiac history that he does not however know that he did have a nuclear medicine stress test in 2018 which showed fixed defect.  He was recently found to have a bladder mass on CT scan concerning for bladder cancer and was supposed to undergo cystoscopy however during anesthesia evaluation he was found to have recurrent episodes of chest pain and as such cardiology evaluation was urgently requested.   He was referred for coronary angiogram on an urgent basis given the progressive symptoms and he did undergo PCI for hemodynamically significant disease as listed below.  Today he is here for follow up.  He notes that he did have the urological procedure and the cancer removed via cystoscopy 1 month after the stent placement, for this he did hold the Brilinta for a few days and went back on it right after.  He continues to have bruising but otherwise no complication whatsoever.  His chest pain cycle he feels significantly better even relaxing and tomorrow walk around more easily.  He did cut back smoking quite a bit at the time of the procedure and afterwards however a month ago he did have a lot of stress and he went back on smoking about before heavy again.  He intends to quit or cut back significantly.  No other complaints doing okay.     PAST MEDICAL HISTORY:  Past Medical History:   Diagnosis Date    Acute exacerbation of chronic obstructive pulmonary disease (H) 10/28/2016    Anxiety     Backache 6/9/2008    Overview:  IMO Update 10/11    Bipolar 1 disorder (H) 11/11/2014    Bipolar affective disorder (H)     Cellulitis and abscess of forearm 4/4/2014    Chlordiazepoxide dependence (H) 8/9/2010    Overview:  IMO Update 10/11    Chronic constipation     COPD exacerbation (H) 9/14/2015    Costochondritis 2/26/2015    Degeneration of lumbar or lumbosacral  intervertebral disc 7/25/2006    Overview:  IMO Update 10/11    Depressive disorder     Drug abuse (H)     Elevated blood sugar 10/29/2016    Heroin abuse (H) 9/15/2015    Heroin addiction (H) 8/9/2010    Overview:  See social notes IMO Update 10/11    Hypertension     IV drug user 8/9/2010    Overview:  IMO Update 10/11    Lumbosacral spondylosis without myelopathy 11/13/2008    Narcotic addiction (H) 10/29/2016    Opioid use disorder, severe, dependence (H) 2020    CADT records; prior Methadone; [facility]; AA/NA    Osteoarthrosis, pelvic region and thigh 11/13/2008    Overview:  IMO Update 10/11    Penile lesion 4/27/2020    Suicidal behavior 4/27/2020    Venous insufficiency of both lower extremities 2/26/2015     Do you wish to do the replacement in the background? yes       FAMILY HISTORY:  Family History   Problem Relation Age of Onset    Diabetes Mother     Cerebrovascular Disease Father     Pancreatic Cancer Brother      SOCIAL HISTORY:  Social History     Socioeconomic History    Marital status:     Number of children: 3    Years of education: 12   Occupational History     Employer: [employer]   Tobacco Use    Smoking status: Every Day     Current packs/day: 1.00     Average packs/day: 1 pack/day for 41.6 years (41.6 ttl pk-yrs)     Types: Cigarettes     Start date: 1983    Smokeless tobacco: Never    Tobacco comments:     Tried to Quit (NO)   Vaping Use    Vaping status: Never Used   Substance and Sexual Activity    Alcohol use: No     Alcohol/week: 0.0 standard drinks of alcohol    Drug use: Not Currently     Types: IV, Methamphetamines, Opiates, Marijuana    Sexual activity: Not Currently     Social Determinants of Health     Financial Resource Strain: Low Risk  (2/2/2024)    Financial Resource Strain     Within the past 12 months, have you or your family members you live with been unable to get utilities (heat, electricity) when it was really needed?: No   Food Insecurity: Low Risk  (2/2/2024)     Food Insecurity     Within the past 12 months, did you worry that your food would run out before you got money to buy more?: No     Within the past 12 months, did the food you bought just not last and you didn t have money to get more?: Patient declined   Transportation Needs: Low Risk  (2/2/2024)    Transportation Needs     Within the past 12 months, has lack of transportation kept you from medical appointments, getting your medicines, non-medical meetings or appointments, work, or from getting things that you need?: No   Interpersonal Safety: Not At Risk (5/14/2024)    Received from [facility] and [facility]     IP Custom IPV     Do you feel UNSAFE in any of your personal relationships with your family members or any other acquaintances?: No   Housing Stability: Low Risk  (2/2/2024)    Housing Stability     Do you have housing? : Yes     Are you worried about losing your housing?: No     CURRENT MEDICATIONS:  Current Outpatient Medications   Medication Sig Dispense Refill    albuterol (PROAIR HFA/PROVENTIL HFA/VENTOLIN HFA) 108 (90 Base) MCG/ACT inhaler Inhale 2 puffs into the lungs every 4 hours as needed for shortness of breath, wheezing or cough 18 g 3    aspirin (ASA) 81 MG chewable tablet Take 1 tablet (81 mg) by mouth daily Starting tomorrow. 90 tablet 3    carvedilol (COREG) 12.5 MG tablet Take 1 tablet (12.5 mg) by mouth 2 times daily (with meals) 180 tablet 1    ipratropium - albuterol 0.5 mg/2.5 mg/3 mL (DUONEB) 0.5-2.5 (3) MG/3ML neb solution Take 1 vial (3 mLs) by nebulization every 6 hours as needed for shortness of breath, wheezing or cough 90 mL 11    losartan (COZAAR) 100 MG tablet Take 1 tablet by mouth once daily 30 tablet 10    rosuvastatin (CRESTOR) 40 MG tablet Take 1 tablet (40 mg) by mouth daily 90 tablet 3    ticagrelor (BRILINTA) 90 MG tablet Take 1 tablet (90 mg) by mouth 2 times daily Start this evening. 180 tablet 3    umeclidinium-vilanterol (ANORO ELLIPTA)  "62.5-25 MCG/ACT oral inhaler Inhale 1 puff into the lungs daily 60 each 1     No current facility-administered medications for this visit.     Facility-Administered Medications Ordered in Other Visits   Medication Dose Route Frequency Provider Last Rate Last Admin    iopamidol (ISOVUE-370) solution    Once PRN Zak Stephens MD   115 mL at 04/04/24 1200     EXAM:  BP (!) 144/86 (BP Location: Right arm, Patient Position: Sitting, Cuff Size: Adult Regular)   Pulse 81   Temp 97.4  F (36.3  C) (Tympanic)   Resp 17   Ht 1.753 m (5' 9\")   Wt 85.8 kg (189 lb 1.6 oz)   SpO2 96%   BMI 27.93 kg/m    General: appears comfortable, alert and oriented  Head: normocephalic, atraumatic  Eyes: anicteric sclera, EOMI , PERRL  Neck: no adenopathy  Orophyarynx: moist mucosa, no lesions noted  Heart: regular, S1/S2, no murmurs, rubs or gallop. Estimated JVP at 5 cmH2O  Lungs: CTAB, No wheezing.   Abdomen: soft, non-tender, bowel sounds present, no hepatosplenomegaly  Extremities: No LE edema today  Skin: no open lesions noted  Neuro: grossly non-focal     Labs:  Lab Results   Component Value Date    WBC 7.6 06/10/2024    HGB 13.4 06/10/2024    HCT 39.1 (L) 06/10/2024     06/10/2024     (L) 06/10/2024    POTASSIUM 4.3 06/10/2024    CHLORIDE 101 06/10/2024    CO2 22 06/10/2024    BUN 18.2 06/10/2024    CR 0.98 06/10/2024     (H) 06/10/2024    SED 10 04/11/2015    DD 0.52 (H) 01/16/2023    DIMER 291 10/28/2016    NTBNPI 326 09/28/2019    NTBNP 417 03/27/2024    TROPI <0.015 09/28/2019    TROPN 0.04 01/20/2014    AST 35 06/10/2024    ALT 27 06/10/2024    GGT 41 09/13/2023    ALKPHOS 172 (H) 06/10/2024    BILITOTAL 0.2 06/10/2024    INR 0.93 04/04/2024     MPI stress 2/21/2018:  Fixed defect inferiorly suggesting right coronary artery infarction. No evidence of reversible ischemia.      Nuclear stress 11/2023:    The nuclear stress test is abnormal.     There is a medium sized area of infarction in the " entire inferior  segment(s) of the left ventricle, similar in appearance to 2/21/2018.     Left ventricular function is low normal.     The left ventricular ejection fraction at rest is 53%.  The left ventricular ejection fraction at stress is 50%.     A prior study was conducted on 2/21/2018.      TTE 11/27/23:  Global and regional left ventricular function is normal with an EF of 55-60%. Grade I or early diastolic dysfunction.  Global right ventricular function is normal.  Both atria appear normal.  Trace mitral insufficiency is present. Trace aortic insufficiency is present.  No aortic stenosis is present.     Coronary angiogram 4/4/24:    Prox RCA lesion is 80% stenosed.    Dist RCA lesion is 40% stenosed.    RPDA lesion is 50% stenosed.    1st Mrg lesion is 50% stenosed.    Mid LAD lesion is 50% stenosed.    2nd Diag lesion is 50% stenosed.   Severe stenosis of the RCA s/p successful BUDDY stenting with 2.5x24mm synergy XD  Moderate stenosis of OM2 and LAD at D2 bifurcation  Uncomplicated R Radial access  Continue DAPT ideally for 90 days uninterrupted, followed by aspirin 81mg daily lifelong. If interruption of DAPT is required for cystoscopy and biopsy, please continue DAPT uninterrupted for a minimum of 30 days, followed by aspiring 81mg daily lifelong.      ASSESSMENT AND PLAN:  In summary, patient is a 64 year old male with above past medical history which is minimal for cardiac with positive stress test back in 2018 as well as 2023 with a large fixed defect in the RCA territory on nuclear medicine stress test no reversible ischemia who was referred for further cardiac evaluation in the setting of her recently found bladder mass requiring cystoscopy.  He presented with symptoms of angina and was referred for coronary angiogram as above. He does have multivessel disease and required PCI as above. He he is here for follow up.  Overall doing well no new complaints or issues.  Chest pain is essentially gone.  He  continues to take her medications, did have the surgical procedure via cystoscopy for cancer removal without any complications or issues.  Luckily no issues with the stent.  He continues aspirin and Crestor and losartan.  Blood pressure was a little bit higher today however normalized.  Will not adjust medications.  We did have a long discussion with regards to Brilinta use.  If he needs any surgical procedures or interventions at bleeding risk he can stop the Brilinta few days prior.  I would prefer him going back after the procedure once it safe for about a year however if that is not possible then I think it is okay to discontinue Brilinta.  Continue aspirin lifelong for sure.  He still continues to have a lot of disease in the coronary vessels and discussed the importance of lifestyle modifications including quit smoking as soon as possible.  He is working on it.  No other medication changes today, I will see him back in 6 months or sooner if needed.    I appreciate the opportunity to participate in the care of Rodney Goodwin . Please do not hesitate to contact me with any further questions.  I have spent a total of 40 minutes today reviewing labs, imaging studies, discussing with colleagues, face-to-face time with patient and documentation in the medical record.  The longitudinal plan of care for the diagnosis(es)/condition(s) as documented were addressed during this visit. Due to the added complexity in care, I will continue to support Rodney in the subsequent management and with ongoing continuity of care.    Sincerely,   Margarito Lamas MD  [facility]

## 2024-07-29 ENCOUNTER — OFFICE VISIT (OUTPATIENT)
Dept: CARDIOLOGY | Facility: OTHER | Age: 64
End: 2024-07-29
Attending: INTERNAL MEDICINE
Payer: MEDICARE

## 2024-07-29 VITALS
RESPIRATION RATE: 17 BRPM | WEIGHT: 189.1 LBS | OXYGEN SATURATION: 96 % | BODY MASS INDEX: 28.01 KG/M2 | SYSTOLIC BLOOD PRESSURE: 144 MMHG | HEIGHT: 69 IN | TEMPERATURE: 97.4 F | DIASTOLIC BLOOD PRESSURE: 86 MMHG | HEART RATE: 81 BPM

## 2024-07-29 DIAGNOSIS — R09.02 HYPOXEMIA: ICD-10-CM

## 2024-07-29 DIAGNOSIS — R06.00 DYSPNEA, UNSPECIFIED TYPE: ICD-10-CM

## 2024-07-29 DIAGNOSIS — I25.10 CORONARY ARTERY DISEASE INVOLVING NATIVE CORONARY ARTERY OF NATIVE HEART WITHOUT ANGINA PECTORIS: Primary | ICD-10-CM

## 2024-07-29 DIAGNOSIS — R07.9 CHEST PAIN, UNSPECIFIED TYPE: ICD-10-CM

## 2024-07-29 DIAGNOSIS — J43.1 PANLOBULAR EMPHYSEMA (H): ICD-10-CM

## 2024-07-29 PROCEDURE — G2211 COMPLEX E/M VISIT ADD ON: HCPCS | Performed by: INTERNAL MEDICINE

## 2024-07-29 PROCEDURE — G0463 HOSPITAL OUTPT CLINIC VISIT: HCPCS

## 2024-07-29 PROCEDURE — 99215 OFFICE O/P EST HI 40 MIN: CPT | Performed by: INTERNAL MEDICINE

## 2024-07-29 ASSESSMENT — PAIN SCALES - GENERAL: PAINLEVEL: NO PAIN (0)

## 2024-07-29 NOTE — NURSING NOTE
"Chief Complaint   Patient presents with    Follow Up     Follow up after stent placement       Initial BP (!) 144/86 (BP Location: Right arm, Patient Position: Sitting, Cuff Size: Adult Regular)   Pulse 81   Temp 97.4  F (36.3  C) (Tympanic)   Resp 17   Ht 1.753 m (5' 9\")   Wt 85.8 kg (189 lb 1.6 oz)   SpO2 96%   BMI 27.93 kg/m   Estimated body mass index is 27.93 kg/m  as calculated from the following:    Height as of this encounter: 1.753 m (5' 9\").    Weight as of this encounter: 85.8 kg (189 lb 1.6 oz).  Medication Reconciliation: complete    Anne-Marie Navarro RN    "

## 2024-07-29 NOTE — PATIENT INSTRUCTIONS
Thank you for allowing Dr. Lamas and our  team to participate in your care. Please call our office at [phone] with scheduling questions or if you need to cancel or change your appointment. With any other questions or concerns you may call the cardiology nurse at [phone].       If you experience chest pain, chest pressure, chest tightness, shortness of breath, fainting, lightheadedness, nausea, vomiting, or other concerning symptoms, please report to the Emergency Department or call 911. These symptoms may be emergent, and best treated in the Emergency Department.

## 2024-08-07 ENCOUNTER — PATIENT OUTREACH (OUTPATIENT)
Dept: CARE COORDINATION | Facility: OTHER | Age: 64
End: 2024-08-07

## 2024-08-07 NOTE — PROGRESS NOTES
Clinic Care Coordination Contact  Care Team Conversations    RN CC reviewed chart.  RN CC called patient and phone is not working number.  RN CC called Opal and she states she doesn't have a phone number for patient and hasn't seen him.  RN CC will attempt to contact patient at another time.  Alba Gama, RN  Care Coordination

## 2024-08-29 ENCOUNTER — PATIENT OUTREACH (OUTPATIENT)
Dept: CARE COORDINATION | Facility: OTHER | Age: 64
End: 2024-08-29

## 2024-08-29 NOTE — TELEPHONE ENCOUNTER
Clinic Care Coordination Contact  Care Team Conversations    RN CC called patient and number states not in service.  RN CC [place] [facility] [place] attempt to call patient and then please mail letter for patient to stop in and schedule follow up with PCP.  Please schedule follow up with Dr. Lamas in January and mail this date/time to patient.  Thank you    Patient Urology follow up for cystoscopy Essedil Ross was scheduled for 8/21 and not seeing notes so will continue to watch for this and prompt patient to reschedule when connect with him.    Alba Gama RN CC

## 2024-09-12 DIAGNOSIS — R10.84 ABDOMINAL PAIN, GENERALIZED: Primary | ICD-10-CM

## 2024-09-12 RX ORDER — OMEPRAZOLE 40 MG/1
40 CAPSULE, DELAYED RELEASE ORAL DAILY
Qty: 30 CAPSULE | Refills: 0 | Status: SHIPPED | OUTPATIENT
Start: 2024-09-12

## 2024-09-12 NOTE — TELEPHONE ENCOUNTER
omeprazole (PRILOSEC) 40 MG DR capsule       Last Written Prescription Date:  08/30/2023  Last Fill Quantity: 28,   # refills: 0  Last Office Visit: 06/10/2024  Future Office visit:       Routing refill request to provider for review/approval because:  Drug not active on patient's medication list  Discontinued 0913/2023    PPI Protocol [id number] 08:45 AM   Protocol Details Medication is active on med list    Medication indicated for associated diagnosis        Kimberly Boecker, RN

## 2024-09-19 ENCOUNTER — PATIENT OUTREACH (OUTPATIENT)
Dept: CARE COORDINATION | Facility: OTHER | Age: 64
End: 2024-09-19

## 2024-09-19 DIAGNOSIS — I25.10 CORONARY ARTERY DISEASE INVOLVING NATIVE CORONARY ARTERY OF NATIVE HEART WITHOUT ANGINA PECTORIS: ICD-10-CM

## 2024-09-19 NOTE — Clinical Note
Just fyi. Patient scheduled for follow up for CDM. Patient not wanting to go over symptoms but would like to discuss possible h pylori with you at that time as well. Please see my note with any further questions. Thank you Alba Gama RN CC

## 2024-09-19 NOTE — TELEPHONE ENCOUNTER
Reason for call:  Medication    Requesting PCP fill medications. States another provider in [facility] was filling prior. Unable to get refills from provider.   Have you contacted your pharmacy? Yes   If patient has contacted Pharmacy and it has been over 72hrs, continue to #2  Medication ticagrelor (BRILINTA) 90 MG tablet   What Pharmacy do you use? [facility]       (Please note that the turn-around-time for prescriptions is 72 business hours; I am sending your request at this time. SEND TO appropriate Care Team Pool )

## 2024-09-19 NOTE — TELEPHONE ENCOUNTER
9/19/2024 2:43 PM  Status check done regarding refill per [facility] they haven't worked on this request yet. Staff report they will start his request now.     9/19/2024 4:22 PM  Called [facility] again med is filled. Pt notified.  Nemo Castorena RN      no

## 2024-09-19 NOTE — PROGRESS NOTES
Clinic Care Coordination Contact  Care Team Conversations    RN ELVIRA called patient back.  He states he is doing well since stent placement.  States that he can feel his legs again.  States he walks 1.5 miles per day. He says that he is taking all medications prescribed to him from PCP.  Verified and patient states taking aspirin.  States that he is out of Brilinta for a few weeks.  States he stopped into clinic but was too inpatient and had to leave.  We discussed asking for RN CC and I will look to assist as needed.  He verbalized understanding.  Patient had called RN CC back and talked with ALFREDO Chester please see her note.  Assured patient Nemo is working on refill.  He was able to verify next appointment with PCP and that he will be here.  We discussed last office visit note with Cardio Dr. Lamas and patient doesn't have follow up scheduled but agrees to any date and time.  Note to ALFREDO Donaldson to please set date and time with him.  We discussed last Urology telephone call 5/21 that states:  LM for patient to call back regarding scheduling an appointment.    If patient calls back, please schedule a PROCEDURE (67) with any MD for cystoscopy, bladder cancer     TO be in August/Sept 2024. Can be at any location. Dr. Knox will be retired at that time so patient will need to see another urology MD  Was scheduled 8/21 at [facility] [phone]  and patient missed appointment.    We discussed rescheduling and patient wants to meet with PCP first and then will look to reschedule after talking with him.  He states that he is sorry that he left during scan at last office visit.  States they were not able to get an IV and he just couldn't take it anymore.  States he continues to have symptoms like he felt before when he had h pylori 8/23.  Patient wasn't open to discussing symptoms. Patient looking to discuss at PCP visit.  Patient is thankful for the call today.  He is encouraged to continue to follow up and he  assures RN CC he will do so.  Alba Gama, RN  Care Coordination

## 2024-09-19 NOTE — PROGRESS NOTES
Clinic Care Coordination Contact  Care Team Conversations    RN CC called patient and left message to have him please return RN CC call.  Waiting on return call. If no return call RN CC will look to discharge patient as haven't been able to get a hold of him.  Alba Gama, RN  Care Coordination

## 2024-09-19 NOTE — TELEPHONE ENCOUNTER
9/19/2024 12:33 PM    Pt called. Pt is requesting ticagrelor script be transferred to [facility] in [place]. Writer called [facility] in [place] they will call and transfer script.    Pt scheduled to see PCP for a follow-up. See letter.     Future Appointments 9/19/2024 - 3/18/2025        Date Visit Type Length Department Provider     9/24/2024  2:30 PM OFFICE VISIT 30 min [facility] Abdifatah Cline DO    Location Instructions:     From [place]: Take [address]. Turn left at [address]. Turn left at the first stoplight on [address]. At the first stop sign, take a right onto [address]. The upper level parking lot will be on the left. East Entrance Door number 10.   From [place]: Take [address]. Take a right at [address]. At the first stop sign, take a right onto [address]. The upper level parking lot will be on the left. East Entrance Door number 10.   From [place]: Take [address]. Take the [address] ramp towards [place]. Turn left onto [address]. Take a slight right onto [address]. Turn left at the first stoplight on [address]. At the first stop sign, take a right onto [address]. The upper level parking lot will be on the left. East Entrance Door number 10.                  Nemo Castorena RN

## 2024-09-24 ENCOUNTER — OFFICE VISIT (OUTPATIENT)
Dept: FAMILY MEDICINE | Facility: OTHER | Age: 64
End: 2024-09-24
Attending: FAMILY MEDICINE
Payer: MEDICARE

## 2024-09-24 VITALS
BODY MASS INDEX: 27.96 KG/M2 | HEIGHT: 69 IN | TEMPERATURE: 98.5 F | HEART RATE: 75 BPM | SYSTOLIC BLOOD PRESSURE: 118 MMHG | RESPIRATION RATE: 17 BRPM | DIASTOLIC BLOOD PRESSURE: 80 MMHG | WEIGHT: 188.8 LBS | OXYGEN SATURATION: 95 %

## 2024-09-24 DIAGNOSIS — R13.10 DYSPHAGIA, UNSPECIFIED TYPE: ICD-10-CM

## 2024-09-24 DIAGNOSIS — R10.9 ABDOMINAL DISCOMFORT: Primary | ICD-10-CM

## 2024-09-24 LAB
ALBUMIN SERPL BCG-MCNC: 4.4 G/DL (ref 3.5–5.2)
ALP SERPL-CCNC: 138 U/L (ref 40–150)
ALT SERPL W P-5'-P-CCNC: 20 U/L (ref 0–70)
ANION GAP SERPL CALCULATED.3IONS-SCNC: 10 MMOL/L (ref 7–15)
AST SERPL W P-5'-P-CCNC: 24 U/L (ref 0–45)
BASOPHILS # BLD AUTO: 0.1 10E3/UL (ref 0–0.2)
BASOPHILS NFR BLD AUTO: 1 %
BILIRUB SERPL-MCNC: 0.3 MG/DL
BUN SERPL-MCNC: 28.9 MG/DL (ref 8–23)
CALCIUM SERPL-MCNC: 9.4 MG/DL (ref 8.8–10.4)
CHLORIDE SERPL-SCNC: 108 MMOL/L (ref 98–107)
CREAT SERPL-MCNC: 1.15 MG/DL (ref 0.67–1.17)
EGFRCR SERPLBLD CKD-EPI 2021: 72 ML/MIN/1.73M2
EOSINOPHIL # BLD AUTO: 0.2 10E3/UL (ref 0–0.7)
EOSINOPHIL NFR BLD AUTO: 3 %
ERYTHROCYTE [DISTWIDTH] IN BLOOD BY AUTOMATED COUNT: 12.5 % (ref 10–15)
GLUCOSE SERPL-MCNC: 95 MG/DL (ref 70–99)
HCO3 SERPL-SCNC: 23 MMOL/L (ref 22–29)
HCT VFR BLD AUTO: 44.5 % (ref 40–53)
HGB BLD-MCNC: 14.9 G/DL (ref 13.3–17.7)
IMM GRANULOCYTES # BLD: 0 10E3/UL
IMM GRANULOCYTES NFR BLD: 0 %
LIPASE SERPL-CCNC: 24 U/L (ref 13–60)
LYMPHOCYTES # BLD AUTO: 2.2 10E3/UL (ref 0.8–5.3)
LYMPHOCYTES NFR BLD AUTO: 29 %
MCH RBC QN AUTO: 30.7 PG (ref 26.5–33)
MCHC RBC AUTO-ENTMCNC: 33.5 G/DL (ref 31.5–36.5)
MCV RBC AUTO: 92 FL (ref 78–100)
MONOCYTES # BLD AUTO: 0.7 10E3/UL (ref 0–1.3)
MONOCYTES NFR BLD AUTO: 10 %
NEUTROPHILS # BLD AUTO: 4.4 10E3/UL (ref 1.6–8.3)
NEUTROPHILS NFR BLD AUTO: 58 %
NRBC # BLD AUTO: 0 10E3/UL
NRBC BLD AUTO-RTO: 0 /100
PLATELET # BLD AUTO: 158 10E3/UL (ref 150–450)
POTASSIUM SERPL-SCNC: 4.4 MMOL/L (ref 3.4–5.3)
PROT SERPL-MCNC: 7.4 G/DL (ref 6.4–8.3)
RBC # BLD AUTO: 4.86 10E6/UL (ref 4.4–5.9)
SODIUM SERPL-SCNC: 141 MMOL/L (ref 135–145)
WBC # BLD AUTO: 7.6 10E3/UL (ref 4–11)

## 2024-09-24 PROCEDURE — G0463 HOSPITAL OUTPT CLINIC VISIT: HCPCS | Mod: 25

## 2024-09-24 PROCEDURE — 80053 COMPREHEN METABOLIC PANEL: CPT | Mod: ZL | Performed by: FAMILY MEDICINE

## 2024-09-24 PROCEDURE — 99214 OFFICE O/P EST MOD 30 MIN: CPT | Performed by: FAMILY MEDICINE

## 2024-09-24 PROCEDURE — 36415 COLL VENOUS BLD VENIPUNCTURE: CPT | Mod: ZL | Performed by: FAMILY MEDICINE

## 2024-09-24 PROCEDURE — 85004 AUTOMATED DIFF WBC COUNT: CPT | Mod: ZL | Performed by: FAMILY MEDICINE

## 2024-09-24 PROCEDURE — G0463 HOSPITAL OUTPT CLINIC VISIT: HCPCS

## 2024-09-24 PROCEDURE — 83690 ASSAY OF LIPASE: CPT | Mod: ZL | Performed by: FAMILY MEDICINE

## 2024-09-24 ASSESSMENT — PAIN SCALES - GENERAL: PAINLEVEL: NO PAIN (0)

## 2024-09-24 NOTE — PROGRESS NOTES
"  Assessment & Plan     Abdominal discomfort  - Adult GI  Referral - Procedure Only; Future  - Helicobacter pylori Antigen Stool  - Lipase  - Comprehensive metabolic panel  - CBC with Platelets & Differential    Dysphagia, unspecified type  - Adult GI  Referral - Procedure Only; Future  - Comprehensive metabolic panel  - CBC with Platelets & Differential            Subjective   Rodney is a 64 year old, presenting for the following health issues:  No chief complaint on file.        9/24/2024     2:40 PM   Additional Questions   Roomed by Sarah Hooper   Accompanied by self     HPI     Concern today of ongoing epigastric discomfort, now feels like he is developing trouble swallowing.  Was supposed to have an EGD/Colonoscopy a year ago but delayed due to cardiac reasons.    He is now approx 6 months out from his stent and wants to get scoped - stent 4/4/24  Compliant with PPI      Hypertension Follow-up    Do you check your blood pressure regularly outside of the clinic? Yes   Are you following a low salt diet? Yes  Are your blood pressures ever more than 140 on the top number (systolic) OR more   than 90 on the bottom number (diastolic), for example 140/90? Yes    States just got back on Brilinta - was off for many days due to trouble getting from pharmacy        Objective    /80   Pulse 75   Temp 98.5  F (36.9  C) (Tympanic)   Resp 17   Ht 1.753 m (5' 9\")   Wt 85.6 kg (188 lb 12.8 oz)   SpO2 95%   BMI 27.88 kg/m    Body mass index is 27.88 kg/m .  Physical Exam  Constitutional:       General: He is not in acute distress.     Appearance: Normal appearance.   Cardiovascular:      Rate and Rhythm: Normal rate and regular rhythm.      Heart sounds: Normal heart sounds. No murmur heard.  Pulmonary:      Effort: Pulmonary effort is normal.      Breath sounds: Normal breath sounds.   Abdominal:      General: Bowel sounds are normal. There is no distension.      Palpations: Abdomen is soft. " There is no mass.      Tenderness: There is no guarding.      Hernia: No hernia is present.      Comments: Mild epigastric discomfort on exam   Neurological:      Mental Status: He is alert and oriented to person, place, and time.   Psychiatric:      Comments: anxious            Signed Electronically by: KASSI PAIZ DO

## 2024-09-25 ENCOUNTER — PATIENT OUTREACH (OUTPATIENT)
Dept: CARE COORDINATION | Facility: OTHER | Age: 64
End: 2024-09-25

## 2024-09-25 NOTE — PROGRESS NOTES
Clinic Care Coordination Contact  Care Team Conversations    RN CC reviewed chart and patient was in to see PCP.  H pylori lab ordered and waiting to be submitted.  Patient has upcoming 9/30 gen surgery visit here for possible upper/lower endoscopy for upper abdominal discomfort and trouble swallowing.    RN CC will continue to follow and reach out as needed.  Alba Gama RN  Care Coordination

## 2024-09-26 PROCEDURE — 87338 HPYLORI STOOL AG IA: CPT | Mod: ZL | Performed by: FAMILY MEDICINE

## 2024-09-30 ENCOUNTER — OFFICE VISIT (OUTPATIENT)
Dept: SURGERY | Facility: OTHER | Age: 64
End: 2024-09-30
Attending: FAMILY MEDICINE
Payer: MEDICARE

## 2024-09-30 ENCOUNTER — PREP FOR PROCEDURE (OUTPATIENT)
Dept: SURGERY | Facility: OTHER | Age: 64
End: 2024-09-30

## 2024-09-30 VITALS
TEMPERATURE: 98 F | OXYGEN SATURATION: 96 % | RESPIRATION RATE: 16 BRPM | SYSTOLIC BLOOD PRESSURE: 164 MMHG | DIASTOLIC BLOOD PRESSURE: 84 MMHG | HEART RATE: 73 BPM

## 2024-09-30 DIAGNOSIS — R10.9 ABDOMINAL DISCOMFORT: ICD-10-CM

## 2024-09-30 DIAGNOSIS — R10.9 ABDOMINAL DISCOMFORT: Primary | ICD-10-CM

## 2024-09-30 DIAGNOSIS — R13.10 DYSPHAGIA: Primary | ICD-10-CM

## 2024-09-30 DIAGNOSIS — R13.10 DYSPHAGIA, UNSPECIFIED TYPE: ICD-10-CM

## 2024-09-30 LAB — H PYLORI AG STL QL IA: NEGATIVE

## 2024-09-30 PROCEDURE — 99204 OFFICE O/P NEW MOD 45 MIN: CPT | Performed by: STUDENT IN AN ORGANIZED HEALTH CARE EDUCATION/TRAINING PROGRAM

## 2024-09-30 PROCEDURE — G0463 HOSPITAL OUTPT CLINIC VISIT: HCPCS

## 2024-09-30 RX ORDER — BISACODYL 5 MG/1
5 TABLET, DELAYED RELEASE ORAL DAILY PRN
Qty: 4 TABLET | Refills: 0 | Status: SHIPPED | OUTPATIENT
Start: 2024-09-30

## 2024-09-30 ASSESSMENT — PAIN SCALES - GENERAL: PAINLEVEL: NO PAIN (0)

## 2024-09-30 NOTE — PATIENT INSTRUCTIONS
Thank you for allowing Dr. Bone and our surgical team to participate in your care. Please call our health unit coordinator at [phone] with scheduling questions or the nurse at [phone] with any other questions or concerns.      You have been scheduled for: Upper endoscopy and colonoscopy with  on 10/22/24.   You will use golytely bowel prep.  Please see handout for additional instruction.  You will need a pre-operative appointment with your primary care provider.  You may call [phone] or [phone] with any questions.

## 2024-09-30 NOTE — PROGRESS NOTES
[facility] Consultation    CHIEF COMPLAINT:  Chief Complaint   Patient presents with    Consult For     Dysphagia, abdominal discomfort- Marlyn referring        HISTORY OF PRESENT ILLNESS:  Rodney Goodwin is a 64 year old male who is seen in consultation at the request of Dr. Cline for evaluation of dysphagia. HE reports a recent history of acute abdominal pain several months ago but denies pain at this time. He reports dysphagia with solid foods but not liquids. Denies emesis but does complain of significant coughing with eating.  Endorses weight loss from 205 pounds to 188 over the last several months.  He reports previous upper endoscopy in 2020 which was positive for H. pylori.  He is unsure of his last colonoscopy however reports he has had numerous polyps on each colonoscopy.  There is a note in the system from 2015 which demonstrates multiple adenomas but recommended 1 year follow-up due to poor prep.  His primary care provider had ordered a CT abdomen pelvis several months ago for an episode of acute abdominal pain however the patient eloped after multiple failed attempts at IV access for contrast.    He is also on Brilinta for coronary stent placement in April 2024.  He ran out and has been off of this for approximately the last month and was restarted 1 week ago.  He denies any chest pain shortness of breath.    REVIEW OF SYSTEMS:  10 point ROS completed and negative unless otherwise listed in HPI    Past Medical History:   Diagnosis Date    Acute exacerbation of chronic obstructive pulmonary disease (H) 10/28/2016    Anxiety     Backache 6/9/2008    Overview:  IMO Update 10/11    Bipolar 1 disorder (H) 11/11/2014    Bipolar affective disorder (H)     Cellulitis and abscess of forearm 4/4/2014    Chlordiazepoxide dependence (H) 8/9/2010    Overview:  IMO Update 10/11    Chronic constipation     COPD exacerbation (H) 9/14/2015    Costochondritis 2/26/2015    Degeneration of lumbar  or lumbosacral intervertebral disc 7/25/2006    Overview:  IMO Update 10/11    Depressive disorder     Drug abuse (H)     Elevated blood sugar 10/29/2016    Heroin abuse (H) 9/15/2015    Heroin addiction (H) 8/9/2010    Overview:  See social notes IMO Update 10/11    Hypertension     IV drug user 8/9/2010    Overview:  IMO Update 10/11    Lumbosacral spondylosis without myelopathy 11/13/2008    Narcotic addiction (H) 10/29/2016    Opioid use disorder, severe, dependence (H) 2020    [facility] records; prior Methadone; [facility]; AA/NA    Osteoarthrosis, pelvic region and thigh 11/13/2008    Overview:  IMO Update 10/11    Penile lesion 4/27/2020    Suicidal behavior 4/27/2020    Venous insufficiency of both lower extremities 2/26/2015     Do you wish to do the replacement in the background? yes         Past Surgical History:   Procedure Laterality Date    ARTHROSCOPY KNEE BILATERAL      COLONOSCOPY  01/29/2020    Multiple, repeat due 2015    COLONOSCOPY N/A 7/31/2015    Procedure: COLONOSCOPY;  Surgeon: Justin Andujar MD;  Location: [facility]    CV CORONARY ANGIOGRAM N/A 4/4/2024    Procedure: Coronary Angiogram;  Surgeon: Zak Stephens MD;  Location: [facility]    CV PCI N/A 4/4/2024    Procedure: Percutaneous Coronary Intervention;  Surgeon: Zak Stephens MD;  Location: [facility]    DECOMPRESSION CUBITAL TUNNEL Left 11/21/2017    Procedure: DECOMPRESSION CUBITAL TUNNEL;;  Surgeon: Jhonny Zhao MD;  Location: [facility]    DENTAL SURGERY      HERNIA REPAIR      Inguinal, left    RELEASE CARPAL TUNNEL Left 11/21/2017    Procedure: RELEASE CARPAL TUNNEL;  LEFT ELBOW CUBITAL and CARPAL TUNNEL RELEASE;  Surgeon: Jhonny Zhao MD;  Location: [facility]       Family History   Problem Relation Age of Onset    Diabetes Mother     Cerebrovascular Disease Father     Pancreatic Cancer Brother    Patient reports possible abdominal cancer in sister as well as what he believes is colon cancer in  his oldest brother.    Social History     Tobacco Use    Smoking status: Every Day     Current packs/day: 1.00     Average packs/day: 1 pack/day for 41.7 years (41.7 ttl pk-yrs)     Types: Cigarettes     Start date: 1983    Smokeless tobacco: Never    Tobacco comments:     Tried to Quit (NO)   Substance Use Topics    Alcohol use: No     Alcohol/week: 0.0 standard drinks of alcohol       Patient Active Problem List   Diagnosis    Cellulitis and abscess of forearm    Bipolar 1 disorder (H)    Costochondritis    Venous insufficiency of both lower extremities    Heroin abuse (H)    Elevated blood sugar    Narcotic addiction (H)    Osteoarthrosis, pelvic region and thigh    Lumbosacral spondylosis without myelopathy    IV drug user    Degeneration of lumbar or lumbosacral intervertebral disc    Backache    Chlordiazepoxide dependence (H)    Heroin addiction (H)    Suicidal behavior    Hypertension    Cluster B personality disorder (H)    Hypoxemia    Polysubstance abuse (H)    Tobacco dependence    Atypical bipolar affective disorder (H)    Gastroesophageal reflux disease without esophagitis    Penile lesion    COPD (chronic obstructive pulmonary disease) (H)    Acute on chronic systolic heart failure (H)    CAD (coronary artery disease)    Status post coronary angiogram    Chest pain, unspecified type    Bladder tumor       Allergies   Allergen Reactions    Codeine     Penicillins     Morphine And Codeine Other (See Comments)     Unknown at young age       Current Outpatient Medications   Medication Sig Dispense Refill    albuterol (PROAIR HFA/PROVENTIL HFA/VENTOLIN HFA) 108 (90 Base) MCG/ACT inhaler Inhale 2 puffs into the lungs every 4 hours as needed for shortness of breath, wheezing or cough 18 g 3    aspirin (ASA) 81 MG chewable tablet Take 1 tablet (81 mg) by mouth daily Starting tomorrow. 90 tablet 3    carvedilol (COREG) 12.5 MG tablet Take 1 tablet (12.5 mg) by mouth 2 times daily (with meals) 180 tablet 1     ipratropium - albuterol 0.5 mg/2.5 mg/3 mL (DUONEB) 0.5-2.5 (3) MG/3ML neb solution Take 1 vial (3 mLs) by nebulization every 6 hours as needed for shortness of breath, wheezing or cough 90 mL 11    losartan (COZAAR) 100 MG tablet Take 1 tablet by mouth once daily 30 tablet 10    omeprazole (PRILOSEC) 40 MG DR capsule Take 1 capsule (40 mg) by mouth daily. 30 capsule 0    rosuvastatin (CRESTOR) 40 MG tablet Take 1 tablet (40 mg) by mouth daily 90 tablet 3    ticagrelor (BRILINTA) 90 MG tablet Take 1 tablet (90 mg) by mouth 2 times daily. Start this evening. 180 tablet 3    umeclidinium-vilanterol (ANORO ELLIPTA) 62.5-25 MCG/ACT oral inhaler Inhale 1 puff into the lungs daily 60 each 1       Vitals: BP (!) 164/84   Pulse 73   Temp 98  F (36.7  C) (Tympanic)   Resp 16   SpO2 96%   BMI= There is no height or weight on file to calculate BMI.    EXAM:  General: Vital signs reviewed, in no apparent distress  Eyes: Anicteric  HENT: Normocephalic, atraumatic, trachea midline   Respiratory: Breathing nonlabored  Cardiovascular: Regular rate and rhythm  GI: Abdomen soft, nondistended, nontender, no organomegaly  Musculoskeletal: No gross deformities  Neurologic: Grossly nonfocal exam  Psychiatric: Normal mood, affect and insight  Integumentary: Warm and dry    All labs and imaging personally reviewed and significant for: CT scan reviewed with small bladder tumor aned enlargind 3.9cm AAA, no other obvious abdominal pathology.     ASSESSMENT:  Rodney Goodwin is a 64 year old who presents with dysphagia to solid foods and weight loss.  Significant pertinent co-morbidities include: Recent coronary stent placement on ticagrelor, COPD, bipolar 1.. Also has a 3.9cm AAA based on CT in Dec 2023 which is increased in size from previous imaging in 2020.       PLAN:  Ordered esophagram prior to scope  Plan for EGD and colonoscopy.  Will request preoperative evaluation due to recent issues with ticagrelor.  Ideally would hold for 1  week prior to endoscopy.  Discussed with patient that pending results of above study, we may also proceed with a CT abdomen/pelvis at some point in the future.  Will defer to PCP regarding repeat imaging for evaluation of AAA.     It was my pleasure to participate in the care of Rodneyweston Ryanon in clinic today. Thank you for this consultation.         Andrey Bone MD    Please route or send letter to:  Primary Care Provider (PCP)

## 2024-10-02 ENCOUNTER — HOSPITAL ENCOUNTER (OUTPATIENT)
Dept: GENERAL RADIOLOGY | Facility: HOSPITAL | Age: 64
Discharge: HOME | End: 2024-10-02
Attending: STUDENT IN AN ORGANIZED HEALTH CARE EDUCATION/TRAINING PROGRAM | Admitting: STUDENT IN AN ORGANIZED HEALTH CARE EDUCATION/TRAINING PROGRAM
Payer: MEDICARE

## 2024-10-02 DIAGNOSIS — R13.10 DYSPHAGIA, UNSPECIFIED TYPE: ICD-10-CM

## 2024-10-02 DIAGNOSIS — R10.9 ABDOMINAL DISCOMFORT: ICD-10-CM

## 2024-10-02 PROCEDURE — 74220 X-RAY XM ESOPHAGUS 1CNTRST: CPT

## 2024-10-14 RX ORDER — POLYETHYLENE GLYCOL 3350, SODIUM SULFATE ANHYDROUS, SODIUM BICARBONATE, SODIUM CHLORIDE, POTASSIUM CHLORIDE 236; 22.74; 6.74; 5.86; 2.97 G/4L; G/4L; G/4L; G/4L; G/4L
POWDER, FOR SOLUTION ORAL
COMMUNITY
Start: 2024-09-30

## 2024-10-15 ENCOUNTER — ANESTHESIA EVENT (OUTPATIENT)
Dept: SURGERY | Facility: HOSPITAL | Age: 64
End: 2024-10-15
Payer: MEDICARE

## 2024-10-15 ENCOUNTER — OFFICE VISIT (OUTPATIENT)
Dept: FAMILY MEDICINE | Facility: OTHER | Age: 64
End: 2024-10-15
Attending: FAMILY MEDICINE
Payer: MEDICARE

## 2024-10-15 VITALS
DIASTOLIC BLOOD PRESSURE: 92 MMHG | SYSTOLIC BLOOD PRESSURE: 142 MMHG | WEIGHT: 193.1 LBS | RESPIRATION RATE: 19 BRPM | OXYGEN SATURATION: 98 % | TEMPERATURE: 98 F | BODY MASS INDEX: 28.52 KG/M2 | HEART RATE: 85 BPM

## 2024-10-15 DIAGNOSIS — Z01.818 PREOPERATIVE EXAMINATION: Primary | ICD-10-CM

## 2024-10-15 DIAGNOSIS — J44.9 CHRONIC OBSTRUCTIVE PULMONARY DISEASE, UNSPECIFIED COPD TYPE (H): ICD-10-CM

## 2024-10-15 DIAGNOSIS — I71.43 INFRARENAL ABDOMINAL AORTIC ANEURYSM (AAA) WITHOUT RUPTURE (H): ICD-10-CM

## 2024-10-15 DIAGNOSIS — I10 PRIMARY HYPERTENSION: ICD-10-CM

## 2024-10-15 DIAGNOSIS — R10.9 ABDOMINAL DISCOMFORT: ICD-10-CM

## 2024-10-15 DIAGNOSIS — R73.9 ELEVATED BLOOD SUGAR: ICD-10-CM

## 2024-10-15 DIAGNOSIS — R73.03 PREDIABETES: ICD-10-CM

## 2024-10-15 DIAGNOSIS — I25.10 CORONARY ARTERY DISEASE INVOLVING NATIVE CORONARY ARTERY OF NATIVE HEART WITHOUT ANGINA PECTORIS: ICD-10-CM

## 2024-10-15 LAB
ANION GAP SERPL CALCULATED.3IONS-SCNC: 9 MMOL/L (ref 7–15)
BASOPHILS # BLD AUTO: 0 10E3/UL (ref 0–0.2)
BASOPHILS NFR BLD AUTO: 0 %
BUN SERPL-MCNC: 22.3 MG/DL (ref 8–23)
CALCIUM SERPL-MCNC: 9.2 MG/DL (ref 8.8–10.4)
CHLORIDE SERPL-SCNC: 105 MMOL/L (ref 98–107)
CREAT SERPL-MCNC: 1.55 MG/DL (ref 0.67–1.17)
EGFRCR SERPLBLD CKD-EPI 2021: 50 ML/MIN/1.73M2
EOSINOPHIL # BLD AUTO: 0.2 10E3/UL (ref 0–0.7)
EOSINOPHIL NFR BLD AUTO: 3 %
ERYTHROCYTE [DISTWIDTH] IN BLOOD BY AUTOMATED COUNT: 12.4 % (ref 10–15)
EST. AVERAGE GLUCOSE BLD GHB EST-MCNC: 131 MG/DL
GLUCOSE SERPL-MCNC: 103 MG/DL (ref 70–99)
HBA1C MFR BLD: 6.2 %
HCO3 SERPL-SCNC: 24 MMOL/L (ref 22–29)
HCT VFR BLD AUTO: 40.4 % (ref 40–53)
HGB BLD-MCNC: 13.6 G/DL (ref 13.3–17.7)
IMM GRANULOCYTES # BLD: 0 10E3/UL
IMM GRANULOCYTES NFR BLD: 1 %
LYMPHOCYTES # BLD AUTO: 1.8 10E3/UL (ref 0.8–5.3)
LYMPHOCYTES NFR BLD AUTO: 24 %
MCH RBC QN AUTO: 30.8 PG (ref 26.5–33)
MCHC RBC AUTO-ENTMCNC: 33.7 G/DL (ref 31.5–36.5)
MCV RBC AUTO: 91 FL (ref 78–100)
MONOCYTES # BLD AUTO: 0.6 10E3/UL (ref 0–1.3)
MONOCYTES NFR BLD AUTO: 9 %
NEUTROPHILS # BLD AUTO: 4.6 10E3/UL (ref 1.6–8.3)
NEUTROPHILS NFR BLD AUTO: 63 %
NRBC # BLD AUTO: 0 10E3/UL
NRBC BLD AUTO-RTO: 0 /100
PLATELET # BLD AUTO: 146 10E3/UL (ref 150–450)
POTASSIUM SERPL-SCNC: 4.4 MMOL/L (ref 3.4–5.3)
RBC # BLD AUTO: 4.42 10E6/UL (ref 4.4–5.9)
SODIUM SERPL-SCNC: 138 MMOL/L (ref 135–145)
WBC # BLD AUTO: 7.3 10E3/UL (ref 4–11)

## 2024-10-15 PROCEDURE — 99214 OFFICE O/P EST MOD 30 MIN: CPT | Performed by: FAMILY MEDICINE

## 2024-10-15 PROCEDURE — 36415 COLL VENOUS BLD VENIPUNCTURE: CPT | Mod: ZL | Performed by: FAMILY MEDICINE

## 2024-10-15 PROCEDURE — 93005 ELECTROCARDIOGRAM TRACING: CPT | Performed by: FAMILY MEDICINE

## 2024-10-15 PROCEDURE — 83036 HEMOGLOBIN GLYCOSYLATED A1C: CPT | Mod: ZL | Performed by: FAMILY MEDICINE

## 2024-10-15 PROCEDURE — 93010 ELECTROCARDIOGRAM REPORT: CPT | Performed by: INTERNAL MEDICINE

## 2024-10-15 PROCEDURE — 80048 BASIC METABOLIC PNL TOTAL CA: CPT | Mod: ZL | Performed by: FAMILY MEDICINE

## 2024-10-15 PROCEDURE — 85025 COMPLETE CBC W/AUTO DIFF WBC: CPT | Mod: ZL | Performed by: FAMILY MEDICINE

## 2024-10-15 PROCEDURE — G0463 HOSPITAL OUTPT CLINIC VISIT: HCPCS | Mod: 25

## 2024-10-15 PROCEDURE — G0463 HOSPITAL OUTPT CLINIC VISIT: HCPCS

## 2024-10-15 ASSESSMENT — PAIN SCALES - GENERAL: PAINLEVEL: MODERATE PAIN (5)

## 2024-10-15 ASSESSMENT — COPD QUESTIONNAIRES: COPD: 1

## 2024-10-15 ASSESSMENT — LIFESTYLE VARIABLES: TOBACCO_USE: 1

## 2024-10-15 NOTE — ANESTHESIA PREPROCEDURE EVALUATION
Anesthesia Pre-Procedure Evaluation    Patient: Rodney Goodwin   MRN: [id number] : 1960        Procedure : Procedure(s):  Upper endoscopy and colonoscopy          Past Medical History:   Diagnosis Date     Acute exacerbation of chronic obstructive pulmonary disease (H) 10/28/2016     Anxiety      Backache 2008    Overview:  IMO Update 10/11     Bipolar 1 disorder (H) 2014     Bipolar affective disorder (H)      Cellulitis and abscess of forearm 2014     Chlordiazepoxide dependence (H) 2010    Overview:  IMO Update 10/11     Chronic constipation      COPD exacerbation (H) 2015     Costochondritis 2015     Degeneration of lumbar or lumbosacral intervertebral disc 2006    Overview:  IMO Update 10/11     Depressive disorder      Drug abuse (H)      Elevated blood sugar 10/29/2016     Heroin abuse (H) 9/15/2015     Heroin addiction (H) 2010    Overview:  See social notes IMO Update 10/11     Hypertension      IV drug user 2010    Overview:  IMO Update 10/11     Lumbosacral spondylosis without myelopathy 2008     Narcotic addiction (H) 10/29/2016     Opioid use disorder, severe, dependence (H)     CADT records; prior Methadone; [facility]; AA/NA     Osteoarthrosis, pelvic region and thigh 2008    Overview:  IMO Update 10/11     Penile lesion 2020     Suicidal behavior 2020     Venous insufficiency of both lower extremities 2015     Do you wish to do the replacement in the background? yes        Past Surgical History:   Procedure Laterality Date     ARTHROSCOPY KNEE BILATERAL       COLONOSCOPY  2020    Multiple, repeat due      COLONOSCOPY N/A 2015    Procedure: COLONOSCOPY;  Surgeon: Justin Andujar MD;  Location: [facility]     CV CORONARY ANGIOGRAM N/A 2024    Procedure: Coronary Angiogram;  Surgeon: Zak Stpehens MD;  [facility]     CV PCI N/A 2024    Procedure: Percutaneous Coronary  Intervention;  Surgeon: Zak Stephens MD;  Location:  [facility]     DECOMPRESSION CUBITAL TUNNEL Left 11/21/2017    Procedure: DECOMPRESSION CUBITAL TUNNEL;;  Surgeon: Jhonny Zhao MD;  Location: [facility]     DENTAL SURGERY       HERNIA REPAIR      Inguinal, left     RELEASE CARPAL TUNNEL Left 11/21/2017    Procedure: RELEASE CARPAL TUNNEL;  LEFT ELBOW CUBITAL and CARPAL TUNNEL RELEASE;  Surgeon: Jhonny Zhao MD;  Location: [facility]      Allergies   Allergen Reactions     Codeine      Penicillins      Morphine And Codeine Other (See Comments)     Unknown at young age      Social History     Tobacco Use     Smoking status: Every Day     Current packs/day: 1.00     Average packs/day: 1 pack/day for 41.8 years (41.8 ttl pk-yrs)     Types: Cigarettes     Start date: 1983     Smokeless tobacco: Never     Tobacco comments:     Tried to Quit (NO)   Substance Use Topics     Alcohol use: No     Alcohol/week: 0.0 standard drinks of alcohol      Wt Readings from Last 1 Encounters:   10/15/24 87.6 kg (193 lb 1.6 oz)        Anesthesia Evaluation   Pt has had prior anesthetic. Type: MAC and General.        ROS/MED HX  ENT/Pulmonary:     (+)     BETTY risk factors,  hypertension,         tobacco use, Current use, 1 packs/day,        COPD,              Neurologic:  - neg neurologic ROS     Cardiovascular: Comment: Infrarenal AAA - saw vascular 1/10/24 - plan is to repeat/follow with an US in 1 year    (+) Dyslipidemia hypertension- -  CAD -  - stent-4/4/24.   Taking blood thinners  Instructions Given to patient: ASA and Brilinta (5 day hold). CHF etiology: Acute on chronic systolic heart failure                          Previous cardiac testing   Echo: Date: 11/2023 Results:  Global and regional left ventricular function is normal with an EF of 55-60%.  Grade I or early diastolic dysfunction.  Global right ventricular function is normal.  Both atria appear normal.  Trace mitral insufficiency is present.  Trace  aortic insufficiency is present.  No aortic stenosis is present.  ______________________________________________________________________________  Left Ventricle  Global and regional left ventricular function is normal with an EF of 55-60%.  Grade I or early diastolic dysfunction.     Right Ventricle  Global right ventricular function is normal.     Atria  Both atria appear normal.     Mitral Valve  Trace mitral insufficiency is present.     Aortic Valve  Trace aortic insufficiency is present. The mean AoV pressure gradient is 2.1  mmHg. The peak AoV pressure gradient is 3.5 mmHg. No aortic stenosis is  present.     Pulmonic Valve  Trace pulmonic insufficiency is present.     Vessels  Ascending aorta 3.52 cm.     Pericardium  No pericardial effusion is present.    Stress Test:  Date: Results:    ECG Reviewed:  Date:  Results:  Sinus rhythm  Possible Inferior infarct (cited on or before 11-Oct-2023)  Abnormal ECG  When compared with ECG of 04-Apr-2024 12:30,  No significant change was found  Confirmed by MD Colin, Jaxson ([id number]) on 10/16/2024 1:31:54 PM    Cath:  Date: Results:      METS/Exercise Tolerance:     Hematologic: Comments: Venous insufficiency of both lower extremities      Musculoskeletal: Comment: Degeneration of lumbar or lumbosacral intervertebral disc  (+)  arthritis,             GI/Hepatic:     (+) GERD, Asymptomatic on medication,      bowel prep,            Renal/Genitourinary: Comment: Bladder tumor    Per Nur[facility]-Slight bump in creatinine from baseline - no recent med changes - okay to do scopes - plan repeat BMP a week or two after scopes    (+) renal disease, type: CRI,            Endo: Comment: Newly diagnosed diabetes A1c 6.2      Psychiatric/Substance Use: Comment: Drug abuse herion  Opioid use disorder, severe, dependence   Suicidal behavior    (+) psychiatric history bipolar, anxiety and depression  H/O chronic opiod use . Recreational drug usage: Other (Comment), Cannabis and Meth  "(heroin-none in many years).    Infectious Disease:  - neg infectious disease ROS     Malignancy:  - neg malignancy ROS     Other:      (+)  , H/O Chronic Pain,         Physical Exam    Airway        Mallampati: III   TM distance: > 3 FB   Neck ROM: full   Mouth opening: > 3 cm    Respiratory Devices and Support         Dental       (+) Edentulous      Cardiovascular          Rhythm and rate: regular and normal     Pulmonary           (+) decreased breath sounds       OUTSIDE LABS:  CBC:   Lab Results   Component Value Date    WBC 7.3 10/15/2024    WBC 7.6 09/24/2024    HGB 13.6 10/15/2024    HGB 14.9 09/24/2024    HCT 40.4 10/15/2024    HCT 44.5 09/24/2024     (L) 10/15/2024     09/24/2024     BMP:   Lab Results   Component Value Date     10/15/2024     09/24/2024    POTASSIUM 4.4 10/15/2024    POTASSIUM 4.4 09/24/2024    CHLORIDE 105 10/15/2024    CHLORIDE 108 (H) 09/24/2024    CO2 24 10/15/2024    CO2 23 09/24/2024    BUN 22.3 10/15/2024    BUN 28.9 (H) 09/24/2024    CR 1.55 (H) 10/15/2024    CR 1.15 09/24/2024     (H) 10/15/2024    GLC 95 09/24/2024     COAGS:   Lab Results   Component Value Date    PTT 28 04/04/2024    INR 0.93 04/04/2024     POC: No results found for: \"BGM\", \"HCG\", \"HCGS\"  HEPATIC:   Lab Results   Component Value Date    ALBUMIN 4.4 09/24/2024    PROTTOTAL 7.4 09/24/2024    ALT 20 09/24/2024    AST 24 09/24/2024    GGT 41 09/13/2023    ALKPHOS 138 09/24/2024    BILITOTAL 0.3 09/24/2024     OTHER:   Lab Results   Component Value Date    LACT 1.4 01/16/2023    A1C 6.2 (H) 10/15/2024    RACHEL 9.2 10/15/2024    MAG 1.9 08/23/2023    LIPASE 24 09/24/2024    AMYLASE 26 (L) 11/03/2017    TSH 0.87 08/23/2023    T4 1.32 11/13/2014    CRP 6.5 11/03/2017    SED 10 04/11/2015       Anesthesia Plan    ASA Status:  4    NPO Status:  NPO Appropriate    Anesthesia Type: MAC.     - Reason for MAC: straight local not clinically adequate, chronic cardiopulmonary disease          " "    Consents    Anesthesia Plan(s) and associated risks, benefits, and realistic alternatives discussed. Questions answered and patient/representative(s) expressed understanding.     - Discussed: Risks, Benefits and Alternatives for BOTH SEDATION and the PROCEDURE were discussed     - Discussed with:  Patient      - Extended Intubation/Ventilatory Support Discussed: No.      - Patient is DNR/DNI Status: No     Use of blood products discussed: No .     Postoperative Care            Comments:    Other Comments: Hp 10/15/24           MARCO ANTONIO Orozco CRNA    I have reviewed the pertinent notes and labs in the chart from the past 30 days and (re)examined the patient.  Any updates or changes from those notes are reflected in this note.               # Hypertension: Noted on problem list         # Overweight: Estimated body mass index is 28.52 kg/m  as calculated from the following:    Height as of 9/24/24: 1.753 m (5' 9\").    Weight as of 10/15/24: 87.6 kg (193 lb 1.6 oz).             "

## 2024-10-15 NOTE — PROGRESS NOTES
Preoperative Evaluation  [facility] [place]  [address]  [place] [address]  Phone: [phone]  Primary Provider: KASSI PAIZ DO  Pre-op Performing Provider: KASSI PAIZ DO  Oct 15, 2024             10/15/2024   Surgical Information   What procedure is being done? Upper endoscopy and colonoscopy   [place] [facility] [place]   Who is doing the procedure / surgery? Virgilio   Date of surgery / procedure: 10/22/2024   Time of surgery / procedure: 09:25 am   Where do you plan to recover after surgery? at home with family        Fax number for surgical facility: Note does not need to be faxed, will be available electronically in Epic.    Assessment & Plan     The proposed surgical procedure is considered LOW risk.    Preoperative examination  - EKG 12-lead complete w/read - Clinics  - Basic metabolic panel  - CBC with Platelets & Differential    Abdominal discomfort    Elevated blood sugar  - Hemoglobin A1c  A1c 6.2 = new dx prediabetes    Coronary artery disease involving native coronary artery of native heart without angina pectoris    Chronic obstructive pulmonary disease, unspecified COPD type (H)      Infrarenal abdominal aortic aneurysm (AAA) without rupture (H)      Primary hypertension              - No identified additional risk factors other than previously addressed    Antiplatelet or Anticoagulation Medication Instructions  Aspirin 81mg - continue  Brilinta - hold x5 days prior    Additional Medication Instructions  Take other meds as per usual schedule.    Recommendation  Approval given to proceed with proposed procedure, without further diagnostic evaluation.    Slight bump in creatinine from baseline - no recent med changes - okay to do scopes - plan repeat BMP a week or two after scopes.  He feels well.    Skye Stevens is a 64 year old, presenting for the following:  Pre-Op Exam          10/15/2024     8:56 AM   Additional Questions    Roomed by Sarah Hooper   Accompanied by self     HPI related to upcoming procedure: preop EGD/Colonoscopy for ongoing non-exertional abdominal discomfort not responding to PPI.          10/15/2024   Pre-Op Questionnaire   Have you ever had a heart attack or stroke? (!) UNKNOWN    Have you ever had surgery on your heart or blood vessels, such as a stent placement, a coronary artery bypass, or surgery on an artery in your head, neck, heart, or legs? (!) YES    Do you have chest pain with activity? No   Do you have a history of heart failure? (!) YES    Do you currently have a cold, bronchitis or symptoms of other infection? No   Do you have a cough, shortness of breath, or wheezing? No   Do you or anyone in your family have previous history of blood clots? No   Do you or does anyone in your family have a serious bleeding problem such as prolonged bleeding following surgeries or cuts? No   Have you ever had problems with anemia or been told to take iron pills? No   Have you had any abnormal blood loss such as black, tarry or bloody stools? No   Have you ever had a blood transfusion? No   Are you willing to have a blood transfusion if it is medically needed before, during, or after your surgery? Yes   Have you or any of your relatives ever had problems with anesthesia? No   Do you have sleep apnea, excessive snoring or daytime drowsiness? No   Do you have any artifical heart valves or other implanted medical devices like a pacemaker, defibrillator, or continuous glucose monitor? No   Do you have artificial joints? No   Are you allergic to latex? No        Health Care Directive  Patient does not have a Health Care Directive or Living Will: Discussed advance care planning with patient; however, patient declined at this time.    Preoperative Review of    reviewed - no record of controlled substances prescribed.      Status of Chronic Conditions:  Still smoking, down to 3 cigs per day, prior 1+ ppd.  COPD - he's  happy with current control.  Doesn't use his Anoro very often as doesn't like it, also breathing better after stent.  CAD - s/p stenting 4/4/24.  H/o bladder tumor resection back in May, Brilinta was held at that time with cardio's blessing, now back on it.    HTN - up today, usually controlled, within acceptable levels for anesthesia.  HLP - on statin  Infrarenal AAA - saw vascular 1/10/24 - plan is to repeat/follow with an US in 1 year.  GERD/Chronic Abd Pain - reason for procedure    Patient Active Problem List    Diagnosis Date Noted    Acute on chronic systolic heart failure (H) 04/04/2024     Priority: Medium    CAD (coronary artery disease) 04/04/2024     Priority: Medium    Status post coronary angiogram 04/04/2024     Priority: Medium    Chest pain, unspecified type 04/04/2024     Priority: Medium    Bladder tumor 03/20/2024     Priority: Medium    COPD (chronic obstructive pulmonary disease) (H) 04/28/2020     Priority: Medium    Suicidal behavior 04/27/2020     Priority: Medium    Gastroesophageal reflux disease without esophagitis 04/27/2020     Priority: Medium    Penile lesion 04/27/2020     Priority: Medium    Hypertension      Priority: Medium    Elevated blood sugar 10/29/2016     Priority: Medium    Narcotic addiction (H) 10/29/2016     Priority: Medium    Heroin abuse (H) 09/15/2015     Priority: Medium    Costochondritis 02/26/2015     Priority: Medium    Venous insufficiency of both lower extremities 02/26/2015     Priority: Medium     Do you wish to do the replacement in the background? yes        Tobacco dependence 01/06/2015     Priority: Medium    Cluster B personality disorder (H) 12/25/2014     Priority: Medium     Vs cluster B traits per OSH records 11/2014      Polysubstance abuse (H) 12/25/2014     Priority: Medium     Heroin, benzodiazepines, amphetamine, prescription opiates, THC abuse. Dealing drugs, IVDU as recently as 11/2014.      Hypoxemia 12/23/2014     Priority: Medium     Bipolar 1 disorder (H) 11/11/2014     Priority: Medium    Atypical bipolar affective disorder (H) 11/11/2014     Priority: Medium    Cellulitis and abscess of forearm 04/04/2014     Priority: Medium    IV drug user 08/09/2010     Priority: Medium     Overview:   IMO Update 10/11      Chlordiazepoxide dependence (H) 08/09/2010     Priority: Medium     Overview:   IMO Update 10/11      Heroin addiction (H) 08/09/2010     Priority: Medium     Overview:   See social notes  IMO Update 10/11      Osteoarthrosis, pelvic region and thigh 11/13/2008     Priority: Medium     Overview:   IMO Update 10/11      Lumbosacral spondylosis without myelopathy 11/13/2008     Priority: Medium    Backache 06/09/2008     Priority: Medium     Overview:   IMO Update 10/11      Degeneration of lumbar or lumbosacral intervertebral disc 07/25/2006     Priority: Medium     Overview:   IMO Update 10/11        Past Medical History:   Diagnosis Date    Acute exacerbation of chronic obstructive pulmonary disease (H) 10/28/2016    Anxiety     Backache 6/9/2008    Overview:  IMO Update 10/11    Bipolar 1 disorder (H) 11/11/2014    Bipolar affective disorder (H)     Cellulitis and abscess of forearm 4/4/2014    Chlordiazepoxide dependence (H) 8/9/2010    Overview:  IMO Update 10/11    Chronic constipation     COPD exacerbation (H) 9/14/2015    Costochondritis 2/26/2015    Degeneration of lumbar or lumbosacral intervertebral disc 7/25/2006    Overview:  IMO Update 10/11    Depressive disorder     Drug abuse (H)     Elevated blood sugar 10/29/2016    Heroin abuse (H) 9/15/2015    Heroin addiction (H) 8/9/2010    Overview:  See social notes IMO Update 10/11    Hypertension     IV drug user 8/9/2010    Overview:  IMO Update 10/11    Lumbosacral spondylosis without myelopathy 11/13/2008    Narcotic addiction (H) 10/29/2016    Opioid use disorder, severe, dependence (H) 2020    [facility] records; prior Methadone; [facility]; AA/NA    Osteoarthrosis, pelvic  region and thigh 11/13/2008    Overview:  IMO Update 10/11    Penile lesion 4/27/2020    Suicidal behavior 4/27/2020    Venous insufficiency of both lower extremities 2/26/2015     Do you wish to do the replacement in the background? yes       Past Surgical History:   Procedure Laterality Date    ARTHROSCOPY KNEE BILATERAL      COLONOSCOPY  01/29/2020    Multiple, repeat due 2015    COLONOSCOPY N/A 7/31/2015    Procedure: COLONOSCOPY;  Surgeon: Justin Andujar MD;  Location: [facility] OR    CV CORONARY ANGIOGRAM N/A 4/4/2024    Procedure: Coronary Angiogram;  Surgeon: Zak Stephens MD;  Location: [facility] CARDIAC CATH LAB    CV PCI N/A 4/4/2024    Procedure: Percutaneous Coronary Intervention;  Surgeon: Zak Stephens MD;  Location: [facility] CARDIAC CATH LAB    DECOMPRESSION CUBITAL TUNNEL Left 11/21/2017    Procedure: DECOMPRESSION CUBITAL TUNNEL;;  Surgeon: Jhonny Zhao MD;  Location: [facility] OR    DENTAL SURGERY      HERNIA REPAIR      Inguinal, left    RELEASE CARPAL TUNNEL Left 11/21/2017    Procedure: RELEASE CARPAL TUNNEL;  LEFT ELBOW CUBITAL and CARPAL TUNNEL RELEASE;  Surgeon: Jhonny Zhao MD;  Location: [facility] OR     Current Outpatient Medications   Medication Sig Dispense Refill    albuterol (PROAIR HFA/PROVENTIL HFA/VENTOLIN HFA) 108 (90 Base) MCG/ACT inhaler Inhale 2 puffs into the lungs every 4 hours as needed for shortness of breath, wheezing or cough 18 g 3    aspirin (ASA) 81 MG chewable tablet Take 1 tablet (81 mg) by mouth daily Starting tomorrow. 90 tablet 3    bisacodyl (DULCOLAX) 5 MG EC tablet Take 1 tablet (5 mg) by mouth daily as needed for constipation. Take 2 tablets by mouth 2 days prior to procedure. Take the remaining 2 tablets by mouth at 3pm the day prior to procedure. 4 tablet 0    carvedilol (COREG) 12.5 MG tablet Take 1 tablet (12.5 mg) by mouth 2 times daily (with meals) 180 tablet 1    ipratropium - albuterol 0.5 mg/2.5 mg/3 mL (DUONEB) 0.5-2.5 (3) MG/3ML neb solution Take 1  "vial (3 mLs) by nebulization every 6 hours as needed for shortness of breath, wheezing or cough 90 mL 11    losartan (COZAAR) 100 MG tablet Take 1 tablet by mouth once daily 30 tablet 10    omeprazole (PRILOSEC) 40 MG DR capsule Take 1 capsule (40 mg) by mouth daily. 30 capsule 0    PEG 3350-KCl-NaBcb-NaCl-NaSulf (POLYETHYLENE GLYCOL) 236 g solution       rosuvastatin (CRESTOR) 40 MG tablet Take 1 tablet (40 mg) by mouth daily 90 tablet 3    ticagrelor (BRILINTA) 90 MG tablet Take 1 tablet (90 mg) by mouth 2 times daily. Start this evening. 180 tablet 3    umeclidinium-vilanterol (ANORO ELLIPTA) 62.5-25 MCG/ACT oral inhaler Inhale 1 puff into the lungs daily 60 each 1       Allergies   Allergen Reactions    Codeine     Penicillins     Morphine And Codeine Other (See Comments)     Unknown at young age        Social History     Tobacco Use    Smoking status: Every Day     Current packs/day: 1.00     Average packs/day: 1 pack/day for 41.8 years (41.8 ttl pk-yrs)     Types: Cigarettes     Start date: 1983    Smokeless tobacco: Never    Tobacco comments:     Tried to Quit (NO)   Substance Use Topics    Alcohol use: No     Alcohol/week: 0.0 standard drinks of alcohol       History   Drug Use Unknown           Review of Systems   Constitutional:  Negative for fever.   HENT:  Negative for sinus pain.    Respiratory:  Negative for cough and shortness of breath.    Cardiovascular:  Negative for chest pain, palpitations and leg swelling.   Neurological:  Negative for dizziness and headaches.         Objective    BP (!) 142/92   Pulse 85   Temp 98  F (36.7  C) (Tympanic)   Resp 19   Wt 87.6 kg (193 lb 1.6 oz)   SpO2 98%   BMI 28.52 kg/m     Estimated body mass index is 28.52 kg/m  as calculated from the following:    Height as of 9/24/24: 1.753 m (5' 9\").    Weight as of this encounter: 87.6 kg (193 lb 1.6 oz).  Physical Exam  Constitutional:       General: He is not in acute distress.     Appearance: Normal appearance. "   HENT:      Head: Normocephalic and atraumatic.      Right Ear: Tympanic membrane normal.      Left Ear: Tympanic membrane normal.      Mouth/Throat:      Mouth: Mucous membranes are moist.      Pharynx: Oropharynx is clear.   Eyes:      Conjunctiva/sclera: Conjunctivae normal.      Pupils: Pupils are equal, round, and reactive to light.   Neck:      Vascular: No carotid bruit.   Cardiovascular:      Rate and Rhythm: Normal rate and regular rhythm.      Heart sounds: Normal heart sounds. No murmur heard.  Pulmonary:      Effort: Pulmonary effort is normal.      Breath sounds: Normal breath sounds.   Abdominal:      General: Bowel sounds are normal. There is no distension.      Palpations: Abdomen is soft.      Tenderness: There is no abdominal tenderness. There is no guarding.   Musculoskeletal:      Cervical back: Normal range of motion.      Right lower leg: No edema.      Left lower leg: No edema.   Lymphadenopathy:      Cervical: No cervical adenopathy.   Neurological:      Mental Status: He is alert and oriented to person, place, and time.           Recent Labs   Lab Test 09/24/24  1549 06/10/24  1522 05/07/24  0912 04/04/24  0907   HGB 14.9 13.4   < > 15.3    199   < > 180   INR  --   --   --  0.93    134*   < > 138   POTASSIUM 4.4 4.3   < > 4.2   CR 1.15 0.98   < > 1.09    < > = values in this interval not displayed.        Diagnostics  Recent Results (from the past 240 hour(s))   Basic metabolic panel    Collection Time: 10/15/24  9:49 AM   Result Value Ref Range    Sodium 138 135 - 145 mmol/L    Potassium 4.4 3.4 - 5.3 mmol/L    Chloride 105 98 - 107 mmol/L    Carbon Dioxide (CO2) 24 22 - 29 mmol/L    Anion Gap 9 7 - 15 mmol/L    Urea Nitrogen 22.3 8.0 - 23.0 mg/dL    Creatinine 1.55 (H) 0.67 - 1.17 mg/dL    GFR Estimate 50 (L) >60 mL/min/1.73m2    Calcium 9.2 8.8 - 10.4 mg/dL    Glucose 103 (H) 70 - 99 mg/dL   Hemoglobin A1c    Collection Time: 10/15/24  9:49 AM   Result Value Ref Range     Estimated Average Glucose 131 (H) <117 mg/dL    Hemoglobin A1C 6.2 (H) <5.7 %   CBC with platelets and differential    Collection Time: 10/15/24  9:49 AM   Result Value Ref Range    WBC Count 7.3 4.0 - 11.0 10e3/uL    RBC Count 4.42 4.40 - 5.90 10e6/uL    Hemoglobin 13.6 13.3 - 17.7 g/dL    Hematocrit 40.4 40.0 - 53.0 %    MCV 91 78 - 100 fL    MCH 30.8 26.5 - 33.0 pg    MCHC 33.7 31.5 - 36.5 g/dL    RDW 12.4 10.0 - 15.0 %    Platelet Count 146 (L) 150 - 450 10e3/uL    % Neutrophils 63 %    % Lymphocytes 24 %    % Monocytes 9 %    % Eosinophils 3 %    % Basophils 0 %    % Immature Granulocytes 1 %    NRBCs per 100 WBC 0 <1 /100    Absolute Neutrophils 4.6 1.6 - 8.3 10e3/uL    Absolute Lymphocytes 1.8 0.8 - 5.3 10e3/uL    Absolute Monocytes 0.6 0.0 - 1.3 10e3/uL    Absolute Eosinophils 0.2 0.0 - 0.7 10e3/uL    Absolute Basophils 0.0 0.0 - 0.2 10e3/uL    Absolute Immature Granulocytes 0.0 <=0.4 10e3/uL    Absolute NRBCs 0.0 10e3/uL   EKG 12-lead complete w/read - Clinics    Collection Time: 10/15/24  9:58 AM   Result Value Ref Range    Systolic Blood Pressure  mmHg    Diastolic Blood Pressure  mmHg    Ventricular Rate 73 BPM    Atrial Rate 73 BPM    NM Interval 166 ms    QRS Duration 94 ms     ms    QTc 445 ms    P Axis 48 degrees    R AXIS 43 degrees    T Axis 149 degrees    Interpretation ECG       Sinus rhythm  Possible Inferior infarct (cited on or before 11-Oct-2023)  Abnormal ECG  When compared with ECG of 04-Apr-2024 12:30,  No significant change was found  Confirmed by MD Colin, Jaxson ([id number]) on 10/16/2024 1:31:54 PM                   Signed Electronically by: KASSI PAIZ DO  A copy of this evaluation report is provided to the requesting physician.

## 2024-10-16 LAB
ATRIAL RATE - MUSE: 73 BPM
DIASTOLIC BLOOD PRESSURE - MUSE: NORMAL MMHG
INTERPRETATION ECG - MUSE: NORMAL
P AXIS - MUSE: 48 DEGREES
PR INTERVAL - MUSE: 166 MS
QRS DURATION - MUSE: 94 MS
QT - MUSE: 404 MS
QTC - MUSE: 445 MS
R AXIS - MUSE: 43 DEGREES
SYSTOLIC BLOOD PRESSURE - MUSE: NORMAL MMHG
T AXIS - MUSE: 149 DEGREES
VENTRICULAR RATE- MUSE: 73 BPM

## 2024-10-18 DIAGNOSIS — R10.84 ABDOMINAL PAIN, GENERALIZED: ICD-10-CM

## 2024-10-18 NOTE — TELEPHONE ENCOUNTER
PPI Protocol [id number]/18/2024 01:11 PM   Protocol Details Medication indicated for associated diagnosis

## 2024-10-18 NOTE — TELEPHONE ENCOUNTER
Omeprazole  Last Written Prescription Date: 9/12/24  Last Fill Quantity: 30 # of Refills: 0  Last Office Visit: 10/15/24

## 2024-10-21 RX ORDER — OMEPRAZOLE 40 MG/1
40 CAPSULE, DELAYED RELEASE ORAL DAILY
Qty: 30 CAPSULE | Refills: 0 | Status: SHIPPED | OUTPATIENT
Start: 2024-10-21

## 2024-10-21 ASSESSMENT — ENCOUNTER SYMPTOMS
SHORTNESS OF BREATH: 0
DIZZINESS: 0
COUGH: 0
FEVER: 0
SINUS PAIN: 0
PALPITATIONS: 0
HEADACHES: 0

## 2024-10-22 ENCOUNTER — HOSPITAL ENCOUNTER (OUTPATIENT)
Facility: HOSPITAL | Age: 64
Discharge: HOME | End: 2024-10-22
Attending: STUDENT IN AN ORGANIZED HEALTH CARE EDUCATION/TRAINING PROGRAM | Admitting: STUDENT IN AN ORGANIZED HEALTH CARE EDUCATION/TRAINING PROGRAM
Payer: MEDICARE

## 2024-10-22 ENCOUNTER — ANESTHESIA (OUTPATIENT)
Dept: SURGERY | Facility: HOSPITAL | Age: 64
End: 2024-10-22
Payer: MEDICARE

## 2024-10-22 VITALS
OXYGEN SATURATION: 95 % | BODY MASS INDEX: 27.85 KG/M2 | DIASTOLIC BLOOD PRESSURE: 82 MMHG | WEIGHT: 188 LBS | SYSTOLIC BLOOD PRESSURE: 116 MMHG | RESPIRATION RATE: 16 BRPM | HEART RATE: 74 BPM | TEMPERATURE: 97 F | HEIGHT: 69 IN

## 2024-10-22 PROCEDURE — 43239 EGD BIOPSY SINGLE/MULTIPLE: CPT | Performed by: STUDENT IN AN ORGANIZED HEALTH CARE EDUCATION/TRAINING PROGRAM

## 2024-10-22 PROCEDURE — 88305 TISSUE EXAM BY PATHOLOGIST: CPT | Mod: TC | Performed by: STUDENT IN AN ORGANIZED HEALTH CARE EDUCATION/TRAINING PROGRAM

## 2024-10-22 PROCEDURE — 250N000011 HC RX IP 250 OP 636: Performed by: NURSE ANESTHETIST, CERTIFIED REGISTERED

## 2024-10-22 PROCEDURE — 258N000003 HC RX IP 258 OP 636: Performed by: NURSE ANESTHETIST, CERTIFIED REGISTERED

## 2024-10-22 PROCEDURE — 999N000141 HC STATISTIC PRE-PROCEDURE NURSING ASSESSMENT: Performed by: STUDENT IN AN ORGANIZED HEALTH CARE EDUCATION/TRAINING PROGRAM

## 2024-10-22 PROCEDURE — 250N000011 HC RX IP 250 OP 636: Performed by: STUDENT IN AN ORGANIZED HEALTH CARE EDUCATION/TRAINING PROGRAM

## 2024-10-22 PROCEDURE — 250N000009 HC RX 250: Performed by: NURSE ANESTHETIST, CERTIFIED REGISTERED

## 2024-10-22 PROCEDURE — 272N000001 HC OR GENERAL SUPPLY STERILE: Performed by: STUDENT IN AN ORGANIZED HEALTH CARE EDUCATION/TRAINING PROGRAM

## 2024-10-22 PROCEDURE — 710N000012 HC RECOVERY PHASE 2, PER MINUTE: Performed by: STUDENT IN AN ORGANIZED HEALTH CARE EDUCATION/TRAINING PROGRAM

## 2024-10-22 PROCEDURE — 370N000017 HC ANESTHESIA TECHNICAL FEE, PER MIN: Performed by: STUDENT IN AN ORGANIZED HEALTH CARE EDUCATION/TRAINING PROGRAM

## 2024-10-22 PROCEDURE — 88305 TISSUE EXAM BY PATHOLOGIST: CPT | Mod: 26 | Performed by: PATHOLOGY

## 2024-10-22 PROCEDURE — 45385 COLONOSCOPY W/LESION REMOVAL: CPT | Mod: PT | Performed by: STUDENT IN AN ORGANIZED HEALTH CARE EDUCATION/TRAINING PROGRAM

## 2024-10-22 PROCEDURE — 45385 COLONOSCOPY W/LESION REMOVAL: CPT | Performed by: NURSE ANESTHETIST, CERTIFIED REGISTERED

## 2024-10-22 PROCEDURE — 360N000075 HC SURGERY LEVEL 2, PER MIN: Performed by: STUDENT IN AN ORGANIZED HEALTH CARE EDUCATION/TRAINING PROGRAM

## 2024-10-22 RX ORDER — ONDANSETRON 4 MG/1
4 TABLET, ORALLY DISINTEGRATING ORAL EVERY 30 MIN PRN
Status: DISCONTINUED | OUTPATIENT
Start: 2024-10-22 | End: 2024-10-22 | Stop reason: HOSPADM

## 2024-10-22 RX ORDER — OXYCODONE HYDROCHLORIDE 5 MG/1
10 TABLET ORAL
Status: DISCONTINUED | OUTPATIENT
Start: 2024-10-22 | End: 2024-10-22 | Stop reason: HOSPADM

## 2024-10-22 RX ORDER — NALOXONE HYDROCHLORIDE 0.4 MG/ML
0.1 INJECTION, SOLUTION INTRAMUSCULAR; INTRAVENOUS; SUBCUTANEOUS
Status: DISCONTINUED | OUTPATIENT
Start: 2024-10-22 | End: 2024-10-22 | Stop reason: HOSPADM

## 2024-10-22 RX ORDER — LIDOCAINE 40 MG/G
CREAM TOPICAL
Status: DISCONTINUED | OUTPATIENT
Start: 2024-10-22 | End: 2024-10-22 | Stop reason: HOSPADM

## 2024-10-22 RX ORDER — NALOXONE HYDROCHLORIDE 0.4 MG/ML
0.4 INJECTION, SOLUTION INTRAMUSCULAR; INTRAVENOUS; SUBCUTANEOUS
Status: DISCONTINUED | OUTPATIENT
Start: 2024-10-22 | End: 2024-10-22 | Stop reason: HOSPADM

## 2024-10-22 RX ORDER — LIDOCAINE HYDROCHLORIDE 20 MG/ML
INJECTION, SOLUTION INFILTRATION; PERINEURAL PRN
Status: DISCONTINUED | OUTPATIENT
Start: 2024-10-22 | End: 2024-10-22

## 2024-10-22 RX ORDER — PROPOFOL 10 MG/ML
INJECTION, EMULSION INTRAVENOUS PRN
Status: DISCONTINUED | OUTPATIENT
Start: 2024-10-22 | End: 2024-10-22

## 2024-10-22 RX ORDER — FENTANYL CITRATE 50 UG/ML
INJECTION, SOLUTION INTRAMUSCULAR; INTRAVENOUS
Status: COMPLETED
Start: 2024-10-22 | End: 2024-10-22

## 2024-10-22 RX ORDER — ONDANSETRON 2 MG/ML
4 INJECTION INTRAMUSCULAR; INTRAVENOUS EVERY 30 MIN PRN
Status: DISCONTINUED | OUTPATIENT
Start: 2024-10-22 | End: 2024-10-22 | Stop reason: HOSPADM

## 2024-10-22 RX ORDER — DEXMEDETOMIDINE HYDROCHLORIDE 4 UG/ML
INJECTION, SOLUTION INTRAVENOUS PRN
Status: DISCONTINUED | OUTPATIENT
Start: 2024-10-22 | End: 2024-10-22

## 2024-10-22 RX ORDER — FENTANYL CITRATE 50 UG/ML
50 INJECTION, SOLUTION INTRAMUSCULAR; INTRAVENOUS
Status: COMPLETED | OUTPATIENT
Start: 2024-10-22 | End: 2024-10-22

## 2024-10-22 RX ORDER — DEXAMETHASONE SODIUM PHOSPHATE 10 MG/ML
4 INJECTION, SOLUTION INTRAMUSCULAR; INTRAVENOUS
Status: DISCONTINUED | OUTPATIENT
Start: 2024-10-22 | End: 2024-10-22 | Stop reason: HOSPADM

## 2024-10-22 RX ORDER — OXYCODONE HYDROCHLORIDE 5 MG/1
5 TABLET ORAL
Status: DISCONTINUED | OUTPATIENT
Start: 2024-10-22 | End: 2024-10-22 | Stop reason: HOSPADM

## 2024-10-22 RX ORDER — SODIUM CHLORIDE, SODIUM LACTATE, POTASSIUM CHLORIDE, CALCIUM CHLORIDE 600; 310; 30; 20 MG/100ML; MG/100ML; MG/100ML; MG/100ML
INJECTION, SOLUTION INTRAVENOUS CONTINUOUS
Status: DISCONTINUED | OUTPATIENT
Start: 2024-10-22 | End: 2024-10-22 | Stop reason: HOSPADM

## 2024-10-22 RX ORDER — NALOXONE HYDROCHLORIDE 0.4 MG/ML
0.2 INJECTION, SOLUTION INTRAMUSCULAR; INTRAVENOUS; SUBCUTANEOUS
Status: DISCONTINUED | OUTPATIENT
Start: 2024-10-22 | End: 2024-10-22 | Stop reason: HOSPADM

## 2024-10-22 RX ADMIN — FENTANYL CITRATE 50 MCG: 50 INJECTION INTRAMUSCULAR; INTRAVENOUS at 09:33

## 2024-10-22 RX ADMIN — LIDOCAINE HYDROCHLORIDE 40 MG: 20 INJECTION, SOLUTION INFILTRATION; PERINEURAL at 10:08

## 2024-10-22 RX ADMIN — MIDAZOLAM 4 MG: 1 INJECTION INTRAMUSCULAR; INTRAVENOUS at 10:07

## 2024-10-22 RX ADMIN — FENTANYL CITRATE 50 MCG: 50 INJECTION INTRAMUSCULAR; INTRAVENOUS at 09:42

## 2024-10-22 RX ADMIN — SODIUM CHLORIDE, POTASSIUM CHLORIDE, SODIUM LACTATE AND CALCIUM CHLORIDE: 600; 310; 30; 20 INJECTION, SOLUTION INTRAVENOUS at 08:08

## 2024-10-22 RX ADMIN — DEXMEDETOMIDINE HYDROCHLORIDE 12 MCG: 4 INJECTION, SOLUTION INTRAVENOUS at 10:09

## 2024-10-22 RX ADMIN — PROPOFOL 200 MCG/KG/MIN: 10 INJECTION, EMULSION INTRAVENOUS at 10:09

## 2024-10-22 RX ADMIN — PROPOFOL 50 MG: 10 INJECTION, EMULSION INTRAVENOUS at 10:08

## 2024-10-22 ASSESSMENT — ACTIVITIES OF DAILY LIVING (ADL)
ADLS_ACUITY_SCORE: 35
ADLS_ACUITY_SCORE: 33
ADLS_ACUITY_SCORE: 35

## 2024-10-22 NOTE — OP NOTE
Rodney Goodwin MRN# [id number]   YOB: 1960 Age: 64 year old      Date of Admission:  10/22/2024    Primary care provider: Abdifatah Cline    PREOPERATIVE DIAGNOSIS:  Weight loss, dysphagia      POSTOPERATIVE DIAGNOSES:    Normal EGD      PROCEDURE:    Esophagogastroduodenoscopy with cold forceps biopsies.   Screening colonoscopy      HISTORY OF PRESENT ILLNESS:  This 64 year old male developed dysphagia and weight loss without known etiology.  He had a swallow study which was negative.  He has also had intermittent abdominal pain of unknown etiology.  He is also due for screening colonoscopy.  Of note, patient mentioned prior to the procedure that he only drank about half the of his bowel preparation.     OPERATIVE FINDINGS:  The gastroesophageal junction was noted 40cm from the incisors.  The Z line was without changes suggesting reflux.  There was no evidence of ulceration or stricture.  The were were no concerning findings in the  stomach and/or duodenum.  Antral biopsies were taken to rule out H. Pylori.      Specimen(s) submitted to pathology:  Antral biopsies    PROCEDURE:  With the patient in the supine position on the transport cart, IV sedation was administered by the nurse anesthetist.  The patient's correct identity and planned procedure were confirmed during a requisite timeout pause.  A bite block was placed and the fiberoptic endoscope was introduced and negotiated through the cricopharyngeus without difficulty.  The length of the esophagus was examined without findings.  The gastroesophageal junction was noted 40 cm from the incisors; the Z line was regular without ulceration, bleeding source or stricture.  There was no  evidence of Avila's change.  The stomach was entered and the pylorus was traversed easily.  The gastric mucosa was within normal limits; there was no evidence of gastric polyp, ulcer or bleeding source.  The duodenum was similarly without finding.  The endoscope was  returned to the body of the stomach and retroflex confirmed the absence of abnormality in the cardia.      Random cold forceps biopsies were obtained of the antrum for H. pylori.  Hemostasis was judged adequate on visualization.   The endoscope was returned to the GE junction.   A second circumferential examination of the esophagus was performed on slow withdrawal of the endoscope without additional finding.      Then transition to the colonoscopy.  The external anus was inspected and was within normal limits. Digital rectal exam was within normal limits. The colonoscope was inserted and advanced under direct visualization to the level of the cecum which was identified by the appendiceal orifice and the ileocecal valve. The prep was poor. Upon slow withdrawal of the colonoscope, approximately 80 % of the mucosa was directly visualized.     Transverse, Distal transverse, rectal polyp x2    A transverse colon polyp was removed with cold snare/biopsy forceps.  A separate distal transverse colon polyp was removed with a cold snare.  Two rectal polyps were identified and removed with biopsy forceps.    The rest of the colon was without mucosal abnormality. There was no evidence of further polyps, diverticula, inflammation, bleeding or AVMs. Retroflexion of the rectum was within normal limits. The extra air was removed from the colon, and the colonoscope withdrawn. The patient tolerated the procedure well and was taken to postanesthesia care unit.     We invite the patient to return in 1 years for follow up screening evaluation due to poor prep and limited visualization today.    Andrey Bone MD

## 2024-10-22 NOTE — PLAN OF CARE
Pt anxious and restless, c/o long wait time until procedure. CRNA notified, see new orders.

## 2024-10-22 NOTE — ANESTHESIA POSTPROCEDURE EVALUATION
Patient: Rodney Goodwin    Procedure: Procedure(s):  Upper endoscopy with biopsy and colonoscopy with polypectomy       Anesthesia Type:  MAC    Note:  Disposition: Outpatient   Postop Pain Control: Uneventful            Sign Out: Well controlled pain   PONV: No   Neuro/Psych: Uneventful            Sign Out: Acceptable/Baseline neuro status   Airway/Respiratory: Uneventful            Sign Out: Acceptable/Baseline resp. status   CV/Hemodynamics: Uneventful            Sign Out: Acceptable CV status; No obvious hypovolemia; No obvious fluid overload   Other NRE: NONE   DID A NON-ROUTINE EVENT OCCUR? No         Last vitals:  Vitals Value Taken Time   /82 10/22/24 1200   Temp 97  F (36.1  C) 10/22/24 1200   Pulse 74 10/22/24 1200   Resp 16 10/22/24 1200   SpO2 95 % 10/22/24 1200       Electronically Signed By: MARCO ANTONIO Mendez CRNA  October 22, 2024  12:16 PM

## 2024-10-22 NOTE — ANESTHESIA CARE TRANSFER NOTE
Patient: Rodney Goodwin    Procedure: Procedure(s):  Upper endoscopy with biopsy and colonoscopy with polypectomy       Diagnosis: Dysphagia [R13.10]  Abdominal discomfort [R10.9]  Diagnosis Additional Information: No value filed.    Anesthesia Type:   MAC     Note:    Oropharynx: oropharynx clear of all foreign objects  Level of Consciousness: drowsy  Oxygen Supplementation: nasal cannula  Level of Supplemental Oxygen (L/min / FiO2): 3  Independent Airway: airway patency satisfactory and stable  Dentition: dentition unchanged  Vital Signs Stable: post-procedure vital signs reviewed and stable  Report to RN Given: handoff report given  Patient transferred to: Phase II    Handoff Report: Identifed the Patient, Identified the Reponsible Provider, Reviewed the pertinent medical history, Discussed the surgical course, Reviewed Intra-OP anesthesia mangement and issues during anesthesia, Set expectations for post-procedure period and Allowed opportunity for questions and acknowledgement of understanding      Vitals:  Vitals Value Taken Time   BP 86/59 10/22/24 1104   Temp     Pulse 87 10/22/24 1104   Resp     SpO2 93 % 10/22/24 1106   Vitals shown include unfiled device data.    Electronically Signed By: MARCO ANTONIO Marte CRNA  October 22, 2024  11:07 AM

## 2024-10-23 DIAGNOSIS — R10.84 ABDOMINAL PAIN, GENERALIZED: ICD-10-CM

## 2024-10-23 RX ORDER — OMEPRAZOLE 40 MG/1
40 CAPSULE, DELAYED RELEASE ORAL DAILY
Qty: 30 CAPSULE | Refills: 0 | OUTPATIENT
Start: 2024-10-23

## 2024-10-31 ENCOUNTER — PATIENT OUTREACH (OUTPATIENT)
Dept: CARE COORDINATION | Facility: OTHER | Age: 64
End: 2024-10-31

## 2024-10-31 NOTE — PROGRESS NOTES
Clinic Care Coordination Contact  Care Team Conversations    Patient had EGD with note:  Repeat colonoscopy in 1 year due to poor prep and presence of precancerous polyp greater than 1cm     Stool was negative for Hpylori    Cardio follow up in 6 months Dr. Lamas.    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 I discussed with the patient that with this type of pathology report he just needs to undergo surveillance cystoscopy going forward. I advised him that I will have my office contact him to set up a cystoscopy in about 3 months.   Note stated:  If patient calls back, please schedule a PROCEDURE (67) with any MD for cystoscopy, bladder cancer   TO be in August/Sept 2024. Can be at any location. Dr. Knox will be retired at that time so patient will need to see another urology MD    Patient was supposed to have follow up [facility] 8/21 and not seeing this happened.    RN CC called patient x 2 and voicemail box is full.  Alba Gama RN  Care Coordination

## 2024-11-06 ENCOUNTER — LAB (OUTPATIENT)
Dept: LAB | Facility: OTHER | Age: 64
End: 2024-11-06
Payer: MEDICARE

## 2024-11-06 DIAGNOSIS — I10 PRIMARY HYPERTENSION: ICD-10-CM

## 2024-11-06 LAB
ANION GAP SERPL CALCULATED.3IONS-SCNC: 12 MMOL/L (ref 7–15)
BUN SERPL-MCNC: 21.8 MG/DL (ref 8–23)
CALCIUM SERPL-MCNC: 9.2 MG/DL (ref 8.8–10.4)
CHLORIDE SERPL-SCNC: 105 MMOL/L (ref 98–107)
CREAT SERPL-MCNC: 1.02 MG/DL (ref 0.67–1.17)
EGFRCR SERPLBLD CKD-EPI 2021: 83 ML/MIN/1.73M2
GLUCOSE SERPL-MCNC: 121 MG/DL (ref 70–99)
HCO3 SERPL-SCNC: 25 MMOL/L (ref 22–29)
POTASSIUM SERPL-SCNC: 4 MMOL/L (ref 3.4–5.3)
SODIUM SERPL-SCNC: 142 MMOL/L (ref 135–145)

## 2024-11-06 PROCEDURE — 80048 BASIC METABOLIC PNL TOTAL CA: CPT | Mod: ZL

## 2024-11-06 PROCEDURE — 36415 COLL VENOUS BLD VENIPUNCTURE: CPT | Mod: ZL

## 2024-11-13 ENCOUNTER — PATIENT OUTREACH (OUTPATIENT)
Dept: CARE COORDINATION | Facility: OTHER | Age: 64
End: 2024-11-13

## 2024-11-13 NOTE — PROGRESS NOTES
Clinic Care Coordination Contact  Care Team Conversations    RN CC called patient and talked with him.  Patient states he is doing well and got behind this year with so many appointments.  He states wanting to wait to schedule more until the new year.   We discussed importance of Urology follow up.  He verbalized understanding and knows he is overdue and would like to wait until after new year to schedule.  Patient is open to scheduling Dr. Lamas right now for after the new year.  Note sent to [facility] to please call to schedule and if not able to reach patient to please schedule in morning appointment and mail date/time to patient.  Patient states he is taking all of his medications and reports he has them all.  States no concerns.  He has questions regarding PCA and is given [facility] number to schedule an assessment [phone].  He states he will call there now.  He states in the future he would like to do cardiac rehab again as he stated he had to quit due to pain from the bladder cancer.  Will look to discuss again when patient ready.  Alba Gama RN  Care Coordination

## 2024-11-13 NOTE — Clinical Note
Please call patient to schedule with Dr. Lamas.  If doesn't answer please schedule in morning appointment time and mail card to patient. Thank you Alba Gama RN CC

## 2024-11-29 DIAGNOSIS — I10 PRIMARY HYPERTENSION: ICD-10-CM

## 2024-11-29 DIAGNOSIS — R10.84 ABDOMINAL PAIN, GENERALIZED: ICD-10-CM

## 2024-12-02 RX ORDER — OMEPRAZOLE 40 MG/1
40 CAPSULE, DELAYED RELEASE ORAL DAILY
Qty: 30 CAPSULE | Refills: 5 | Status: SHIPPED | OUTPATIENT
Start: 2024-12-02

## 2024-12-02 RX ORDER — CARVEDILOL 12.5 MG/1
12.5 TABLET ORAL 2 TIMES DAILY WITH MEALS
Qty: 180 TABLET | Refills: 1 | Status: SHIPPED | OUTPATIENT
Start: 2024-12-02

## 2024-12-02 NOTE — PROGRESS NOTES
Omeprazole  Last filled 10/21 #30, 0 R  Protocol fails for:  Medication indicated for associated diagnosis    The medication is prescribed for one or more of the following conditions:                Erosive esophagitis               Gastritis              Gastric hypersecretion              Gastric ulcer              Gastroesophageal reflux disease              Helicobacter pylori gastrointestinal tract infection              Ulcer of duodenum              Drug-induced peptic ulcer              Esophageal stricture              Gastrointestinal hemorrhage              Indigestion              Stress ulcer              Zollinger-Munoz syndrome              Avila s esophagus              Laryngopharyngeal reflux              Epigastric Pain              Morbid Obesity              Cough              History of Peptic Ulcer              Esophageal Atresia, Stenosis and Fistula              Cystic Fibrosis  Bronchiectasis    Alba Gama, RN  Care Coordination

## 2025-01-10 ENCOUNTER — TELEPHONE (OUTPATIENT)
Dept: CARE COORDINATION | Facility: OTHER | Age: 65
End: 2025-01-10

## 2025-01-10 NOTE — TELEPHONE ENCOUNTER
Patient called and left a VM message on Triage line reporting trouble breathing, sore throat, hard to swallow and knee pain. RN called and spoke to patient. He reports O2 94% on room air. Able to have a 10 minute conversation with no shortness of breath noted during phone call. Patient report falling and hitting his knee and trouble swallowing due to sore throat. RN scheduled patient for office visit with PCP for 01/15/2025 at 4 PM. RN advised patient go to ED if symptoms worsen. Patient agreed with plan.

## 2025-01-13 NOTE — TELEPHONE ENCOUNTER
Attempt to call pt with below, no answer no able to LVM. Recall.  Sun Burgess, Abdifatah Conte, DO Boecker, Kimberly J, RN; P  Family Care Team 3  Caller: Unspecified (3 days ago,  2:41 PM)  He should probably go into UC today if having issues.  He has a lot going on.

## 2025-01-13 NOTE — TELEPHONE ENCOUNTER
Writer spoke with patient declines any breathing issues at this time, states he feels a bit better, has appt with PCP 1/15/25.  Sun Burgess LPN

## 2025-01-15 ENCOUNTER — OFFICE VISIT (OUTPATIENT)
Dept: FAMILY MEDICINE | Facility: OTHER | Age: 65
End: 2025-01-15
Attending: FAMILY MEDICINE
Payer: MEDICARE

## 2025-01-15 ENCOUNTER — APPOINTMENT (OUTPATIENT)
Dept: GENERAL RADIOLOGY | Facility: HOSPITAL | Age: 65
End: 2025-01-15
Attending: NURSE PRACTITIONER
Payer: MEDICARE

## 2025-01-15 ENCOUNTER — HOSPITAL ENCOUNTER (EMERGENCY)
Facility: HOSPITAL | Age: 65
Discharge: LEFT BEFORE DISCHARGE | End: 2025-01-15
Attending: NURSE PRACTITIONER
Payer: MEDICARE

## 2025-01-15 VITALS
OXYGEN SATURATION: 100 % | SYSTOLIC BLOOD PRESSURE: 105 MMHG | HEART RATE: 76 BPM | TEMPERATURE: 99.6 F | DIASTOLIC BLOOD PRESSURE: 72 MMHG | RESPIRATION RATE: 18 BRPM

## 2025-01-15 VITALS
HEART RATE: 76 BPM | HEIGHT: 69 IN | SYSTOLIC BLOOD PRESSURE: 132 MMHG | WEIGHT: 172 LBS | DIASTOLIC BLOOD PRESSURE: 73 MMHG | OXYGEN SATURATION: 98 % | RESPIRATION RATE: 18 BRPM | TEMPERATURE: 98.7 F | BODY MASS INDEX: 25.48 KG/M2

## 2025-01-15 DIAGNOSIS — R06.00 DYSPNEA, UNSPECIFIED TYPE: ICD-10-CM

## 2025-01-15 DIAGNOSIS — M25.461 EFFUSION OF RIGHT KNEE: ICD-10-CM

## 2025-01-15 DIAGNOSIS — R07.9 CHEST PAIN: ICD-10-CM

## 2025-01-15 DIAGNOSIS — M25.561 ACUTE PAIN OF RIGHT KNEE: Primary | ICD-10-CM

## 2025-01-15 DIAGNOSIS — R13.10 DYSPHAGIA, UNSPECIFIED TYPE: ICD-10-CM

## 2025-01-15 LAB
ALBUMIN SERPL BCG-MCNC: 3.5 G/DL (ref 3.5–5.2)
ALP SERPL-CCNC: 149 U/L (ref 40–150)
ALT SERPL W P-5'-P-CCNC: 15 U/L (ref 0–70)
ANION GAP SERPL CALCULATED.3IONS-SCNC: 12 MMOL/L (ref 7–15)
AST SERPL W P-5'-P-CCNC: 21 U/L (ref 0–45)
BILIRUB SERPL-MCNC: 0.3 MG/DL
BUN SERPL-MCNC: 20.7 MG/DL (ref 8–23)
CALCIUM SERPL-MCNC: 9.5 MG/DL (ref 8.8–10.4)
CHLORIDE SERPL-SCNC: 97 MMOL/L (ref 98–107)
CREAT SERPL-MCNC: 1.12 MG/DL (ref 0.67–1.17)
EGFRCR SERPLBLD CKD-EPI 2021: 74 ML/MIN/1.73M2
ERYTHROCYTE [DISTWIDTH] IN BLOOD BY AUTOMATED COUNT: 12.3 % (ref 10–15)
GLUCOSE SERPL-MCNC: 98 MG/DL (ref 70–99)
HCO3 SERPL-SCNC: 22 MMOL/L (ref 22–29)
HCT VFR BLD AUTO: 34.3 % (ref 40–53)
HGB BLD-MCNC: 11.5 G/DL (ref 13.3–17.7)
HOLD SPECIMEN: NORMAL
MAGNESIUM SERPL-MCNC: 2 MG/DL (ref 1.7–2.3)
MCH RBC QN AUTO: 30.5 PG (ref 26.5–33)
MCHC RBC AUTO-ENTMCNC: 33.5 G/DL (ref 31.5–36.5)
MCV RBC AUTO: 91 FL (ref 78–100)
PLATELET # BLD AUTO: 227 10E3/UL (ref 150–450)
POTASSIUM SERPL-SCNC: 4.1 MMOL/L (ref 3.4–5.3)
PROT SERPL-MCNC: 8.3 G/DL (ref 6.4–8.3)
RBC # BLD AUTO: 3.77 10E6/UL (ref 4.4–5.9)
SODIUM SERPL-SCNC: 131 MMOL/L (ref 135–145)
TROPONIN T SERPL HS-MCNC: 10 NG/L
WBC # BLD AUTO: 10.2 10E3/UL (ref 4–11)

## 2025-01-15 PROCEDURE — 93005 ELECTROCARDIOGRAM TRACING: CPT

## 2025-01-15 PROCEDURE — G0463 HOSPITAL OUTPT CLINIC VISIT: HCPCS

## 2025-01-15 PROCEDURE — 93010 ELECTROCARDIOGRAM REPORT: CPT | Performed by: INTERNAL MEDICINE

## 2025-01-15 PROCEDURE — 84484 ASSAY OF TROPONIN QUANT: CPT | Performed by: NURSE PRACTITIONER

## 2025-01-15 PROCEDURE — 80053 COMPREHEN METABOLIC PANEL: CPT | Performed by: NURSE PRACTITIONER

## 2025-01-15 PROCEDURE — 99284 EMERGENCY DEPT VISIT MOD MDM: CPT | Performed by: NURSE PRACTITIONER

## 2025-01-15 PROCEDURE — 83735 ASSAY OF MAGNESIUM: CPT | Performed by: NURSE PRACTITIONER

## 2025-01-15 PROCEDURE — 71046 X-RAY EXAM CHEST 2 VIEWS: CPT

## 2025-01-15 PROCEDURE — 73562 X-RAY EXAM OF KNEE 3: CPT | Mod: RT

## 2025-01-15 PROCEDURE — 36415 COLL VENOUS BLD VENIPUNCTURE: CPT | Performed by: NURSE PRACTITIONER

## 2025-01-15 PROCEDURE — 85014 HEMATOCRIT: CPT | Performed by: NURSE PRACTITIONER

## 2025-01-15 PROCEDURE — 99285 EMERGENCY DEPT VISIT HI MDM: CPT | Mod: 25

## 2025-01-15 ASSESSMENT — ACTIVITIES OF DAILY LIVING (ADL): ADLS_ACUITY_SCORE: 46

## 2025-01-15 ASSESSMENT — PATIENT HEALTH QUESTIONNAIRE - PHQ9
SUM OF ALL RESPONSES TO PHQ QUESTIONS 1-9: 27
10. IF YOU CHECKED OFF ANY PROBLEMS, HOW DIFFICULT HAVE THESE PROBLEMS MADE IT FOR YOU TO DO YOUR WORK, TAKE CARE OF THINGS AT HOME, OR GET ALONG WITH OTHER PEOPLE: EXTREMELY DIFFICULT
SUM OF ALL RESPONSES TO PHQ QUESTIONS 1-9: 27

## 2025-01-15 ASSESSMENT — COLUMBIA-SUICIDE SEVERITY RATING SCALE - C-SSRS
6. HAVE YOU EVER DONE ANYTHING, STARTED TO DO ANYTHING, OR PREPARED TO DO ANYTHING TO END YOUR LIFE?: NO
1. IN THE PAST MONTH, HAVE YOU WISHED YOU WERE DEAD OR WISHED YOU COULD GO TO SLEEP AND NOT WAKE UP?: NO
2. HAVE YOU ACTUALLY HAD ANY THOUGHTS OF KILLING YOURSELF IN THE PAST MONTH?: NO

## 2025-01-15 NOTE — PROGRESS NOTES
Assessment & Plan     Acute pain of right knee  Dysphagia, unspecified type  Dyspnea, unspecified type      Very tearful, borderline hysterical today in office.  Difficult tolerating any knee exam.  We'll have him further evaluated in ER - signout given to ER provider and nursing staff is wheeling him down in wheelchair.          Skye Stevens is a 64 year old, presenting for the following health issues:  Musculoskeletal Problem        1/15/2025     4:24 PM   Additional Questions   Roomed by betina kaufman   Accompanied by none         1/15/2025     4:24 PM   Patient Reported Additional Medications   Patient reports taking the following new medications none     HPI     Patient is very tearful in clinic, states spent the last 3 weeks in bed, had fallen and hurt right knee before [place], also feeling poorly using oxygen and neb more than baseline.  Right foot feels numb since his fall/knee injury - feels like it did prior to his vascular intervention.  Tight from back of knee down to ankle.      When he eats food getting stuck sternal area  Feels like glue in throat - bad taste  Can't cough it out      states has been choking on his saliva and his O2 levels have been doing down from 95 to 91 and when it does that he will put his oxygen on    Musculoskeletal problem/pain    Duration: 1 month  Description  Location: right knee  Intensity:  severe  Accompanying signs and symptoms: radiation of pain from back of knee to lower leg, weakness of knee, and swelling  History  Previous similar problem: no   Previous evaluation:  none  Precipitating or alleviating factors:  Trauma or overuse: YES- fall on steps  Aggravating factors include: sitting, standing, walking, and climbing stairs  Therapies tried and outcome: rest/inactivity and immobilization             Objective    /73 (BP Location: Right arm, Patient Position: Sitting, Cuff Size: Adult Regular)   Pulse 76   Temp 98.7  F (37.1  C) (Tympanic)   Resp 18   " Ht 1.753 m (5' 9\")   Wt 78 kg (172 lb)   SpO2 98%   BMI 25.40 kg/m    Body mass index is 25.4 kg/m .  Physical Exam  Constitutional:       Appearance: He is not ill-appearing or toxic-appearing.   Pulmonary:      Effort: Pulmonary effort is normal.   Musculoskeletal:      Comments: Tender right knee, not tolerating any knee movement for exam  Proximal fibular area tenderness   Neurological:      Mental Status: He is alert and oriented to person, place, and time.                    Signed Electronically by: KASSI PAIZ DO    Answers submitted by the patient for this visit:  Patient Health Questionnaire (Submitted on 1/15/2025)  If you checked off any problems, how difficult have these problems made it for you to do your work, take care of things at home, or get along with other people?: Extremely difficult  PHQ9 TOTAL SCORE: 27    "

## 2025-01-16 LAB
ATRIAL RATE - MUSE: 71 BPM
DIASTOLIC BLOOD PRESSURE - MUSE: NORMAL MMHG
INTERPRETATION ECG - MUSE: NORMAL
P AXIS - MUSE: 37 DEGREES
PR INTERVAL - MUSE: 160 MS
QRS DURATION - MUSE: 90 MS
QT - MUSE: 410 MS
QTC - MUSE: 445 MS
R AXIS - MUSE: 62 DEGREES
SYSTOLIC BLOOD PRESSURE - MUSE: NORMAL MMHG
T AXIS - MUSE: 224 DEGREES
VENTRICULAR RATE- MUSE: 71 BPM

## 2025-01-16 NOTE — ED PROVIDER NOTES
History     Chief Complaint   Patient presents with    Chest Pain    Cough    Knee Pain     Right knee       Rodney Goodwin is a 64 year old individual with history of bipolar 1 disorder, cluster B personality disorder, GERD, COPD, coronary artery disease, substance abuse, comes from primary care provider with chest pain, right knee pain, and cough per triage note.      BP: 113/78  Pulse: 75  Temp: 99.6  F (37.6  C)  Resp: 18  SpO2: 97 %      ED Course as of 01/15/25 2040   Wed [place] 15, 2025   1800 EKG 12 lead  No acute findings on ECG.   1802 Chest x-ray and knee x-ray ordered while patient in lobby.   1857 Labs ordered while patient in [facility].   2030 In to see patient but noted that patient had eloped from the ER.      ECG:    ECG competed at 1756 and personally reviewed at 1800 showing sinus rhythm with ventricular rate of 71 and QTc of 445.  Normal axis.  Inferior infarct age indeterminant is noted.  T wave abnormality in the inferolateral leads also noted.  Abnormal ECG.  When compared to ECG from 10/15/2024, no significant changes noted.         Results for orders placed or performed during the hospital encounter of 01/15/25 (from the past 24 hours)   EKG 12 lead   Result Value Ref Range    Systolic Blood Pressure  mmHg    Diastolic Blood Pressure  mmHg    Ventricular Rate 71 BPM    Atrial Rate 71 BPM    CA Interval 160 ms    QRS Duration 90 ms     ms    QTc 445 ms    P Axis 37 degrees    R AXIS 62 degrees    T Axis 224 degrees    Interpretation ECG       Sinus rhythm  Cannot rule out Inferior infarct (cited on or before 11-Oct-2023)  T wave abnormality, consider lateral ischemia  Abnormal ECG  When compared with ECG of 15-Oct-2024 09:58,  No significant change was found     XR Chest 2 Views    Narrative    EXAM: XR CHEST 2 VIEWS  LOCATION: [facility]  DATE: 1/15/2025    INDICATION: Cough, chest pain  COMPARISON: 11/9/2023      Impression    IMPRESSION: Negative chest.   XR Knee Right 3  Views    Narrative    EXAM: XR KNEE RIGHT 3 VIEWS  LOCATION: [facility]  DATE: 1/15/2025    INDICATION: Pain  COMPARISON: None.      Impression    IMPRESSION: Large right knee effusion. No fracture or malalignment. Joint spaces are maintained. Ossification projecting over Hoffa's fat pad which is likely intra-articular.   CBC with platelets   Result Value Ref Range    WBC Count 10.2 4.0 - 11.0 10e3/uL    RBC Count 3.77 (L) 4.40 - 5.90 10e6/uL    Hemoglobin 11.5 (L) 13.3 - 17.7 g/dL    Hematocrit 34.3 (L) 40.0 - 53.0 %    MCV 91 78 - 100 fL    MCH 30.5 26.5 - 33.0 pg    MCHC 33.5 31.5 - 36.5 g/dL    RDW 12.3 10.0 - 15.0 %    Platelet Count 227 150 - 450 10e3/uL   Comprehensive metabolic panel   Result Value Ref Range    Sodium 131 (L) 135 - 145 mmol/L    Potassium 4.1 3.4 - 5.3 mmol/L    Carbon Dioxide (CO2) 22 22 - 29 mmol/L    Anion Gap 12 7 - 15 mmol/L    Urea Nitrogen 20.7 8.0 - 23.0 mg/dL    Creatinine 1.12 0.67 - 1.17 mg/dL    GFR Estimate 74 >60 mL/min/1.73m2    Calcium 9.5 8.8 - 10.4 mg/dL    Chloride 97 (L) 98 - 107 mmol/L    Glucose 98 70 - 99 mg/dL    Alkaline Phosphatase 149 40 - 150 U/L    AST 21 0 - 45 U/L    ALT 15 0 - 70 U/L    Protein Total 8.3 6.4 - 8.3 g/dL    Albumin 3.5 3.5 - 5.2 g/dL    Bilirubin Total 0.3 <=1.2 mg/dL   Troponin T, High Sensitivity   Result Value Ref Range    Troponin T, High Sensitivity 10 <=22 ng/L   Magnesium   Result Value Ref Range    Magnesium 2.0 1.7 - 2.3 mg/dL   Extra Tube    Narrative    The following orders were created for panel order Extra Tube.  Procedure                               Abnormality         Status                     ---------                               -----------         ------                     Extra Blue Top Tube[[id number]]                              In process                 Extra Red Top Tube[[id number]]                               In process                 Extra Heparinized Syringe[[id number]]                        In  process                   Please view results for these tests on the individual orders.       Medications - No data to display    Assessments & Plan (with Medical Decision Making)     I have reviewed the nursing notes.    Lab work and ECG was ordered while patient in lobby along with x-ray of chest and knee.  Patient brought back into the room and patient apparently eloped prior to evaluation by provider.  Did review ECG which showed no acute abnormalities.  Lab work showed WBC of 10.2 with hemoglobin 11.5.  Sodium slightly low at 131 potassium 4.1.  Renal and hepatic functions normal.  High-sensitivity troponin was 10.  Chest x-ray personally reviewed showing no acute cardiopulmonary abnormalities.  X-ray of right knee personally reviewed showing opacity in joint space.  Nursing states that patient apparently only wanted his knee wrapped.       Final diagnoses:   Effusion of right knee   Chest pain       1/15/2025   [facility]       Abdifatah Calzada, MARCO ANTONIO CNP  01/15/25 2040

## 2025-01-16 NOTE — ED NOTES
"Patient to ED room 1. Patient states that his chest pain is gone and all he would like is \"my knee wrapped\" as that is \"what I came for\". Patient advised that we are awaiting results of imaging and will check labs. Patient offers no other requests or c/o at this time.   "

## 2025-01-16 NOTE — ED TRIAGE NOTES
"\"I was in the clinic for 2 hrs and had chest pain there.  I saw Dr Cline.  I was sent here from the clinic.  I have chest pains every day.  I have 5 stents in my heart.  I also have been having a cough for 3 months and my right knee has been hurting since before [place] when I fell on the steps.  No chest pain at the current time.  Chest pain went away from when I had it in the clinic.\"        "

## 2025-02-07 DIAGNOSIS — I10 PRIMARY HYPERTENSION: ICD-10-CM

## 2025-02-07 NOTE — TELEPHONE ENCOUNTER
Losartan (Cozaar) 100 mg tablet  Take 1 tablet by mouth once daily   Last Written Prescription Date:  1-3-25  Last Fill Quantity: 30 tablet,   # refills: 0  Last Office Visit: 1-15-25  Future Office visit:

## 2025-02-10 ENCOUNTER — HOSPITAL ENCOUNTER (OUTPATIENT)
Facility: HOSPITAL | Age: 65
Setting detail: OBSERVATION
Discharge: HOME | End: 2025-02-11
Attending: EMERGENCY MEDICINE | Admitting: NURSE PRACTITIONER
Payer: MEDICARE

## 2025-02-10 ENCOUNTER — APPOINTMENT (OUTPATIENT)
Dept: CT IMAGING | Facility: HOSPITAL | Age: 65
End: 2025-02-10
Attending: EMERGENCY MEDICINE
Payer: MEDICARE

## 2025-02-10 DIAGNOSIS — J44.9 CHRONIC OBSTRUCTIVE PULMONARY DISEASE, UNSPECIFIED COPD TYPE (H): ICD-10-CM

## 2025-02-10 DIAGNOSIS — R07.9 CHEST PAIN, UNSPECIFIED TYPE: ICD-10-CM

## 2025-02-10 DIAGNOSIS — J40 BRONCHITIS: ICD-10-CM

## 2025-02-10 DIAGNOSIS — R06.00 DYSPNEA, UNSPECIFIED TYPE: ICD-10-CM

## 2025-02-10 DIAGNOSIS — I71.43 INFRARENAL ABDOMINAL AORTIC ANEURYSM (AAA) WITHOUT RUPTURE: ICD-10-CM

## 2025-02-10 DIAGNOSIS — F41.9 ANXIETY: ICD-10-CM

## 2025-02-10 DIAGNOSIS — E86.0 DEHYDRATION: ICD-10-CM

## 2025-02-10 DIAGNOSIS — J44.1 COPD EXACERBATION (H): ICD-10-CM

## 2025-02-10 DIAGNOSIS — R10.84 GENERALIZED ABDOMINAL PAIN: ICD-10-CM

## 2025-02-10 LAB
ALBUMIN SERPL BCG-MCNC: 3.9 G/DL (ref 3.5–5.2)
ALBUMIN UR-MCNC: 20 MG/DL
ALP SERPL-CCNC: 168 U/L (ref 40–150)
ALT SERPL W P-5'-P-CCNC: 17 U/L (ref 0–70)
ANION GAP SERPL CALCULATED.3IONS-SCNC: 8 MMOL/L (ref 7–15)
APPEARANCE UR: CLEAR
AST SERPL W P-5'-P-CCNC: 31 U/L (ref 0–45)
BASE EXCESS BLDV CALC-SCNC: 3.7 MMOL/L (ref -3–3)
BASOPHILS # BLD AUTO: 0 10E3/UL (ref 0–0.2)
BASOPHILS NFR BLD AUTO: 1 %
BILIRUB SERPL-MCNC: 0.3 MG/DL
BILIRUB UR QL STRIP: NEGATIVE
BUN SERPL-MCNC: 23.2 MG/DL (ref 8–23)
CALCIUM SERPL-MCNC: 9.6 MG/DL (ref 8.8–10.4)
CHLORIDE SERPL-SCNC: 95 MMOL/L (ref 98–107)
COLOR UR AUTO: ABNORMAL
CREAT SERPL-MCNC: 1.24 MG/DL (ref 0.67–1.17)
CRP SERPL-MCNC: 111.48 MG/L
EGFRCR SERPLBLD CKD-EPI 2021: 66 ML/MIN/1.73M2
EOSINOPHIL # BLD AUTO: 0 10E3/UL (ref 0–0.7)
EOSINOPHIL NFR BLD AUTO: 1 %
ERYTHROCYTE [DISTWIDTH] IN BLOOD BY AUTOMATED COUNT: 13 % (ref 10–15)
FLUAV RNA SPEC QL NAA+PROBE: NEGATIVE
FLUBV RNA RESP QL NAA+PROBE: NEGATIVE
GLUCOSE SERPL-MCNC: 118 MG/DL (ref 70–99)
GLUCOSE UR STRIP-MCNC: NEGATIVE MG/DL
HCO3 BLDV-SCNC: 29 MMOL/L (ref 21–28)
HCO3 SERPL-SCNC: 29 MMOL/L (ref 22–29)
HCT VFR BLD AUTO: 36.3 % (ref 40–53)
HGB BLD-MCNC: 12.1 G/DL (ref 13.3–17.7)
HGB UR QL STRIP: ABNORMAL
HOLD SPECIMEN: NORMAL
HOLD SPECIMEN: NORMAL
IMM GRANULOCYTES # BLD: 0.1 10E3/UL
IMM GRANULOCYTES NFR BLD: 1 %
KETONES UR STRIP-MCNC: NEGATIVE MG/DL
LACTATE SERPL-SCNC: 1 MMOL/L (ref 0.7–2)
LEUKOCYTE ESTERASE UR QL STRIP: NEGATIVE
LYMPHOCYTES # BLD AUTO: 1 10E3/UL (ref 0.8–5.3)
LYMPHOCYTES NFR BLD AUTO: 18 %
MAGNESIUM SERPL-MCNC: 2.2 MG/DL (ref 1.7–2.3)
MCH RBC QN AUTO: 29.2 PG (ref 26.5–33)
MCHC RBC AUTO-ENTMCNC: 33.3 G/DL (ref 31.5–36.5)
MCV RBC AUTO: 88 FL (ref 78–100)
MONOCYTES # BLD AUTO: 0.7 10E3/UL (ref 0–1.3)
MONOCYTES NFR BLD AUTO: 12 %
MUCOUS THREADS #/AREA URNS LPF: PRESENT /LPF
NEUTROPHILS # BLD AUTO: 3.8 10E3/UL (ref 1.6–8.3)
NEUTROPHILS NFR BLD AUTO: 67 %
NITRATE UR QL: NEGATIVE
NRBC # BLD AUTO: 0 10E3/UL
NRBC BLD AUTO-RTO: 0 /100
NT-PROBNP SERPL-MCNC: 784 PG/ML (ref 0–900)
O2/TOTAL GAS SETTING VFR VENT: 21 %
OXYHGB MFR BLDV: 47 % (ref 70–75)
PCO2 BLDV: 48 MM HG (ref 40–50)
PH BLDV: 7.4 [PH] (ref 7.32–7.43)
PH UR STRIP: 6 [PH] (ref 4.7–8)
PLATELET # BLD AUTO: 221 10E3/UL (ref 150–450)
PO2 BLDV: 28 MM HG (ref 25–47)
POTASSIUM SERPL-SCNC: 3.9 MMOL/L (ref 3.4–5.3)
PROT SERPL-MCNC: 8.8 G/DL (ref 6.4–8.3)
RBC # BLD AUTO: 4.15 10E6/UL (ref 4.4–5.9)
RBC URINE: 5 /HPF
RSV RNA SPEC NAA+PROBE: NEGATIVE
SAO2 % BLDV: 47.5 % (ref 70–75)
SARS-COV-2 RNA RESP QL NAA+PROBE: NEGATIVE
SODIUM SERPL-SCNC: 132 MMOL/L (ref 135–145)
SP GR UR STRIP: 1.01 (ref 1–1.03)
SQUAMOUS EPITHELIAL: 0 /HPF
TROPONIN T SERPL HS-MCNC: 10 NG/L
TROPONIN T SERPL HS-MCNC: 13 NG/L
UROBILINOGEN UR STRIP-MCNC: NORMAL MG/DL
WBC # BLD AUTO: 5.6 10E3/UL (ref 4–11)
WBC URINE: 1 /HPF

## 2025-02-10 PROCEDURE — 74177 CT ABD & PELVIS W/CONTRAST: CPT

## 2025-02-10 PROCEDURE — 81001 URINALYSIS AUTO W/SCOPE: CPT | Performed by: EMERGENCY MEDICINE

## 2025-02-10 PROCEDURE — 82805 BLOOD GASES W/O2 SATURATION: CPT | Performed by: EMERGENCY MEDICINE

## 2025-02-10 PROCEDURE — 250N000013 HC RX MED GY IP 250 OP 250 PS 637: Performed by: NURSE PRACTITIONER

## 2025-02-10 PROCEDURE — 94640 AIRWAY INHALATION TREATMENT: CPT

## 2025-02-10 PROCEDURE — 250N000009 HC RX 250: Performed by: EMERGENCY MEDICINE

## 2025-02-10 PROCEDURE — 84484 ASSAY OF TROPONIN QUANT: CPT | Performed by: EMERGENCY MEDICINE

## 2025-02-10 PROCEDURE — 258N000003 HC RX IP 258 OP 636: Performed by: EMERGENCY MEDICINE

## 2025-02-10 PROCEDURE — 99285 EMERGENCY DEPT VISIT HI MDM: CPT | Performed by: EMERGENCY MEDICINE

## 2025-02-10 PROCEDURE — 250N000012 HC RX MED GY IP 250 OP 636 PS 637: Performed by: NURSE PRACTITIONER

## 2025-02-10 PROCEDURE — 250N000011 HC RX IP 250 OP 636: Performed by: INTERNAL MEDICINE

## 2025-02-10 PROCEDURE — 85025 COMPLETE CBC W/AUTO DIFF WBC: CPT | Performed by: EMERGENCY MEDICINE

## 2025-02-10 PROCEDURE — 93005 ELECTROCARDIOGRAM TRACING: CPT | Performed by: EMERGENCY MEDICINE

## 2025-02-10 PROCEDURE — 93010 ELECTROCARDIOGRAM REPORT: CPT | Performed by: INTERNAL MEDICINE

## 2025-02-10 PROCEDURE — 250N000011 HC RX IP 250 OP 636: Performed by: EMERGENCY MEDICINE

## 2025-02-10 PROCEDURE — 96375 TX/PRO/DX INJ NEW DRUG ADDON: CPT

## 2025-02-10 PROCEDURE — 71275 CT ANGIOGRAPHY CHEST: CPT

## 2025-02-10 PROCEDURE — 36415 COLL VENOUS BLD VENIPUNCTURE: CPT | Performed by: EMERGENCY MEDICINE

## 2025-02-10 PROCEDURE — 82040 ASSAY OF SERUM ALBUMIN: CPT | Performed by: EMERGENCY MEDICINE

## 2025-02-10 PROCEDURE — 83880 ASSAY OF NATRIURETIC PEPTIDE: CPT | Performed by: EMERGENCY MEDICINE

## 2025-02-10 PROCEDURE — 96365 THER/PROPH/DIAG IV INF INIT: CPT | Mod: XU | Performed by: EMERGENCY MEDICINE

## 2025-02-10 PROCEDURE — 99222 1ST HOSP IP/OBS MODERATE 55: CPT | Performed by: INTERNAL MEDICINE

## 2025-02-10 PROCEDURE — 99285 EMERGENCY DEPT VISIT HI MDM: CPT | Mod: 25 | Performed by: EMERGENCY MEDICINE

## 2025-02-10 PROCEDURE — 82310 ASSAY OF CALCIUM: CPT | Performed by: EMERGENCY MEDICINE

## 2025-02-10 PROCEDURE — 96361 HYDRATE IV INFUSION ADD-ON: CPT | Performed by: EMERGENCY MEDICINE

## 2025-02-10 PROCEDURE — 999N000157 HC STATISTIC RCP TIME EA 10 MIN

## 2025-02-10 PROCEDURE — 83605 ASSAY OF LACTIC ACID: CPT | Performed by: EMERGENCY MEDICINE

## 2025-02-10 PROCEDURE — G0378 HOSPITAL OBSERVATION PER HR: HCPCS

## 2025-02-10 PROCEDURE — 87637 SARSCOV2&INF A&B&RSV AMP PRB: CPT | Performed by: EMERGENCY MEDICINE

## 2025-02-10 PROCEDURE — 83735 ASSAY OF MAGNESIUM: CPT | Performed by: EMERGENCY MEDICINE

## 2025-02-10 PROCEDURE — 82565 ASSAY OF CREATININE: CPT | Performed by: EMERGENCY MEDICINE

## 2025-02-10 PROCEDURE — 86140 C-REACTIVE PROTEIN: CPT | Performed by: EMERGENCY MEDICINE

## 2025-02-10 RX ORDER — PROCHLORPERAZINE MALEATE 5 MG/1
10 TABLET ORAL EVERY 6 HOURS PRN
Status: DISCONTINUED | OUTPATIENT
Start: 2025-02-10 | End: 2025-02-11 | Stop reason: HOSPADM

## 2025-02-10 RX ORDER — ONDANSETRON 2 MG/ML
4 INJECTION INTRAMUSCULAR; INTRAVENOUS EVERY 6 HOURS PRN
Status: DISCONTINUED | OUTPATIENT
Start: 2025-02-10 | End: 2025-02-11 | Stop reason: HOSPADM

## 2025-02-10 RX ORDER — CEFTRIAXONE SODIUM 1 G/50ML
1 INJECTION, SOLUTION INTRAVENOUS ONCE
Status: COMPLETED | OUTPATIENT
Start: 2025-02-10 | End: 2025-02-10

## 2025-02-10 RX ORDER — IPRATROPIUM BROMIDE AND ALBUTEROL SULFATE 2.5; .5 MG/3ML; MG/3ML
3 SOLUTION RESPIRATORY (INHALATION)
Status: DISCONTINUED | OUTPATIENT
Start: 2025-02-11 | End: 2025-02-11 | Stop reason: HOSPADM

## 2025-02-10 RX ORDER — ACETAMINOPHEN 650 MG/1
650 SUPPOSITORY RECTAL EVERY 4 HOURS PRN
Status: DISCONTINUED | OUTPATIENT
Start: 2025-02-10 | End: 2025-02-11 | Stop reason: HOSPADM

## 2025-02-10 RX ORDER — IPRATROPIUM BROMIDE AND ALBUTEROL SULFATE 2.5; .5 MG/3ML; MG/3ML
1 SOLUTION RESPIRATORY (INHALATION) EVERY 4 HOURS PRN
Status: DISCONTINUED | OUTPATIENT
Start: 2025-02-10 | End: 2025-02-11 | Stop reason: HOSPADM

## 2025-02-10 RX ORDER — AZITHROMYCIN 250 MG/1
500 TABLET, FILM COATED ORAL ONCE
Status: COMPLETED | OUTPATIENT
Start: 2025-02-10 | End: 2025-02-10

## 2025-02-10 RX ORDER — AZITHROMYCIN 250 MG/1
250 TABLET, FILM COATED ORAL DAILY
Status: DISCONTINUED | OUTPATIENT
Start: 2025-02-11 | End: 2025-02-11 | Stop reason: HOSPADM

## 2025-02-10 RX ORDER — PREDNISONE 20 MG/1
40 TABLET ORAL DAILY
Status: DISCONTINUED | OUTPATIENT
Start: 2025-02-10 | End: 2025-02-11 | Stop reason: HOSPADM

## 2025-02-10 RX ORDER — IPRATROPIUM BROMIDE AND ALBUTEROL SULFATE 2.5; .5 MG/3ML; MG/3ML
3 SOLUTION RESPIRATORY (INHALATION) ONCE
Status: COMPLETED | OUTPATIENT
Start: 2025-02-10 | End: 2025-02-10

## 2025-02-10 RX ORDER — IOPAMIDOL 755 MG/ML
76 INJECTION, SOLUTION INTRAVASCULAR ONCE
Status: COMPLETED | OUTPATIENT
Start: 2025-02-10 | End: 2025-02-10

## 2025-02-10 RX ORDER — POLYETHYLENE GLYCOL 3350 17 G
2 POWDER IN PACKET (EA) ORAL
Status: DISCONTINUED | OUTPATIENT
Start: 2025-02-10 | End: 2025-02-11 | Stop reason: HOSPADM

## 2025-02-10 RX ORDER — AMOXICILLIN 250 MG
2 CAPSULE ORAL 2 TIMES DAILY PRN
Status: DISCONTINUED | OUTPATIENT
Start: 2025-02-10 | End: 2025-02-11 | Stop reason: HOSPADM

## 2025-02-10 RX ORDER — LOSARTAN POTASSIUM 100 MG/1
100 TABLET ORAL DAILY
Qty: 30 TABLET | Refills: 11 | Status: SHIPPED | OUTPATIENT
Start: 2025-02-10

## 2025-02-10 RX ORDER — AMOXICILLIN 250 MG
1 CAPSULE ORAL 2 TIMES DAILY PRN
Status: DISCONTINUED | OUTPATIENT
Start: 2025-02-10 | End: 2025-02-11 | Stop reason: HOSPADM

## 2025-02-10 RX ORDER — LIDOCAINE 40 MG/G
CREAM TOPICAL
Status: DISCONTINUED | OUTPATIENT
Start: 2025-02-10 | End: 2025-02-11 | Stop reason: HOSPADM

## 2025-02-10 RX ORDER — ACETAMINOPHEN 325 MG/1
650 TABLET ORAL EVERY 4 HOURS PRN
Status: DISCONTINUED | OUTPATIENT
Start: 2025-02-10 | End: 2025-02-11 | Stop reason: HOSPADM

## 2025-02-10 RX ORDER — ALBUTEROL SULFATE 0.83 MG/ML
2.5 SOLUTION RESPIRATORY (INHALATION) ONCE
Status: COMPLETED | OUTPATIENT
Start: 2025-02-10 | End: 2025-02-10

## 2025-02-10 RX ORDER — ONDANSETRON 4 MG/1
4 TABLET, ORALLY DISINTEGRATING ORAL EVERY 6 HOURS PRN
Status: DISCONTINUED | OUTPATIENT
Start: 2025-02-10 | End: 2025-02-11 | Stop reason: HOSPADM

## 2025-02-10 RX ORDER — GUAIFENESIN AND DEXTROMETHORPHAN HYDROBROMIDE 600; 30 MG/1; MG/1
1 TABLET, EXTENDED RELEASE ORAL 2 TIMES DAILY
Status: DISCONTINUED | OUTPATIENT
Start: 2025-02-10 | End: 2025-02-11 | Stop reason: HOSPADM

## 2025-02-10 RX ADMIN — CEFTRIAXONE SODIUM 1 G: 1 INJECTION, SOLUTION INTRAVENOUS at 15:07

## 2025-02-10 RX ADMIN — FAMOTIDINE 20 MG: 10 INJECTION, SOLUTION INTRAVENOUS at 23:44

## 2025-02-10 RX ADMIN — IPRATROPIUM BROMIDE AND ALBUTEROL SULFATE 3 ML: .5; 3 SOLUTION RESPIRATORY (INHALATION) at 12:23

## 2025-02-10 RX ADMIN — AZITHROMYCIN DIHYDRATE 500 MG: 250 TABLET ORAL at 20:15

## 2025-02-10 RX ADMIN — PREDNISONE 40 MG: 20 TABLET ORAL at 20:15

## 2025-02-10 RX ADMIN — IOPAMIDOL 76 ML: 755 INJECTION, SOLUTION INTRAVENOUS at 13:07

## 2025-02-10 RX ADMIN — SODIUM CHLORIDE 500 ML: 9 INJECTION, SOLUTION INTRAVENOUS at 14:26

## 2025-02-10 RX ADMIN — ALBUTEROL SULFATE 2.5 MG: 2.5 SOLUTION RESPIRATORY (INHALATION) at 17:31

## 2025-02-10 RX ADMIN — GUAIFENESIN, DEXTROMETHORPHAN HBR 1 TABLET: 600; 30 TABLET ORAL at 20:15

## 2025-02-10 ASSESSMENT — ACTIVITIES OF DAILY LIVING (ADL)
ADLS_ACUITY_SCORE: 44
ADLS_ACUITY_SCORE: 38
ADLS_ACUITY_SCORE: 44
ADLS_ACUITY_SCORE: 38
ADLS_ACUITY_SCORE: 38

## 2025-02-10 ASSESSMENT — COLUMBIA-SUICIDE SEVERITY RATING SCALE - C-SSRS
2. HAVE YOU ACTUALLY HAD ANY THOUGHTS OF KILLING YOURSELF IN THE PAST MONTH?: NO
1. IN THE PAST MONTH, HAVE YOU WISHED YOU WERE DEAD OR WISHED YOU COULD GO TO SLEEP AND NOT WAKE UP?: NO
6. HAVE YOU EVER DONE ANYTHING, STARTED TO DO ANYTHING, OR PREPARED TO DO ANYTHING TO END YOUR LIFE?: YES

## 2025-02-10 NOTE — ED PROVIDER NOTES
History     Chief Complaint   Patient presents with    Chest Pain    Shortness of Breath     HPI  Rodney Goodwin is a 64 year old male who presents to the ED with chest pain.  And shortness of breath.  Apparently ran out of oxygen blood pressure medicine several days ago he uses the oxygen on a as needed basis.  His cough is been ongoing for months and has been productive he thought he had some hemoptysis recently.  Patient's been having postnasal drip and having to clear his throat more frequently which is been distressing him, his chest discomfort is pleuritic in nature anterior worse with coughing.  Not aware of any fevers but he states he feels terrible.  Denies any palpitations.  He has generalized abdominal discomfort no diarrhea patient states he thinks it might have been H. pylori which she had before but was assessed by his PCP and found to be negative.  Patient continues to smoke up until 2 days ago.    Allergies:  Allergies   Allergen Reactions    Codeine     Penicillins     Morphine And Codeine Other (See Comments)     Unknown at young age       Problem List:    Patient Active Problem List    Diagnosis Date Noted    Acute on chronic systolic heart failure (H) 04/04/2024     Priority: Medium    CAD (coronary artery disease) 04/04/2024     Priority: Medium    Status post coronary angiogram 04/04/2024     Priority: Medium    Chest pain, unspecified type 04/04/2024     Priority: Medium    Bladder tumor 03/20/2024     Priority: Medium    COPD (chronic obstructive pulmonary disease) (H) 04/28/2020     Priority: Medium    Suicidal behavior 04/27/2020     Priority: Medium    Gastroesophageal reflux disease without esophagitis 04/27/2020     Priority: Medium    Penile lesion 04/27/2020     Priority: Medium    Hypertension      Priority: Medium    Elevated blood sugar 10/29/2016     Priority: Medium    Narcotic addiction (H) 10/29/2016     Priority: Medium    Heroin abuse (H) 09/15/2015     Priority: Medium     Costochondritis 02/26/2015     Priority: Medium    Venous insufficiency of both lower extremities 02/26/2015     Priority: Medium     Do you wish to do the replacement in the background? yes        Tobacco dependence 01/06/2015     Priority: Medium    Cluster B personality disorder (H) 12/25/2014     Priority: Medium     Vs cluster B traits per OSH records 11/2014      Polysubstance abuse (H) 12/25/2014     Priority: Medium     Heroin, benzodiazepines, amphetamine, prescription opiates, THC abuse. Dealing drugs, IVDU as recently as 11/2014.      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     Priority: Medium    Cellulitis and abscess of forearm 04/04/2014     Priority: Medium    IV drug user 08/09/2010     Priority: Medium     Overview:   IMO Update 10/11      Chlordiazepoxide dependence (H) 08/09/2010     Priority: Medium     Overview:   IMO Update 10/11      Heroin addiction (H) 08/09/2010     Priority: Medium     Overview:   See social notes  IMO Update 10/11      Osteoarthrosis, pelvic region and thigh 11/13/2008     Priority: Medium     Overview:   IMO Update 10/11      Lumbosacral spondylosis without myelopathy 11/13/2008     Priority: Medium    Backache 06/09/2008     Priority: Medium     Overview:   IMO Update 10/11      Degeneration of lumbar or lumbosacral intervertebral disc 07/25/2006     Priority: Medium     Overview:   IMO Update 10/11          Past Medical History:    Past Medical History:   Diagnosis Date    Acute exacerbation of chronic obstructive pulmonary disease (H) 10/28/2016    Anxiety     Backache 6/9/2008    Bipolar 1 disorder (H) 11/11/2014    Bipolar affective disorder (H)     Cellulitis and abscess of forearm 4/4/2014    Chlordiazepoxide dependence (H) 8/9/2010    Chronic constipation     COPD exacerbation (H) 9/14/2015    Costochondritis 2/26/2015    Degeneration of lumbar or  lumbosacral intervertebral disc 7/25/2006    Depressive disorder     Drug abuse (H)     Elevated blood sugar 10/29/2016    Heroin abuse (H) 9/15/2015    Heroin addiction (H) 8/9/2010    Hypertension     IV drug user 8/9/2010    Lumbosacral spondylosis without myelopathy 11/13/2008    Narcotic addiction (H) 10/29/2016    Opioid use disorder, severe, dependence (H) 2020    Osteoarthrosis, pelvic region and thigh 11/13/2008    Penile lesion 4/27/2020    Suicidal behavior 4/27/2020    Venous insufficiency of both lower extremities 2/26/2015       Past Surgical History:    Past Surgical History:   Procedure Laterality Date    ARTHROSCOPY KNEE BILATERAL      COLONOSCOPY  01/29/2020    Multiple, repeat due 2015    COLONOSCOPY N/A 7/31/2015    Procedure: COLONOSCOPY;  Surgeon: Justin Andujar MD;  Location: [facility]    CV CORONARY ANGIOGRAM N/A 4/4/2024    Procedure: Coronary Angiogram;  Surgeon: Zak Stephens MD;  [facility]    CV PCI N/A 4/4/2024    Procedure: Percutaneous Coronary Intervention;  Surgeon: Zak Stephens MD;  [facility]    DECOMPRESSION CUBITAL TUNNEL Left 11/21/2017    Procedure: DECOMPRESSION CUBITAL TUNNEL;;  Surgeon: Jhonny Zhao MD;  Location: [facility]    DENTAL SURGERY      ENDOSCOPY UPPER, COLONOSCOPY, COMBINED N/A 10/22/2024    Procedure: Upper endoscopy with biopsy and colonoscopy with polypectomy;  Surgeon: Andrey Bone MD;  Location: [facility]    HERNIA REPAIR      Inguinal, left    RELEASE CARPAL TUNNEL Left 11/21/2017    Procedure: RELEASE CARPAL TUNNEL;  LEFT ELBOW CUBITAL and CARPAL TUNNEL RELEASE;  Surgeon: Jhonny Zhao MD;  Location: [facility]       Family History:    Family History   Problem Relation Age of Onset    Diabetes Mother     Cerebrovascular Disease Father     Pancreatic Cancer Brother        Social History:  Marital Status:   [4]  Social History     Tobacco Use    Smoking status: Every Day     Current  packs/day: 1.00     Average packs/day: 1 pack/day for 42.1 years (42.1 ttl pk-yrs)     Types: Cigarettes     Start date: 1983    Smokeless tobacco: Never    Tobacco comments:     Tried to Quit (NO)   Vaping Use    Vaping status: Never Used   Substance Use Topics    Alcohol use: No     Alcohol/week: 0.0 standard drinks of alcohol    Drug use: Not Currently     Types: IV, Methamphetamines, Opiates, Marijuana     Comment: last use 8 mo        Medications:    albuterol (PROAIR HFA/PROVENTIL HFA/VENTOLIN HFA) 108 (90 Base) MCG/ACT inhaler  aspirin (ASA) 81 MG chewable tablet  bisacodyl (DULCOLAX) 5 MG EC tablet  carvedilol (COREG) 12.5 MG tablet  ipratropium - albuterol 0.5 mg/2.5 mg/3 mL (DUONEB) 0.5-2.5 (3) MG/3ML neb solution  losartan (COZAAR) 100 MG tablet  omeprazole (PRILOSEC) 40 MG DR capsule  PEG 3350-KCl-NaBcb-NaCl-NaSulf (POLYETHYLENE GLYCOL) 236 g solution  rosuvastatin (CRESTOR) 40 MG tablet  ticagrelor (BRILINTA) 90 MG tablet  umeclidinium-vilanterol (ANORO ELLIPTA) 62.5-25 MCG/ACT oral inhaler          Review of Systems   All other systems reviewed and are negative.      Physical Exam   BP: 109/67  Pulse: 89  Temp: 98.7  F (37.1  C)  Resp: 18  SpO2: (!) 91 %      Physical Exam  Vitals and nursing note reviewed.   Constitutional:       General: He is not in acute distress.     Appearance: Normal appearance. He is not ill-appearing, toxic-appearing or diaphoretic.   HENT:      Head: Normocephalic and atraumatic.   Eyes:      General: No scleral icterus.     Extraocular Movements: Extraocular movements intact.      Conjunctiva/sclera: Conjunctivae normal.   Neck:      Comments: No pharyngeal erythema patient had dry oral mucosa  Cardiovascular:      Rate and Rhythm: Normal rate and regular rhythm.      Heart sounds: Normal heart sounds.   Pulmonary:      Effort: Pulmonary effort is normal. No respiratory distress.      Breath sounds: Examination of the right-upper field reveals wheezing. Examination of the  right-middle field reveals wheezing. Examination of the left-middle field reveals wheezing. Wheezing present.   Abdominal:      General: Bowel sounds are normal.      Palpations: Abdomen is soft. There is no mass.      Tenderness: There is abdominal tenderness. There is no guarding or rebound.      Comments: Generalized abdominal tenderness no rebound no guarding   Musculoskeletal:         General: No deformity.      Cervical back: Normal range of motion and neck supple.      Right lower leg: No edema.      Left lower leg: No edema.   Skin:     General: Skin is warm and dry.      Capillary Refill: Capillary refill takes less than 2 seconds.   Neurological:      General: No focal deficit present.      Mental Status: He is alert and oriented to person, place, and time.   Psychiatric:         Behavior: Behavior normal.      Comments: Anxious mood         ED Course     ED Course as of 02/10/25 1723   Mon Feb 10, 2025   1722 Patient is tachypneic O2 sats dropped down to 91%.  Second nebulizer albuterol was ordered patient feels generally weak his CRP was in the 148 range.  CTA chest CT abdomen showed no acute findings appears he has bronchitis with COPD exacerbation.  Patient lives alone and does not feel safe going home.  Will discuss with hospitalist about observation admission     Procedures              Critical Care time:  none    IV Antibiotics given and/or elevated Lactate of 1 and no sepsis note found - Delete this reminder and enter the sepsis note or '.edcms' before signing chart.>>>     EKG done at 1133 reviewed at 1133 agree with below interpretation    Results for orders placed or performed during the hospital encounter of 02/10/25 (from the past 24 hours)   EKG 12-lead, tracing only   Result Value Ref Range    Systolic Blood Pressure  mmHg    Diastolic Blood Pressure  mmHg    Ventricular Rate 89 BPM    Atrial Rate 89 BPM    CO Interval 150 ms    QRS Duration 88 ms     ms    QTc 457 ms    P Axis 47  degrees    R AXIS 69 degrees    T Axis 89 degrees    Interpretation ECG       Sinus rhythm  Cannot rule out Inferior infarct (cited on or before 11-Oct-2023)  Abnormal ECG  When compared with ECG of 15-Darwin-2025 17:56,  T wave inversion no longer evident in Inferior leads  Nonspecific T wave abnormality has replaced inverted T waves in Lateral leads     CBC with platelets differential    Narrative    The following orders were created for panel order CBC with platelets differential.  Procedure                               Abnormality         Status                     ---------                               -----------         ------                     CBC with platelets and d...[[id number]]  Abnormal            Final result                 Please view results for these tests on the individual orders.   Comprehensive metabolic panel   Result Value Ref Range    Sodium 132 (L) 135 - 145 mmol/L    Potassium 3.9 3.4 - 5.3 mmol/L    Carbon Dioxide (CO2) 29 22 - 29 mmol/L    Anion Gap 8 7 - 15 mmol/L    Urea Nitrogen 23.2 (H) 8.0 - 23.0 mg/dL    Creatinine 1.24 (H) 0.67 - 1.17 mg/dL    GFR Estimate 66 >60 mL/min/1.73m2    Calcium 9.6 8.8 - 10.4 mg/dL    Chloride 95 (L) 98 - 107 mmol/L    Glucose 118 (H) 70 - 99 mg/dL    Alkaline Phosphatase 168 (H) 40 - 150 U/L    AST 31 0 - 45 U/L    ALT 17 0 - 70 U/L    Protein Total 8.8 (H) 6.4 - 8.3 g/dL    Albumin 3.9 3.5 - 5.2 g/dL    Bilirubin Total 0.3 <=1.2 mg/dL   Lactic acid whole blood with 1x repeat in 2 hr when >2   Result Value Ref Range    Lactic Acid, Initial 1.0 0.7 - 2.0 mmol/L   Troponin T, High Sensitivity   Result Value Ref Range    Troponin T, High Sensitivity 13 <=22 ng/L   Magnesium   Result Value Ref Range    Magnesium 2.2 1.7 - 2.3 mg/dL   Blood gas venous   Result Value Ref Range    pH Venous 7.40 7.32 - 7.43    pCO2 Venous 48 40 - 50 mm Hg    pO2 Venous 28 25 - 47 mm Hg    Bicarbonate Venous 29 (H) 21 - 28 mmol/L    Base Excess/Deficit Venous 3.7 (H) -3.0 - 3.0  mmol/L    FIO2 21     Oxyhemoglobin Venous 47 (L) 70 - 75 %    O2 Sat, Venous 47.5 (L) 70.0 - 75.0 %    Narrative    In healthy individuals, oxyhemoglobin (O2Hb) and oxygen saturation (SO2) are approximately equal. In the presence of dyshemoglobins, oxyhemoglobin can be considerably lower than oxygen saturation.   Nt probnp inpatient (BNP)   Result Value Ref Range    N terminal Pro BNP Inpatient 784 0 - 900 pg/mL   CRP inflammation   Result Value Ref Range    CRP Inflammation 111.48 (H) <5.00 mg/L   CBC with platelets and differential   Result Value Ref Range    WBC Count 5.6 4.0 - 11.0 10e3/uL    RBC Count 4.15 (L) 4.40 - 5.90 10e6/uL    Hemoglobin 12.1 (L) 13.3 - 17.7 g/dL    Hematocrit 36.3 (L) 40.0 - 53.0 %    MCV 88 78 - 100 fL    MCH 29.2 26.5 - 33.0 pg    MCHC 33.3 31.5 - 36.5 g/dL    RDW 13.0 10.0 - 15.0 %    Platelet Count 221 150 - 450 10e3/uL    % Neutrophils 67 %    % Lymphocytes 18 %    % Monocytes 12 %    % Eosinophils 1 %    % Basophils 1 %    % Immature Granulocytes 1 %    NRBCs per 100 WBC 0 <1 /100    Absolute Neutrophils 3.8 1.6 - 8.3 10e3/uL    Absolute Lymphocytes 1.0 0.8 - 5.3 10e3/uL    Absolute Monocytes 0.7 0.0 - 1.3 10e3/uL    Absolute Eosinophils 0.0 0.0 - 0.7 10e3/uL    Absolute Basophils 0.0 0.0 - 0.2 10e3/uL    Absolute Immature Granulocytes 0.1 <=0.4 10e3/uL    Absolute NRBCs 0.0 10e3/uL   [place] Draw    Narrative    The following orders were created for panel order [place] Draw.  Procedure                               Abnormality         Status                     ---------                               -----------         ------                     Extra Blue Top Tube[[id number]]                              Final result               Extra Red Top Tube[[id number]]                               Final result                 Please view results for these tests on the individual orders.   Extra Blue Top Tube   Result Value Ref Range    Hold Specimen [facility]    Extra Red Top Tube   Result  Value Ref Range    Hold Specimen [facility]    Influenza A/B, RSV and SARS-CoV2 PCR (COVID-19) Nasopharyngeal    Specimen: Nasopharyngeal; Swab   Result Value Ref Range    Influenza A PCR Negative Negative    Influenza B PCR Negative Negative    RSV PCR Negative Negative    SARS CoV2 PCR Negative Negative    Narrative    Testing was performed using the Xpert Xpress CoV2/Flu/RSV Assay on the [employer] Gene[employer]pert Instrument. This test should be ordered for the detection of SARS-CoV2, influenza, and RSV viruses in individuals with signs and symptoms of respiratory tract infection. This test is for in vitro diagnostic use under the [place] FDA for laboratories certified under CLIA to perform high or moderate complexity testing. This test has been [place] FDA cleared. A negative result does not rule out the presence of PCR inhibitors in the specimen or target RNA in concentration below the limit of detection for the assay. If only one viral target is positive but coinfection with multiple targets is suspected, the sample should be re-tested with another FDA cleared, approved, or authorized test, if coninfection would change clinical management. This test was validated by the [facility] [employer]. These laboratories are certified under the Clinical Laboratory Improvement Amendments of 1988 (CLIA-88) as qualified to perfom high complexity laboratory testing.   CT Chest (PE) Abdomen Pelvis w Contrast    Narrative    CT angiogram of the chest with sagittal coronal mip reconstructions.  CT scan of the abdomen and pelvis with IV contrast    HISTORY: Chest pain short of breath and generalized abdominal pain    TECHNIQUE: CT angiogram of the chest was performed with sagittal and  coronal nip reconstructions.    CT scan of the abdomen and pelvis with IV contrast.    FINDINGS: CT angiogram of the chest: There are no pulmonary emboli.  The heart is normal in size. There is atherosclerotic plaquing in the  thoracic aorta.    The lungs are  clear. The hilar and mediastinal lymph nodes are normal.  Axillary and supraclavicular lymph nodes are normal.    CT scan of the abdomen and pelvis: The liver is free of masses or  biliary ductal enlargement. Calcified gallstones are seen. The spleen  and pancreas are normal. The adrenal glands are normal. There is a  small intermediate density nodule in the midportion of the left kidney  most likely a cyst small cyst. This is unchanged from 2023 there is no  hydronephrosis.    The periaortic lymph nodes are normal. There is a 4.7 cm in diameter  infrarenal abdominal aortic aneurysm.    No intraperitoneal masses or inflammatory changes are noted. The  bladder and rectum are normal. Degenerative changes are present in the  thoracic and lumbar spine.      Impression    IMPRESSION: No pulmonary emboli    4.7 cm infrarenal abdominal aortic aneurysm.    No intraperitoneal masses or inflammatory changes are noted.    GABRIELE MANDUJANO MD         SYSTEM ID:  [id number]   UA with Microscopic reflex to Culture    Specimen: Urine, Midstream   Result Value Ref Range    Color Urine Light Yellow Colorless, Straw, Light Yellow, Yellow    Appearance Urine Clear Clear    Glucose Urine Negative Negative mg/dL    Bilirubin Urine Negative Negative    Ketones Urine Negative Negative mg/dL    Specific Gravity Urine 1.015 1.003 - 1.035    Blood Urine Small (A) Negative    pH Urine 6.0 4.7 - 8.0    Protein Albumin Urine 20 (A) Negative mg/dL    Urobilinogen Urine Normal Normal, 2.0 mg/dL    Nitrite Urine Negative Negative    Leukocyte Esterase Urine Negative Negative    Mucus Urine Present (A) None Seen /LPF    RBC Urine 5 (H) <=2 /HPF    WBC Urine 1 <=5 /HPF    Squamous Epithelials Urine 0 <=1 /HPF    Narrative    Urine Culture not indicated   Troponin T, High Sensitivity   Result Value Ref Range    Troponin T, High Sensitivity 10 <=22 ng/L       Medications   albuterol (PROVENTIL) neb solution 2.5 mg (has no administration in time range)    ipratropium - albuterol 0.5 mg/2.5 mg/3 mL (DUONEB) neb solution 3 mL (3 mLs Nebulization $Given 2/10/25 1223)   iopamidol (ISOVUE-370) solution 76 mL (76 mLs Intravenous $Given 2/10/25 1307)   sodium chloride (PF) 0.9% PF flush 100 mL (100 mLs Intravenous $Given 2/10/25 1307)   sodium chloride 0.9% BOLUS 500 mL (0 mLs Intravenous Stopped 2/10/25 1517)   cefTRIAXone in d5w (ROCEPHIN) intermittent infusion 1 g (0 g Intravenous Stopped 2/10/25 1530)       Assessments & Plan (with Medical Decision Making)     I have reviewed the nursing notes.    I have reviewed the findings, diagnosis, plan and need for follow up with the patient.           Medical Decision Making  The patient's presentation was of moderate complexity (an acute illness with systemic symptoms).    The patient's evaluation involved:  ordering and/or review of 3+ test(s) in this encounter (CT scan EKG multiple lab test)    The patient's management necessitated high risk (a decision regarding hospitalization).    Patient's now again tachypneic O2 sats around 91% repeat nebulizer ordered.  Patient feels generally weak diagnostic investigations including CTA chest CT abdomen pelvis IV contrast were negative his CRP is elevated significantly at 148 he has had a loose productive cough likely has bronchitis with COPD exacerbation flu COVID RSV are negative.  Rocephin was administered, discussed with hospitalist regarding possibility of observation placement.    New Prescriptions    No medications on file       Final diagnoses:   Chest pain, unspecified type   Dyspnea, unspecified type   Generalized abdominal pain   Infrarenal abdominal aortic aneurysm (AAA) without rupture   Anxiety   Dehydration   Bronchitis   COPD exacerbation (H)       2/10/2025   [facility]       Mick Carter MD  02/10/25 1626

## 2025-02-10 NOTE — ED TRIAGE NOTES
"Patient presents with c/o SOB and Chest pain. Reports running out of O2. Reports that he usually runs around 94% and when he drops down to 91% or below he puts on 1 LPM O2. Patient reports medial chest pain that started about a month and reports that he has been evaluated several times in the past few months for these problems. Patient reports that he called today because he \"couldn't swallow,\" which he also has been evaluated for. Reports medial chest pain, denies any radiation of pain. Patient now c/o stomach pain, which he also has been evaluated for. \"Julianne been living with it, I can't live it with it anymore.\"        "

## 2025-02-11 ENCOUNTER — APPOINTMENT (OUTPATIENT)
Dept: PHYSICAL THERAPY | Facility: HOSPITAL | Age: 65
End: 2025-02-11
Attending: INTERNAL MEDICINE
Payer: MEDICARE

## 2025-02-11 VITALS
HEART RATE: 78 BPM | DIASTOLIC BLOOD PRESSURE: 56 MMHG | OXYGEN SATURATION: 94 % | HEIGHT: 69 IN | TEMPERATURE: 97.7 F | RESPIRATION RATE: 18 BRPM | BODY MASS INDEX: 25.42 KG/M2 | SYSTOLIC BLOOD PRESSURE: 111 MMHG | WEIGHT: 171.6 LBS

## 2025-02-11 LAB
ANION GAP SERPL CALCULATED.3IONS-SCNC: 13 MMOL/L (ref 7–15)
ATRIAL RATE - MUSE: 89 BPM
BUN SERPL-MCNC: 22.7 MG/DL (ref 8–23)
CALCIUM SERPL-MCNC: 9.2 MG/DL (ref 8.8–10.4)
CHLORIDE SERPL-SCNC: 99 MMOL/L (ref 98–107)
CREAT SERPL-MCNC: 0.85 MG/DL (ref 0.67–1.17)
CRP SERPL-MCNC: 59.83 MG/L
DIASTOLIC BLOOD PRESSURE - MUSE: NORMAL MMHG
EGFRCR SERPLBLD CKD-EPI 2021: >90 ML/MIN/1.73M2
ERYTHROCYTE [DISTWIDTH] IN BLOOD BY AUTOMATED COUNT: 13.1 % (ref 10–15)
GLUCOSE SERPL-MCNC: 128 MG/DL (ref 70–99)
HCO3 SERPL-SCNC: 21 MMOL/L (ref 22–29)
HCT VFR BLD AUTO: 36.5 % (ref 40–53)
HGB BLD-MCNC: 12.1 G/DL (ref 13.3–17.7)
INTERPRETATION ECG - MUSE: NORMAL
MCH RBC QN AUTO: 28.9 PG (ref 26.5–33)
MCHC RBC AUTO-ENTMCNC: 33.2 G/DL (ref 31.5–36.5)
MCV RBC AUTO: 87 FL (ref 78–100)
P AXIS - MUSE: 47 DEGREES
PLATELET # BLD AUTO: 234 10E3/UL (ref 150–450)
POTASSIUM SERPL-SCNC: 4.8 MMOL/L (ref 3.4–5.3)
PR INTERVAL - MUSE: 150 MS
PROCALCITONIN SERPL IA-MCNC: 0.1 NG/ML
QRS DURATION - MUSE: 88 MS
QT - MUSE: 376 MS
QTC - MUSE: 457 MS
R AXIS - MUSE: 69 DEGREES
RBC # BLD AUTO: 4.18 10E6/UL (ref 4.4–5.9)
SODIUM SERPL-SCNC: 133 MMOL/L (ref 135–145)
SYSTOLIC BLOOD PRESSURE - MUSE: NORMAL MMHG
T AXIS - MUSE: 89 DEGREES
VENTRICULAR RATE- MUSE: 89 BPM
WBC # BLD AUTO: 4.8 10E3/UL (ref 4–11)

## 2025-02-11 PROCEDURE — 85018 HEMOGLOBIN: CPT | Performed by: NURSE PRACTITIONER

## 2025-02-11 PROCEDURE — 97530 THERAPEUTIC ACTIVITIES: CPT | Mod: GP

## 2025-02-11 PROCEDURE — 36415 COLL VENOUS BLD VENIPUNCTURE: CPT | Performed by: NURSE PRACTITIONER

## 2025-02-11 PROCEDURE — 84145 PROCALCITONIN (PCT): CPT | Performed by: NURSE PRACTITIONER

## 2025-02-11 PROCEDURE — 250N000013 HC RX MED GY IP 250 OP 250 PS 637: Performed by: NURSE PRACTITIONER

## 2025-02-11 PROCEDURE — 99239 HOSP IP/OBS DSCHRG MGMT >30: CPT | Performed by: NURSE PRACTITIONER

## 2025-02-11 PROCEDURE — 250N000011 HC RX IP 250 OP 636: Performed by: INTERNAL MEDICINE

## 2025-02-11 PROCEDURE — G0378 HOSPITAL OBSERVATION PER HR: HCPCS

## 2025-02-11 PROCEDURE — 999N000157 HC STATISTIC RCP TIME EA 10 MIN

## 2025-02-11 PROCEDURE — 82565 ASSAY OF CREATININE: CPT | Performed by: NURSE PRACTITIONER

## 2025-02-11 PROCEDURE — 96376 TX/PRO/DX INJ SAME DRUG ADON: CPT

## 2025-02-11 PROCEDURE — 250N000012 HC RX MED GY IP 250 OP 636 PS 637: Performed by: NURSE PRACTITIONER

## 2025-02-11 PROCEDURE — 97161 PT EVAL LOW COMPLEX 20 MIN: CPT | Mod: GP

## 2025-02-11 PROCEDURE — 80048 BASIC METABOLIC PNL TOTAL CA: CPT | Performed by: NURSE PRACTITIONER

## 2025-02-11 PROCEDURE — 94640 AIRWAY INHALATION TREATMENT: CPT

## 2025-02-11 PROCEDURE — 250N000009 HC RX 250: Performed by: INTERNAL MEDICINE

## 2025-02-11 PROCEDURE — 86140 C-REACTIVE PROTEIN: CPT | Performed by: NURSE PRACTITIONER

## 2025-02-11 RX ORDER — ASPIRIN 81 MG/1
81 TABLET, CHEWABLE ORAL DAILY
Status: DISCONTINUED | OUTPATIENT
Start: 2025-02-11 | End: 2025-02-11 | Stop reason: HOSPADM

## 2025-02-11 RX ORDER — PREDNISONE 20 MG/1
40 TABLET ORAL DAILY
Qty: 8 TABLET | Refills: 0 | Status: SHIPPED | OUTPATIENT
Start: 2025-02-12 | End: 2025-02-16

## 2025-02-11 RX ORDER — OMEPRAZOLE 20 MG/1
40 CAPSULE, DELAYED RELEASE ORAL DAILY
Status: DISCONTINUED | OUTPATIENT
Start: 2025-02-11 | End: 2025-02-11

## 2025-02-11 RX ORDER — AZITHROMYCIN 250 MG/1
250 TABLET, FILM COATED ORAL
Qty: 4 TABLET | Refills: 0 | Status: SHIPPED | OUTPATIENT
Start: 2025-02-11 | End: 2025-02-21

## 2025-02-11 RX ORDER — ASPIRIN 81 MG/1
81 TABLET, CHEWABLE ORAL EVERY MORNING
COMMUNITY

## 2025-02-11 RX ORDER — CARVEDILOL 12.5 MG/1
12.5 TABLET ORAL 2 TIMES DAILY WITH MEALS
Status: DISCONTINUED | OUTPATIENT
Start: 2025-02-11 | End: 2025-02-11 | Stop reason: HOSPADM

## 2025-02-11 RX ORDER — LOSARTAN POTASSIUM 50 MG/1
100 TABLET ORAL DAILY
Status: DISCONTINUED | OUTPATIENT
Start: 2025-02-11 | End: 2025-02-11 | Stop reason: HOSPADM

## 2025-02-11 RX ORDER — PANTOPRAZOLE SODIUM 40 MG/1
40 TABLET, DELAYED RELEASE ORAL 2 TIMES DAILY
Status: DISCONTINUED | OUTPATIENT
Start: 2025-02-11 | End: 2025-02-11 | Stop reason: HOSPADM

## 2025-02-11 RX ORDER — IPRATROPIUM BROMIDE AND ALBUTEROL SULFATE 2.5; .5 MG/3ML; MG/3ML
1 SOLUTION RESPIRATORY (INHALATION) EVERY 6 HOURS PRN
Qty: 90 ML | Refills: 0 | Status: SHIPPED | OUTPATIENT
Start: 2025-02-11 | End: 2025-02-13

## 2025-02-11 RX ADMIN — IPRATROPIUM BROMIDE AND ALBUTEROL SULFATE 3 ML: .5; 3 SOLUTION RESPIRATORY (INHALATION) at 07:50

## 2025-02-11 RX ADMIN — LOSARTAN POTASSIUM 100 MG: 50 TABLET, FILM COATED ORAL at 09:20

## 2025-02-11 RX ADMIN — IPRATROPIUM BROMIDE AND ALBUTEROL SULFATE 3 ML: .5; 3 SOLUTION RESPIRATORY (INHALATION) at 02:24

## 2025-02-11 RX ADMIN — GUAIFENESIN, DEXTROMETHORPHAN HBR 1 TABLET: 600; 30 TABLET ORAL at 08:06

## 2025-02-11 RX ADMIN — CARVEDILOL 12.5 MG: 12.5 TABLET, FILM COATED ORAL at 09:20

## 2025-02-11 RX ADMIN — ASPIRIN 81 MG CHEWABLE TABLET 81 MG: 81 TABLET CHEWABLE at 09:19

## 2025-02-11 RX ADMIN — FAMOTIDINE 20 MG: 10 INJECTION, SOLUTION INTRAVENOUS at 11:04

## 2025-02-11 RX ADMIN — TICAGRELOR 90 MG: 90 TABLET ORAL at 09:20

## 2025-02-11 RX ADMIN — PREDNISONE 40 MG: 20 TABLET ORAL at 08:06

## 2025-02-11 RX ADMIN — PANTOPRAZOLE SODIUM 40 MG: 40 TABLET, DELAYED RELEASE ORAL at 09:20

## 2025-02-11 ASSESSMENT — ACTIVITIES OF DAILY LIVING (ADL)
ADLS_ACUITY_SCORE: 38

## 2025-02-11 NOTE — DISCHARGE INSTRUCTIONS
Rodney, the telephone number for [facility] transportation is:  [phone].  Put this in your telephone address book so you always have it.  Remember, they need 2 days notice to schedule your rides AND they can take you to pick-up your prescriptions.    Alba is your care coordinator in the clinic.  She will be watching for you at your follow-up appointment with Dr. Cline.

## 2025-02-11 NOTE — MEDICATION SCRIBE - ADMISSION MEDICATION HISTORY
Medication Scribe Admission Medication History    Admission medication history is complete. The information provided in this note is only as accurate as the sources available at the time of the update.    Information Source(s): Patient and CareEverywhere/SureScripts via in-person    Pertinent Information:   Patient reports taking 6 tablets in the AM and 2 tablets in the PM which correlates with his fill history. He reports taking all medications on Sunday but none yesterday PTA. He denies using inhalers and no longer has them at home but reports he does use his nebulizer PRN.     Changes made to PTA medication list:  Added: None  Deleted: None  Changed: re-entered asa and brilinta without starting instructions.     Allergies reviewed with patient and updates made in EHR: yes    Medication History Completed By: Cinthia Hanley 2/11/2025 10:02 AM    PTA Med List   Medication Sig Last Dose/Taking    aspirin (ASA) 81 MG chewable tablet Take 81 mg by mouth every morning. 2/9/2025 Morning    carvedilol (COREG) 12.5 MG tablet TAKE 1 TABLET BY MOUTH TWICE DAILY WITH MEALS 2/9/2025 Bedtime    ipratropium - albuterol 0.5 mg/2.5 mg/3 mL (DUONEB) 0.5-2.5 (3) MG/3ML neb solution Take 1 vial (3 mLs) by nebulization every 6 hours as needed for shortness of breath, wheezing or cough More than a month    losartan (COZAAR) 100 MG tablet Take 1 tablet by mouth once daily 2/9/2025 Morning    omeprazole (PRILOSEC) 40 MG DR capsule Take 1 capsule by mouth once daily 2/9/2025 Morning    rosuvastatin (CRESTOR) 40 MG tablet Take 1 tablet (40 mg) by mouth daily 2/9/2025 Morning    ticagrelor (BRILINTA) 90 MG tablet Take 90 mg by mouth 2 times daily. 2/9/2025 Evening

## 2025-02-11 NOTE — PLAN OF CARE
Goal Outcome Evaluation:      Plan of Care Reviewed With: patient    Overall Patient Progress: improving    Alert and oriented. Denies pain this shift. Up with SBA. Vitals and assessments as charted. Uses call light appropriately.     Face to face report given with opportunity to observe patient.    Report given to Logan Goff RN   2/11/2025  3:13 PM

## 2025-02-11 NOTE — PLAN OF CARE
Pt was alert and oriented. Reports mild discomfort from pressure feeling in chest and ab. By end of shift pt reports discomfort has subsided. Remained on RA all shift. Lungs are wheezy. Stool sample not obtained, as pt did not have any BM's. VS and assessment as charted. Call light in reach, uses appropriately.

## 2025-02-11 NOTE — PLAN OF CARE
Face to face report given with opportunity to observe patient.    Report given to ALFREDO Mcdonald RN   2/11/2025  7:23 AM

## 2025-02-11 NOTE — PROGRESS NOTES
Assessment completed by Bernadine Levin with Case Management.    LOC: alert     Dx: Chest Pain and Shortness of Breath   Chronic Disease Management: COPD, Venous insufficiency of both lower extremities, Hypertension, Hypoxemia, Osteoarthrosis.    Lives with: by himself  Living at:  Home in an apartment  Transportation: NO Has no vehicle- needs medical rides set up for him.  Patient has the number and knows how to schedule.    Primary PCP: Abdifatah Cline  Insurance:  Medicare and [facility]    Support System:  Has friends that can give rides once in a while - per patient  Homecare/PCA: No, but have started the process for getting help and assistance at home.  /County Services:   Soon, patient will be having a [place] Cho[place] and will be assigned a CADI waiver   [place]: NO          Health Care Directive: No- nothing on file  Guardian: No  POA: No    Pharmacy: [facility] [place]  Meds management: Self    Adequate Resources for needs (housing, utilities, food/med): NO  Patient states he only receives $32 in SNAP benefits and has no food in the house right now.  Patient discharging with an emergency food bag  Household chores: Has been doing, but struggles.  Patient said his laundry got backed up 2 months.  Work/community/social activity: YES When desired    ADLs: Independent  Ambulation:Will now be using a walker - patient given one upon discharge  Falls: yes in the last year  Nutrition: questionable - patient states he does not have any food in the house right now  Sleep: OK - per patient    Equipment used: Has oxygen and tanks at home.  Received a long time ago.  Patient states he does not use it very often, only when his O2 sats drop into the 80's will ge put it on.  Did not remember where he got the oxygen from.  States has had since about 2015???        Oxygen supplier: Does not know      Does the supplier have valid oxygen orders: Probably not as patient cannot remember who/where he got  from.  Patient is not feeling very well either, so hopefully his memory will clear up once feeling better    Mental health: Has multiple diagnoses   Substance abuse: History of drug abuse  Exposure to violence/abuse: yes  Stressors: Sister  in October and is still grieving her death.    Able to Return to Prior Living Arrangements: YES    Choice of Vendor: Offered Home Care Services to patient for PT and Skilled nursing.  Patient chose [facility].  Call place to them    Barriers: does not have transportation so relies on medical transport, friends and taxi services.  Patient reminded that he can take medical transport to go to the pharmacy and  his medications.  He just needs to call 48 hours in advance to arrange that.    Plan:  Patient has a follow-up appointment with Dr. Cline on 25 at 1:30.  Transportation has been made.  It will be [employer] who transports patient to clinic follow-up.  [employer] will also be transporting patient home from the hospital.  They will be here at 3:30 pm to pick patient up.      Patient given a food bag, $25 [employer] gift card, a dental kit and personal hygiene kit.  Patient very appreciative for that.      [facility] [place] has been contacted and will be reaching out to patient to set up an intake time sometime next week.  Spoke to Nel [phone].    Called [facility] NISHA Chery.  Gave  information on patient and then transferred her to patient's room and she got further info.  She said patient would need to fill out the shortened long term care application for CADI Waiver services.  That was printed, completed and emailed into [facility] where she said to email to:  [email or web address]   She notified patient that it will be 5-6 weeks before someone would be able to get out to his house and do an intake.  What this SW discussed with the [place] Choice worker was that patient would likely benefit from:  Connectibility rides (for going  to  [employer] food boxes), Frozen food delivery meals, a 4 wheeled walker with seat (patient got a regular walker upon discharge from Hospital), and PCA/homemaking services for things like laundry and cleaning around house.    Meds sent to pharmacy along with script for new nebulizer as patient stated his was broken.    Reminded patient he has a care coordinator at the clinic by the name of Alba.

## 2025-02-11 NOTE — H&P
HOSPITALIST TELEMED ADMISSION H&P    Service Date : 2/10/2025  Dr. Claudine WHITE am located in [place]  Rodney Goodwin is located in [place] at [facility]     RN, on Med/Surg unit is assisting me today with this patient.    chief complaint: chest pain and shortness of breath    History of Present Illness:  Rodney Goodwin is a 64 year old male with coronary artery disease,  chronic systolic heart failure, COPD, gastroesophageal reflux disease, hypertension,polysubstance abuse to include heroin,IV drug user,   tobacco use disorder,  cluster B personality disorder, and venous insufficiency of bilateral lower extremities, lives alone presented to the ED with ongoing shortness of breath with associated chest pain. Patient has home oxygen that he said he uses periodically.  He said that he was using his neb machine to help  alleviate his shortness of breath, but he also has having cough with lots of phlegm production.  He said that he was having trouble clearing the phlegm in his throat.  He denied any fever but he said that he felt hot and that he was also having sweating episodes he denied any chills.  He states that he was having abdominal pain and it was rather intense but he was not having any diarrhea or constipation.  He was previously treated for H. Pylori and that is also why he came to the emergency room because he thought that he had another H. Pylori infection, but   He said while he had the stomach pains, he was not experiencing any vomiting or diarrhea as previously when he had the H. Pylori.    Emergency Room Course - in the emergency room, serologies for CMP, CBC,  Trop T x 2, Urinalysis with reflex to culture, ProBnp, Influenza A/B, RSV, Covid, Magnesium, lactic acid      Sodium 132 Low  mmol/L Chloride 95 Low  mmol/L   Potassium 3.9 mmol/L Glucose 118 High  mg/dL   Carbon Dioxide (CO2) 29 mmol/L Alkaline Phosphatase 168 High  U/L   Anion Gap 8 mmol/L AST 31 U/L   Urea  Nitrogen 23.2 High  mg/dL ALT 17 U/L   Creatinine 1.24 High  mg/dL Protein Total 8.8 High  g/dL   GFR Estimate 66 mL/min/1.73m2  Albumin 3.9 g/dL   Calcium 9.6 mg/dL Bilirubin Total 0.3 mg/dL             Blood gas venous  Collected: 02/10/25 1211  Final result  Specimen: Blood, venous from Arm, Left     pH Venous 7.40 Base Excess/Deficit Venous 3.7 High  mmol/L   pCO2 Venous 48 mm Hg FIO2 21   pO2 Venous 28 mm Hg Oxyhemoglobin Venous 47 Low  %   Bicarbonate Venous 29 High  mmol/L O2 Sat, Venous 47.5 Low  %          CRP = 111.48     EKG:  Sinus rhythm  Cannot rule out Inferior infarct (cited on or before 11-Oct-2023)  Abnormal ECG  When compared with ECG of 15-Darwin-2025 17:56,  T wave inversion no longer evident in Inferior leads  Nonspecific T wave abnormality has replaced inverted T waves in Lateral leads         Radiological diagnostics included:        Narrative & Impression   CT angiogram of the chest with sagittal coronal mip reconstructions.  CT scan of the abdomen and pelvis with IV contrast     HISTORY: Chest pain short of breath and generalized abdominal pain     TECHNIQUE: CT angiogram of the chest was performed with sagittal and  coronal nip reconstructions.     CT scan of the abdomen and pelvis with IV contrast.     FINDINGS: CT angiogram of the chest: There are no pulmonary emboli.  The heart is normal in size. There is atherosclerotic plaquing in the  thoracic aorta.     The lungs are clear. The hilar and mediastinal lymph nodes are normal.  Axillary and supraclavicular lymph nodes are normal.     CT scan of the abdomen and pelvis: The liver is free of masses or  biliary ductal enlargement. Calcified gallstones are seen. The spleen  and pancreas are normal. The adrenal glands are normal. There is a  small intermediate density nodule in the midportion of the left kidney  most likely a cyst small cyst. This is unchanged from 2023 there is no  hydronephrosis.     The periaortic lymph nodes are normal.  There is a 4.7 cm in diameter  infrarenal abdominal aortic aneurysm.     No intraperitoneal masses or inflammatory changes are noted. The  bladder and rectum are normal. Degenerative changes are present in the  thoracic and lumbar spine.                                                                      IMPRESSION: No pulmonary emboli     4.7 cm infrarenal abdominal aortic aneurysm.     No intraperitoneal masses or inflammatory changes are noted.     GABRIELE MANDUJANO MD                 Past Medical History  Past Medical History:   Diagnosis Date    Acute exacerbation of chronic obstructive pulmonary disease (H) 10/28/2016    Anxiety     Backache 6/9/2008    Overview:  IMO Update 10/11    Bipolar 1 disorder (H) 11/11/2014    Bipolar affective disorder (H)     Cellulitis and abscess of forearm 4/4/2014    Chlordiazepoxide dependence (H) 8/9/2010    Overview:  IMO Update 10/11    Chronic constipation     COPD exacerbation (H) 9/14/2015    Costochondritis 2/26/2015    Degeneration of lumbar or lumbosacral intervertebral disc 7/25/2006    Overview:  IMO Update 10/11    Depressive disorder     Drug abuse (H)     Elevated blood sugar 10/29/2016    Heroin abuse (H) 9/15/2015    Heroin addiction (H) 8/9/2010    Overview:  See social notes IMO Update 10/11    Hypertension     IV drug user 8/9/2010    Overview:  IMO Update 10/11    Lumbosacral spondylosis without myelopathy 11/13/2008    Narcotic addiction (H) 10/29/2016    Opioid use disorder, severe, dependence (H) 2020    CADT records; prior Methadone; [facility]; AA/NA    Osteoarthrosis, pelvic region and thigh 11/13/2008    Overview:  IMO Update 10/11    Penile lesion 4/27/2020    Suicidal behavior 4/27/2020    Venous insufficiency of both lower extremities 2/26/2015     Do you wish to do the replacement in the background? yes        Patient Active Problem List   Diagnosis    Cellulitis and abscess of forearm    Bipolar 1 disorder (H)    Costochondritis    Venous  insufficiency of both lower extremities    COPD exacerbation (H)    Heroin abuse (H)    Elevated blood sugar    Narcotic addiction (H)    Osteoarthrosis, pelvic region and thigh    Lumbosacral spondylosis without myelopathy    IV drug user    Degeneration of lumbar or lumbosacral intervertebral disc    Backache    Chlordiazepoxide dependence (H)    Heroin addiction (H)    Suicidal behavior    Hypertension    Cluster B personality disorder (H)    Hypoxemia    Polysubstance abuse (H)    Tobacco dependence    Atypical bipolar affective disorder (H)    Gastroesophageal reflux disease without esophagitis    Penile lesion    COPD (chronic obstructive pulmonary disease) (H)    Acute on chronic systolic heart failure (H)    CAD (coronary artery disease)    Status post coronary angiogram    Chest pain, unspecified type    Bladder tumor    Dehydration    Anxiety    Bronchitis    Generalized abdominal pain    Dyspnea, unspecified type    Infrarenal abdominal aortic aneurysm (AAA) without rupture         Past Surgical History  Past Surgical History:   Procedure Laterality Date    ARTHROSCOPY KNEE BILATERAL      COLONOSCOPY  01/29/2020    Multiple, repeat due 2015    COLONOSCOPY N/A 7/31/2015    Procedure: COLONOSCOPY;  Surgeon: Justin Andujar MD;  Location: [facility] OR    CV CORONARY ANGIOGRAM N/A 4/4/2024    Procedure: Coronary Angiogram;  Surgeon: Zak Stephens MD;  Location: [facility] CARDIAC CATH LAB    CV PCI N/A 4/4/2024    Procedure: Percutaneous Coronary Intervention;  Surgeon: Zak Stephens MD;  Location: [facility] CARDIAC CATH LAB    DECOMPRESSION CUBITAL TUNNEL Left 11/21/2017    Procedure: DECOMPRESSION CUBITAL TUNNEL;;  Surgeon: Jhonny Zhao MD;  Location: [facility] OR    DENTAL SURGERY      ENDOSCOPY UPPER, COLONOSCOPY, COMBINED N/A 10/22/2024    Procedure: Upper endoscopy with biopsy and colonoscopy with polypectomy;  Surgeon: Andrey Bone MD;  Location: [facility] OR    HERNIA REPAIR      Inguinal, left     RELEASE CARPAL TUNNEL Left 11/21/2017    Procedure: RELEASE CARPAL TUNNEL;  LEFT ELBOW CUBITAL and CARPAL TUNNEL RELEASE;  Surgeon: Jhonny Zhao MD;  Location: [facility] OR      Social History  Rodney  reports that he has been smoking cigarettes. He started smoking about 42 years ago. He has a 42.1 pack-year smoking history. He has never used smokeless tobacco. He reports that he does not currently use drugs after having used the following drugs: IV, Methamphetamines, Opiates, and Marijuana. He reports that he does not drink alcohol.    Family History  Rodney's family history includes Cerebrovascular Disease in his father; Diabetes in his mother; Pancreatic Cancer in his brother.    Home Medications  Current Outpatient Medications   Medication Instructions    albuterol (PROAIR HFA/PROVENTIL HFA/VENTOLIN HFA) 108 (90 Base) MCG/ACT inhaler 2 puffs, Inhalation, EVERY 4 HOURS PRN    aspirin (ASA) 81 mg, Oral, DAILY, Starting tomorrow.    carvedilol (COREG) 12.5 mg, Oral, 2 TIMES DAILY WITH MEALS    ipratropium - albuterol 0.5 mg/2.5 mg/3 mL (DUONEB) 0.5-2.5 (3) MG/3ML neb solution 3 mLs, Nebulization, EVERY 6 HOURS PRN    losartan (COZAAR) 100 mg, Oral, DAILY    omeprazole (PRILOSEC) 40 mg, Oral, DAILY    rosuvastatin (CRESTOR) 40 mg, Oral, DAILY    ticagrelor (BRILINTA) 90 mg, Oral, 2 TIMES DAILY, Start this evening.    umeclidinium-vilanterol (ANORO ELLIPTA) 62.5-25 MCG/ACT oral inhaler 1 puff, Inhalation, DAILY       Allergies  Allergies   Allergen Reactions    Codeine     Penicillins     Morphine And Codeine Other (See Comments)     Unknown at young age        10 pt ROS neg except as noted in HPI    Physical Exam:  I performed all aspects of the physical examination via Telemedicine associated with two way audio and video communication.    Vital Signs: Blood pressure 136/69, pulse 79, temperature 98.4  F (36.9  C), temperature source Tympanic, resp. rate 18, SpO2 93%.    Physical Exam    Gen:  Well-developed, well-nourished,  in no acute distress, lying semi-supine in his  hospital bed.  HEENT:  NC/AT,  EOMI,  hearing intact to voice  Resp:  No accessory muscle use, breath sounds are coarse with barely audible expiratory wheezes noted, no rhonchi or crackles were noted,   Card:  No murmur, normal S1, S2   Abd:   normoactive bowel sounds are present,  Skin: No rashes or skin breakdown.   Ext: No clubbing, cyanosis or edema was noted  Psych:  Not anxious, not agitated      DATA:    I&O: No intake/output data recorded.     Labs:  I have personally reviewed the following studies:  Recent Labs   Lab 02/10/25  1211   POTASSIUM 3.9   CHLORIDE 95*   CO2 29   BUN 23.2*   ALBUMIN 3.9   ALKPHOS 168*   AST 31   ALT 17      Recent Labs   Lab 02/10/25  1211   WBC 5.6   HGB 12.1*   HCT 36.3*             Imaging: ER imaging reviewed      Misc  DVT prophylaxis:   Pneumatic compression device  Code Status: Full code   Cardiac Monitoring: no active telemetry  Castaneda: no  Lines:  peripheral IV  Diet: Regular       ASSESSMENT/PLAN:    64-year-old, with tobacco use disorder, productive cough, CT scan not suggesting any pneumonia will be placed in observation for treatment of COPD exacerbation.    This patient's current admission to an acute care setting is essential for the treatment of   COPD exacerbation    The patient's recovery is dependent on the following treatments of   -  IV Rocephin  -  azithromycin  -  DuoNeb treatment  -  supplemental oxygen  - Incentive spirometry  -Correcting any electrolyte abnormalities  to stabilize this condition.       The patient is at risk of developing further complications of  respiratory failure, heart failure, and an early demise if not treated in an acute care setting.     I expect the patient's hospital stay to require 24hr - 36hr      Our patient will be admitted under the hospitalist services and further management to be based on patient's clinical progress.       #ACTIVE PROBLEM LIST:    # Hypertension:   "  136/69  Stable.  - Monitor with daily vitals   -  losartan 100 mg daily  -  carvedilol 12.5 mg twice daily    #Coronary artery Disease  - Brilinta 90mg bid  - Ecasa 81mg q day      # Hyperlipidemia:   Cholesterol   Date Value Ref Range Status   04/04/2024 221 (H) <200 mg/dL Final    Stable   - Continue medication management with rosuvastatin 40mg q day    #GERD  - Famotidine 20mg IV bid    # Overweight:      Estimated body mass index is 25.4 kg/m  as calculated from the following:    Height as of 1/15/25: 1.753 m (5' 9\").    Weight as of 1/15/25: 78 kg (172 lb).       Clinically Significant Risk Factors Present on Admission                   As the provider for the telehealth service, I attest that I introduced myself to the patient and provided my credentials. Based on review of the patient s chart and/or a discussion with members of the patient s treatment team, I determined that telemedicine via a real-time, two-way, interactive audio and video platform is an appropriate means of providing this service.     The encounter was approximately 25 minutes. The nurse was present for the duration of the encounter.    Chart reviewed,labs and available imaging reviewed,consultant notes reviewed. Previous available medical records have been reviewed  Care plan discussed with care team    I have seen and examined the patient today. Documentation for this visit was completed using a template. Everything documented was personally performed today with the necessary additions, deletions and changes made as appropriate with the diagnoses being listed in no particular order of clinical relevance.    [facility]  Securely message with the Vocera Web Console (learn more here)  Text page via [employer] Paging/Directory   "

## 2025-02-11 NOTE — PLAN OF CARE
Report received from Amie in ER at 1850, patient sent to floor at 1900, shift change, Face to face report given with opportunity to observe patient.    Report given to Karen Wilson RN   2/10/2025  7:30 PM

## 2025-02-11 NOTE — DISCHARGE SUMMARY
"[facility]    Discharge Summary  Hospitalist    Date of Admission:  2/10/2025  Date of Discharge:  {DISCHARGE DATE:370721}  Discharging Provider: Anjali Nettles NP  Date of Service (when I saw the patient): {Date of Service:002224}    Discharge Diagnoses     Principal Problem:    COPD exacerbation (H)  Active Problems:    COPD (chronic obstructive pulmonary disease) (H)    Bipolar 1 disorder (H)    Cluster B personality disorder (H)      History of Present Illness   Rodney Goodwin is an 64 year old male who presented with ***    Hospital Course     ***    Anjali Nettles CNP      Pending Results     Unresulted Labs Ordered in the Past 30 Days of this Admission       No orders found for last 31 day(s).            Code Status   {CODE STATUS      :561089}       Primary Care Physician   Kassi Paiz     {Do you want to add a physical exam section?:322660}      Discharge Disposition   {                 :5395357::\"Discharged to home\"}  Condition at discharge: {CONDITION:376426::\"Stable\"}    Consultations This Hospital Stay   CARE MANAGEMENT / SOCIAL WORK IP CONSULT  PHYSICAL THERAPY ADULT IP CONSULT  OCCUPATIONAL THERAPY ADULT IP CONSULT  CARE MANAGEMENT / SOCIAL WORK IP CONSULT    Time Spent on this Encounter   {How much time did you spend on discharge?:24160723}    Discharge Orders      Home Care Referral      Reason for your hospital stay    COPD exacerbation     Follow-up and recommended labs and tests     Follow up with primary care provider, KASSI PAIZ, within 7 days for hospital follow- up.  No follow up labs or test are needed.     Activity    Your activity upon discharge: activity as tolerated     Nebulizer and Nebulizer Supplies    Nebulizer Documentation  I attest that I have seen and evaluated Rodney Goodwin. He has a diagnosis of J44.1 - Chronic obstructive pulmonary disease with (acute) exacerbation and a nebulizer machine is needed to administer medication to improve breathing " passages.     I, the undersigned, certify that the above prescribed supplies are medically necessary for this patient and is both reasonable and necessary in reference to accepted standards of medical and necessary in reference to accepted standards of medical practice in the treatment of this patient's condition and is not prescribed as a convenience.     Diet    Follow this diet upon discharge: Current Diet:Orders Placed This Encounter      Regular Diet Adult     Discharge Medications   Current Discharge Medication List        START taking these medications    Details   azithromycin (ZITHROMAX) 250 MG tablet Take 1 tablet (250 mg) by mouth daily (with dinner).  Qty: 4 tablet, Refills: 0    Associated Diagnoses: COPD exacerbation (H)      predniSONE (DELTASONE) 20 MG tablet Take 2 tablets (40 mg) by mouth daily for 4 days.  Qty: 8 tablet, Refills: 0    Associated Diagnoses: COPD exacerbation (H)           CONTINUE these medications which have CHANGED    Details   ipratropium - albuterol 0.5 mg/2.5 mg/3 mL (DUONEB) 0.5-2.5 (3) MG/3ML neb solution Take 1 vial (3 mLs) by nebulization every 6 hours as needed for shortness of breath, wheezing or cough.  Qty: 90 mL, Refills: 0    Associated Diagnoses: Chronic obstructive pulmonary disease, unspecified COPD type (H)           CONTINUE these medications which have NOT CHANGED    Details   aspirin (ASA) 81 MG chewable tablet Take 81 mg by mouth every morning.      carvedilol (COREG) 12.5 MG tablet TAKE 1 TABLET BY MOUTH TWICE DAILY WITH MEALS  Qty: 180 tablet, Refills: 1    Associated Diagnoses: Primary hypertension      losartan (COZAAR) 100 MG tablet Take 1 tablet by mouth once daily  Qty: 30 tablet, Refills: 11    Associated Diagnoses: Primary hypertension      omeprazole (PRILOSEC) 40 MG DR capsule Take 1 capsule by mouth once daily  Qty: 30 capsule, Refills: 5    Associated Diagnoses: Abdominal pain, generalized      rosuvastatin (CRESTOR) 40 MG tablet Take 1 tablet  (40 mg) by mouth daily  Qty: 90 tablet, Refills: 3    Associated Diagnoses: Coronary artery disease involving native coronary artery of native heart without angina pectoris      ticagrelor (BRILINTA) 90 MG tablet Take 90 mg by mouth 2 times daily.      albuterol (PROAIR HFA/PROVENTIL HFA/VENTOLIN HFA) 108 (90 Base) MCG/ACT inhaler Inhale 2 puffs into the lungs every 4 hours as needed for shortness of breath, wheezing or cough  Qty: 18 g, Refills: 3    Comments: Pharmacy may dispense brand covered by insurance (Proair, or proventil or ventolin or generic albuterol inhaler)  Associated Diagnoses: Chronic obstructive pulmonary disease, unspecified COPD type (H); JENKINS (dyspnea on exertion)      umeclidinium-vilanterol (ANORO ELLIPTA) 62.5-25 MCG/ACT oral inhaler Inhale 1 puff into the lungs daily  Qty: 60 each, Refills: 1    Associated Diagnoses: Chronic obstructive pulmonary disease, unspecified COPD type (H)           Allergies   Allergies   Allergen Reactions    Codeine     Penicillins     Morphine And Codeine Other (See Comments)     Unknown at young age     Data   {What data do you want to display?:868085}     obstructive pulmonary disease, unspecified COPD type (H); JENKINS (dyspnea on exertion)      umeclidinium-vilanterol (ANORO ELLIPTA) 62.5-25 MCG/ACT oral inhaler Inhale 1 puff into the lungs daily  Qty: 60 each, Refills: 1    Associated Diagnoses: Chronic obstructive pulmonary disease, unspecified COPD type (H)           Allergies   Allergies   Allergen Reactions    Codeine     Penicillins     Morphine And Codeine Other (See Comments)     Unknown at young age     Data   Most Recent 3 CBC's:  Recent Labs   Lab Test 02/11/25  0536 02/10/25  1211 01/15/25  1937   WBC 4.8 5.6 10.2   HGB 12.1* 12.1* 11.5*   MCV 87 88 91    221 227      Most Recent 3 BMP's:  Recent Labs   Lab Test 02/11/25 0536 02/10/25  1211 01/15/25  1937   * 132* 131*   POTASSIUM 4.8 3.9 4.1   CHLORIDE 99 95* 97*   CO2 21* 29 22   BUN 22.7 23.2* 20.7   CR 0.85 1.24* 1.12   ANIONGAP 13 8 12   RACHEL 9.2 9.6 9.5   * 118* 98     Most Recent 2 LFT's:  Recent Labs   Lab Test 02/10/25  1211 01/15/25  1937   AST 31 21   ALT 17 15   ALKPHOS 168* 149   BILITOTAL 0.3 0.3     Most Recent INR's and Anticoagulation Dosing History:  Anticoagulation Dose History          Latest Ref Rng & Units 6/20/2013 6/22/2013 1/20/2014 1/16/2023 4/4/2024   Recent Dosing and Labs   INR 0.85 - 1.15 1.01  1.07  1.06  1.07  0.93      Most Recent 3 Troponin's:  Recent Labs   Lab Test 09/28/19 2006 03/12/18  1244 09/04/17  0532   TROPI <0.015 <0.015 <0.015     Most Recent Cholesterol Panel:  Recent Labs   Lab Test 04/04/24  0907   CHOL 221*   *   HDL 38*   TRIG 240*     Most Recent 6 Bacteria Isolates From Any Culture (See EPIC Reports for Culture Details):No lab results found.  Most Recent TSH, T4 and A1c Labs:  Recent Labs   Lab Test 10/15/24  0949 08/23/23  1047   TSH  --  0.87   A1C 6.2*  --      Results for orders placed or performed during the hospital encounter of 02/10/25   CT Chest (PE) Abdomen Pelvis w Contrast    Narrative    CT angiogram of the chest  with sagittal coronal mip reconstructions.  CT scan of the abdomen and pelvis with IV contrast    HISTORY: Chest pain short of breath and generalized abdominal pain    TECHNIQUE: CT angiogram of the chest was performed with sagittal and  coronal nip reconstructions.    CT scan of the abdomen and pelvis with IV contrast.    FINDINGS: CT angiogram of the chest: There are no pulmonary emboli.  The heart is normal in size. There is atherosclerotic plaquing in the  thoracic aorta.    The lungs are clear. The hilar and mediastinal lymph nodes are normal.  Axillary and supraclavicular lymph nodes are normal.    CT scan of the abdomen and pelvis: The liver is free of masses or  biliary ductal enlargement. Calcified gallstones are seen. The spleen  and pancreas are normal. The adrenal glands are normal. There is a  small intermediate density nodule in the midportion of the left kidney  most likely a cyst small cyst. This is unchanged from 2023 there is no  hydronephrosis.    The periaortic lymph nodes are normal. There is a 4.7 cm in diameter  infrarenal abdominal aortic aneurysm.    No intraperitoneal masses or inflammatory changes are noted. The  bladder and rectum are normal. Degenerative changes are present in the  thoracic and lumbar spine.      Impression    IMPRESSION: No pulmonary emboli    4.7 cm infrarenal abdominal aortic aneurysm.    No intraperitoneal masses or inflammatory changes are noted.    GABRIELE MANDUJANO MD         SYSTEM ID:  [id number]

## 2025-02-11 NOTE — PROGRESS NOTES
02/11/25 1000   Appointment Info   Signing Clinician's Name / Credentials (PT) AUDI Shrestha   Student Supervision Direct supervision provided;Direct Patient Contact Provided;Therapy services provided with the co-signing licensed therapist guiding and directing the services, and providing the skilled judgement and assessment throughout the session   Living Environment   People in Home alone   Current Living Arrangements apartment   Home Accessibility stairs to enter home   Number of Stairs, Main Entrance 5   Stair Railings, Main Entrance none   Transportation Anticipated other (see comments)  (Unknown. Pt states he does not drive.)   Living Environment Comments Apartment located on 1st floor. Bathroom has tub/shower combo, no grab bars. Toilet seat is regular height with one grab bar.   Self-Care   Usual Activity Tolerance good   Current Activity Tolerance moderate   Regular Exercise Other (see comments)  (Pt states he typically walks many miles around [facility], but has not been able to the past few months as he has not been feeling well.)   Equipment Currently Used at Home other (see comments)  (Uses a walking stick at baseline.)   Fall history within last six months no   Activity/Exercise/Self-Care Comment Pt indep with dressing, bathing, cooking, and cleaning. States doing PT at [facility] in the past.   General Information   Onset of Illness/Injury or Date of Surgery 02/10/25   Referring Physician Claudine Ramirez MD   Patient/Family Therapy Goals Statement (PT) Return home   Pertinent History of Current Problem (include personal factors and/or comorbidities that impact the POC) Pt presented to the ED on 2/10/25 due to chest pain and SOB. Found to have potential bronchitis with COPD exacerbation. History of coronary artery disease, chronic systolic heart failure, COPD, gastroesophageal reflux disease, hypertension, polysubstance abuse to include heroin, IV drug user, tobacco use disorder,  cluster B  personality disorder, and venous insufficiency of bilateral lower extremities.   Cognition   Affect/Mental Status (Cognition) WFL   Orientation Status (Cognition) oriented x 4;oriented to;person;place;situation;time   Follows Commands (Cognition) WF   Behavioral Issues other (see comments)  (Appears anxious about not being able to move as well as he typically does.)   Pain Assessment   Patient Currently in Pain Yes, see Vital Sign flowsheet  (No resting pain. 7/10 R knee pain during stair negotiation.)   Integumentary/Edema   Integumentary/Edema no deficits were identifed   Posture    Posture Not impaired   Range of Motion (ROM)   Range of Motion ROM is WFL;ROM deficits secondary to pain   ROM Comment R knee AROM slightly reduced compared to L.   Strength (Manual Muscle Testing)   Strength (Manual Muscle Testing) strength is WFL   Strength Comments Seated hip flex and knee ext MMTs 5/5. Strength functional for ambulation with FWW and stair negotiation with rail use.   Transfers   Transfers sit-stand transfer   Sit-Stand Transfer   Sit-Stand [place] (Transfers) independent   Gait/Stairs (Locomotion)   [place] Level (Gait) supervision   Assistive Device (Gait) walker, front-wheeled   Distance in Feet (Gait) 225   Pattern (Gait) step-through   Deviations/Abnormal Patterns (Gait) gait speed decreased;stride length decreased   Comment, (Gait/Stairs) Ambulates cautiously and expresses anxiety about decreased tolerance for walking further distances.   Balance   Balance no deficits were identified   Balance Comments Sitting and standing balance demonstrate no deficits during functional mobility.   Clinical Impression   Criteria for Skilled Therapeutic Intervention Yes, treatment indicated   PT Diagnosis (PT) Reduced gait endurance, strength, and functional mobility   Influenced by the following impairments Decreased tolerance for functional mobility and prolonged activity.   Functional limitations due to  impairments Difficulty walking longer distances and ambulating without a device.   Clinical Presentation (PT Evaluation Complexity) stable   Clinical Presentation Rationale Clinical reasoning   Clinical Decision Making (Complexity) low complexity   Planned Therapy Interventions (PT) patient/family education;neuromuscular re-education;stair training;strengthening   Risk & Benefits of therapy have been explained evaluation/treatment results reviewed;risks/benefits reviewed;care plan/treatment goals reviewed;current/potential barriers reviewed;participants voiced agreement with care plan;participants included;patient   Clinical Impression Comments Pt presented to the ED with chest pain and SOB, and is experiencing a COPD exacerbation. PT evaluation completed today indicates pt has reduced functional mobility tolerance. His gait endurance is significantly below his baseline as pt typically walks long distances around town. Pt would benefit from home care physical therapy once discharged to progress functional mobility and ambulation endurance. Continued skilled physical therapy warranted until pt is discharged home.   PT Total Evaluation Time   PT César, Low Complexity Minutes (12750) 15   Physical Therapy Goals   PT Frequency 6x/week   PT Predicted Duration/Target Date for Goal Attainment 02/18/25   PT Goals Gait;Stairs   PT: Gait Modified independent;Straight cane;Greater than 200 feet   PT: Stairs Modified independent;5 stairs;Assistive device   Interventions   Interventions Quick Adds Therapeutic Activity   Therapeutic Activity   Therapeutic Activities: dynamic activities to improve functional performance Minutes (76157) 10   Symptoms Noted During/After Treatment Fatigue   Treatment Detail/Skilled Intervention Pt ambulated additional 225' w/ FWW and SBA. Pt expresses anxiety and is quite cautious when ambulating. Ascending/descending 4 steps with CGA and bilat rail use. Again, pt quite cautious, especially with  descending. Notes R knee pain 7/10 after stairs. Discussed the benefit of using the FWW once discharged home until pt increases his strength and endurance. Pt open to using the FWW, and needs one provided. Left pt seated in chair with call light in reach.   PT Discharge Planning   PT Plan Progress strength, endurance, and functional mobility.   PT Discharge Recommendation (DC Rec) home with home care physical therapy   PT Rationale for DC Rec Pt would benefit from continued home care PT services once discharged home as he does not drive at baseline. His endurance and strength is reduced with overall difficulty during functional mobility.   PT Brief overview of current status Indep with sit to/from stand transfers. SBA for amb with FWW. CGA for stair negotiation   PT Total Distance Amb During Session (feet) 450   PT Equipment Needed at Discharge walker, standard  (FWW)   Physical Therapy Time and Intention   Timed Code Treatment Minutes 10   Total Session Time (sum of timed and untimed services) 25

## 2025-02-11 NOTE — PLAN OF CARE
[facility] Inpatient Admission Note:    Patient admitted to 3224/3224-1 at approximately 1905 via cart accompanied by transport tech from emergency room . Report received from ALFREDO Neumann in SBAR format at 1920 via face to face. Patient ambulated to bed via self.. Patient is alert and oriented X 3, denies pain; rates at 0 on 0-10 scale.  Patient oriented to room, unit, hourly rounding, and plan of care. Explained admission packet and patient handbook with patient bill of rights brochure. Will continue to monitor and document as needed.     Inpatient Nursing criteria listed below was met:    Health care directives status obtained and documented:  NA none on exist    Patient identifies a surrogate decision maker: No      If initial lactic acid greater than 2.0, repeat lactic acid drawn within one hour of arrival to unit: NA.     Clergy visit ordered if patient requests: N/A    Skin issues/needs documented: Yes    Isolation Patient: no     Fall Prevention Yes: Care plan updated, education given and documented, sticker and magnet in place: Yes    Care Plan initiated: NA obs    Education Documented (including assessment): Yes    Patient has discharge needs : No     A&O, lungs wheezy, 93% on RA. Call light in reach, uses appropriately.

## 2025-02-11 NOTE — PROGRESS NOTES
Name: Rodney Goodwin    MRN#: [id number]    Reason for Hospitalization: Dehydration [E86.0];Anxiety [F41.9];Bronchitis [J40];Generalized abdominal pain [R10.84];COPD exacerbation (H) [J44.1];Dyspnea, unspecified type [R06.00];Chest pain, unspecified type [R07.9];Infrarenal abdominal aortic aneurysm (AAA) without rupture [I71.43]      Discharge Date: 2/11/2025    Patient / Family response to discharge plan: Rodney    Follow-Up Appt:   Future Appointments   Date Time Provider Department Center   2/21/2025  1:30 PM Abdifatah Cline,  [facility]       Other Providers (Care Coordinator, County Services, PCA services etc): Yes: Ordered Home Care skilled RN and PT along with getting started a [place] Cho[place] Assessment for this patient for LTC services.  RN Care Coordinator in Clinic aware of follow-up appointment and will look to connect with patient at that time.    Discharge Disposition: home via [employer] at 3:30 pm    ORVILLE Saenz

## 2025-02-12 ENCOUNTER — PATIENT OUTREACH (OUTPATIENT)
Dept: CARE COORDINATION | Facility: OTHER | Age: 65
End: 2025-02-12

## 2025-02-12 NOTE — PROGRESS NOTES
Clinic Care Coordination Contact  Care Team Conversations    RN CC called patient and talked with him.  RN CC let him know glad he answered his phone.  Patient states glad to know that this RN CC will be reaching out to him.  Patient states he ran out of minutes on his phone and wasn't getting out of bed for months.  States his pride got in his way from calling to ask for help until he finally called the ambulance and entered the hospital.  We discussed this and he agrees not to let that happen again.  Patient states glad to be home but states his home also feels like a retirement.  States that he is glad to be off the streets.  We will continue to discuss this and getting patient connected to community if he desires.  Patient states getting around his home but states he is used to walking miles a day.  We discussed HC getting set up and patient verbalized understanding and hasn't heard from them yet.  RN CC called [facility] and left message for Nel to see when they will be admitting patient. Also seeing if they would be able to coordinate picking up medications from [facility] as patient unable to get there.  RN CC [facility] as patient thought they had delivery now and RN CC checked and they can only fedex which would be 5 days. RN CC let patient know and patient open to switching medications over to [facility] and talked to them and will have delivered today.  Patient thankful.   RN CC called [facility] and they will look to deliver the nebulizer for patient.  Patient is aware of upcoming PCP follow up visit and ride set up for him and plans to attend.  Alba Gama RN  Care Coordination

## 2025-02-12 NOTE — PLAN OF CARE
Physical Therapy Discharge Summary    Reason for therapy discharge:    Discharged to home with home therapy.    Progress towards therapy goal(s). See goals on Care Plan in [facility] electronic health record for goal details.  Goals met    Therapy recommendation(s):    Continued therapy is recommended.  Rationale/Recommendations:  Progress functional mobility with home care PT.

## 2025-02-13 DIAGNOSIS — J44.9 CHRONIC OBSTRUCTIVE PULMONARY DISEASE, UNSPECIFIED COPD TYPE (H): ICD-10-CM

## 2025-02-13 RX ORDER — IPRATROPIUM BROMIDE AND ALBUTEROL SULFATE 2.5; .5 MG/3ML; MG/3ML
1 SOLUTION RESPIRATORY (INHALATION) EVERY 6 HOURS PRN
Qty: 90 ML | Refills: 2 | Status: SHIPPED | OUTPATIENT
Start: 2025-02-13

## 2025-02-13 NOTE — TELEPHONE ENCOUNTER
[facility] signed Duoneb and 's ankita signed by PCP with a diagnosis.  Pended for PCP.  Alba Gama, ALFREDO  Care Coordination

## 2025-02-18 ENCOUNTER — MEDICAL CORRESPONDENCE (OUTPATIENT)
Dept: HEALTH INFORMATION MANAGEMENT | Facility: CLINIC | Age: 65
End: 2025-02-18
Payer: MEDICARE

## 2025-02-20 NOTE — PROGRESS NOTES
"  {PROVIDER CHARTING PREFERENCE:921608}    Skye Stevens is a 64 year old, presenting for the following health issues:  No chief complaint on file.  {(!) Visit Details have not yet been documented.  Please enter Visit Details and then use this list to pull in documentation. (Optional):136999}  HPI       Without his inhalers for about a week.  Anoro Ellipta - Rx'ed over a year ago - should have run out a long time ago - states only when needs it  Albuterol - uses more often - also out now    Complains about ongoing knee pain - declines MRI    Hospital Follow-up Visit:    Hospital/Nursing Home/IP Rehab Facility: [facility]  Date of Admission: 2/10  Date of Discharge: 2/11  Reason(s) for Admission: chest pain  Was the patient in the ICU or did the patient experience delirium during hospitalization?  No  Do you have any other stressors you would like to discuss with your provider? No    Problems taking medications regularly:  None  Medication changes since discharge: zithromax, prednisone  Problems adhering to non-medication therapy:  has PCA that comes to home     Summary of hospitalization:  [facility] discharge summary reviewed  Diagnostic Tests/Treatments reviewed.  Follow up needed: {:050030:}  Other Healthcare Providers Involved in Patient s Care:         {those currently involved after discharge:996411::\"None\"}  Update since discharge: {:098191} {TIP  Include information from family/caregivers, SNF, Care Coordination :316850}        Plan of care communicated with {:905574}     {Reference  Coding guidelines- Moderate Complexity F2F/Video within 7 - 14 days of discharge [id number], High Complexity F2F/Video within 7 days 4726416 or [id number] days [id number] :117192}      {additonal problems for provider to add (Optional):987110}  {additonal problems for provider to add (Optional):973081}    {ROS Picklists (Optional):768374}      Objective    There were no vitals taken for this " visit.  There is no height or weight on file to calculate BMI.  Physical Exam   {Exam List (Optional):199942}    {Diagnostic Test Results (Optional):810319}        Signed Electronically by: KASSI PAIZ DO  {Email feedback regarding this note to [email or web address]   :663133}   sounds: Normal heart sounds. No murmur heard.  Pulmonary:      Effort: Pulmonary effort is normal.      Breath sounds: Wheezing and rhonchi present.   Abdominal:      General: Bowel sounds are normal.      Palpations: Abdomen is soft.      Tenderness: There is no abdominal tenderness.   Musculoskeletal:      Right lower leg: No edema.      Left lower leg: No edema.   Lymphadenopathy:      Cervical: No cervical adenopathy.   Neurological:      Mental Status: He is alert and oriented to person, place, and time.                    Signed Electronically by: KASSI PAIZ DO

## 2025-02-21 ENCOUNTER — MEDICAL CORRESPONDENCE (OUTPATIENT)
Dept: HEALTH INFORMATION MANAGEMENT | Facility: HOSPITAL | Age: 65
End: 2025-02-21

## 2025-02-21 ENCOUNTER — OFFICE VISIT (OUTPATIENT)
Dept: FAMILY MEDICINE | Facility: OTHER | Age: 65
End: 2025-02-21
Attending: FAMILY MEDICINE
Payer: MEDICARE

## 2025-02-21 ENCOUNTER — TELEPHONE (OUTPATIENT)
Dept: CARE COORDINATION | Facility: OTHER | Age: 65
End: 2025-02-21

## 2025-02-21 VITALS
RESPIRATION RATE: 20 BRPM | DIASTOLIC BLOOD PRESSURE: 70 MMHG | WEIGHT: 187.6 LBS | HEIGHT: 69 IN | SYSTOLIC BLOOD PRESSURE: 122 MMHG | HEART RATE: 83 BPM | BODY MASS INDEX: 27.78 KG/M2 | OXYGEN SATURATION: 96 % | TEMPERATURE: 99.3 F

## 2025-02-21 DIAGNOSIS — Z09 HOSPITAL DISCHARGE FOLLOW-UP: ICD-10-CM

## 2025-02-21 DIAGNOSIS — I71.43 INFRARENAL ABDOMINAL AORTIC ANEURYSM (AAA) WITHOUT RUPTURE: ICD-10-CM

## 2025-02-21 DIAGNOSIS — J02.9 SORE THROAT: ICD-10-CM

## 2025-02-21 DIAGNOSIS — M25.561 CHRONIC PAIN OF RIGHT KNEE: ICD-10-CM

## 2025-02-21 DIAGNOSIS — G89.29 CHRONIC PAIN OF RIGHT KNEE: ICD-10-CM

## 2025-02-21 DIAGNOSIS — J01.90 ACUTE SINUSITIS WITH SYMPTOMS > 10 DAYS: ICD-10-CM

## 2025-02-21 DIAGNOSIS — J44.9 CHRONIC OBSTRUCTIVE PULMONARY DISEASE, UNSPECIFIED COPD TYPE (H): Primary | ICD-10-CM

## 2025-02-21 PROCEDURE — G0463 HOSPITAL OUTPT CLINIC VISIT: HCPCS | Mod: 25

## 2025-02-21 PROCEDURE — 99495 TRANSJ CARE MGMT MOD F2F 14D: CPT | Performed by: FAMILY MEDICINE

## 2025-02-21 RX ORDER — IPRATROPIUM BROMIDE AND ALBUTEROL SULFATE 2.5; .5 MG/3ML; MG/3ML
1 SOLUTION RESPIRATORY (INHALATION) EVERY 6 HOURS PRN
Qty: 90 ML | Refills: 2 | Status: SHIPPED | OUTPATIENT
Start: 2025-02-21

## 2025-02-21 RX ORDER — PREDNISONE 20 MG/1
60 TABLET ORAL DAILY
Qty: 15 TABLET | Refills: 0 | Status: SHIPPED | OUTPATIENT
Start: 2025-02-21 | End: 2025-02-26

## 2025-02-21 RX ORDER — PANTOPRAZOLE SODIUM 40 MG/1
40 TABLET, DELAYED RELEASE ORAL DAILY
Qty: 28 TABLET | Refills: 0 | Status: SHIPPED | OUTPATIENT
Start: 2025-02-21 | End: 2025-03-21

## 2025-02-21 RX ORDER — CEFDINIR 300 MG/1
300 CAPSULE ORAL 2 TIMES DAILY
Qty: 14 CAPSULE | Refills: 0 | Status: SHIPPED | OUTPATIENT
Start: 2025-02-21 | End: 2025-02-28

## 2025-02-21 RX ORDER — ALBUTEROL SULFATE 90 UG/1
2 INHALANT RESPIRATORY (INHALATION) EVERY 4 HOURS PRN
Qty: 18 G | Refills: 3 | Status: SHIPPED | OUTPATIENT
Start: 2025-02-21

## 2025-02-21 RX ORDER — ALBUTEROL SULFATE 90 UG/1
2 INHALANT RESPIRATORY (INHALATION) EVERY 4 HOURS PRN
Qty: 18 G | Refills: 3 | Status: CANCELLED | OUTPATIENT
Start: 2025-02-21

## 2025-02-21 ASSESSMENT — PAIN SCALES - GENERAL: PAINLEVEL_OUTOF10: SEVERE PAIN (7)

## 2025-02-24 ENCOUNTER — TELEPHONE (OUTPATIENT)
Dept: CARE COORDINATION | Facility: OTHER | Age: 65
End: 2025-02-24

## 2025-02-25 ENCOUNTER — TELEPHONE (OUTPATIENT)
Dept: CARE COORDINATION | Facility: OTHER | Age: 65
End: 2025-02-25

## 2025-02-25 NOTE — TELEPHONE ENCOUNTER
[facility] calling for VO for PT [id number]. Verbal orders given by this RN.     She also wanted to report patients blood pressure 160/110 this morning before taking his medication. At 150/88 - 30 minutes after taking his medications

## 2025-02-26 ENCOUNTER — TELEPHONE (OUTPATIENT)
Dept: CARE COORDINATION | Facility: OTHER | Age: 65
End: 2025-02-26

## 2025-02-26 NOTE — TELEPHONE ENCOUNTER
ALFREDO Simon calling to confirm medication changes made during 02/21/2025 office visit with provider. RN went over all medication changes with Home care nurse.

## 2025-03-04 ENCOUNTER — MEDICAL CORRESPONDENCE (OUTPATIENT)
Dept: HEALTH INFORMATION MANAGEMENT | Facility: CLINIC | Age: 65
End: 2025-03-04
Payer: MEDICARE

## 2025-03-10 ENCOUNTER — TELEPHONE (OUTPATIENT)
Dept: CARE COORDINATION | Facility: OTHER | Age: 65
End: 2025-03-10

## 2025-03-10 RX ORDER — PANTOPRAZOLE SODIUM 40 MG/1
40 TABLET, DELAYED RELEASE ORAL 2 TIMES DAILY
Qty: 180 TABLET | Refills: 3 | Status: CANCELLED | OUTPATIENT
Start: 2025-03-10

## 2025-03-10 NOTE — Clinical Note
Patient said that he has to sleep sitting straight up despite taking pantoprazole 40 mg every day Provider requesting to see patient to discuss adjusting medications. Patient scheduled for Thursday 03/20/2025 at 2 PM.

## 2025-03-10 NOTE — TELEPHONE ENCOUNTER
ALFREDO Simon calling to report patient is having worsening reflux and has been unable to lay flat at night. Patient is currently taking pantoprazole 40 mg once daily. RN to CC provider

## 2025-03-18 NOTE — TELEPHONE ENCOUNTER
We already changed his PPI - sounds like didn't help.  Please make him appt to discuss issue - not going to keep just adding more meds via phone.    While waiting for appt, have him place something under head legs of bed to raise it a few inches.
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
## 2025-03-20 ENCOUNTER — ANCILLARY PROCEDURE (OUTPATIENT)
Dept: GENERAL RADIOLOGY | Facility: OTHER | Age: 65
End: 2025-03-20
Attending: FAMILY MEDICINE
Payer: MEDICARE

## 2025-03-20 ENCOUNTER — OFFICE VISIT (OUTPATIENT)
Dept: FAMILY MEDICINE | Facility: OTHER | Age: 65
End: 2025-03-20
Attending: FAMILY MEDICINE
Payer: MEDICARE

## 2025-03-20 VITALS
RESPIRATION RATE: 18 BRPM | OXYGEN SATURATION: 96 % | WEIGHT: 185.6 LBS | BODY MASS INDEX: 27.49 KG/M2 | HEART RATE: 88 BPM | HEIGHT: 69 IN | DIASTOLIC BLOOD PRESSURE: 78 MMHG | SYSTOLIC BLOOD PRESSURE: 120 MMHG | TEMPERATURE: 98.1 F

## 2025-03-20 DIAGNOSIS — R10.84 ABDOMINAL PAIN, GENERALIZED: ICD-10-CM

## 2025-03-20 DIAGNOSIS — S69.91XA INJURY OF FINGER OF RIGHT HAND, INITIAL ENCOUNTER: ICD-10-CM

## 2025-03-20 DIAGNOSIS — K21.9 GASTROESOPHAGEAL REFLUX DISEASE WITHOUT ESOPHAGITIS: Primary | ICD-10-CM

## 2025-03-20 PROCEDURE — 73140 X-RAY EXAM OF FINGER(S): CPT | Mod: TC,RT

## 2025-03-20 PROCEDURE — 3078F DIAST BP <80 MM HG: CPT | Performed by: FAMILY MEDICINE

## 2025-03-20 PROCEDURE — 3074F SYST BP LT 130 MM HG: CPT | Performed by: FAMILY MEDICINE

## 2025-03-20 PROCEDURE — G0463 HOSPITAL OUTPT CLINIC VISIT: HCPCS | Mod: 25

## 2025-03-20 PROCEDURE — 99214 OFFICE O/P EST MOD 30 MIN: CPT | Performed by: FAMILY MEDICINE

## 2025-03-20 PROCEDURE — 1126F AMNT PAIN NOTED NONE PRSNT: CPT | Performed by: FAMILY MEDICINE

## 2025-03-20 RX ORDER — SUCRALFATE 1 G/1
1 TABLET ORAL 4 TIMES DAILY
Qty: 120 TABLET | Refills: 0 | Status: SHIPPED | OUTPATIENT
Start: 2025-03-20

## 2025-03-20 RX ORDER — FAMOTIDINE 40 MG/1
40 TABLET, FILM COATED ORAL EVERY EVENING
Qty: 30 TABLET | Refills: 1 | Status: SHIPPED | OUTPATIENT
Start: 2025-03-20

## 2025-03-20 RX ORDER — PANTOPRAZOLE SODIUM 40 MG/1
40 TABLET, DELAYED RELEASE ORAL DAILY
Qty: 30 TABLET | Refills: 1 | Status: SHIPPED | OUTPATIENT
Start: 2025-03-20

## 2025-03-20 RX ORDER — SULFAMETHOXAZOLE AND TRIMETHOPRIM 800; 160 MG/1; MG/1
1 TABLET ORAL 2 TIMES DAILY
Qty: 20 TABLET | Refills: 0 | Status: SHIPPED | OUTPATIENT
Start: 2025-03-20 | End: 2025-03-30

## 2025-03-20 ASSESSMENT — PAIN SCALES - GENERAL: PAINLEVEL_OUTOF10: NO PAIN (0)
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
## 2025-03-31 ENCOUNTER — OFFICE VISIT (OUTPATIENT)
Dept: CARDIOLOGY | Facility: OTHER | Age: 65
End: 2025-03-31
Attending: INTERNAL MEDICINE
Payer: MEDICARE

## 2025-03-31 ENCOUNTER — PATIENT OUTREACH (OUTPATIENT)
Dept: CARE COORDINATION | Facility: OTHER | Age: 65
End: 2025-03-31

## 2025-03-31 VITALS
BODY MASS INDEX: 27.46 KG/M2 | TEMPERATURE: 97.5 F | OXYGEN SATURATION: 97 % | HEIGHT: 69 IN | RESPIRATION RATE: 16 BRPM | SYSTOLIC BLOOD PRESSURE: 134 MMHG | HEART RATE: 92 BPM | DIASTOLIC BLOOD PRESSURE: 84 MMHG | WEIGHT: 185.4 LBS

## 2025-03-31 DIAGNOSIS — J41.0 SIMPLE CHRONIC BRONCHITIS (H): ICD-10-CM

## 2025-03-31 DIAGNOSIS — R06.00 DYSPNEA, UNSPECIFIED TYPE: ICD-10-CM

## 2025-03-31 DIAGNOSIS — R09.02 HYPOXEMIA: ICD-10-CM

## 2025-03-31 DIAGNOSIS — I25.5 ISCHEMIC CARDIOMYOPATHY: Primary | ICD-10-CM

## 2025-03-31 DIAGNOSIS — I25.10 CORONARY ARTERY DISEASE INVOLVING NATIVE CORONARY ARTERY, UNSPECIFIED WHETHER ANGINA PRESENT, UNSPECIFIED WHETHER NATIVE OR TRANSPLANTED HEART: ICD-10-CM

## 2025-03-31 PROCEDURE — G0463 HOSPITAL OUTPT CLINIC VISIT: HCPCS

## 2025-03-31 RX ORDER — FUROSEMIDE 40 MG/1
40 TABLET ORAL DAILY
Qty: 90 TABLET | Refills: 1 | Status: SHIPPED | OUTPATIENT
Start: 2025-03-31 | End: 2025-09-27

## 2025-03-31 NOTE — PROGRESS NOTES
Clinic Care Coordination Contact  Care Team Conversations    RN CC called patient to remind him of Dr. Lamas appointment today and he is thankful for the call as was still sleeping and getting up now to get going.    Will look to discuss addition of pepcid in the evening and added carafate at last PCP visit.    Check on swelling of finger right hand and if antibiotic had helped or needing to see ortho.  Patient has upcoming PCP visit.  Alba Gama RN  Care Coordination

## 2025-03-31 NOTE — PATIENT INSTRUCTIONS
Thank you for allowing Dr. Lamas and our  team to participate in your care. Please call our office at [phone] with scheduling questions or if you need to cancel or change your appointment. With any other questions or concerns you may call cardiology nurse at [phone].       If you experience chest pain, chest pressure, chest tightness, shortness of breath, fainting, lightheadedness, nausea, vomiting, or other concerning symptoms, please report to the Emergency Department or call 911. These symptoms may be emergent, and best treated in the Emergency Department.

## 2025-04-03 ENCOUNTER — MEDICAL CORRESPONDENCE (OUTPATIENT)
Dept: HEALTH INFORMATION MANAGEMENT | Facility: HOSPITAL | Age: 65
End: 2025-04-03

## 2025-04-17 ENCOUNTER — HOSPITAL ENCOUNTER (OUTPATIENT)
Dept: CARDIOLOGY | Facility: HOSPITAL | Age: 65
Discharge: HOME | End: 2025-04-17
Attending: INTERNAL MEDICINE
Payer: MEDICARE

## 2025-04-17 ENCOUNTER — LAB (OUTPATIENT)
Dept: LAB | Facility: OTHER | Age: 65
End: 2025-04-17
Attending: INTERNAL MEDICINE
Payer: MEDICARE

## 2025-04-17 DIAGNOSIS — I25.5 ISCHEMIC CARDIOMYOPATHY: ICD-10-CM

## 2025-04-17 LAB
ALBUMIN SERPL BCG-MCNC: 4.2 G/DL (ref 3.5–5.2)
ALP SERPL-CCNC: 140 U/L (ref 40–150)
ALT SERPL W P-5'-P-CCNC: 28 U/L (ref 0–70)
ANION GAP SERPL CALCULATED.3IONS-SCNC: 13 MMOL/L (ref 7–15)
AST SERPL W P-5'-P-CCNC: 37 U/L (ref 0–45)
BILIRUB SERPL-MCNC: 0.2 MG/DL
BUN SERPL-MCNC: 22.4 MG/DL (ref 8–23)
CALCIUM SERPL-MCNC: 9.4 MG/DL (ref 8.8–10.4)
CHLORIDE SERPL-SCNC: 100 MMOL/L (ref 98–107)
CREAT SERPL-MCNC: 1.19 MG/DL (ref 0.67–1.17)
EGFRCR SERPLBLD CKD-EPI 2021: 69 ML/MIN/1.73M2
GLUCOSE SERPL-MCNC: 109 MG/DL (ref 70–99)
HCO3 SERPL-SCNC: 26 MMOL/L (ref 22–29)
LVEF ECHO: NORMAL
POTASSIUM SERPL-SCNC: 3.7 MMOL/L (ref 3.4–5.3)
PROT SERPL-MCNC: 7.6 G/DL (ref 6.4–8.3)
SODIUM SERPL-SCNC: 139 MMOL/L (ref 135–145)

## 2025-04-17 PROCEDURE — 84155 ASSAY OF PROTEIN SERUM: CPT | Mod: ZL

## 2025-04-17 PROCEDURE — 36415 COLL VENOUS BLD VENIPUNCTURE: CPT | Mod: ZL

## 2025-04-17 PROCEDURE — 93306 TTE W/DOPPLER COMPLETE: CPT

## 2025-04-17 PROCEDURE — 82947 ASSAY GLUCOSE BLOOD QUANT: CPT | Mod: ZL

## 2025-04-21 NOTE — PROGRESS NOTES
Assessment & Plan     Gastroesophageal reflux disease without esophagitis  Doing better.  Continue present PPI, H2 blocker, and carafate.  Can stop the Carafate when he runs out.  See back in 1-2 months, sooner if needed.            Skye Stevens is a 65 year old, presenting for the following health issues:  Follow Up        4/22/2025     8:18 AM   Additional Questions   Roomed by Jose A Forrester lpn   Accompanied by self     HPI      See note from 3/20/25.  GERD symptoms improving.  Abd pain also improving.  Finger doing better.          Objective    /72 (BP Location: Left arm, Patient Position: Sitting, Cuff Size: Adult Regular)   Pulse 76   Temp 97.4  F (36.3  C) (Tympanic)   Resp 18   Wt 85.8 kg (189 lb 1.6 oz)   SpO2 94%   BMI 27.93 kg/m    Body mass index is 27.93 kg/m .  Physical Exam  Constitutional:       General: He is not in acute distress.     Appearance: Normal appearance.   Cardiovascular:      Rate and Rhythm: Normal rate and regular rhythm.      Heart sounds: Normal heart sounds. No murmur heard.  Pulmonary:      Effort: Pulmonary effort is normal.      Breath sounds: Normal breath sounds.   Abdominal:      General: Bowel sounds are normal. There is no distension.      Palpations: Abdomen is soft.      Tenderness: There is no abdominal tenderness. There is no guarding.   Neurological:      Mental Status: He is alert and oriented to person, place, and time.                    Signed Electronically by: Abdifatah Cline DO

## 2025-04-22 ENCOUNTER — OFFICE VISIT (OUTPATIENT)
Dept: FAMILY MEDICINE | Facility: OTHER | Age: 65
End: 2025-04-22
Attending: FAMILY MEDICINE
Payer: MEDICARE

## 2025-04-22 VITALS
SYSTOLIC BLOOD PRESSURE: 120 MMHG | TEMPERATURE: 97.4 F | BODY MASS INDEX: 27.93 KG/M2 | OXYGEN SATURATION: 94 % | WEIGHT: 189.1 LBS | HEART RATE: 76 BPM | DIASTOLIC BLOOD PRESSURE: 72 MMHG | RESPIRATION RATE: 18 BRPM

## 2025-04-22 DIAGNOSIS — K21.9 GASTROESOPHAGEAL REFLUX DISEASE WITHOUT ESOPHAGITIS: Primary | ICD-10-CM

## 2025-04-22 DIAGNOSIS — Z53.9 PERSONS ENCOUNTERING HEALTH SERVICES FOR SPECIFIC PROCEDURES, NOT CARRIED OUT: Primary | ICD-10-CM

## 2025-04-22 PROCEDURE — G0179 MD RECERTIFICATION HHA PT: HCPCS | Performed by: FAMILY MEDICINE

## 2025-04-22 PROCEDURE — G0463 HOSPITAL OUTPT CLINIC VISIT: HCPCS

## 2025-04-22 ASSESSMENT — PAIN SCALES - GENERAL: PAINLEVEL_OUTOF10: NO PAIN (0)

## 2025-04-23 ENCOUNTER — PATIENT OUTREACH (OUTPATIENT)
Dept: CARE COORDINATION | Facility: OTHER | Age: 65
End: 2025-04-23

## 2025-04-23 NOTE — Clinical Note
Please call patient in regards to Echo as hasn't received results and doesn't look to be resulted yet.   Thank you

## 2025-04-23 NOTE — PROGRESS NOTES
Clinic Care Coordination Contact  Care Team Conversations    RN CC called patient and talked with him.  Patient reports that office visit went well yesterday.  Note not yet complete.  Patient states that he will be discontinuing medication as they discussed for acid reflux.  Patient states he is outside now and doesn't have the name in front of him but is aware of directions from PCP.  Patient states he hasn't heard from Cardio regarding Echo results and RN CC will route to ALFREDO Donaldson to please reach out to patient regarding Echo from Dr. Lamas.  Patient states he feels like a new man and getting around well.  States he enjoys walking and walking much more.  Patient is thankful for the support.  RN CC will continue to check in once again and then look to discharge as patient doing well navigating.  Alba Gama, ALFREDO  Care Coordination

## 2025-04-24 ENCOUNTER — TELEPHONE (OUTPATIENT)
Dept: CARE COORDINATION | Facility: OTHER | Age: 65
End: 2025-04-24

## 2025-04-24 NOTE — TELEPHONE ENCOUNTER
Care coordination follow up call to let patient know that a message was sent to Dr. Lamas inquiring about his echo results.  Unable to leave message for patient.  States mail box is full.

## 2025-04-25 ENCOUNTER — TELEPHONE (OUTPATIENT)
Dept: CARE COORDINATION | Facility: OTHER | Age: 65
End: 2025-04-25

## 2025-04-25 NOTE — TELEPHONE ENCOUNTER
Telephone call to patient to  notify of results per provider note.   2nd attempt.  Unable to reach.  VM is full, could not leave message.

## 2025-04-25 NOTE — LETTER
[facility] - HEART FAILURE CARE COORDINATION  [address]  [place] [address]    April 28, 2025    Rodney Goodwin  [address]  [place] [address]      Dear Rodney,    I am a heart failure care coordinator nurse who works with the doctors and staff at the Cardiology/C.O.R.E. clinic to help manage patient care. I am here to answer any questions or concerns about your heart health.    My goals are to help you manage your heart failure and to improve your access to care. I will work with your Cardiology team to support your health and social needs.     We will:    Create a care plan that supports communication between you and your Cardiology team.  Help you connect with health care resources.   Give you the information and knowledge you may need to manage your heart failure.    Work with you to remove any barriers you may have to your heart health.     We work to give you the highest quality healthcare. Please call me at *** with any questions about your heart failure care.    Sincerely,    ***

## 2025-04-25 NOTE — TELEPHONE ENCOUNTER
Margarito Lamas MD sent to Anne-Marie Navarro RN  Hey  Is it possible to tell him that the echo overall looks good, function is essentially normal.  Raleigh Pimentel

## 2025-04-25 NOTE — TELEPHONE ENCOUNTER
From: Anne-Marie Navarro RN  Sent: 4/23/2025   3:35 PM CDT  To: Margarito Lamas MD  Subject: Echo results                                     Echo encounter date is 4/17.  Was released, but the patient doesn't have mychart.  He mentioned to a  family practice   RN CC that he hasn't heard anything.  How would you like to notify the patient of the results?  Please advise.

## 2025-04-28 NOTE — TELEPHONE ENCOUNTER
3rd Attempt:  Telephone call to patient.  Unable to reach.  No answer.  Mailbox is full, unable to leave message.

## 2025-05-13 ENCOUNTER — TELEPHONE (OUTPATIENT)
Dept: CARE COORDINATION | Facility: OTHER | Age: 65
End: 2025-05-13

## 2025-05-13 DIAGNOSIS — R10.84 ABDOMINAL PAIN, GENERALIZED: ICD-10-CM

## 2025-05-13 DIAGNOSIS — K21.9 GASTROESOPHAGEAL REFLUX DISEASE WITHOUT ESOPHAGITIS: ICD-10-CM

## 2025-05-13 RX ORDER — SUCRALFATE 1 G/1
1 TABLET ORAL DAILY
Status: SHIPPED
Start: 2025-05-13

## 2025-05-13 NOTE — Clinical Note
ALFREDO Simon calling to report patient tried to stop the sucralfate but immediatly got acid reflux.  Instruction for sucralfate below:  Disp Refills Start End DEL sucralfate (CARAFATE) 1 GM tablet 120 tablet 0 3/20/2025 - No Sig - Route: Take 1 tablet (1 g) by mouth 4 times daily. - Oral  Patient is taking differently. Patient is taking once a day. Home Care Nursing asking if script can be changed to once daily.

## 2025-05-13 NOTE — TELEPHONE ENCOUNTER
ALFREDO Simon calling to report patient tried to stop the sucralfate but immediatly got acid reflux.   Instruction for sucralfate below:   Disp Refills Start End DEL   sucralfate (CARAFATE) 1 GM tablet 120 tablet 0 3/20/2025 -- No   Sig - Route: Take 1 tablet (1 g) by mouth 4 times daily. - Oral     Patient is taking differently. Patient is taking once a day. Home Care Nursing asking if script can be changed to once daily.

## 2025-05-20 ENCOUNTER — MEDICAL CORRESPONDENCE (OUTPATIENT)
Dept: HEALTH INFORMATION MANAGEMENT | Facility: HOSPITAL | Age: 65
End: 2025-05-20

## 2025-05-20 DIAGNOSIS — R10.84 ABDOMINAL PAIN, GENERALIZED: ICD-10-CM

## 2025-05-20 DIAGNOSIS — K21.9 GASTROESOPHAGEAL REFLUX DISEASE WITHOUT ESOPHAGITIS: ICD-10-CM

## 2025-05-20 RX ORDER — PANTOPRAZOLE SODIUM 40 MG/1
40 TABLET, DELAYED RELEASE ORAL DAILY
Qty: 30 TABLET | Refills: 4 | Status: SHIPPED | OUTPATIENT
Start: 2025-05-20

## 2025-05-29 ENCOUNTER — TELEPHONE (OUTPATIENT)
Dept: CARE COORDINATION | Facility: OTHER | Age: 65
End: 2025-05-29

## 2025-05-29 NOTE — TELEPHONE ENCOUNTER
ALFREDO Simon calling to update provider that patient has cancelled has his SN visit because he didn't feel well. Home Care nurse called patient again today and said he was starting to feel better but he still did not want a nursing visit. So a missed visit will show up.

## 2025-06-10 DIAGNOSIS — I25.10 CORONARY ARTERY DISEASE INVOLVING NATIVE CORONARY ARTERY OF NATIVE HEART WITHOUT ANGINA PECTORIS: ICD-10-CM

## 2025-06-10 RX ORDER — ROSUVASTATIN CALCIUM 40 MG/1
40 TABLET, COATED ORAL
Qty: 90 TABLET | Refills: 0 | Status: SHIPPED | OUTPATIENT
Start: 2025-06-10

## 2025-06-10 RX ORDER — ASPIRIN 81 MG
81 TABLET,CHEWABLE ORAL DAILY
Qty: 90 TABLET | Refills: 0 | Status: SHIPPED | OUTPATIENT
Start: 2025-06-10

## 2025-06-10 NOTE — TELEPHONE ENCOUNTER
ASPIRIN 81 MG CHEWABLE TABLET       Last Written Prescription Date:  0  Last Fill Quantity: 0,   # refills: 0  Last Office Visit: 04/22/2025  Future Office visit:    Next 5 appointments (look out 90 days)      Jul 28, 2025 4:00 PM  (Arrive by 3:45 PM)  Return Visit with Margarito Lamas MD  [facility] ([facility] ) [address] [address]  [phone]           Historical    Routing refill request to provider for review/approval because:    Analgesics (Non-Narcotic Tylenol and ASA Only) Clwbyz61/10/2025 10:23 AM   Protocol Details Medication is active on med list and the sig matches. RN to manually verify dose and sig if red X/fail.          ROSUVASTATIN CALCIUM 40MG TAB       Last Written Prescription Date:  05/07/2024  Last Fill Quantity: 90,   # refills: 3  Last Office Visit: 04/22/2025  Future Office visit:    Next 5 appointments (look out 90 days)      Jul 28, 2025 4:00 PM  (Arrive by 3:45 PM)  Return Visit with Margarito Lamas MD  [facility] ([facility] ) [address] [address]  [phone]             Routing refill request to provider for review/approval because:    Antihyperlipidemic agents Qkpyfl94/10/2025 10:23 AM   Protocol Details LDL on file in the past 12 months    Medication is active on med list and the sig matches. RN to manually verify dose and sig if red X/fail.            Kimberly Boecker, RN

## 2025-06-25 ENCOUNTER — PATIENT OUTREACH (OUTPATIENT)
Dept: CARE COORDINATION | Facility: OTHER | Age: 65
End: 2025-06-25

## 2025-06-25 NOTE — Clinical Note
Please call patient to schedule PCP follow up.  If works on 7/28 when has Cardio visit that would be great.  Thank you

## 2025-06-25 NOTE — PROGRESS NOTES
Clinic Care Coordination Contact  Care Team Conversations    RN CC reviewed chart.   RN CC called patient x 2 and busy and nowhere to leave message.  RN CC sent message to [facility] to please call patient to schedule follow up with PCP and can be on date of Dr. Lamas visit if this works out.  Thank you  ALFREDO Willis

## 2025-06-26 NOTE — TELEPHONE ENCOUNTER
"Attempt # 1  Outcome: Not Available   Comment: \"This number has calling restrictions that prevented the completion of this call.\"    "

## 2025-07-09 DIAGNOSIS — I10 PRIMARY HYPERTENSION: ICD-10-CM

## 2025-07-10 RX ORDER — CARVEDILOL 12.5 MG/1
12.5 TABLET ORAL 2 TIMES DAILY WITH MEALS
Qty: 180 TABLET | Refills: 2 | Status: SHIPPED | OUTPATIENT
Start: 2025-07-10

## 2025-07-28 ENCOUNTER — OFFICE VISIT (OUTPATIENT)
Dept: CARDIOLOGY | Facility: OTHER | Age: 65
End: 2025-07-28
Attending: INTERNAL MEDICINE
Payer: MEDICARE

## 2025-07-28 VITALS
WEIGHT: 189.8 LBS | HEART RATE: 81 BPM | BODY MASS INDEX: 28.11 KG/M2 | SYSTOLIC BLOOD PRESSURE: 126 MMHG | HEIGHT: 69 IN | OXYGEN SATURATION: 95 % | RESPIRATION RATE: 16 BRPM | DIASTOLIC BLOOD PRESSURE: 68 MMHG

## 2025-07-28 DIAGNOSIS — R09.02 HYPOXEMIA: ICD-10-CM

## 2025-07-28 DIAGNOSIS — R06.00 DYSPNEA, UNSPECIFIED TYPE: ICD-10-CM

## 2025-07-28 DIAGNOSIS — I25.5 ISCHEMIC CARDIOMYOPATHY: Primary | ICD-10-CM

## 2025-07-28 DIAGNOSIS — R07.9 CHEST PAIN, UNSPECIFIED TYPE: ICD-10-CM

## 2025-07-28 PROCEDURE — G0463 HOSPITAL OUTPT CLINIC VISIT: HCPCS | Performed by: INTERNAL MEDICINE

## 2025-07-28 ASSESSMENT — PAIN SCALES - GENERAL: PAINLEVEL_OUTOF10: NO PAIN (0)

## 2025-07-28 NOTE — PATIENT INSTRUCTIONS
Thank you for allowing Dr. Lamas and our  team to participate in your care. Please call our office at [phone] with scheduling questions or if you need to cancel or change your appointment. With any other questions or concerns you may call cardiology nurse at [phone].       If you experience chest pain, chest pressure, chest tightness, shortness of breath, fainting, lightheadedness, nausea, vomiting, or other concerning symptoms, please report to the Emergency Department or call 911. These symptoms may be emergent, and best treated in the Emergency Department.

## 2025-07-28 NOTE — PROGRESS NOTES
July 28, 2025     I had the pleasure of seeing Rodney Goodwin  in the [facility].  He is a 65-year-old male with no known past cardiac history that he does not however know that he did have a nuclear medicine stress test in 2018 which showed fixed defect.  He was recently found to have a bladder mass on CT scan concerning for bladder cancer and was supposed to undergo cystoscopy however during anesthesia evaluation he was found to have recurrent episodes of chest pain and as such cardiology evaluation was urgently requested.   He was referred for coronary angiogram on an urgent basis given the progressive symptoms and he did undergo PCI for hemodynamically significant disease as listed below.  He then had the urological procedure and the cancer removed via cystoscopy 1 month after the stent placement, for this he did hold the Brilinta for a few days and went back on it right after.  He continued to have bruising but otherwise no complication whatsoever.  His chest pain feels significantly better even relaxing and able to walk around more easily.  He did cut back smoking quite a bit at the time of the procedure and afterwards however increased again later again in the setting of significant stress. He did have a recent hospital admission in [place] for COPD exacerbation  Overall he denies any new cardiac complaints or issues.  He has been doing relatively well however not been taking his medications consistently.  The only thing he takes regularly is the Brilinta.  The other ones are hit or miss, he does not have home care anymore and this hinders his medication adherence.  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     PAST MEDICAL HISTORY:  Past Medical History:   Diagnosis Date    Acute exacerbation of chronic obstructive pulmonary disease (H) 10/28/2016    Anxiety     Backache 6/9/2008    Overview:  IMO Update 10/11    Bipolar 1 disorder (H)  11/11/2014    Bipolar affective disorder (H)     Cellulitis and abscess of forearm 4/4/2014    Chlordiazepoxide dependence (H) 8/9/2010    Overview:  IMO Update 10/11    Chronic constipation     COPD exacerbation (H) 9/14/2015    Costochondritis 2/26/2015    Degeneration of lumbar or lumbosacral intervertebral disc 7/25/2006    Overview:  IMO Update 10/11    Depressive disorder     Drug abuse (H)     Elevated blood sugar 10/29/2016    Heroin abuse (H) 9/15/2015    Heroin addiction (H) 8/9/2010    Overview:  See social notes IMO Update 10/11    Hypertension     IV drug user 8/9/2010    Overview:  IMO Update 10/11    Lumbosacral spondylosis without myelopathy 11/13/2008    Narcotic addiction (H) 10/29/2016    Opioid use disorder, severe, dependence (H) 2020    CADT records; prior Methadone; [facility]; AA/NA    Osteoarthrosis, pelvic region and thigh 11/13/2008    Overview:  IMO Update 10/11    Penile lesion 4/27/2020    Suicidal behavior 4/27/2020    Venous insufficiency of both lower extremities 2/26/2015     Do you wish to do the replacement in the background? yes       FAMILY HISTORY:  Family History   Problem Relation Age of Onset    Diabetes Mother     Cerebrovascular Disease Father     Pancreatic Cancer Brother      SOCIAL HISTORY:  Social History     Socioeconomic History    Marital status:     Number of children: 3    Years of education: 12   Occupational History     Employer: [employer]   Tobacco Use    Smoking status: Every Day     Current packs/day: 1.00     Average packs/day: 1 pack/day for 42.6 years (42.6 ttl pk-yrs)     Types: Cigarettes     Start date: 1983    Smokeless tobacco: Never    Tobacco comments:     Tried to Quit (NO)   Vaping Use    Vaping status: Never Used   Substance and Sexual Activity    Alcohol use: No     Alcohol/week: 0.0 standard drinks of alcohol    Drug use: Not Currently     Types: IV, Methamphetamines, Opiates, Marijuana     Comment: last use 8 mo    Sexual activity:  Not Currently     Social Drivers of Health     Financial Resource Strain: Low Risk  (2/10/2025)    Financial Resource Strain     Within the past 12 months, have you or your family members you live with been unable to get utilities (heat, electricity) when it was really needed?: No   Food Insecurity: Low Risk  (2/10/2025)    Food Insecurity     Within the past 12 months, did you worry that your food would run out before you got money to buy more?: No     Within the past 12 months, did the food you bought just not last and you didn t have money to get more?: No   Transportation Needs: High Risk (2/10/2025)    Transportation Needs     Within the past 12 months, has lack of transportation kept you from medical appointments, getting your medicines, non-medical meetings or appointments, work, or from getting things that you need?: Yes   Interpersonal Safety: Low Risk  (4/22/2025)    Interpersonal Safety     Do you feel physically and emotionally safe where you currently live?: Yes     Within the past 12 months, have you been hit, slapped, kicked or otherwise physically hurt by someone?: No     Within the past 12 months, have you been humiliated or emotionally abused in other ways by your partner or ex-partner?: No   Housing Stability: High Risk (2/10/2025)    Housing Stability     Do you have housing? : No     Are you worried about losing your housing?: No     CURRENT MEDICATIONS:  Current Outpatient Medications   Medication Sig Dispense Refill    albuterol (PROAIR HFA/PROVENTIL HFA/VENTOLIN HFA) 108 (90 Base) MCG/ACT inhaler Inhale 2 puffs into the lungs every 4 hours as needed for shortness of breath, wheezing or cough. 18 g 3    ASPIRIN LOW DOSE 81 MG chewable tablet CHEW AND SWALLOW 1 TABLET BY MOUTH DAILY 90 tablet 0    carvedilol (COREG) 12.5 MG tablet TAKE 1 TABLET BY MOUTH TWICE DAILY WITH MEALS 180 tablet 2    famotidine (PEPCID) 40 MG tablet Take 1 tablet (40 mg) bymouth every evening. 30 tablet 0    furosemide  "(LASIX) 40 MG tablet Take 1 tablet (40 mg) by mouth daily. 90 tablet 1    ipratropium - albuterol 0.5 mg/2.5 mg/3 mL (DUONEB) 0.5-2.5 (3) MG/3ML neb solution Take 1 vial (3 mLs) by nebulization every 6 hours as needed for shortness of breath, wheezing or cough. 90 mL 2    ipratropium - albuterol 0.5 mg/2.5 mg/3 mL (DUONEB) 0.5-2.5 (3) MG/3ML neb solution Take 1 vial (3 mLs) by nebulization every 6 hours as needed for shortness of breath, wheezing or cough. 90 mL 2    losartan (COZAAR) 100 MG tablet Take 1 tablet by mouth once daily 30 tablet 11    pantoprazole (PROTONIX) 40 MG EC tablet Take 1 tablet (40 mg) by mouth daily 30 tablet 4    rosuvastatin (CRESTOR) 40 MG tablet TAKE 1 TABLET BY MOUTH DAILY 90 tablet 0    sucralfate (CARAFATE) 1 GM tablet Take 1 tablet (1 g) by mouth daily.      ticagrelor (BRILINTA) 90 MG tablet Take 90 mg by mouth 2 times daily.      umeclidinium-vilanterol (ANORO ELLIPTA) 62.5-25 MCG/ACT oral inhaler Inhale 1 puff into the lungs daily. 60 each 1     No current facility-administered medications for this visit.     Facility-Administered Medications Ordered in Other Visits   Medication Dose Route Frequency Provider Last Rate Last Admin    iopamidol (ISOVUE-370) solution    Once PRN Zak Stephens MD   115 mL at 04/04/24 1200     EXAM:  /68 (BP Location: Right arm, Patient Position: Sitting, Cuff Size: Adult Regular)   Pulse 81   Resp 16   Ht 1.753 m (5' 9\")   Wt 86.1 kg (189 lb 12.8 oz)   SpO2 95%   BMI 28.03 kg/m    General: appears comfortable, alert and oriented  Head: normocephalic, atraumatic  Eyes: anicteric sclera, EOMI , PERRL  Neck: no adenopathy  Orophyarynx: moist mucosa, no lesions noted  Heart: regular, S1/S2, no murmurs, rubs or gallop. Estimated JVP at 5 cmH2O  Lungs: CTAB, No wheezing.   Abdomen: soft, non-tender, bowel sounds present, no hepatosplenomegaly  Extremities: 1+ LE edema today  Skin: no open lesions noted, some erythema of bilateral " ankles  Neuro: grossly non-focal     Labs:  Lab Results   Component Value Date    WBC 4.8 02/11/2025    HGB 12.1 (L) 02/11/2025    HCT 36.5 (L) 02/11/2025     02/11/2025     04/17/2025    POTASSIUM 3.7 04/17/2025    CHLORIDE 100 04/17/2025    CO2 26 04/17/2025    BUN 22.4 04/17/2025    CR 1.19 (H) 04/17/2025     (H) 04/17/2025    SED 10 04/11/2015    DD 0.52 (H) 01/16/2023    DIMER 291 10/28/2016    NTBNPI 784 02/10/2025    NTBNP 417 03/27/2024    TROPI <0.015 09/28/2019    TROPN 0.04 01/20/2014    AST 37 04/17/2025    ALT 28 04/17/2025    GGT 41 09/13/2023    ALKPHOS 140 04/17/2025    BILITOTAL 0.2 04/17/2025    INR 0.93 04/04/2024      MPI stress 2/21/2018:  Fixed defect inferiorly suggesting right coronary artery infarction. No evidence of reversible ischemia.      Nuclear stress 11/2023:    The nuclear stress test is abnormal.     There is a medium sized area of infarction in the entire inferior  segment(s) of the left ventricle, similar in appearance to 2/21/2018.     Left ventricular function is low normal.     The left ventricular ejection fraction at rest is 53%.  The left ventricular ejection fraction at stress is 50%.     A prior study was conducted on 2/21/2018.      TTE 11/27/23:  Global and regional left ventricular function is normal with an EF of 55-60%. Grade I or early diastolic dysfunction.  Global right ventricular function is normal.  Both atria appear normal.  Trace mitral insufficiency is present. Trace aortic insufficiency is present.  No aortic stenosis is present.     Coronary angiogram 4/4/24:    Prox RCA lesion is 80% stenosed.    Dist RCA lesion is 40% stenosed.    RPDA lesion is 50% stenosed.    1st Mrg lesion is 50% stenosed.    Mid LAD lesion is 50% stenosed.    2nd Diag lesion is 50% stenosed.   Severe stenosis of the RCA s/p successful BUDDY stenting with 2.5x24mm synergy XD  Moderate stenosis of OM2 and LAD at D2 bifurcation  Uncomplicated R Radial access  Continue  DAPT ideally for 90 days uninterrupted, followed by aspirin 81mg daily lifelong. If interruption of DAPT is required for cystoscopy and biopsy, please continue DAPT uninterrupted for a minimum of 30 days, followed by aspiring 81mg daily lifelong.      TTE 4/17/25:  Left ventricular function is decreased. The ejection fraction is 50-55% (borderline). Grade I or early diastolic dysfunction.  Global right ventricular function is normal.  Mild to moderate aortic insufficiency is present. No aortic stenosis is present.  The tricuspid valve is normal.    ASSESSMENT AND PLAN:  In summary, patient is a 65 year old male with above past medical history which is minimal for cardiac with positive stress test back in 2018 as well as 2023 with a large fixed defect in the RCA territory on nuclear medicine stress test no reversible ischemia who was referred for further cardiac evaluation in the setting of her recently found bladder mass requiring cystoscopy.  He presented with symptoms of angina and was referred for coronary angiogram as above. He does have multivessel disease and required PCI as above. He he is here for follow up.     Overall he is doing okay however not consistent with the medications except for the Brilinta.  We discussed the importance of medication adherence and how much this will help him down the road.  At this time I do not think it is worth changing his medications given that he has not been consistent with this.  We discussed again the importance of this he is doing good on the smoking though.  Again no medication changes we will see him back sometimes next year however he would either need to be corrected obtain home Somehow or he will need to be consistent with his medications otherwise he will end up in the hospital again.  He did gain some weight already which I think some of it is fluid although some of it is food for sure.    I appreciate the opportunity to participate in the care of Rodney Goodwin  . Please do not hesitate to contact me with any further questions.  I have spent a total of 40 minutes today reviewing labs, imaging studies, discussing with colleagues, face-to-face time with patient and documentation in the medical record.  The longitudinal plan of care for the diagnosis(es)/condition(s) as documented were addressed during this visit. Due to the added complexity in care, I will continue to support Rodney in the subsequent management and with ongoing continuity of care.    Sincerely,   Margarito Lamas MD  [facility] Division of Cardiology

## 2025-08-14 ENCOUNTER — PATIENT OUTREACH (OUTPATIENT)
Dept: CARE COORDINATION | Facility: OTHER | Age: 65
End: 2025-08-14

## (undated) DEVICE — STERI-STRIP-1/2" X 4"

## (undated) DEVICE — DRSG-SPONGE STERILE 4 X 4

## (undated) DEVICE — GW VASC .035IN DIA 260CML 7CML 3 MM RADIUS J CURVE 502455

## (undated) DEVICE — ENDO TRAP POLYP E-TRAP 00711099

## (undated) DEVICE — TOWEL-OR DISP 4PKS

## (undated) DEVICE — IRRIGATION-H2O 1000ML

## (undated) DEVICE — FORCEP-COLON BIOPSY LARGE W/NEEDLE 240CM

## (undated) DEVICE — TUBING SUCTION 20FT N620A

## (undated) DEVICE — FORCEPS BIOPSY RADIAL JAW 4 LARGE W/NEEDLE 240CM M00513332

## (undated) DEVICE — BDG-ELASTIC 4 INCH DBL.

## (undated) DEVICE — LIGHT HANDLE COVER

## (undated) DEVICE — SOL WATER IRRIG 1000ML BOTTLE 2F7114

## (undated) DEVICE — NDL-25G 1-1/2" NON-SAFETY

## (undated) DEVICE — FASTENER CATH BALLOON CLAMPX2 STATLOCK 0684-00-493

## (undated) DEVICE — CATH BALLOON NC EMERGE 2.75X15MM H7493926715270

## (undated) DEVICE — BLADE-SCALPEL #15

## (undated) DEVICE — ENDO SNARE EXACTO COLD 9MM LOOP 2.4MMX230CM 00711115

## (undated) DEVICE — SLEEVE TR BAND RADIAL COMPRESSION DEVICE 24CM TRB24-REG

## (undated) DEVICE — SUTURE-ETHILON 4-0 FS-1 1629H

## (undated) DEVICE — TUBING PRESSURE 30"

## (undated) DEVICE — KIT HAND CONTROL ACIST 014644 AR-P54

## (undated) DEVICE — PACK HEART LEFT CUSTOM

## (undated) DEVICE — APPLICATOR-CHLORAPREP 26ML TINTED CHG 2%+ 70% IPA-SURGICAL

## (undated) DEVICE — DRAPE-STERI 45X60CM #1010

## (undated) DEVICE — SHTH INTRO 0.021IN ID 6FR DIA

## (undated) DEVICE — DRSG-XEROFORM 1X8

## (undated) DEVICE — CANISTER SUCTION MEDI-VAC GUARDIAN 2000ML 90D 65651-220

## (undated) DEVICE — NDL-22G X 1 1/2" NON-SAFETY

## (undated) DEVICE — DRSG-ABDOMINAL 5 X 9

## (undated) DEVICE — LABEL-STERILE PREPRINTED FOR OR

## (undated) DEVICE — CONNECTOR ERBEFLO 2 PORT 20325-215

## (undated) DEVICE — SUCTION MANIFOLD NEPTUNE 2 SYS 1 PORT 702-025-000

## (undated) DEVICE — GLV-8.0 BIOGEL LATEX

## (undated) DEVICE — CUFF-DISP STERILE 18IN SINGLE BLADDER

## (undated) DEVICE — DRAPE-THREE QUARTER (LARGE) SHEET

## (undated) DEVICE — CATH GUIDING BLUE YELLOW PTFE JR4 6FRX100CM 67008200

## (undated) DEVICE — SUTURE-MONOCRYL 4-0 PS-2 Y496G

## (undated) DEVICE — PACK-SET UP-CUSTOM

## (undated) DEVICE — WIRE GUIDE HI-TRQ PILOT 50 JTIP 0.014"X190CM 1010480-HJ

## (undated) DEVICE — GOWN-SURG XL LVL 3 REINFORCED

## (undated) DEVICE — CAST PADDING-STERILE 4"

## (undated) DEVICE — BDG-ESMARK 4 INCH X 9 FT

## (undated) DEVICE — WIRE GUIDE 0.035"X150CM EMERALD J TIP 502521

## (undated) DEVICE — Device

## (undated) DEVICE — SLING-ARM-LARGE

## (undated) DEVICE — IRRIGATION-NACL 1000ML

## (undated) DEVICE — DRSG-KERLIX 6 X 6 3/4 FLUFF

## (undated) DEVICE — DRAPE-EXTREMITY SHEET

## (undated) DEVICE — MANIFOLD KIT ANGIO AUTOMATED 014613

## (undated) DEVICE — VALVE HEMOSTASIS GUARDIAN II OD8 FR GUIDEWIRE 8215

## (undated) DEVICE — SUTURE-VICRYL 2-0 CT-2 J269H

## (undated) DEVICE — DRAPE-U DRAPE PLASTIC SPLIT SHEET 60" X 72"

## (undated) RX ORDER — SOFT LENS RINSE,STORE SOLUTION
SOLUTION, NON-ORAL MISCELLANEOUS
Status: DISPENSED
Start: 2024-05-07

## (undated) RX ORDER — PROPOFOL 10 MG/ML
INJECTION, EMULSION INTRAVENOUS
Status: DISPENSED
Start: 2024-10-22

## (undated) RX ORDER — LIDOCAINE HYDROCHLORIDE 20 MG/ML
INJECTION, SOLUTION EPIDURAL; INFILTRATION; INTRACAUDAL; PERINEURAL
Status: DISPENSED
Start: 2017-11-21

## (undated) RX ORDER — SODIUM CHLORIDE 9 MG/ML
INJECTION, SOLUTION INTRAVENOUS
Status: DISPENSED
Start: 2024-04-04

## (undated) RX ORDER — HEPARIN SODIUM 1000 [USP'U]/ML
INJECTION, SOLUTION INTRAVENOUS; SUBCUTANEOUS
Status: DISPENSED
Start: 2024-04-04

## (undated) RX ORDER — NITROGLYCERIN 5 MG/ML
VIAL (ML) INTRAVENOUS
Status: DISPENSED
Start: 2024-04-04

## (undated) RX ORDER — NICARDIPINE HCL-0.9% SOD CHLOR 1 MG/10 ML
SYRINGE (ML) INTRAVENOUS
Status: DISPENSED
Start: 2024-04-04

## (undated) RX ORDER — GINSENG 100 MG
CAPSULE ORAL
Status: DISPENSED
Start: 2023-12-14

## (undated) RX ORDER — ONDANSETRON 2 MG/ML
INJECTION INTRAMUSCULAR; INTRAVENOUS
Status: DISPENSED
Start: 2017-11-21

## (undated) RX ORDER — KETAMINE HCL IN 0.9 % NACL 20 MG/2 ML
SYRINGE (ML) INTRAVENOUS
Status: DISPENSED
Start: 2017-11-21

## (undated) RX ORDER — ASPIRIN 325 MG
TABLET ORAL
Status: DISPENSED
Start: 2024-04-04

## (undated) RX ORDER — FENTANYL CITRATE 50 UG/ML
INJECTION, SOLUTION INTRAMUSCULAR; INTRAVENOUS
Status: DISPENSED
Start: 2017-11-21

## (undated) RX ORDER — TETRACAINE HYDROCHLORIDE 5 MG/ML
SOLUTION OPHTHALMIC
Status: DISPENSED
Start: 2024-05-07

## (undated) RX ORDER — FENTANYL CITRATE 50 UG/ML
INJECTION, SOLUTION INTRAMUSCULAR; INTRAVENOUS
Status: DISPENSED
Start: 2024-04-04

## (undated) RX ORDER — PROPOFOL 10 MG/ML
INJECTION, EMULSION INTRAVENOUS
Status: DISPENSED
Start: 2017-11-21

## (undated) RX ORDER — DEXMEDETOMIDINE HYDROCHLORIDE 4 UG/ML
INJECTION, SOLUTION INTRAVENOUS
Status: DISPENSED
Start: 2024-10-22